# Patient Record
Sex: MALE | Race: OTHER | HISPANIC OR LATINO | URBAN - METROPOLITAN AREA
[De-identification: names, ages, dates, MRNs, and addresses within clinical notes are randomized per-mention and may not be internally consistent; named-entity substitution may affect disease eponyms.]

---

## 2018-03-01 ENCOUNTER — OUTPATIENT (OUTPATIENT)
Dept: OUTPATIENT SERVICES | Facility: HOSPITAL | Age: 38
LOS: 1 days | End: 2018-03-01

## 2018-03-16 DIAGNOSIS — R69 ILLNESS, UNSPECIFIED: ICD-10-CM

## 2018-12-11 ENCOUNTER — EMERGENCY (EMERGENCY)
Facility: HOSPITAL | Age: 38
LOS: 1 days | Discharge: LEFT BEFORE DISCHARGE | End: 2018-12-11
Attending: EMERGENCY MEDICINE | Admitting: INTERNAL MEDICINE
Payer: MEDICAID

## 2018-12-11 VITALS
WEIGHT: 223.99 LBS | DIASTOLIC BLOOD PRESSURE: 87 MMHG | OXYGEN SATURATION: 95 % | HEIGHT: 69 IN | TEMPERATURE: 99 F | HEART RATE: 107 BPM | SYSTOLIC BLOOD PRESSURE: 129 MMHG | RESPIRATION RATE: 18 BRPM

## 2018-12-11 VITALS
DIASTOLIC BLOOD PRESSURE: 77 MMHG | OXYGEN SATURATION: 99 % | HEART RATE: 89 BPM | SYSTOLIC BLOOD PRESSURE: 122 MMHG | RESPIRATION RATE: 18 BRPM

## 2018-12-11 DIAGNOSIS — R69 ILLNESS, UNSPECIFIED: ICD-10-CM

## 2018-12-11 DIAGNOSIS — F11.20 OPIOID DEPENDENCE, UNCOMPLICATED: ICD-10-CM

## 2018-12-11 DIAGNOSIS — F19.10 OTHER PSYCHOACTIVE SUBSTANCE ABUSE, UNCOMPLICATED: ICD-10-CM

## 2018-12-11 DIAGNOSIS — M86.9 OSTEOMYELITIS, UNSPECIFIED: ICD-10-CM

## 2018-12-11 LAB
ALBUMIN SERPL ELPH-MCNC: 4.2 G/DL — SIGNIFICANT CHANGE UP (ref 3.3–5)
ALP SERPL-CCNC: 94 U/L — SIGNIFICANT CHANGE UP (ref 40–120)
ALT FLD-CCNC: 51 U/L — HIGH (ref 10–45)
ANION GAP SERPL CALC-SCNC: 11 MMOL/L — SIGNIFICANT CHANGE UP (ref 5–17)
APTT BLD: 19.7 SEC — LOW (ref 27.5–36.3)
AST SERPL-CCNC: 49 U/L — HIGH (ref 10–40)
BASOPHILS # BLD AUTO: 0 K/UL — SIGNIFICANT CHANGE UP (ref 0–0.2)
BASOPHILS NFR BLD AUTO: 0.8 % — SIGNIFICANT CHANGE UP (ref 0–2)
BILIRUB SERPL-MCNC: 0.4 MG/DL — SIGNIFICANT CHANGE UP (ref 0.2–1.2)
BUN SERPL-MCNC: 17 MG/DL — SIGNIFICANT CHANGE UP (ref 7–23)
CALCIUM SERPL-MCNC: 9.7 MG/DL — SIGNIFICANT CHANGE UP (ref 8.4–10.5)
CHLORIDE SERPL-SCNC: 97 MMOL/L — SIGNIFICANT CHANGE UP (ref 96–108)
CO2 SERPL-SCNC: 26 MMOL/L — SIGNIFICANT CHANGE UP (ref 22–31)
CREAT SERPL-MCNC: 0.69 MG/DL — SIGNIFICANT CHANGE UP (ref 0.5–1.3)
CRP SERPL-MCNC: 4.33 MG/DL — HIGH (ref 0–0.4)
EOSINOPHIL # BLD AUTO: 0.2 K/UL — SIGNIFICANT CHANGE UP (ref 0–0.5)
EOSINOPHIL NFR BLD AUTO: 4.4 % — SIGNIFICANT CHANGE UP (ref 0–6)
ERYTHROCYTE [SEDIMENTATION RATE] IN BLOOD: 44 MM/HR — HIGH (ref 0–15)
GAS PNL BLDV: SIGNIFICANT CHANGE UP
GLUCOSE SERPL-MCNC: 118 MG/DL — HIGH (ref 70–99)
HCT VFR BLD CALC: 40.8 % — SIGNIFICANT CHANGE UP (ref 39–50)
HGB BLD-MCNC: 14 G/DL — SIGNIFICANT CHANGE UP (ref 13–17)
INR BLD: 0.97 RATIO — SIGNIFICANT CHANGE UP (ref 0.88–1.16)
LYMPHOCYTES # BLD AUTO: 1 K/UL — SIGNIFICANT CHANGE UP (ref 1–3.3)
LYMPHOCYTES # BLD AUTO: 20.2 % — SIGNIFICANT CHANGE UP (ref 13–44)
MCHC RBC-ENTMCNC: 29.6 PG — SIGNIFICANT CHANGE UP (ref 27–34)
MCHC RBC-ENTMCNC: 34.4 GM/DL — SIGNIFICANT CHANGE UP (ref 32–36)
MCV RBC AUTO: 86.1 FL — SIGNIFICANT CHANGE UP (ref 80–100)
MONOCYTES # BLD AUTO: 0.4 K/UL — SIGNIFICANT CHANGE UP (ref 0–0.9)
MONOCYTES NFR BLD AUTO: 7.7 % — SIGNIFICANT CHANGE UP (ref 2–14)
NEUTROPHILS # BLD AUTO: 3.4 K/UL — SIGNIFICANT CHANGE UP (ref 1.8–7.4)
NEUTROPHILS NFR BLD AUTO: 66.8 % — SIGNIFICANT CHANGE UP (ref 43–77)
PLATELET # BLD AUTO: 229 K/UL — SIGNIFICANT CHANGE UP (ref 150–400)
POTASSIUM SERPL-MCNC: 5.2 MMOL/L — SIGNIFICANT CHANGE UP (ref 3.5–5.3)
POTASSIUM SERPL-SCNC: 5.2 MMOL/L — SIGNIFICANT CHANGE UP (ref 3.5–5.3)
PROT SERPL-MCNC: 8 G/DL — SIGNIFICANT CHANGE UP (ref 6–8.3)
PROTHROM AB SERPL-ACNC: 11 SEC — SIGNIFICANT CHANGE UP (ref 10–12.9)
RBC # BLD: 4.74 M/UL — SIGNIFICANT CHANGE UP (ref 4.2–5.8)
RBC # FLD: 14.1 % — SIGNIFICANT CHANGE UP (ref 10.3–14.5)
SODIUM SERPL-SCNC: 134 MMOL/L — LOW (ref 135–145)
WBC # BLD: 5.1 K/UL — SIGNIFICANT CHANGE UP (ref 3.8–10.5)
WBC # FLD AUTO: 5.1 K/UL — SIGNIFICANT CHANGE UP (ref 3.8–10.5)

## 2018-12-11 PROCEDURE — 85027 COMPLETE CBC AUTOMATED: CPT

## 2018-12-11 PROCEDURE — 99284 EMERGENCY DEPT VISIT MOD MDM: CPT

## 2018-12-11 PROCEDURE — 99285 EMERGENCY DEPT VISIT HI MDM: CPT

## 2018-12-11 PROCEDURE — 82803 BLOOD GASES ANY COMBINATION: CPT

## 2018-12-11 PROCEDURE — 80053 COMPREHEN METABOLIC PANEL: CPT

## 2018-12-11 PROCEDURE — 85730 THROMBOPLASTIN TIME PARTIAL: CPT

## 2018-12-11 PROCEDURE — 86140 C-REACTIVE PROTEIN: CPT

## 2018-12-11 PROCEDURE — 83605 ASSAY OF LACTIC ACID: CPT

## 2018-12-11 PROCEDURE — 85610 PROTHROMBIN TIME: CPT

## 2018-12-11 PROCEDURE — 82565 ASSAY OF CREATININE: CPT

## 2018-12-11 PROCEDURE — 85652 RBC SED RATE AUTOMATED: CPT

## 2018-12-11 PROCEDURE — 82435 ASSAY OF BLOOD CHLORIDE: CPT

## 2018-12-11 PROCEDURE — 82330 ASSAY OF CALCIUM: CPT

## 2018-12-11 PROCEDURE — 84295 ASSAY OF SERUM SODIUM: CPT

## 2018-12-11 PROCEDURE — 82947 ASSAY GLUCOSE BLOOD QUANT: CPT

## 2018-12-11 PROCEDURE — 84132 ASSAY OF SERUM POTASSIUM: CPT

## 2018-12-11 PROCEDURE — 85014 HEMATOCRIT: CPT

## 2018-12-11 RX ORDER — ONDANSETRON 8 MG/1
4 TABLET, FILM COATED ORAL EVERY 6 HOURS
Qty: 0 | Refills: 0 | Status: DISCONTINUED | OUTPATIENT
Start: 2018-12-11 | End: 2018-12-12

## 2018-12-11 RX ORDER — INFLUENZA VIRUS VACCINE 15; 15; 15; 15 UG/.5ML; UG/.5ML; UG/.5ML; UG/.5ML
0.5 SUSPENSION INTRAMUSCULAR ONCE
Qty: 0 | Refills: 0 | Status: DISCONTINUED | OUTPATIENT
Start: 2018-12-11 | End: 2018-12-12

## 2018-12-11 RX ORDER — PIPERACILLIN AND TAZOBACTAM 4; .5 G/20ML; G/20ML
3.38 INJECTION, POWDER, LYOPHILIZED, FOR SOLUTION INTRAVENOUS EVERY 8 HOURS
Qty: 0 | Refills: 0 | Status: DISCONTINUED | OUTPATIENT
Start: 2018-12-11 | End: 2018-12-12

## 2018-12-11 RX ORDER — VANCOMYCIN HCL 1 G
1000 VIAL (EA) INTRAVENOUS EVERY 12 HOURS
Qty: 0 | Refills: 0 | Status: DISCONTINUED | OUTPATIENT
Start: 2018-12-11 | End: 2018-12-12

## 2018-12-11 RX ORDER — NALOXONE HYDROCHLORIDE 4 MG/.1ML
2 SPRAY NASAL ONCE
Qty: 0 | Refills: 0 | Status: COMPLETED | OUTPATIENT
Start: 2018-12-11 | End: 2018-12-11

## 2018-12-11 RX ORDER — ENOXAPARIN SODIUM 100 MG/ML
40 INJECTION SUBCUTANEOUS EVERY 24 HOURS
Qty: 0 | Refills: 0 | Status: DISCONTINUED | OUTPATIENT
Start: 2018-12-11 | End: 2018-12-12

## 2018-12-11 RX ORDER — KETOROLAC TROMETHAMINE 30 MG/ML
30 SYRINGE (ML) INJECTION ONCE
Qty: 0 | Refills: 0 | Status: DISCONTINUED | OUTPATIENT
Start: 2018-12-11 | End: 2018-12-12

## 2018-12-11 RX ADMIN — NALOXONE HYDROCHLORIDE 2 MILLIGRAM(S): 4 SPRAY NASAL at 18:21

## 2018-12-11 RX ADMIN — Medication 250 MILLIGRAM(S): at 20:22

## 2018-12-11 NOTE — ED ADULT NURSE NOTE - NSIMPLEMENTINTERV_GEN_ALL_ED
Implemented All Universal Safety Interventions:  [place] to call system. Call bell, personal items and telephone within reach. Instruct patient to call for assistance. Room bathroom lighting operational. Non-slip footwear when patient is off stretcher. Physically safe environment: no spills, clutter or unnecessary equipment. Stretcher in lowest position, wheels locked, appropriate side rails in place.
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
## 2018-12-11 NOTE — ED BEHAVIORAL HEALTH ASSESSMENT NOTE - HPI (INCLUDE ILLNESS QUALITY, SEVERITY, DURATION, TIMING, CONTEXT, MODIFYING FACTORS, ASSOCIATED SIGNS AND SYMPTOMS)
Pt is 37 yo  male ,  for past 2 months, lives with wife and 2 stepsons, PMHx current IV drug abuse, osteomyelitis T5T6 with washout presented to the ED after signing out AMA from [facility] while being treated for osteomyelitis of L5S1. Patient left AMA this morning as he was offended RN accused patient of tampering with IV. Patient stated he had CT and MRI with IV contrast while at [facility]. Psychiatric consult was requested to evaluate the pt's capacity to leave AMA. Pt was requesting to leave AMA despite encouragement and education about risks of leaving AMA without receiving proper treatment and risk of spreading of the infection possible death. pt was seen and evaluated at bedside, he is calm ,cooperatives and AAOX3. Pt reports that he is not satisfied with his care here after being told that his pain will be managed by Toradol, pt stated " I am a heroin user and need stronger medication" pt reports that at [facility] he as given strong pain killers as oxycodone 40 mg. Writer spoke with pt regarding pain management in the hospital according to medical team discretion, pt contributed to refuse to stay. Per ER team, pt earlier went to bathroom and injected heroin as IV and had to be given Narcan to resuscitate him. Pt admits of hx of heroin use since age 31, started with oxycodone and sri pills, pt then switched to heroin and been using daily 20 bags for past one year, pt reports that he is prescribed Adderall and Xanax by a psychiatrist for his anxiety, however pt refused to give name of the psychiatrist and details. Pt reports that he currently using Xanax 2 mg TID and Adderall 30 mg BID  for past 2 years. Writer offered the pt rehab referral but her refused. Pt denies nay hx of psychiatric admissions or suicidal attempts, has hx of 19 days in penitentiary for possession of drugs, no hx of violence. Pt denies endogenous symptoms of depression like low energy, excessive feelings of guilt, reports good sleep, good appetite, good concentration, denies feelings of hopelessness or helplessness or worthlessness. Pt reports her mood to be "Ok". Pt denies suicidal ideation, intent or plan. Pt denies any manic symptoms like mood instability, impulsivity, grandiosity, racing thoughts, insomnia or pressured speech. Pt denies auditory or visual hallucinations, denies paranoia, thought insertion or thought broad casting, depersonalization or derealization.   Pt denies obsessions or compulsions, denies symptoms of PTSD. Patient denies symptoms of ZEN, Phobias, Social anxiety. Suicidal and homicidal risk assessment was done. Patient at this time does not have any acute, modifiable, imminent risk for suicide. Patient also denies any violent thoughts. Pt denies any hallucinations; patient can control his impulses and think rationally.     Risks of leaving AMA was explained in details at bedside for the pt by the PA MsMariella ( [id number]) Pt verbalized understanding  was able to repeat what was told , instructed pt to come back to ER if he decided to receive treatment or had any worsening symptoms as pain or fever. Pt agreed. Counseling and education provided to pt regarding risk of substance use and resources offered to pt for tx.
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
## 2018-12-11 NOTE — ED ADULT NURSE REASSESSMENT NOTE - NS ED NURSE REASSESS COMMENT FT1
MD herring state to hold off on IV insertion/ blood work at this time and is waiting to hear back from doctors at [facility] on the care that was provided to patient during admission there.

## 2018-12-11 NOTE — ED PROVIDER NOTE - ATTENDING CONTRIBUTION TO CARE
39 y/o male with the above documented history and HPI who on exam appears well and comfortable but saying he received narcotics prior to his leaving [facility], under the influence. VSs noted, sclerae anicteric, MM's moist, neck supple, lungs CTA, cardiac sounds s/ audible m/r/g, abdomen soft, NT/ND, extremities s/ asymmetry, back s/ erythema, edema, ecchymosis, warmth or reproudcible ttp skin s/ rash or jaundice and neurologically intact. We will attempt to contact [facility] in an effort to establish his status at the time of his leaving the hospital and the plan that was in place. 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

## 2018-12-11 NOTE — PROVIDER CONTACT NOTE (MEDICATION) - ACTION/TREATMENT ORDERED:
PA notified and made aware. Jenifer, ADMIN at bedside. pt has pending psych consult. will continue to monitor.

## 2018-12-11 NOTE — ED ADULT NURSE NOTE - OBJECTIVE STATEMENT
38y Male PMH  diagnosed with osteomyelitis of L4-L5 at [facility] on Friday and states he left against medical advice because "the nurse accused me of messing with the IV." 38y Male PMH IV Drug abuse, vertebral osteomylitis, presents to the ED c/o infection.     diagnosed with osteomyelitis of L4-L5 at [facility] on Friday and states he left against medical advice because "the nurse accused me of messing with the IV."  Pt states that prior to leaving the hospital he was given Oxyxodone 40mg and Xanax 2mg. Pt presents a&oX4, drowsy but arousable to voice. 38y Male PMH IV Drug abuse, vertebral osteomylitis, presents to the ED c/o infection. Pt states that on Friday he began to have back pain, went to [place] and was diagnosed osteomyelitis of L4-L5. Pt was admitted for antibiotics and states he left AMA because "the nurse accused me of messing with the IV."  Pt states that prior to leaving the hospital he was given Oxyxodone 40mg and Xanax 2mg. Pt presents a&oX4, drowsy but arousable to voice. 38y Male PMH IV Drug abuse, vertebral osteomyelitis, presents to the ED c/o infection. Pt states that on Friday he began to have back pain, went to [place] and was diagnosed osteomyelitis of L4-L5. Pt was admitted for antibiotics and states he left AMA because "the nurse accused me of messing with the IV." Pt also reporting numbness to   Pt states that prior to leaving the hospital he was given Oxyxodone 40mg and Xanax 2mg. Pt reports IV use of Heroin, pt states that he last used Heroin on 12/7/18 prior to going to [place], Pt denies using IV drugs prior to arrival to [facility] ED. Pt denies having any drugs on him at this time. Pt presents a&oX4, drowsy but arousable to voice, MAUREEN Vasquez  requesting pt to be placed on 1:1, airway intact, breathing spontaneously and unlabored, lung sounds clear bilaterally, abd soft nondistended nontender to palpation, scaring noted to bilateral upper extremities, pin point pupils noted, gross neuro intact, pt speaking in full sentences and moving all extremities, no facial droop or slurred speech noted, equal strength and sensation bilaterally, 38y Male PMH IV Drug abuse, vertebral osteomyelitis, presents to the ED c/o infection. Pt states that on Friday he began to have back pain, went to [place] and was diagnosed osteomyelitis of L4-L5. Pt was admitted for antibiotics and states he left AMA because "the nurse accused me of messing with the IV." Pt also reporting numbness to L. leg X weeks. Pt states that prior to leaving the hospital he was given Oxyxodone 40mg and Xanax 2mg. Pt reports IV use of Heroin, pt states that he last used Heroin on 12/7/18 prior to going to [place], Pt denies using IV drugs prior to arrival to [facility] ED. Pt denies having any drugs on him at this time. Pt presents a&oX4, drowsy but arousable to voice, MAUREEN Vasquez  requesting pt to be placed on 1:1, airway intact, breathing spontaneously and unlabored, lung sounds clear bilaterally, abd soft nondistended nontender to palpation, scaring noted to bilateral upper extremities, pin point pupils noted, gross neuro intact, pt speaking in full sentences and moving all extremities, no facial droop or slurred speech noted, equal strength and sensation bilaterally, skin warm and dry, cap refill <3 seconds, +peripheral pulses, denies ha, dizziness, SOB, CP, light-headedness, n/v, abd pain, dysuria, hematuria, tingling to extremities, inability to ambulate, loss of bladder/bowel function. MD at bedside for eval. safety maintained.

## 2018-12-11 NOTE — ED PROVIDER NOTE - PHYSICAL EXAMINATION
Patient complaining of chest pain and nose bleed. No obvious distress. Patient appears to be under the influence from unknown substance

## 2018-12-11 NOTE — ED BEHAVIORAL HEALTH ASSESSMENT NOTE - DESCRIPTION
calm and cooperative , no agitations or PRN needed    Heart Rate  Heart Rate Heart Rate (beats/min): 89 /min  Heart Rate Method: cardiac monitor    Noninvasive Blood Pressure  BP Systolic Systolic: 122 mm Hg  BP Diastolic Diastolic (mm Hg): 77 mm Hg  Blood Pressure - Site Site: left upper arm  Blood Pressure - Method Method: electronic    Respiratory/Pulse Oximetry  Respiration Rate (breaths/min) Respiration Rate (breaths/min): 18 /min  SpO2 (%) SpO2 (%): 99 %  O2 delivery Patient On: supplemental O2    Oxygen Therapy (Pediatric/Adult)  Oxygen Therapy Flow (L/min) Oxygen Flow (L/min): 2 L/Min  Oxygen Therapy: Delivery Method Delivery Method: nasal cannula osteomyelitis T5T6 see hPI

## 2018-12-11 NOTE — H&P ADULT - NSHPPHYSICALEXAM_GEN_ALL_CORE
General: WN/WD NAD  PERRLA  Neurology: A&Ox3, nonfocal, GALLAGHER x 4  Respiratory: CTA B/L  CV: RRR, S1S2, no murmurs, rubs or gallops  Abdominal: Soft, NT, ND +BS, Last BM  Extremities: No edema, + peripheral pulses  Skin Normal
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
## 2018-12-11 NOTE — ED ADULT NURSE REASSESSMENT NOTE - NS ED NURSE REASSESS COMMENT FT1
Pt remains a&oX4, resting comfortably in bed in no apparent distress. Pt on 1:1. Calm. Remains on 2L NC. Safety maintained. Awaiting bed assignment.

## 2018-12-11 NOTE — ED BEHAVIORAL HEALTH ASSESSMENT NOTE - RISK ASSESSMENT
Low risk  Risk factors: active substance use, limited coping skills , not in treatment, male gender  Protective factors: no prior psych admissions or suicide attempts, no hx of self injurious behavior. Overall protective factors overweights the risk factors and no indication for acute hospitalization.
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
## 2018-12-11 NOTE — H&P ADULT - NSHPLABSRESULTS_GEN_ALL_CORE
Lab Results:  CBC  CBC Full  -  ( 11 Dec 2018 14:44 )  WBC Count : 5.1 K/uL  Hemoglobin : 14.0 g/dL  Hematocrit : 40.8 %  Platelet Count - Automated : 229 K/uL  Mean Cell Volume : 86.1 fl  Mean Cell Hemoglobin : 29.6 pg  Mean Cell Hemoglobin Concentration : 34.4 gm/dL  Auto Neutrophil # : 3.4 K/uL  Auto Lymphocyte # : 1.0 K/uL  Auto Monocyte # : 0.4 K/uL  Auto Eosinophil # : 0.2 K/uL  Auto Basophil # : 0.0 K/uL  Auto Neutrophil % : 66.8 %  Auto Lymphocyte % : 20.2 %  Auto Monocyte % : 7.7 %  Auto Eosinophil % : 4.4 %  Auto Basophil % : 0.8 %    .		Differential:	[] Automated		[] Manual  Chemistry                        14.0   5.1   )-----------( 229      ( 11 Dec 2018 14:44 )             40.8     12-11    134<L>  |  97  |  17  ----------------------------<  118<H>  5.2   |  26  |  0.69    Ca    9.7      11 Dec 2018 14:44    TPro  8.0  /  Alb  4.2  /  TBili  0.4  /  DBili  x   /  AST  49<H>  /  ALT  51<H>  /  AlkPhos  94  12-11    LIVER FUNCTIONS - ( 11 Dec 2018 14:44 )  Alb: 4.2 g/dL / Pro: 8.0 g/dL / ALK PHOS: 94 U/L / ALT: 51 U/L / AST: 49 U/L / GGT: x           PT/INR - ( 11 Dec 2018 14:44 )   PT: 11.0 sec;   INR: 0.97 ratio         PTT - ( 11 Dec 2018 14:44 )  PTT:19.7 sec          MICROBIOLOGY/CULTURES:      RADIOLOGY RESULTS: reviewed
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
## 2018-12-11 NOTE — ED ADULT NURSE REASSESSMENT NOTE - NS ED NURSE REASSESS COMMENT FT1
Pt being placed on 1:1 after evaluation by MD Blankenship. Pt lethargic at this time but becomes alert to voice stimuli. Pt currently standing in room putting on gown and taking other clothes off. Pt steady on feet. 1:1 at bedside. Safety maintained. will continue to monitor

## 2018-12-11 NOTE — ED BEHAVIORAL HEALTH ASSESSMENT NOTE - SUICIDE PROTECTIVE FACTORS
Pt Malayalam speaking, refused  services and requested son Marcellus Lopez [phone] to interpret.
Fear of death or dying due to pain/suffering/Identifies reasons for living

## 2018-12-11 NOTE — ED BEHAVIORAL HEALTH ASSESSMENT NOTE - SUMMARY
Pt is 39 yo  male ,  for past 2 months, lives with wife and 2 stepsons, PMHx current IV drug abuse, osteomyelitis T5T6 with washout presented to the ED after signing out AMA from [facility] while being treated for osteomyelitis of L5S1. Patient left AMA this morning as he was offended RN accused patient of tampering with IV. Patient stated he had CT and MRI with IV contrast while at [facility]. Psychiatric consult was requested to evaluate the pt's capacity to leave AMA. Pt presents as clam and cooperative, relates well to questions and interviewer. no psychotic cor manic symptoms appreciated or reported. Pt is AAOX3. Pt was able to verbalize the risks of leaving AMA as discussed with him by medical team, advised pt t return to nearest ER if developed symptoms as worsening pain or fever. Pt agreed. Patient at this time does not have any acute, modifiable, imminent risk for suicide. Patient also denies any violent thoughts. Pt denies any hallucinations; patient can control his impulses and think rationally. Pt has capacity to leave AMA
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
## 2018-12-11 NOTE — ED ADULT NURSE REASSESSMENT NOTE - NS ED NURSE REASSESS COMMENT FT1
Pt was on 1:1 and was brought to bathroom. 1:1 states that she allowed patient in bathroom with the door slightly open. 1:1 states that about 10 minutes in he shut the door and locked it. 1:1 states she kept asking patient to open up the door and asked if he was okay but did not ask for assistance for about two minutes. ED Tech Brigitte went to help 1:1 and opened door and states she found him crouching on the floor. 1:1 states she heard the toilet flush multiple times. Pt immediately placed back on stretcher and brought into room. Powdery white substance found in pants pocket. MAUREEN Vasquez and MD Blankenship at bedside for evaluation. Pt lethargic but arousable to voice stimuli. Pt placed on cardiac monitor, CO2 monitoring and placed on 2L NC for SpO2 of 88-89%. Security contacted and at bedside to wand patient and remove belongings from room. Per MD Blankenship's orders patient was given 2mg of Narcan. Charge RN aware of situation. 1:1 remains in place. Pt now more alert and VSS.

## 2018-12-12 ENCOUNTER — INPATIENT (INPATIENT)
Facility: HOSPITAL | Age: 38
LOS: 4 days | Discharge: LEFT BEFORE DISCHARGE | DRG: 540 | End: 2018-12-17
Attending: INTERNAL MEDICINE | Admitting: INTERNAL MEDICINE
Payer: MEDICAID

## 2018-12-12 VITALS
WEIGHT: 229.94 LBS | OXYGEN SATURATION: 98 % | TEMPERATURE: 98 F | HEIGHT: 68 IN | HEART RATE: 72 BPM | RESPIRATION RATE: 16 BRPM | SYSTOLIC BLOOD PRESSURE: 138 MMHG | DIASTOLIC BLOOD PRESSURE: 88 MMHG

## 2018-12-12 DIAGNOSIS — M46.27 OSTEOMYELITIS OF VERTEBRA, LUMBOSACRAL REGION: ICD-10-CM

## 2018-12-12 DIAGNOSIS — F19.10 OTHER PSYCHOACTIVE SUBSTANCE ABUSE, UNCOMPLICATED: ICD-10-CM

## 2018-12-12 DIAGNOSIS — Z98.890 OTHER SPECIFIED POSTPROCEDURAL STATES: Chronic | ICD-10-CM

## 2018-12-12 LAB
ALBUMIN SERPL ELPH-MCNC: 4.2 G/DL — SIGNIFICANT CHANGE UP (ref 3.3–5)
ALP SERPL-CCNC: 90 U/L — SIGNIFICANT CHANGE UP (ref 40–120)
ALT FLD-CCNC: 46 U/L — HIGH (ref 10–45)
AMPHET UR-MCNC: NEGATIVE — SIGNIFICANT CHANGE UP
ANION GAP SERPL CALC-SCNC: 13 MMOL/L — SIGNIFICANT CHANGE UP (ref 5–17)
APTT BLD: 27.4 SEC — LOW (ref 27.5–36.3)
AST SERPL-CCNC: 38 U/L — SIGNIFICANT CHANGE UP (ref 10–40)
BARBITURATES UR SCN-MCNC: NEGATIVE — SIGNIFICANT CHANGE UP
BASOPHILS # BLD AUTO: 0 K/UL — SIGNIFICANT CHANGE UP (ref 0–0.2)
BASOPHILS NFR BLD AUTO: 0.4 % — SIGNIFICANT CHANGE UP (ref 0–2)
BENZODIAZ UR-MCNC: POSITIVE
BILIRUB SERPL-MCNC: 0.5 MG/DL — SIGNIFICANT CHANGE UP (ref 0.2–1.2)
BUN SERPL-MCNC: 17 MG/DL — SIGNIFICANT CHANGE UP (ref 7–23)
CALCIUM SERPL-MCNC: 9.4 MG/DL — SIGNIFICANT CHANGE UP (ref 8.4–10.5)
CHLORIDE SERPL-SCNC: 93 MMOL/L — LOW (ref 96–108)
CO2 SERPL-SCNC: 26 MMOL/L — SIGNIFICANT CHANGE UP (ref 22–31)
COCAINE METAB.OTHER UR-MCNC: NEGATIVE — SIGNIFICANT CHANGE UP
CREAT SERPL-MCNC: 0.69 MG/DL — SIGNIFICANT CHANGE UP (ref 0.5–1.3)
CRP SERPL-MCNC: 2.82 MG/DL — HIGH (ref 0–0.4)
EOSINOPHIL # BLD AUTO: 0.2 K/UL — SIGNIFICANT CHANGE UP (ref 0–0.5)
EOSINOPHIL NFR BLD AUTO: 3.6 % — SIGNIFICANT CHANGE UP (ref 0–6)
ERYTHROCYTE [SEDIMENTATION RATE] IN BLOOD: 40 MM/HR — HIGH (ref 0–15)
GAS PNL BLDV: SIGNIFICANT CHANGE UP
GLUCOSE SERPL-MCNC: 127 MG/DL — HIGH (ref 70–99)
HCT VFR BLD CALC: 40.1 % — SIGNIFICANT CHANGE UP (ref 39–50)
HGB BLD-MCNC: 13.6 G/DL — SIGNIFICANT CHANGE UP (ref 13–17)
INR BLD: 0.97 RATIO — SIGNIFICANT CHANGE UP (ref 0.88–1.16)
LYMPHOCYTES # BLD AUTO: 1 K/UL — SIGNIFICANT CHANGE UP (ref 1–3.3)
LYMPHOCYTES # BLD AUTO: 16.7 % — SIGNIFICANT CHANGE UP (ref 13–44)
MCHC RBC-ENTMCNC: 29.3 PG — SIGNIFICANT CHANGE UP (ref 27–34)
MCHC RBC-ENTMCNC: 34.1 GM/DL — SIGNIFICANT CHANGE UP (ref 32–36)
MCV RBC AUTO: 85.9 FL — SIGNIFICANT CHANGE UP (ref 80–100)
METHADONE UR-MCNC: NEGATIVE — SIGNIFICANT CHANGE UP
MONOCYTES # BLD AUTO: 0.5 K/UL — SIGNIFICANT CHANGE UP (ref 0–0.9)
MONOCYTES NFR BLD AUTO: 7.4 % — SIGNIFICANT CHANGE UP (ref 2–14)
NEUTROPHILS # BLD AUTO: 4.4 K/UL — SIGNIFICANT CHANGE UP (ref 1.8–7.4)
NEUTROPHILS NFR BLD AUTO: 71.9 % — SIGNIFICANT CHANGE UP (ref 43–77)
OPIATES UR-MCNC: POSITIVE
OXYCODONE UR-MCNC: POSITIVE
PCP SPEC-MCNC: SIGNIFICANT CHANGE UP
PCP UR-MCNC: NEGATIVE — SIGNIFICANT CHANGE UP
PLATELET # BLD AUTO: 254 K/UL — SIGNIFICANT CHANGE UP (ref 150–400)
POTASSIUM SERPL-MCNC: 4.1 MMOL/L — SIGNIFICANT CHANGE UP (ref 3.5–5.3)
POTASSIUM SERPL-SCNC: 4.1 MMOL/L — SIGNIFICANT CHANGE UP (ref 3.5–5.3)
PROT SERPL-MCNC: 7.8 G/DL — SIGNIFICANT CHANGE UP (ref 6–8.3)
PROTHROM AB SERPL-ACNC: 11.1 SEC — SIGNIFICANT CHANGE UP (ref 10–12.9)
RBC # BLD: 4.67 M/UL — SIGNIFICANT CHANGE UP (ref 4.2–5.8)
RBC # FLD: 13.9 % — SIGNIFICANT CHANGE UP (ref 10.3–14.5)
SODIUM SERPL-SCNC: 132 MMOL/L — LOW (ref 135–145)
THC UR QL: NEGATIVE — SIGNIFICANT CHANGE UP
WBC # BLD: 6.1 K/UL — SIGNIFICANT CHANGE UP (ref 3.8–10.5)
WBC # FLD AUTO: 6.1 K/UL — SIGNIFICANT CHANGE UP (ref 3.8–10.5)

## 2018-12-12 PROCEDURE — 99285 EMERGENCY DEPT VISIT HI MDM: CPT | Mod: 25

## 2018-12-12 PROCEDURE — 99223 1ST HOSP IP/OBS HIGH 75: CPT

## 2018-12-12 PROCEDURE — 72149 MRI LUMBAR SPINE W/DYE: CPT | Mod: 26

## 2018-12-12 RX ORDER — OXYCODONE HYDROCHLORIDE 5 MG/1
10 TABLET ORAL EVERY 6 HOURS
Qty: 0 | Refills: 0 | Status: DISCONTINUED | OUTPATIENT
Start: 2018-12-12 | End: 2018-12-12

## 2018-12-12 RX ORDER — OXYCODONE HYDROCHLORIDE 5 MG/1
120 TABLET ORAL
Qty: 0 | Refills: 0 | Status: DISCONTINUED | OUTPATIENT
Start: 2018-12-12 | End: 2018-12-12

## 2018-12-12 RX ORDER — VANCOMYCIN HCL 1 G
1500 VIAL (EA) INTRAVENOUS EVERY 12 HOURS
Qty: 0 | Refills: 0 | Status: DISCONTINUED | OUTPATIENT
Start: 2018-12-12 | End: 2018-12-12

## 2018-12-12 RX ORDER — OXYCODONE HYDROCHLORIDE 5 MG/1
15 TABLET ORAL ONCE
Qty: 0 | Refills: 0 | Status: DISCONTINUED | OUTPATIENT
Start: 2018-12-12 | End: 2018-12-12

## 2018-12-12 RX ORDER — INFLUENZA VIRUS VACCINE 15; 15; 15; 15 UG/.5ML; UG/.5ML; UG/.5ML; UG/.5ML
0.5 SUSPENSION INTRAMUSCULAR ONCE
Qty: 0 | Refills: 0 | Status: DISCONTINUED | OUTPATIENT
Start: 2018-12-12 | End: 2018-12-17

## 2018-12-12 RX ORDER — VANCOMYCIN HCL 1 G
1000 VIAL (EA) INTRAVENOUS ONCE
Qty: 0 | Refills: 0 | Status: COMPLETED | OUTPATIENT
Start: 2018-12-12 | End: 2018-12-12

## 2018-12-12 RX ORDER — OXYCODONE HYDROCHLORIDE 5 MG/1
30 TABLET ORAL EVERY 6 HOURS
Qty: 0 | Refills: 0 | Status: DISCONTINUED | OUTPATIENT
Start: 2018-12-12 | End: 2018-12-13

## 2018-12-12 RX ORDER — PIPERACILLIN AND TAZOBACTAM 4; .5 G/20ML; G/20ML
3.38 INJECTION, POWDER, LYOPHILIZED, FOR SOLUTION INTRAVENOUS ONCE
Qty: 0 | Refills: 0 | Status: COMPLETED | OUTPATIENT
Start: 2018-12-12 | End: 2018-12-12

## 2018-12-12 RX ORDER — SODIUM CHLORIDE 9 MG/ML
1000 INJECTION INTRAMUSCULAR; INTRAVENOUS; SUBCUTANEOUS ONCE
Qty: 0 | Refills: 0 | Status: COMPLETED | OUTPATIENT
Start: 2018-12-12 | End: 2018-12-12

## 2018-12-12 RX ORDER — HEPARIN SODIUM 5000 [USP'U]/ML
5000 INJECTION INTRAVENOUS; SUBCUTANEOUS EVERY 8 HOURS
Qty: 0 | Refills: 0 | Status: DISCONTINUED | OUTPATIENT
Start: 2018-12-12 | End: 2018-12-17

## 2018-12-12 RX ORDER — ALPRAZOLAM 0.25 MG
2 TABLET ORAL THREE TIMES A DAY
Qty: 0 | Refills: 0 | Status: DISCONTINUED | OUTPATIENT
Start: 2018-12-12 | End: 2018-12-17

## 2018-12-12 RX ORDER — NICOTINE POLACRILEX 2 MG
1 GUM BUCCAL DAILY
Qty: 0 | Refills: 0 | Status: DISCONTINUED | OUTPATIENT
Start: 2018-12-12 | End: 2018-12-17

## 2018-12-12 RX ORDER — PIPERACILLIN AND TAZOBACTAM 4; .5 G/20ML; G/20ML
3.38 INJECTION, POWDER, LYOPHILIZED, FOR SOLUTION INTRAVENOUS EVERY 8 HOURS
Qty: 0 | Refills: 0 | Status: DISCONTINUED | OUTPATIENT
Start: 2018-12-12 | End: 2018-12-12

## 2018-12-12 RX ADMIN — Medication 1 PATCH: at 09:16

## 2018-12-12 RX ADMIN — Medication 250 MILLIGRAM(S): at 09:11

## 2018-12-12 RX ADMIN — HEPARIN SODIUM 5000 UNIT(S): 5000 INJECTION INTRAVENOUS; SUBCUTANEOUS at 21:26

## 2018-12-12 RX ADMIN — Medication 1 PATCH: at 19:39

## 2018-12-12 RX ADMIN — OXYCODONE HYDROCHLORIDE 15 MILLIGRAM(S): 5 TABLET ORAL at 09:04

## 2018-12-12 RX ADMIN — Medication 1 MILLIGRAM(S): at 15:03

## 2018-12-12 RX ADMIN — OXYCODONE HYDROCHLORIDE 15 MILLIGRAM(S): 5 TABLET ORAL at 08:41

## 2018-12-12 RX ADMIN — Medication 2 MILLIGRAM(S): at 21:26

## 2018-12-12 RX ADMIN — OXYCODONE HYDROCHLORIDE 30 MILLIGRAM(S): 5 TABLET ORAL at 23:47

## 2018-12-12 RX ADMIN — SODIUM CHLORIDE 2000 MILLILITER(S): 9 INJECTION INTRAMUSCULAR; INTRAVENOUS; SUBCUTANEOUS at 09:12

## 2018-12-12 RX ADMIN — PIPERACILLIN AND TAZOBACTAM 200 GRAM(S): 4; .5 INJECTION, POWDER, LYOPHILIZED, FOR SOLUTION INTRAVENOUS at 08:49

## 2018-12-12 NOTE — ED ADULT NURSE REASSESSMENT NOTE - NS ED NURSE REASSESS COMMENT FT1
1436 Pt to MRI and they called pt states he is claustrophobic will contact admitting team for they ordered the MRi and nothing ordered Jose Antonio

## 2018-12-12 NOTE — BEHAVIORAL HEALTH ASSESSMENT NOTE - THOUGHT PROCESS
Pt contacted,adv to cont the augmentin as per immediate care and then start on prednisone too for the lupus flare Linear

## 2018-12-12 NOTE — BEHAVIORAL HEALTH ASSESSMENT NOTE - NSBHCHARTREVIEWLAB_PSY_A_CORE FT
13.6   6.1   )-----------( 254      ( 12 Dec 2018 08:45 )             40.1     12-12    132<L>  |  93<L>  |  17  ----------------------------<  127<H>  4.1   |  26  |  0.69    Ca    9.4      12 Dec 2018 08:45    TPro  7.8  /  Alb  4.2  /  TBili  0.5  /  DBili  x   /  AST  38  /  ALT  46<H>  /  AlkPhos  90  12-12

## 2018-12-12 NOTE — CONSULT NOTE ADULT - SUBJECTIVE AND OBJECTIVE BOX
p ([id number])     HPI: 38 yr old M current IV drug abuse, h/o osteomyelitis T5-T6 with washout back in 2014 by Dr. Williamson presented to the ED yesterday after signing out AMA from [facility] while being treated for osteomyelitis. Patient complains of a month history of left sided lower back pain radiating to the left leg up to the knee both anteriorly and posteriorly. The pain has affected his mobility as he started limping. He sometimes has numbness as well. However, the patient denies saddle anesthesia and urinary and bowel incontinence.     PAST MEDICAL HISTORY   IV drug abuse    PAST SURGICAL HISTORY   H/O Spinal surgery  No significant past surgical history        MEDICATIONS:  Antibiotics:    Neuro:  oxyCODONE    IR 10 milliGRAM(s) Oral every 6 hours PRN    Anticoagulation:  heparin  Injectable 5000 Unit(s) SubCutaneous every 8 hours    Other:      SOCIAL HISTORY:   Occupation:   Marital Status:     FAMILY HISTORY:  No pertinent family history in first degree relatives      PHYSICAL EXAMINATION:   T(C): 36.9 (12-12-18 @ 12:58), Max: 36.9 (12-12-18 @ 12:58)  HR: 83 (12-12-18 @ 12:58) (72 - 89)  BP: 120/74 (12-12-18 @ 12:58) (94/53 - 138/88)  RR: 18 (12-12-18 @ 17:00) (15 - 18)  SpO2: 100% (12-12-18 @ 12:58) (98% - 100%)  Wt(kg): --Height (cm): 172.72 (12-12 @ 06:50)  Weight (kg): 104.3 (12-12 @ 06:50)    General Examination:     Neurologic Examination:           PHYSICAL EXAM:    Constitutional: No Acute Distress     Neurological: AOx3, Following Commands    Motor exam:          Upper extremity                         Delt     Bicep     Tricep    HG                                                 R         5/5        5/5        5/5       5/5                                               L          5/5        5/5        5/5       5/5          Lower extremity                        HF         KF        KE       DF         PF                                                  R        5/5        4/5        5/5       5/5         5/5                                               L         4/5        4/5       5/5       4/5          5/5                                                 Sensation: [X] intact to light touch  [] decreased:     No clonus    Incision: c/d/i    LABS:                        13.6   6.1   )-----------( 254      ( 12 Dec 2018 08:45 )             40.1     12-12    132<L>  |  93<L>  |  17  ----------------------------<  127<H>  4.1   |  26  |  0.69    Ca    9.4      12 Dec 2018 08:45    TPro  7.8  /  Alb  4.2  /  TBili  0.5  /  DBili  x   /  AST  38  /  ALT  46<H>  /  AlkPhos  90  12-12    PT/INR - ( 12 Dec 2018 08:45 )   PT: 11.1 sec;   INR: 0.97 ratio         PTT - ( 12 Dec 2018 08:45 )  PTT:27.4 sec      RADIOLOGY & ADDITIONAL STUDIES:    MRI: L3-4: Minimal right facet edema may represent early facet infection.   L4-L5: There is right perifacet edema and enhancement. Minimal disc bulge and mild bilateral facet arthropathy results in mild left foraminal narrowing. No central canal stenosis.   There is abnormal enhancement in the bilateral paraspinal musculature extending from L1 to L5 consistent with phlegmon. There is a 5 mm rim enhancing collection at the L1 level may represent an abscess. p ([phone])     HPI: 38 yr old M current IV drug abuse, h/o osteomyelitis T5-T6 with washout back in 2014 by Dr. Williamson presented to the ED yesterday after signing out AMA from [facility] while being treated for osteomyelitis. Patient complains of a month history of left sided lower back pain radiating to the left leg up to the knee both anteriorly and posteriorly. The pain has affected his mobility as he started limping. He sometimes has numbness as well. However, the patient denies saddle anesthesia and urinary and bowel incontinence.     PAST MEDICAL HISTORY   IV drug abuse    PAST SURGICAL HISTORY   H/O Spinal surgery  No significant past surgical history        MEDICATIONS:  Antibiotics:    Neuro:  oxyCODONE    IR 10 milliGRAM(s) Oral every 6 hours PRN    Anticoagulation:  heparin  Injectable 5000 Unit(s) SubCutaneous every 8 hours    Other:      SOCIAL HISTORY:   Occupation:   Marital Status:     FAMILY HISTORY:  No pertinent family history in first degree relatives      PHYSICAL EXAMINATION:   T(C): 36.9 (12-12-18 @ 12:58), Max: 36.9 (12-12-18 @ 12:58)  HR: 83 (12-12-18 @ 12:58) (72 - 89)  BP: 120/74 (12-12-18 @ 12:58) (94/53 - 138/88)  RR: 18 (12-12-18 @ 17:00) (15 - 18)  SpO2: 100% (12-12-18 @ 12:58) (98% - 100%)  Wt(kg): --Height (cm): 172.72 (12-12 @ 06:50)  Weight (kg): 104.3 (12-12 @ 06:50)    General Examination:     Neurologic Examination:           PHYSICAL EXAM:    Constitutional: No Acute Distress     Neurological: AOx3, Following Commands    Motor exam:          Upper extremity                         Delt     Bicep     Tricep    HG                                                 R         5/5        5/5        5/5       5/5                                               L          5/5        5/5        5/5       5/5          Lower extremity                        HF         KF        KE       DF         PF                           R (pain limited)           5/5        4/5        5/5       5/5         5/5                         L (pain limited)          4/5        4/5       5/5       4/5          5/5                                                 Sensation: [X] intact to light touch  [] decreased:     No clonus    Incision: c/d/i    LABS:                        13.6   6.1   )-----------( 254      ( 12 Dec 2018 08:45 )             40.1     12-12    132<L>  |  93<L>  |  17  ----------------------------<  127<H>  4.1   |  26  |  0.69    Ca    9.4      12 Dec 2018 08:45    TPro  7.8  /  Alb  4.2  /  TBili  0.5  /  DBili  x   /  AST  38  /  ALT  46<H>  /  AlkPhos  90  12-12    PT/INR - ( 12 Dec 2018 08:45 )   PT: 11.1 sec;   INR: 0.97 ratio         PTT - ( 12 Dec 2018 08:45 )  PTT:27.4 sec      RADIOLOGY & ADDITIONAL STUDIES:    MRI: L3-4: Minimal right facet edema may represent early facet infection.   L4-L5: There is right perifacet edema and enhancement. Minimal disc bulge and mild bilateral facet arthropathy results in mild left foraminal narrowing. No central canal stenosis.   There is abnormal enhancement in the bilateral paraspinal musculature extending from L1 to L5 consistent with phlegmon. There is a 5 mm rim enhancing collection at the L1 level may represent an abscess.

## 2018-12-12 NOTE — ED ADULT NURSE NOTE - OBJECTIVE STATEMENT
Pt was in the ED earlier today and AMA pt was found to be licked in the bathroom using Heroin with his own needles.  Pt comes back with Back pain Pt has osteomyelitis known and wants to be admitted and stay.  Pt belongs taken and pt placed in a gown and socks Pt asked if he had any drugs on him Pt stated no only his clean needles which where packed up by security and locked up.  Pt hard stick and unable to get an IV 3 attempts by @ rns.  IVL placed in the right Jugular will continue to monitor Mpuleorn

## 2018-12-12 NOTE — DISCHARGE NOTE ADULT - CARE PROVIDER_API CALL
Kashmir Abel), [facility]; Internal Medicine  [address] [place] [address]  Phone: [phone]  Fax: [phone]

## 2018-12-12 NOTE — ED PROVIDER NOTE - PHYSICAL EXAMINATION
Constitutional: Well developed, well nourished. NAD. Good general hygiene  Head: Atraumatic.  Eyes: PERRLA. EOMI without discomfort.   ENT: No nasal discharge. Mucous membranes moist.  Neck: Supple. Painless ROM.  Cardiovascular: Regular rhythm. Regular rate. Normal S1 and S2. No murmurs. 2+ pulses in all extremities.   Pulmonary: Normal respiratory rate and effort. Lungs clear to auscultation bilaterally. No wheezing, rales, or rhonchi. Bilateral, equal lung expansion.   Abdominal: Soft. Nondistended. Nontender. No rebound or guarding.   Extremities. Pelvis stable. No lower extremity edema. Symmetric calves.  Skin: old track marks to b/l arms. No skin changes to back  Neuro: AAOx3. No focal neurological deficits. Symmetric intact strength/sensation to b/l legs  Psych: Normal mood. Normal affect.

## 2018-12-12 NOTE — ED ADULT NURSE REASSESSMENT NOTE - NS ED NURSE REASSESS COMMENT FT1
[phone] called NP covering the pt and an order for PO Ativan to be given for MRI will go and medicate pt in MRI dimas

## 2018-12-12 NOTE — BEHAVIORAL HEALTH ASSESSMENT NOTE - NSBHCHARTREVIEWIMAGING_PSY_A_CORE FT
< from: MR Lumbar Spine w/ IV Cont (12.12.18 @ 15:59) >      IMPRESSION:     L4-5 facet septic arthritis.. A 5 mm rim enhancing collection in the left   paraspinal musculature at the L1 level may represent an abscess.    < end of copied text >

## 2018-12-12 NOTE — ED PROVIDER NOTE - PROGRESS NOTE DETAILS
Pt adamant to me he would like to be admitted and be treated for osteo. We took all of his belongings and fully undressed him. D/w Dr Abel for admission.

## 2018-12-12 NOTE — H&P ADULT - HISTORY OF PRESENT ILLNESS
37 y/o male PMHx current IV drug abuse, osteomyelitis T5T6 with washout presented to the ED after signing out AMA from [facility] while being treated for osteomyelitis of L5S1. Patient left AMA as he was offended RN accused patient of tampering with IV. Patient stated he had CT and MRI with IV contrast while at [facility]    As per collateral I was able to obtain from [facility] MRI SHOWS L4-L5 PEDICLE ENHANCEMENT AND EXTENSIVE ENHANCEMENT AROUND THE PARAVERTEBRAL MUSCLES OF L4L5

## 2018-12-12 NOTE — H&P ADULT - PROBLEM SELECTOR PLAN 1
AS PER VERBAL REPORT FROM [facility] enhancement of right pedicles of L4/L5 and facet joints along with extensive enhancement of paravertebral muscles   -Repeat MRI of Lumbar spine with contrast   -Blood cultures now  -echocardiogram   Vanc and Zosyn for now

## 2018-12-12 NOTE — BEHAVIORAL HEALTH ASSESSMENT NOTE - SUMMARY
Pt is 37 yo  male,  for past 2 months, lives with wife and 2 stepsons, part time  but applying for disability, with PPHx significant for heroin dependence, no prior psych admissions or SA, actively abusing 2.5 bundles IV heroin daily, with PMHx significant for osteomyelitis T5T6 with washout, has had 2 recent ER visits and left AMA, now re-admitted to [facility] for management of OM, psychiatry consulted for substance abuse.  Pt currently with very mild signs/sx opioid withdrawal; is prescribed Xanax 2mg tid and Oxycodone 30mg four times a day per ISTOP.  Would reinstate home meds to prevent withdrawal.  Denies SIIP/HIIP, state she is motivated for substance cessation.

## 2018-12-12 NOTE — SBIRT NOTE. - NSSBIRTFULLSCREEN_GEN_A_ED_FT
Screen results reviewed with pt. Pt provided with healthy guidelines. Recommendation made for certified substance abuse treatment program referral. Pt's motivation and readiness to stop use was discussed and goals to make changes discussed. Pt reports he is enrolled in outpatient treatment facility-- [facility]. Pt given [facility] substance abuse treatment facility list for review.

## 2018-12-12 NOTE — ED PROVIDER NOTE - ATTENDING CONTRIBUTION TO CARE
Attending MD Reed: I personally have seen and examined this patient.  Resident note reviewed and agree on plan of care and except where noted.  See below for details.     Seen in G2, unaccompanied    38M with PMH including osteomyelitis of thoracic spine s/p washout in 2014/2015, [facility], found using narcotics at [facility] earlier today and given Naloxone and then AMA'ed presents to the ED now for treatment because scared of losing legs from osteo.  Reports back pain.  Denies numbness/weakness/tingling in extremities.  Denies loss of urinary or bowel continence, urinary retention.  Denies chest pain, shortness of breath, palpitations. Denies abdominal pain, nausea, vomiting, diarrhea, blood in stools. Meds reviewed Oxycodone, Xanax and Adderall. Denies allergies.  Reports tobacco and heroin use.  Reports daily heroin.  Denies EtOH.  On exam, NAD, head NCAT, PERRL, FROM at neck, no tenderness to midline palpation, no stepoffs along length of spine, lungs CTAB with good inspiratory effort, +S1S2, no m/r/g, abdomen soft with +BS, NT, ND, no CVAT, moving all extremities with 5/5 strength bilateral upper and lower extremities, good and equal  strength bilaterally, 5cm round ecchymosis to anterior RLE, multiple healing abrasions/scars to bilateral upper and lower extremities, +track osborn to bilateral upper extremities, sensory grossly intact, +2 DPs and radials; A/P: 38M with osteomyelitis of lumbar spine, will admit.  Discussed will remove his belonging with security secondary to previous behavior.  Amenable, contrite.  Will re-admit, labs, IV abx.

## 2018-12-12 NOTE — BEHAVIORAL HEALTH ASSESSMENT NOTE - MEDICAL RECORD REVIEWED
Addended by: ISMAEL WARREN on: 8/13/2018 09:37 AM     Modules accepted: Orders    
Addended by: JESSICA SERNA on: 8/13/2018 09:55 AM     Modules accepted: Orders    
Yes

## 2018-12-12 NOTE — BEHAVIORAL HEALTH ASSESSMENT NOTE - OTHER PAST PSYCHIATRIC HISTORY (INCLUDE DETAILS REGARDING ONSET, COURSE OF ILLNESS, INPATIENT/OUTPATIENT TREATMENT)
saw a therapist once in the past briefly but states his insurance did not cover it    No h/o SA or psych admissions

## 2018-12-12 NOTE — BEHAVIORAL HEALTH ASSESSMENT NOTE - NSBHCHARTREVIEWINVESTIGATE_PSY_A_CORE FT
< from: 12 Lead ECG (12.09.12 @ 19:48) >    Ventricular Rate 92 BPM    Atrial Rate 92 BPM    P-R Interval 154 ms    QRS Duration 102 ms     ms    QTc 435 ms    P Axis 50 degrees    R Axis 56 degrees    T Axis 19 degrees    Diagnosis Line NORMAL SINUS RHYTHM  NORMAL ECG    < end of copied text >

## 2018-12-12 NOTE — CONSULT NOTE ADULT - ASSESSMENT
38 yr old M current IV drug abuse, h/o osteomyelitis T5-T6 with washout back in 2014 by Dr. Williamson presented to the ED yesterday after signing out AMA from [facility] while being treated for osteomyelitis. Patient complains of a month history of left sided lower back pain radiating to the left leg up to the knee both anteriorly and posteriorly. The pain has affected his mobility as he started limping. He sometimes has numbness as well. However, the patient denies saddle anesthesia and urinary and bowel incontinence. MRI showed L3-5 facet edema may represent early infection as well as bilateral paraspinal enhancement from L1-5 consistent with phlegmon and ring enhancing collection at L1 that may represent an abscess. 40; CRP2.82; WBC 6.1; temp 98.4. Currently not on abx for optimal microbiological diagnosis per ID.     Plan:  - Continue management per primary team  - Management per ID  - Consider IR guided aspiration   - Pain control  - F/U blood cultures
39 y/o male PMHx current IV drug abuse, MSSA osteomyelitis T5T6 with washout and hardware placement in 2012, presented to the ED after signing out AMA from [facility] while being treated for osteomyelitis of L5S1.   Pt found to have facet L4-5 septic arthritis with abscess in paraspinal muscles.     Plan:   Pt with no neurological deficits.   No fever or leucocytosis   Given pt with no neurological deficits and not septic and has an abscess would recommend holding abx for optimal microbiologic diagnosis and will try to see if the abscess is amenable to aspiration.  Will discuss with Neuroradiology in am.  ESR/CRP minimally elevated.   Pt has been intermittently been on Abx at [place].   Please try to obtain records from [place].   Neurosurgery eval.   Possible psych input as pt worried about going into withdrawal.   Pain management  Follow up blood culture.   If pt has fevers or any change in neurological status please start Vancomycin 1gm iv q8h with cefepime 2 gm iv q12h

## 2018-12-12 NOTE — H&P ADULT - ASSESSMENT
38 yr old M w/a hx of IV drug abuse with history osteomyelitis of t5t6 with washout now presents with acute back pain

## 2018-12-12 NOTE — BEHAVIORAL HEALTH ASSESSMENT NOTE - NSBHSUICPROTECTFACT_PSY_A_CORE
Fear of death or dying due to pain/suffering/Responsibility to family and others/Identifies reasons for living/Future oriented/Supportive social network or family

## 2018-12-12 NOTE — CHART NOTE - NSCHARTNOTEFT_GEN_A_CORE
Was called by RN due to patient wanting to sign out AMA. Asked patient why he wanted to leave, reports that he wants to leave against medical advise feels unhappy about the care in the hospital. Pt was noted to be using heroin by ED staff and was placed on constant observation. Psych consulted who deemed that pt has capacity to leave AMA. Patient is AAO x 3. I explained at length to the patient the risks of signing out AMA including but not limited to harm, injury or death. I explained the risks, benefits and alternatives to treatment as well as the attendant risks of refusing treatment at this time. I offered to answer any questions and fully answered any such questions. We believe that the patient fully understands what has been explained and answered. I informed hospitalist Dr. Abel of this, aware. Patient signed form to sign out AMA and accepts responsibility for any and all results of this decision. Pt educated that he has to follow up outpatient and if develops fever, chills, numbness, tingling or worsening pain return to ED for further care.    Ester Henderson NP  [id number]

## 2018-12-12 NOTE — PATIENT PROFILE ADULT - NSSTREETDRUGWI_GEN_A_NUR
insomnia/vomiting/depression/mood swings/nausea/diaphoresis/intense desire for drugs/agitation/difficulty concentrating/disorientation

## 2018-12-12 NOTE — BEHAVIORAL HEALTH ASSESSMENT NOTE - NSBHSOCIALHXDETAILSFT_PSY_A_CORE
in October, lives with wife and her 2 children who have ADHD and autism, employed part time but is applying for disability

## 2018-12-12 NOTE — DISCHARGE NOTE ADULT - CARE PLAN
Principal Discharge DX:	Osteomyelitis  Goal:	pt leaving AMA  Assessment and plan of treatment:	pt leaving AMA

## 2018-12-12 NOTE — DISCHARGE NOTE ADULT - PATIENT PORTAL LINK FT
You can access the [facility] Patient Portal, offered by [facility], by registering with the following website: [email or web address]

## 2018-12-12 NOTE — DISCHARGE NOTE ADULT - HOSPITAL COURSE
39 y/o male PMHx current IV drug abuse, osteomyelitis T5T6 with washout presented to the ED after signing out AMA from [facility] while being treated for osteomyelitis of L5S1. Patient left AMA as he was offended RN accused patient of tampering with IV. Patient stated he had CT and MRI with IV contrast while at [facility]. Pt started on Vancomycin and Zosyn. Pt also placed on 1:1 as pt noted to be using Heroin in ED bathroom. Pt wanted to sign out AMA. Psych consulted who deemed that pt has capacity to leave AMA. Pt now leaving AMA,  PMD Dr. Abel aware.

## 2018-12-12 NOTE — DISCHARGE NOTE ADULT - ADDITIONAL INSTRUCTIONS
pt leaving AMA, instructed pt to follow up outpatient, and pt need to return to ER if develops fever numbness, tingling , worsening pain

## 2018-12-12 NOTE — ED PROVIDER NOTE - OBJECTIVE STATEMENT
37 y/o male PMHx current IV drug abuse, osteomyelitis T5T6 with washout presented to the ED after signing out AMA from [facility] while being treated for osteomyelitis of L5S1. Patient left AMA as he was offended RN accused patient of tampering with IV. Patient stated he had CT and MRI with IV contrast while at [place].    Left AMA here about 6 hours ago. Notably used heroin in the bathroom and required naloxone, left AMA because no one would give him high dose narcotics. REturns today because he is scared that he may lose use of his legs from osteo.

## 2018-12-12 NOTE — H&P ADULT - NSHPLABSRESULTS_GEN_ALL_CORE
LABS:                         13.6   6.1   )-----------( 254      ( 12 Dec 2018 08:45 )             40.1     12-12    132<L>  |  93<L>  |  17  ----------------------------<  127<H>  4.1   |  26  |  0.69    Ca    9.4      12 Dec 2018 08:45    TPro  7.8  /  Alb  4.2  /  TBili  0.5  /  DBili  x   /  AST  38  /  ALT  46<H>  /  AlkPhos  90  12-12    PT/INR - ( 12 Dec 2018 08:45 )   PT: 11.1 sec;   INR: 0.97 ratio         PTT - ( 12 Dec 2018 08:45 )  PTT:27.4 sec            Records reviewed from prior hospitalization.  Labs reviewed remarkable for   EKG personally reviewed   CXR personally reviewed

## 2018-12-12 NOTE — BEHAVIORAL HEALTH ASSESSMENT NOTE - DETAILS
mother with Alzheimers father was addicted to heroin, multiple siblings with substance abuse who have  nausea back pain, generalized pain

## 2018-12-12 NOTE — ED PROVIDER NOTE - CARE PLAN
Principal Discharge DX:	Osteomyelitis of vertebra of lumbosacral region  Secondary Diagnosis:	IV drug abuse

## 2018-12-12 NOTE — ED ADULT NURSE NOTE - CHPI ED NUR SYMPTOMS NEG
no difficulty bearing weight/no tingling/no bladder dysfunction/no bowel dysfunction/no anorexia/no fatigue/no constipation/no motor function loss/no neck tenderness/no numbness

## 2018-12-12 NOTE — BEHAVIORAL HEALTH ASSESSMENT NOTE - HPI (INCLUDE ILLNESS QUALITY, SEVERITY, DURATION, TIMING, CONTEXT, MODIFYING FACTORS, ASSOCIATED SIGNS AND SYMPTOMS)
Pt is 39 yo  male,  for past 2 months, lives with wife and 2 stepsons, part time  but applying for disability, with PPHx significant for heroin dependence, no prior psych admissions or SA, actively abusing 2.5 bundles IV heroin daily, with PMHx significant for osteomyelitis T5T6 with washout, has had 2 recent ER visits and left AMA, now re-admitted to [facility] for management of OM, psychiatry consulted for substance abuse.     Of note, pt left [facility] ER last night after being found using IV heroin in the bathroom; required Narcan.  Currently pt calm, cooperative, resting in bed.  States he has been using heroin use since age 31 after his parents , started with oxycodone and sri pills, pt then switched to heroin and been using daily 20-25 bags IV daily for past one year, pt reports that he is prescribed Adderall and Xanax by OP physician (IM/nephrologist Erickson Orr per ISTOP) for his anxiety. Per ISTOP, pt is regularly prescribed Xanax 2 mg TID and Adderall 30 mg BID, as well as Oxycodone 30mg four times a day by Dr. Orr. States he takes his prescribed medications correctly.  Denies h/o psychiatric admissions or SA  Reports mood has recently been "up and down," slightly depressed over his ongoing substance dependence and employment issues, also issues with his landlord selling their residence.  Denies sustained anhedonia, SIIP/HIIP, AVH, or paranoia.  No manic sx.  Denies access to guns.  Denies abuse of alcohol or other drugs.  Pt currently VSS, no tremor noted on exam.  Pupils slightly enlarged, endorses mild joint pain, nausea and diminished appetite, states he is worried withdrawal is imminent.  Pt states he is motivated to stop using IV drugs.  Has been previously hospitalized for 8 weeks for spinal infection; states he is aware he may require another prolonged admission and is ready to do so if needed.

## 2018-12-12 NOTE — BEHAVIORAL HEALTH ASSESSMENT NOTE - NSBHCONSULTMEDS_PSY_A_CORE FT
1. Recommend restarting Xanax 2mg PO tid standing.  Should also consider restarting home dose Oxycodone to prevent opioid withdrawal.  Will likely need additional PRN doses for breakthrough pain; defer to pain management/primary team.  Would not restart Adderall for now.    2. CINA monitoring with PRN: Clonidine 0.1mg PO q4h PRN opioid withdrawal sx (lacrimation, pilorection, abdominal pain, nausea, vomiting, rhinorrhea, muscle aches); Loperamide PRN GI sx; Seroquel 50mg PO q6h PRN anxiety/agitation/insomnia    3.  for substance abuse referral resources    4. [facility] psych will follow

## 2018-12-12 NOTE — BEHAVIORAL HEALTH ASSESSMENT NOTE - RISK ASSESSMENT
Risk factors: active substance use, limited coping skills , not in treatment, male gender  Protective factors: no prior psych admissions or suicide attempts, no hx of self injurious behavior, fear of death, supportive family, no guns, future-oriented, no current SIIP/HIIP    Pt is at chronic risk 2/2 ongoing substance abuse but is not at acutely elevated risk and does not meet criteria for psychiatric admission

## 2018-12-12 NOTE — BEHAVIORAL HEALTH ASSESSMENT NOTE - NSBHCHARTREVIEWVS_PSY_A_CORE FT
T(C): 36.9 (12-12-18 @ 17:00), Max: 36.9 (12-12-18 @ 12:58)  HR: 77 (12-12-18 @ 17:00) (72 - 89)  BP: 126/66 (12-12-18 @ 17:00) (94/53 - 138/88)  RR: 18 (12-12-18 @ 17:00) (15 - 18)  SpO2: 100% (12-12-18 @ 12:58) (98% - 100%)  Wt(kg): --

## 2018-12-12 NOTE — BEHAVIORAL HEALTH ASSESSMENT NOTE - NSBHCONSULTFOLLOWAFTERCARE_PSY_A_CORE FT
May f/u as outpatient at [facility]- [phone]

## 2018-12-12 NOTE — CONSULT NOTE ADULT - SUBJECTIVE AND OBJECTIVE BOX
Patient is a 38y old  Male who presents with a chief complaint of OM (12 Dec 2018 11:32)      HPI:  39 y/o male PMHx current IV drug abuse, osteomyelitis T5T6 with washout presented to the ED after signing out AMA from [facility] while being treated for osteomyelitis of L5S1. Patient left AMA as he was offended RN accused patient of tampering with IV. Patient stated he had CT and MRI with IV contrast while at [place]    As per collateral I was able to obtain from [place] [facility] SHOWS L4-L5 PEDICLE ENHANCEMENT AND EXTENSIVE ENHANCEMENT AROUND THE PARAVERTEBRAL MUSCLES OF L4L5 (12 Dec 2018 11:32)      PAST MEDICAL & SURGICAL HISTORY:  IV drug abuse  H/O Spinal surgery      REVIEW OF SYSTEMS    General:	Denies any chills. Fevers absent    Skin: No rash  	  Ophthalmologic: Denies any visual complaints, discharge, redness.  	  ENMT: No nasal congestion or throat pain.     Respiratory and Thorax: No cough, sputum or chest pain. Denies shortness of breath.  	  Cardiovascular: No chest pain, palpitations.    Gastrointestinal: No nausea, abdominal pain or diarrhea.    Genitourinary: No dysuria, frequency. No flank pain.    Musculoskeletal: No joint swelling or pain.    Neurological: No confusion. No extremity weakness.    Psychiatric: No hallucinations	    Hematology/Lymphatics: No LN swelling.    Endocrine: No recent weight gain or loss. No abnormal heat/cold intolerance    Allergic/Immunologic:	No hives or rash     Social history:    FAMILY HISTORY:  No pertinent family history in first degree relatives      Allergies    No Known Allergies    Intolerances        Antimicrobials:    piperacillin/tazobactam IVPB. 3.375 Gram(s) IV Intermittent every 8 hours  vancomycin  IVPB 1500 milliGRAM(s) IV Intermittent every 12 hours        Vital Signs Last 24 Hrs  T(C): 36.9 (12 Dec 2018 12:58), Max: 36.9 (12 Dec 2018 12:58)  T(F): 98.4 (12 Dec 2018 12:58), Max: 98.4 (12 Dec 2018 12:58)  HR: 83 (12 Dec 2018 12:58) (72 - 89)  BP: 120/74 (12 Dec 2018 12:58) (94/53 - 138/88)  BP(mean): 74 (12 Dec 2018 12:58) (74 - 74)  RR: 16 (12 Dec 2018 12:58) (15 - 18)  SpO2: 100% (12 Dec 2018 12:58) (98% - 100%)    PHYSICAL EXAM: Patient in no acute distress.    Constitutional: Comfortable. Awake and alert    Eyes: No discharge or conjunctival injection    ENMT: No thrush. No pharyngeal exudate or erythema.    Neck: Supple, No LN.    Respiratory: Good air entry bilaterally.    Cardiovascular: S1 S2 wnl, No murmurs.    Gastrointestinal: Soft BS(+) no tenderness, non distended.    Genitourinary: No CVA tenderness     Extremities: No edema.    Vascular: peripheral pulses felt    Neurological: AAO X 3. No grossly focal deficits.    Skin: No rash     Musculoskeletal: No joint swelling.    Psychiatric: Affect normal.                              13.6   6.1   )-----------( 254      ( 12 Dec 2018 08:45 )             40.1       12-12    132<L>  |  93<L>  |  17  ----------------------------<  127<H>  4.1   |  26  |  0.69    Ca    9.4      12 Dec 2018 08:45    TPro  7.8  /  Alb  4.2  /  TBili  0.5  /  DBili  x   /  AST  38  /  ALT  46<H>  /  AlkPhos  90  12-12            Radiology: Imaging reviewed personally [ x] Patient is a 38y old  Male who presents with a chief complaint of OM (12 Dec 2018 11:32)      HPI:  37 y/o male PMHx current IV drug abuse, osteomyelitis T5T6 with washout and hardware placement in , presented to the ED after signing out AMA from [facility] while being treated for osteomyelitis of L5S1. Patient left AMA as he was offended RN accused patient of tampering with IV. Patient stated he had CT and MRI with IV contrast while at [place]  As per collateral I was able to obtain from [place] [facility] SHOWS L4-L5 PEDICLE ENHANCEMENT AND EXTENSIVE ENHANCEMENT AROUND THE PARAVERTEBRAL MUSCLES OF L4L5 (12 Dec 2018 11:32)  Above reviewed:   Pt presented to ER here and then signed out AMA from here used heroine and came back later as he was worried he can become paralysed. Pt says a week ago he woke up with severe low back pain, no fever or chills. Pt has a hx of MSSA T4-5 spinal OM, s/p washout and hardware placement in , he says his symptoms were similar.  He went to [place] and was on antibiotics intermittently due ti issues with iv access. He denies weakness or numbness in B/L lower extremities, no bowel or urinary incontinence. Pain concentrated in lower back worse on lt side, not much radiation down the legs.   Pt admits to actively using iv heroine, reuses the needles and washes them with water only.       PAST MEDICAL & SURGICAL HISTORY:  IV drug abuse  H/O Spinal surgery      REVIEW OF SYSTEMS    General: Denies any chills. Fevers absent    Skin: No rash  	  Ophthalmologic: Denies any visual complaints, discharge, redness.  	  ENT: No nasal congestion or throat pain.     Respiratory and Thorax: No cough, sputum. Denies shortness of breath.  	  Cardiovascular: No chest pain, palpitations.    Gastrointestinal: + nausea, no abdominal pain or diarrhea.    Genitourinary: No dysuria, frequency. No flank pain.    Musculoskeletal: No joint swelling or pain.    Neurological: No extremity weakness. Rest per HPI.     Psychiatric: No hallucinations	    Endocrine: No recent weight gain or loss. No abnormal heat/cold intolerance    Allergic/Immunologic: No hives or rash       Social history:  Lives alone.      FAMILY HISTORY:  Father , had DM and ESRD on HD  Mother with Alzheimer's,  at 63.       Allergies  No Known Allergies      Antimicrobials:  piperacillin/tazobactam IVPB. 3.375 Gram(s) IV Intermittent every 8 hours  vancomycin  IVPB 1500 milliGRAM(s) IV Intermittent every 12 hours        Vital Signs Last 24 Hrs  T(C): 36.9 (12 Dec 2018 12:58), Max: 36.9 (12 Dec 2018 12:58)  T(F): 98.4 (12 Dec 2018 12:58), Max: 98.4 (12 Dec 2018 12:58)  HR: 83 (12 Dec 2018 12:58) (72 - 89)  BP: 120/74 (12 Dec 2018 12:58) (94/53 - 138/88)  BP(mean): 74 (12 Dec 2018 12:58) (74 - 74)  RR: 16 (12 Dec 2018 12:58) (15 - 18)  SpO2: 100% (12 Dec 2018 12:58) (98% - 100%)      PHYSICAL EXAM: Patient in no acute distress.    Constitutional: Comfortable. Awake and alert    Eyes: No discharge or conjunctival injection    ENT: No thrush. No pharyngeal exudate or erythema.    Neck: Supple, No LN.    Respiratory: Good air entry bilaterally.    Cardiovascular: S1 S2 wnl, No murmurs.    Gastrointestinal: Soft BS(+) no tenderness, non distended.    Genitourinary: No CVA tenderness     Extremities: No edema.    Vascular: peripheral pulses felt    Neurological: AAO X 3. No grossly focal deficits.    Skin: No rash     Musculoskeletal: No joint swelling.    Psychiatric: Affect normal.                              13.6   6.1   )-----------( 254      ( 12 Dec 2018 08:45 )             40.1           132<L>  |  93<L>  |  17  ----------------------------<  127<H>  4.1   |  26  |  0.69    Ca    9.4      12 Dec 2018 08:45    TPro  7.8  /  Alb  4.2  /  TBili  0.5  /  DBili  x   /  AST  38  /  ALT  46<H>  /  AlkPhos  90  12            Radiology: Imaging reviewed personally [ x] Patient is a 38y old  Male who presents with a chief complaint of OM (12 Dec 2018 11:32)      HPI:  39 y/o male PMHx current IV drug abuse, osteomyelitis T5T6 with washout, presented to the ED after signing out AMA from [facility] while being treated for osteomyelitis of L5S1. Patient left AMA as he was offended RN accused patient of tampering with IV. Patient stated he had CT and MRI with IV contrast while at [place]  As per collateral I was able to obtain from [place] [facility] SHOWS L4-L5 PEDICLE ENHANCEMENT AND EXTENSIVE ENHANCEMENT AROUND THE PARAVERTEBRAL MUSCLES OF L4L5 (12 Dec 2018 11:32)  Above reviewed:   Pt presented to ER here and then signed out AMA from here used heroine and came back later as he was worried he can become paralysed. Pt says a week ago he woke up with severe low back pain, no fever or chills. Pt has a hx of MSSA T4-5 spinal OM, s/p washout and hardware placement in , he says his symptoms were similar.  He went to [place] and was on antibiotics intermittently due ti issues with iv access. He denies weakness or numbness in B/L lower extremities, no bowel or urinary incontinence. Pain concentrated in lower back worse on lt side, not much radiation down the legs.   Pt admits to actively using iv heroine, reuses the needles and washes them with water only.       PAST MEDICAL & SURGICAL HISTORY:  IV drug abuse  H/O Spinal surgery      REVIEW OF SYSTEMS    General: Denies any chills. Fevers absent    Skin: No rash  	  Ophthalmologic: Denies any visual complaints, discharge, redness.  	  ENT: No nasal congestion or throat pain.     Respiratory and Thorax: No cough, sputum. Denies shortness of breath.  	  Cardiovascular: No chest pain, palpitations.    Gastrointestinal: + nausea, no abdominal pain or diarrhea.    Genitourinary: No dysuria, frequency. No flank pain.    Musculoskeletal: No joint swelling or pain.    Neurological: No extremity weakness. Rest per HPI.     Psychiatric: No hallucinations	    Endocrine: No recent weight gain or loss. No abnormal heat/cold intolerance    Allergic/Immunologic: No hives or rash       Social history:  Lives alone.      FAMILY HISTORY:  Father , had DM and ESRD on HD  Mother with Alzheimer's,  at 63.       Allergies  No Known Allergies      Antimicrobials:  piperacillin/tazobactam IVPB. 3.375 Gram(s) IV Intermittent every 8 hours  vancomycin  IVPB 1500 milliGRAM(s) IV Intermittent every 12 hours        Vital Signs Last 24 Hrs  T(C): 36.9 (12 Dec 2018 12:58), Max: 36.9 (12 Dec 2018 12:58)  T(F): 98.4 (12 Dec 2018 12:58), Max: 98.4 (12 Dec 2018 12:58)  HR: 83 (12 Dec 2018 12:58) (72 - 89)  BP: 120/74 (12 Dec 2018 12:58) (94/53 - 138/88)  BP(mean): 74 (12 Dec 2018 12:58) (74 - 74)  RR: 16 (12 Dec 2018 12:58) (15 - 18)  SpO2: 100% (12 Dec 2018 12:58) (98% - 100%)      PHYSICAL EXAM: Patient in no acute distress.    Constitutional: Comfortable. Awake and alert    Eyes: No discharge or conjunctival injection    ENT: No thrush. No pharyngeal exudate or erythema.    Neck: Supple,     Respiratory: Good air entry bilaterally.    Cardiovascular: S1 S2 wnl, No murmurs.    Gastrointestinal: Soft BS(+) no tenderness, non distended.    Genitourinary: No CVA tenderness     Extremities: No edema.    Vascular: peripheral pulses felt    Neurological: AAO X 3. No grossly focal deficits. Able to move B/L lower extremities against gravity.     Skin: Multiple track marks b/l upper extremities.     Musculoskeletal: No joint swelling.    Psychiatric: Affect normal.                              13.6   6.1   )-----------( 254      ( 12 Dec 2018 08:45 )             40.1           132<L>  |  93<L>  |  17  ----------------------------<  127<H>  4.1   |  26  |  0.69    Ca    9.4      12 Dec 2018 08:45    TPro  7.8  /  Alb  4.2  /  TBili  0.5  /  DBili  x   /  AST  38  /  ALT  46<H>  /  AlkPhos  90  12      Sedimentation Rate, Erythrocyte (18 @ 08:45)    Sedimentation Rate, Erythrocyte: 40 mm/hr    C-Reactive Protein, Serum (18 @ 12:54)    C-Reactive Protein, Serum: 2.82 mg/dL      Radiology: Imaging reviewed personally [ x]  < from: MR Lumbar Spine w/ IV Cont (12.18 @ 15:59) >  IMPRESSION:     L4-5 facet septic arthritis.. A 5 mm rim enhancing collection in the left   paraspinal musculature at the L1 level may represent an abscess.

## 2018-12-13 LAB
ANION GAP SERPL CALC-SCNC: 12 MMOL/L — SIGNIFICANT CHANGE UP (ref 5–17)
BUN SERPL-MCNC: 12 MG/DL — SIGNIFICANT CHANGE UP (ref 7–23)
CALCIUM SERPL-MCNC: 9.5 MG/DL — SIGNIFICANT CHANGE UP (ref 8.4–10.5)
CHLORIDE SERPL-SCNC: 102 MMOL/L — SIGNIFICANT CHANGE UP (ref 96–108)
CO2 SERPL-SCNC: 23 MMOL/L — SIGNIFICANT CHANGE UP (ref 22–31)
CREAT SERPL-MCNC: 0.65 MG/DL — SIGNIFICANT CHANGE UP (ref 0.5–1.3)
GLUCOSE SERPL-MCNC: 104 MG/DL — HIGH (ref 70–99)
HCT VFR BLD CALC: 41 % — SIGNIFICANT CHANGE UP (ref 39–50)
HGB BLD-MCNC: 13.8 G/DL — SIGNIFICANT CHANGE UP (ref 13–17)
MCHC RBC-ENTMCNC: 28.8 PG — SIGNIFICANT CHANGE UP (ref 27–34)
MCHC RBC-ENTMCNC: 33.7 GM/DL — SIGNIFICANT CHANGE UP (ref 32–36)
MCV RBC AUTO: 85.4 FL — SIGNIFICANT CHANGE UP (ref 80–100)
PLATELET # BLD AUTO: 266 K/UL — SIGNIFICANT CHANGE UP (ref 150–400)
POTASSIUM SERPL-MCNC: 4 MMOL/L — SIGNIFICANT CHANGE UP (ref 3.5–5.3)
POTASSIUM SERPL-SCNC: 4 MMOL/L — SIGNIFICANT CHANGE UP (ref 3.5–5.3)
RBC # BLD: 4.8 M/UL — SIGNIFICANT CHANGE UP (ref 4.2–5.8)
RBC # FLD: 14.5 % — SIGNIFICANT CHANGE UP (ref 10.3–14.5)
SODIUM SERPL-SCNC: 137 MMOL/L — SIGNIFICANT CHANGE UP (ref 135–145)
WBC # BLD: 5.62 K/UL — SIGNIFICANT CHANGE UP (ref 3.8–10.5)
WBC # FLD AUTO: 5.62 K/UL — SIGNIFICANT CHANGE UP (ref 3.8–10.5)

## 2018-12-13 PROCEDURE — 93306 TTE W/DOPPLER COMPLETE: CPT | Mod: 26

## 2018-12-13 PROCEDURE — 99232 SBSQ HOSP IP/OBS MODERATE 35: CPT

## 2018-12-13 RX ORDER — QUETIAPINE FUMARATE 200 MG/1
50 TABLET, FILM COATED ORAL EVERY 6 HOURS
Qty: 0 | Refills: 0 | Status: DISCONTINUED | OUTPATIENT
Start: 2018-12-13 | End: 2018-12-17

## 2018-12-13 RX ORDER — OXYCODONE HYDROCHLORIDE 5 MG/1
30 TABLET ORAL EVERY 4 HOURS
Qty: 0 | Refills: 0 | Status: DISCONTINUED | OUTPATIENT
Start: 2018-12-13 | End: 2018-12-17

## 2018-12-13 RX ADMIN — OXYCODONE HYDROCHLORIDE 30 MILLIGRAM(S): 5 TABLET ORAL at 17:53

## 2018-12-13 RX ADMIN — HEPARIN SODIUM 5000 UNIT(S): 5000 INJECTION INTRAVENOUS; SUBCUTANEOUS at 21:31

## 2018-12-13 RX ADMIN — Medication 1 PATCH: at 11:16

## 2018-12-13 RX ADMIN — Medication 2 MILLIGRAM(S): at 21:31

## 2018-12-13 RX ADMIN — QUETIAPINE FUMARATE 50 MILLIGRAM(S): 200 TABLET, FILM COATED ORAL at 23:52

## 2018-12-13 RX ADMIN — HEPARIN SODIUM 5000 UNIT(S): 5000 INJECTION INTRAVENOUS; SUBCUTANEOUS at 05:34

## 2018-12-13 RX ADMIN — OXYCODONE HYDROCHLORIDE 30 MILLIGRAM(S): 5 TABLET ORAL at 05:34

## 2018-12-13 RX ADMIN — Medication 2 MILLIGRAM(S): at 13:30

## 2018-12-13 RX ADMIN — Medication 1 PATCH: at 08:38

## 2018-12-13 RX ADMIN — OXYCODONE HYDROCHLORIDE 30 MILLIGRAM(S): 5 TABLET ORAL at 22:07

## 2018-12-13 RX ADMIN — HEPARIN SODIUM 5000 UNIT(S): 5000 INJECTION INTRAVENOUS; SUBCUTANEOUS at 13:29

## 2018-12-13 RX ADMIN — OXYCODONE HYDROCHLORIDE 30 MILLIGRAM(S): 5 TABLET ORAL at 11:38

## 2018-12-13 RX ADMIN — Medication 1 PATCH: at 21:29

## 2018-12-13 RX ADMIN — Medication 2 MILLIGRAM(S): at 05:34

## 2018-12-13 RX ADMIN — OXYCODONE HYDROCHLORIDE 30 MILLIGRAM(S): 5 TABLET ORAL at 00:23

## 2018-12-13 RX ADMIN — OXYCODONE HYDROCHLORIDE 30 MILLIGRAM(S): 5 TABLET ORAL at 11:12

## 2018-12-13 RX ADMIN — OXYCODONE HYDROCHLORIDE 30 MILLIGRAM(S): 5 TABLET ORAL at 06:17

## 2018-12-13 RX ADMIN — OXYCODONE HYDROCHLORIDE 30 MILLIGRAM(S): 5 TABLET ORAL at 23:00

## 2018-12-13 NOTE — PROGRESS NOTE ADULT - SUBJECTIVE AND OBJECTIVE BOX
Patient is a 38y old  Male who presents with a chief complaint of OM (13 Dec 2018 11:48)      INTERVAL HPI/OVERNIGHT EVENTS:  T(C): 36.3 (12-13-18 @ 12:21), Max: 36.7 (12-12-18 @ 20:10)  HR: 92 (12-13-18 @ 12:21) (74 - 92)  BP: 130/86 (12-13-18 @ 12:21) (105/68 - 133/66)  RR: 18 (12-13-18 @ 12:21) (17 - 18)  SpO2: 98% (12-13-18 @ 12:21) (97% - 98%)  Wt(kg): --  I&O's Summary    12 Dec 2018 07:01  -  13 Dec 2018 07:00  --------------------------------------------------------  IN: 750 mL / OUT: 3 mL / NET: 747 mL    13 Dec 2018 07:01  -  13 Dec 2018 17:29  --------------------------------------------------------  IN: 600 mL / OUT: 0 mL / NET: 600 mL        LABS:                        13.8   5.62  )-----------( 266      ( 13 Dec 2018 07:29 )             41.0     12-13    137  |  102  |  12  ----------------------------<  104<H>  4.0   |  23  |  0.65    Ca    9.5      13 Dec 2018 06:55    TPro  7.8  /  Alb  4.2  /  TBili  0.5  /  DBili  x   /  AST  38  /  ALT  46<H>  /  AlkPhos  90  12-12    PT/INR - ( 12 Dec 2018 08:45 )   PT: 11.1 sec;   INR: 0.97 ratio         PTT - ( 12 Dec 2018 08:45 )  PTT:27.4 sec    CAPILLARY BLOOD GLUCOSE                MEDICATIONS  (STANDING):  ALPRAZolam 2 milliGRAM(s) Oral three times a day  heparin  Injectable 5000 Unit(s) SubCutaneous every 8 hours  influenza   Vaccine 0.5 milliLiter(s) IntraMuscular once  nicotine - 21 mG/24Hr(s) Patch 1 patch Transdermal daily    MEDICATIONS  (PRN):          PHYSICAL EXAM:  GENERAL: NAD, well-groomed, well-developed  HEAD:  Atraumatic, Normocephalic  CHEST/LUNG: Clear to percussion bilaterally; No rales, rhonchi, wheezing, or rubs  HEART: Regular rate and rhythm; No murmurs, rubs, or gallops  ABDOMEN: Soft, Nontender, Nondistended; Bowel sounds present  EXTREMITIES:  2+ Peripheral Pulses, No clubbing, cyanosis, or edema  LYMPH: No lymphadenopathy noted  SKIN: No rashes or lesions    Care Discussed with Consultants/Other Providers [ ] YES  [ ] NO

## 2018-12-13 NOTE — PROGRESS NOTE ADULT - SUBJECTIVE AND OBJECTIVE BOX
38y old  Male who presents with a chief complaint of OM (12 Dec 2018 18:10)      Interval history:  Afebrile, back pain controlled, denies no other complains.     No Known Allergies    Antimicrobials:      REVIEW OF SYSTEMS:  No chest pain  No cough, no SOB  No N/V/D, no abdominal pain  No dysuria  No rash.     Vital Signs Last 24 Hrs  T(C): 36.4 (12-13-18 @ 04:51), Max: 36.9 (12-12-18 @ 12:58)  T(F): 97.6 (12-13-18 @ 04:51), Max: 98.4 (12-12-18 @ 12:58)  HR: 74 (12-13-18 @ 04:51) (74 - 83)  BP: 105/68 (12-13-18 @ 04:51) (105/68 - 133/66)  BP(mean): 74 (12-12-18 @ 12:58) (74 - 74)  RR: 17 (12-13-18 @ 04:51) (16 - 18)  SpO2: 97% (12-13-18 @ 04:51) (97% - 100%)    PHYSICAL EXAM:  Patient in no acute distress. AAOX3.  No icterus, no oral ulcers.  Cardiovascular: S1S2 normal.  Lungs: Good air entry B/L lung fields.  Gastrointestinal: soft, nontender, nondistended.  Extremities: no edema.  IV sites not inflamed.   Track marks b/l upper extremity                           13.8   5.62  )-----------( 266      ( 13 Dec 2018 07:29 )             41.0   12-13    137  |  102  |  12  ----------------------------<  104<H>  4.0   |  23  |  0.65    Ca    9.5      13 Dec 2018 06:55    TPro  7.8  /  Alb  4.2  /  TBili  0.5  /  DBili  x   /  AST  38  /  ALT  46<H>  /  AlkPhos  90  12-12      LIVER FUNCTIONS - ( 12 Dec 2018 08:45 )  Alb: 4.2 g/dL / Pro: 7.8 g/dL / ALK PHOS: 90 U/L / ALT: 46 U/L / AST: 38 U/L / GGT: x             Culture - Blood (collected 12 Dec 2018 10:55)  Source: .Blood Blood-Peripheral  Preliminary Report (13 Dec 2018 11:11):    No growth to date.

## 2018-12-13 NOTE — PROGRESS NOTE ADULT - ASSESSMENT
39 y/o male PMHx current IV drug abuse, MSSA osteomyelitis T5T6 with washout and hardware placement in 2012, presented to the ED after signing out AMA from [facility] while being treated for osteomyelitis of L5S1.   Pt found to have facet L4-5 septic arthritis with abscess in paraspinal muscles.     Plan:   Pt neurologically stable.   No fever or leucocytosis   Given pt neurologically stable and not septic and has an abscess would recommend holding abx for optimal microbiologic diagnosis  Discussed with Neuroradiology, pt scheduled for aspiration tomorrow at 8 am.   Please order Bacterail, AFB and fungal cultures.   S/p Neurosurgery eval.   Psych on board.   Pain management  Follow up blood culture, NTD.   If pt has fevers or any change in neurological status please start Vancomycin 1gm iv q8h with cefepime 2 gm iv q12h

## 2018-12-13 NOTE — PROGRESS NOTE ADULT - ASSESSMENT
38 yr old M w/a hx of IV drug abuse with history osteomyelitis of t5t6 with washout now presents with acute back pain    Problem/Plan - 1:  ·  Problem: Osteomyelitis of vertebra of lumbosacral region.  Plan: AS PER VERBAL REPORT FROM [facility] enhancement of right pedicles of L4/L5 and facet joints along with extensive enhancement of paravertebral muscles   -Repeat MRI of Lumbar spine with contrast   -Blood cultures now  -echocardiogram   Vanc and Zosyn for now.     Problem/Plan - 2:  ·  Problem: IV drug abuse.  Plan: No benzo's No adderal   oxycodone 10 mg Q6 PRN for pain.

## 2018-12-14 LAB
GRAM STN FLD: SIGNIFICANT CHANGE UP
NIGHT BLUE STAIN TISS: SIGNIFICANT CHANGE UP
SPECIMEN SOURCE: SIGNIFICANT CHANGE UP
SPECIMEN SOURCE: SIGNIFICANT CHANGE UP

## 2018-12-14 PROCEDURE — 99232 SBSQ HOSP IP/OBS MODERATE 35: CPT

## 2018-12-14 PROCEDURE — 77012 CT SCAN FOR NEEDLE BIOPSY: CPT | Mod: 26

## 2018-12-14 PROCEDURE — 10022: CPT

## 2018-12-14 RX ORDER — DEXTROAMPHETAMINE SACCHARATE, AMPHETAMINE ASPARTATE, DEXTROAMPHETAMINE SULFATE AND AMPHETAMINE SULFATE 1.875; 1.875; 1.875; 1.875 MG/1; MG/1; MG/1; MG/1
30 TABLET ORAL THREE TIMES A DAY
Qty: 0 | Refills: 0 | Status: DISCONTINUED | OUTPATIENT
Start: 2018-12-14 | End: 2018-12-14

## 2018-12-14 RX ORDER — HYDROCORTISONE 1 %
1 OINTMENT (GRAM) TOPICAL
Qty: 0 | Refills: 0 | Status: DISCONTINUED | OUTPATIENT
Start: 2018-12-14 | End: 2018-12-17

## 2018-12-14 RX ORDER — CEFEPIME 1 G/1
2000 INJECTION, POWDER, FOR SOLUTION INTRAMUSCULAR; INTRAVENOUS EVERY 12 HOURS
Qty: 0 | Refills: 0 | Status: DISCONTINUED | OUTPATIENT
Start: 2018-12-14 | End: 2018-12-17

## 2018-12-14 RX ORDER — DIPHENHYDRAMINE HCL 50 MG
25 CAPSULE ORAL ONCE
Qty: 0 | Refills: 0 | Status: COMPLETED | OUTPATIENT
Start: 2018-12-14 | End: 2018-12-14

## 2018-12-14 RX ORDER — VANCOMYCIN HCL 1 G
1000 VIAL (EA) INTRAVENOUS EVERY 8 HOURS
Qty: 0 | Refills: 0 | Status: DISCONTINUED | OUTPATIENT
Start: 2018-12-14 | End: 2018-12-17

## 2018-12-14 RX ORDER — DEXTROAMPHETAMINE SACCHARATE, AMPHETAMINE ASPARTATE, DEXTROAMPHETAMINE SULFATE AND AMPHETAMINE SULFATE 1.875; 1.875; 1.875; 1.875 MG/1; MG/1; MG/1; MG/1
1 TABLET ORAL
Qty: 0 | Refills: 0 | COMMUNITY

## 2018-12-14 RX ORDER — DEXTROAMPHETAMINE SACCHARATE, AMPHETAMINE ASPARTATE, DEXTROAMPHETAMINE SULFATE AND AMPHETAMINE SULFATE 1.875; 1.875; 1.875; 1.875 MG/1; MG/1; MG/1; MG/1
30 TABLET ORAL
Qty: 0 | Refills: 0 | Status: DISCONTINUED | OUTPATIENT
Start: 2018-12-14 | End: 2018-12-17

## 2018-12-14 RX ADMIN — OXYCODONE HYDROCHLORIDE 30 MILLIGRAM(S): 5 TABLET ORAL at 02:14

## 2018-12-14 RX ADMIN — Medication 1 PATCH: at 11:43

## 2018-12-14 RX ADMIN — Medication 250 MILLIGRAM(S): at 21:34

## 2018-12-14 RX ADMIN — OXYCODONE HYDROCHLORIDE 30 MILLIGRAM(S): 5 TABLET ORAL at 10:09

## 2018-12-14 RX ADMIN — OXYCODONE HYDROCHLORIDE 30 MILLIGRAM(S): 5 TABLET ORAL at 03:20

## 2018-12-14 RX ADMIN — Medication 2 MILLIGRAM(S): at 21:35

## 2018-12-14 RX ADMIN — OXYCODONE HYDROCHLORIDE 30 MILLIGRAM(S): 5 TABLET ORAL at 23:39

## 2018-12-14 RX ADMIN — HEPARIN SODIUM 5000 UNIT(S): 5000 INJECTION INTRAVENOUS; SUBCUTANEOUS at 21:35

## 2018-12-14 RX ADMIN — DEXTROAMPHETAMINE SACCHARATE, AMPHETAMINE ASPARTATE, DEXTROAMPHETAMINE SULFATE AND AMPHETAMINE SULFATE 30 MILLIGRAM(S): 1.875; 1.875; 1.875; 1.875 TABLET ORAL at 13:08

## 2018-12-14 RX ADMIN — OXYCODONE HYDROCHLORIDE 30 MILLIGRAM(S): 5 TABLET ORAL at 13:13

## 2018-12-14 RX ADMIN — Medication 1 PATCH: at 20:34

## 2018-12-14 RX ADMIN — OXYCODONE HYDROCHLORIDE 30 MILLIGRAM(S): 5 TABLET ORAL at 18:55

## 2018-12-14 RX ADMIN — OXYCODONE HYDROCHLORIDE 30 MILLIGRAM(S): 5 TABLET ORAL at 18:25

## 2018-12-14 RX ADMIN — Medication 1 PATCH: at 11:44

## 2018-12-14 RX ADMIN — Medication 2 MILLIGRAM(S): at 13:12

## 2018-12-14 RX ADMIN — Medication 2 MILLIGRAM(S): at 09:06

## 2018-12-14 RX ADMIN — OXYCODONE HYDROCHLORIDE 30 MILLIGRAM(S): 5 TABLET ORAL at 09:05

## 2018-12-14 RX ADMIN — OXYCODONE HYDROCHLORIDE 30 MILLIGRAM(S): 5 TABLET ORAL at 22:30

## 2018-12-14 RX ADMIN — OXYCODONE HYDROCHLORIDE 30 MILLIGRAM(S): 5 TABLET ORAL at 13:43

## 2018-12-14 RX ADMIN — Medication 250 MILLIGRAM(S): at 13:08

## 2018-12-14 RX ADMIN — CEFEPIME 100 MILLIGRAM(S): 1 INJECTION, POWDER, FOR SOLUTION INTRAMUSCULAR; INTRAVENOUS at 18:25

## 2018-12-14 RX ADMIN — Medication 1 APPLICATION(S): at 22:27

## 2018-12-14 RX ADMIN — DEXTROAMPHETAMINE SACCHARATE, AMPHETAMINE ASPARTATE, DEXTROAMPHETAMINE SULFATE AND AMPHETAMINE SULFATE 30 MILLIGRAM(S): 1.875; 1.875; 1.875; 1.875 TABLET ORAL at 18:20

## 2018-12-14 RX ADMIN — HEPARIN SODIUM 5000 UNIT(S): 5000 INJECTION INTRAVENOUS; SUBCUTANEOUS at 13:08

## 2018-12-14 RX ADMIN — Medication 1 PATCH: at 07:21

## 2018-12-14 RX ADMIN — Medication 25 MILLIGRAM(S): at 18:20

## 2018-12-14 NOTE — PROGRESS NOTE ADULT - SUBJECTIVE AND OBJECTIVE BOX
Clinical Indication: Suspected lumbar osteomyelitis    PREPROCEDURE:    Patient presents for CT fine needle aspiration of lumbar spine osteomyelitis.  Risks and benefits were discussed with patient and/or health care proxy.  Risks include but are not limited to headache, bleeding, infection and nerve damage.    Patient and/or health care proxy understands and consents to procedure.    POSTPROCEDURE:     20 g 10 cm Chiba needle inserted into the spine adjacent to the right l4-5 facet joint. A small amount of bloody fluid was  aspirated and mixed with normal saline and sent to lab.    Patient tolerated the procedure well.

## 2018-12-14 NOTE — PROGRESS NOTE ADULT - ATTENDING COMMENTS
Radha Alvarado  Pager: [phone]. If no response or past 5 pm call [phone].     Please call ID service for questions over weekend at [phone].

## 2018-12-14 NOTE — PHARMACOTHERAPY INTERVENTION NOTE - COMMENTS
recommended adderall dose be adjusted to a maximum of 60mg/day - confirmed w/ outside pharmacy pt takes 30mg bid

## 2018-12-14 NOTE — PROGRESS NOTE ADULT - SUBJECTIVE AND OBJECTIVE BOX
38y old  Male who presents with a chief complaint of OM (14 Dec 2018 11:12)      Interval history:  Afebrile, feels well, back pain controlled, able to walk around.       No Known Allergies      Antimicrobials:  cefepime   IVPB 2000 milliGRAM(s) IV Intermittent every 12 hours  vancomycin  IVPB 1000 milliGRAM(s) IV Intermittent every 8 hours      REVIEW OF SYSTEMS:  No chest pain  No cough, no SOB  No N/V/D, no abdominal pain  No dysuria  No rash.       Vital Signs Last 24 Hrs  T(C): 36.9 (12-14-18 @ 10:36), Max: 36.9 (12-14-18 @ 10:36)  T(F): 98.5 (12-14-18 @ 10:36), Max: 98.5 (12-14-18 @ 10:36)  HR: 84 (12-14-18 @ 10:36) (73 - 88)  BP: 107/72 (12-14-18 @ 10:36) (96/67 - 112/72)  RR: 18 (12-14-18 @ 10:36) (18 - 18)  SpO2: 98% (12-14-18 @ 10:36) (96% - 98%)      PHYSICAL EXAM:  Patient in no acute distress. AAOX3.  No icterus, no oral ulcers.  Cardiovascular: S1S2 normal.  Lungs: Good air entry B/L lung fields.  Gastrointestinal: soft, nontender, nondistended.  Extremities: no edema.  IV sites not inflamed.                           13.8   5.62  )-----------( 266      ( 13 Dec 2018 07:29 )             41.0   12-13    137  |  102  |  12  ----------------------------<  104<H>  4.0   |  23  |  0.65    Ca    9.5      13 Dec 2018 06:55      Culture - Body Fluid with Gram Stain (collected 14 Dec 2018 12:23)  Source: .Body Fluid Interstitial Fluid  Gram Stain (14 Dec 2018 14:31):    polymorphonuclear leukocytes per low power field    No organisms seen per oil power field    by cytocentrifuge    Culture - Blood (collected 12 Dec 2018 10:55)  Source: .Blood Blood-Peripheral  Preliminary Report (13 Dec 2018 11:11):    No growth to date.

## 2018-12-14 NOTE — PROGRESS NOTE ADULT - ASSESSMENT
39 y/o male PMHx current IV drug abuse, MSSA osteomyelitis T5T6 with washout and hardware placement in 2012, presented to the ED after signing out AMA from [facility] while being treated for osteomyelitis of L5S1.   Pt found to have facet L4-5 septic arthritis with abscess in paraspinal muscles.       Plan:   Started Vancomycin 1gm iv q8h with cefepime 2 gm iv q12h  Check trough prior to 4th dose.   Follow up Bacterial, AFB and fungal cultures.   S/p Neurosurgery eval.   Psych on board.   Pain management  Follow up blood culture, NTD.

## 2018-12-14 NOTE — CHART NOTE - NSCHARTNOTEFT_GEN_A_CORE
Pt c/o itching after Vancomycin dose. No rashes noted. Pt denies sob, swelling. Will give benadryl. D/w ID. Recommended to infuse Vancomycin slowly and monitor patient.   D.w attending. Continue to monitor pt.

## 2018-12-15 LAB
ANION GAP SERPL CALC-SCNC: 12 MMOL/L — SIGNIFICANT CHANGE UP (ref 5–17)
BUN SERPL-MCNC: 18 MG/DL — SIGNIFICANT CHANGE UP (ref 7–23)
CALCIUM SERPL-MCNC: 9.3 MG/DL — SIGNIFICANT CHANGE UP (ref 8.4–10.5)
CHLORIDE SERPL-SCNC: 100 MMOL/L — SIGNIFICANT CHANGE UP (ref 96–108)
CO2 SERPL-SCNC: 24 MMOL/L — SIGNIFICANT CHANGE UP (ref 22–31)
CREAT SERPL-MCNC: 0.7 MG/DL — SIGNIFICANT CHANGE UP (ref 0.5–1.3)
GLUCOSE SERPL-MCNC: 106 MG/DL — HIGH (ref 70–99)
HCT VFR BLD CALC: 38.5 % — LOW (ref 39–50)
HGB BLD-MCNC: 12.7 G/DL — LOW (ref 13–17)
MCHC RBC-ENTMCNC: 28.3 PG — SIGNIFICANT CHANGE UP (ref 27–34)
MCHC RBC-ENTMCNC: 33 GM/DL — SIGNIFICANT CHANGE UP (ref 32–36)
MCV RBC AUTO: 85.7 FL — SIGNIFICANT CHANGE UP (ref 80–100)
PLATELET # BLD AUTO: 296 K/UL — SIGNIFICANT CHANGE UP (ref 150–400)
POTASSIUM SERPL-MCNC: 3.4 MMOL/L — LOW (ref 3.5–5.3)
POTASSIUM SERPL-SCNC: 3.4 MMOL/L — LOW (ref 3.5–5.3)
RBC # BLD: 4.49 M/UL — SIGNIFICANT CHANGE UP (ref 4.2–5.8)
RBC # FLD: 14.3 % — SIGNIFICANT CHANGE UP (ref 10.3–14.5)
SODIUM SERPL-SCNC: 136 MMOL/L — SIGNIFICANT CHANGE UP (ref 135–145)
VANCOMYCIN TROUGH SERPL-MCNC: 7.4 UG/ML — LOW (ref 10–20)
WBC # BLD: 7.7 K/UL — SIGNIFICANT CHANGE UP (ref 3.8–10.5)
WBC # FLD AUTO: 7.7 K/UL — SIGNIFICANT CHANGE UP (ref 3.8–10.5)

## 2018-12-15 PROCEDURE — 99232 SBSQ HOSP IP/OBS MODERATE 35: CPT

## 2018-12-15 RX ADMIN — QUETIAPINE FUMARATE 50 MILLIGRAM(S): 200 TABLET, FILM COATED ORAL at 00:04

## 2018-12-15 RX ADMIN — OXYCODONE HYDROCHLORIDE 30 MILLIGRAM(S): 5 TABLET ORAL at 02:34

## 2018-12-15 RX ADMIN — Medication 1 PATCH: at 11:55

## 2018-12-15 RX ADMIN — OXYCODONE HYDROCHLORIDE 30 MILLIGRAM(S): 5 TABLET ORAL at 19:30

## 2018-12-15 RX ADMIN — Medication 2 MILLIGRAM(S): at 06:19

## 2018-12-15 RX ADMIN — OXYCODONE HYDROCHLORIDE 30 MILLIGRAM(S): 5 TABLET ORAL at 19:00

## 2018-12-15 RX ADMIN — Medication 250 MILLIGRAM(S): at 16:43

## 2018-12-15 RX ADMIN — DEXTROAMPHETAMINE SACCHARATE, AMPHETAMINE ASPARTATE, DEXTROAMPHETAMINE SULFATE AND AMPHETAMINE SULFATE 30 MILLIGRAM(S): 1.875; 1.875; 1.875; 1.875 TABLET ORAL at 09:31

## 2018-12-15 RX ADMIN — Medication 2 MILLIGRAM(S): at 21:51

## 2018-12-15 RX ADMIN — Medication 2 MILLIGRAM(S): at 13:04

## 2018-12-15 RX ADMIN — CEFEPIME 100 MILLIGRAM(S): 1 INJECTION, POWDER, FOR SOLUTION INTRAMUSCULAR; INTRAVENOUS at 06:18

## 2018-12-15 RX ADMIN — CEFEPIME 100 MILLIGRAM(S): 1 INJECTION, POWDER, FOR SOLUTION INTRAMUSCULAR; INTRAVENOUS at 19:01

## 2018-12-15 RX ADMIN — Medication 1 PATCH: at 07:17

## 2018-12-15 RX ADMIN — QUETIAPINE FUMARATE 50 MILLIGRAM(S): 200 TABLET, FILM COATED ORAL at 23:20

## 2018-12-15 RX ADMIN — OXYCODONE HYDROCHLORIDE 30 MILLIGRAM(S): 5 TABLET ORAL at 09:30

## 2018-12-15 RX ADMIN — Medication 250 MILLIGRAM(S): at 06:18

## 2018-12-15 RX ADMIN — Medication 1 PATCH: at 19:48

## 2018-12-15 RX ADMIN — DEXTROAMPHETAMINE SACCHARATE, AMPHETAMINE ASPARTATE, DEXTROAMPHETAMINE SULFATE AND AMPHETAMINE SULFATE 30 MILLIGRAM(S): 1.875; 1.875; 1.875; 1.875 TABLET ORAL at 18:32

## 2018-12-15 RX ADMIN — OXYCODONE HYDROCHLORIDE 30 MILLIGRAM(S): 5 TABLET ORAL at 15:25

## 2018-12-15 RX ADMIN — Medication 1 APPLICATION(S): at 06:19

## 2018-12-15 RX ADMIN — OXYCODONE HYDROCHLORIDE 30 MILLIGRAM(S): 5 TABLET ORAL at 10:00

## 2018-12-15 RX ADMIN — Medication 1 APPLICATION(S): at 18:31

## 2018-12-15 RX ADMIN — Medication 1 PATCH: at 11:56

## 2018-12-15 RX ADMIN — OXYCODONE HYDROCHLORIDE 30 MILLIGRAM(S): 5 TABLET ORAL at 23:20

## 2018-12-15 RX ADMIN — HEPARIN SODIUM 5000 UNIT(S): 5000 INJECTION INTRAVENOUS; SUBCUTANEOUS at 06:18

## 2018-12-15 RX ADMIN — HEPARIN SODIUM 5000 UNIT(S): 5000 INJECTION INTRAVENOUS; SUBCUTANEOUS at 13:05

## 2018-12-15 RX ADMIN — OXYCODONE HYDROCHLORIDE 30 MILLIGRAM(S): 5 TABLET ORAL at 14:55

## 2018-12-15 RX ADMIN — HEPARIN SODIUM 5000 UNIT(S): 5000 INJECTION INTRAVENOUS; SUBCUTANEOUS at 21:51

## 2018-12-15 NOTE — PROGRESS NOTE ADULT - ASSESSMENT
38 yr old M w/a hx of IV drug abuse with history osteomyelitis of t5t6 with washout now presents with acute back pain    Problem/Plan - 1:  ·  Problem: Osteomyelitis of vertebra of lumbosacral region.  Plan: AS PER VERBAL REPORT FROM [facility] enhancement of right pedicles of L4/L5 and facet joints along with extensive enhancement of paravertebral muscles   - sp biopsy  - cw antibiotics  - ID fu .     Problem/Plan - 2:  ·  Problem: IV drug abuse.  Plan: psych consult for management

## 2018-12-15 NOTE — PROGRESS NOTE ADULT - SUBJECTIVE AND OBJECTIVE BOX
Follow Up:  osteomyelitis    Interval History/ROS: complains of pain - has limited duration of analgesia    Allergies  No Known Allergies    ANTIMICROBIALS:  cefepime   IVPB 2000 every 12 hours  vancomycin  IVPB 1000 every 8 hours    OTHER MEDS:  MEDICATIONS  (STANDING):  ALPRAZolam 2 three times a day  amphetamine/dextroamphetamine 30 two times a day  cloNIDine 0.1 every 4 hours PRN  heparin  Injectable 5000 every 8 hours  influenza   Vaccine 0.5 once  oxyCODONE    IR 30 every 4 hours PRN  QUEtiapine 50 every 6 hours PRN    Vital Signs Last 24 Hrs  T(C): 36.8 (15 Dec 2018 11:12), Max: 36.9 (14 Dec 2018 20:58)  T(F): 98.3 (15 Dec 2018 11:12), Max: 98.5 (14 Dec 2018 20:58)  HR: 91 (15 Dec 2018 11:12) (74 - 110)  BP: 109/71 (15 Dec 2018 11:12) (91/56 - 130/84)  BP(mean): --  RR: 18 (15 Dec 2018 11:12) (18 - 18)  SpO2: 95% (15 Dec 2018 11:12) (95% - 99%)    PHYSICAL EXAM:  General: WN/WD NAD, Non-toxic  Neurology: A&Ox3, nonfocal  Respiratory: Clear to auscultation bilaterally  CV: RRR, S1S2, no murmurs, rubs or gallops  Abdominal:  non-distended,   Extremities: No edema, + peripheral pulses  Line Sites: Clear right IJ site  Skin: No rash, tattoos                        12.7   7.70  )-----------( 296      ( 15 Dec 2018 09:18 )             38.5   WBC Count: 7.70 (12-15 @ 09:18)  WBC Count: 5.62 (12-13 @ 07:29)  WBC Count: 6.1 (12-12 @ 08:45)  WBC Count: 5.1 (12-11 @ 14:44)      12-15    136  |  100  |  18  ----------------------------<  106<H>  3.4<L>   |  24  |  0.70    Ca    9.3      15 Dec 2018 07:32        MICROBIOLOGY:  .Body Fluid Interstitial Fluid  12-14-18   No growth  --    polymorphonuclear leukocytes per low power field  No organisms seen per oil power field  by cytocentrifuge      .Blood Blood-Peripheral  12-12-18   No growth to date.  --  --    Vancomycin Level, Trough: 7.4 ug/mL (12-15-18 @ 13:30)        RADIOLOGY:  < from: MR Lumbar Spine w/ IV Cont (12.12.18 @ 15:59) >  L4-5 facet septic arthritis.. A 5 mm rim enhancing collection in the left   paraspinal musculature at the L1 level may represent an abscess.    < end of copied text >      Alfonso Bermudez MD; Division of Infectious Disease; Pager: [phone]; nights and weekends: [phone]

## 2018-12-15 NOTE — PROGRESS NOTE ADULT - ASSESSMENT
39 y/o male PMHx current IV drug abuse, MSSA osteomyelitis T5T6 with washout and hardware placement in 2012, presented to the ED after signing out AMA from [facility] while being treated for osteomyelitis of L5S1.   Pt found to have facet L4-5 septic arthritis with abscess in paraspinal muscles.   Low vanco level but was drawn prior to steady state  Cultures negative      Plan:   Continue Vancomycin 1gm iv q8h with cefepime 2 gm iv q12h  Check vanco level  12/17 am  monitor culture results  Check HIV, Hep C serologies

## 2018-12-15 NOTE — PROGRESS NOTE ADULT - SUBJECTIVE AND OBJECTIVE BOX
Patient is a 38y old  Male who presents with a chief complaint of OM (15 Dec 2018 17:05)      INTERVAL HPI/OVERNIGHT EVENTS:  T(C): 36.8 (12-15-18 @ 11:12), Max: 36.9 (12-14-18 @ 20:58)  HR: 91 (12-15-18 @ 11:12) (74 - 110)  BP: 109/71 (12-15-18 @ 11:12) (91/56 - 130/84)  RR: 18 (12-15-18 @ 11:12) (18 - 18)  SpO2: 95% (12-15-18 @ 11:12) (95% - 99%)  Wt(kg): --  I&O's Summary    14 Dec 2018 07:01  -  15 Dec 2018 07:00  --------------------------------------------------------  IN: 2100 mL / OUT: 0 mL / NET: 2100 mL    15 Dec 2018 07:01  -  15 Dec 2018 17:39  --------------------------------------------------------  IN: 830 mL / OUT: 0 mL / NET: 830 mL        LABS:                        12.7   7.70  )-----------( 296      ( 15 Dec 2018 09:18 )             38.5     12-15    136  |  100  |  18  ----------------------------<  106<H>  3.4<L>   |  24  |  0.70    Ca    9.3      15 Dec 2018 07:32          CAPILLARY BLOOD GLUCOSE                MEDICATIONS  (STANDING):  ALPRAZolam 2 milliGRAM(s) Oral three times a day  amphetamine/dextroamphetamine 30 milliGRAM(s) Oral two times a day  cefepime   IVPB 2000 milliGRAM(s) IV Intermittent every 12 hours  heparin  Injectable 5000 Unit(s) SubCutaneous every 8 hours  hydrocortisone 0.5% Cream 1 Application(s) Topical two times a day  influenza   Vaccine 0.5 milliLiter(s) IntraMuscular once  nicotine - 21 mG/24Hr(s) Patch 1 patch Transdermal daily  vancomycin  IVPB 1000 milliGRAM(s) IV Intermittent every 8 hours    MEDICATIONS  (PRN):  cloNIDine 0.1 milliGRAM(s) Oral every 4 hours PRN if signs of withdrawal  oxyCODONE    IR 30 milliGRAM(s) Oral every 4 hours PRN Severe Pain (7 - 10)  QUEtiapine 50 milliGRAM(s) Oral every 6 hours PRN Anxiety, Agitation, or Insomnia          PHYSICAL EXAM:  GENERAL: NAD, well-groomed, well-developed  HEAD:  Atraumatic, Normocephalic  CHEST/LUNG: Clear to percussion bilaterally; No rales, rhonchi, wheezing, or rubs  HEART: Regular rate and rhythm; No murmurs, rubs, or gallops  ABDOMEN: Soft, Nontender, Nondistended; Bowel sounds present  EXTREMITIES:  2+ Peripheral Pulses, No clubbing, cyanosis, or edema  LYMPH: No lymphadenopathy noted  SKIN: No rashes or lesions    Care Discussed with Consultants/Other Providers [ ] YES  [ ] NO

## 2018-12-16 LAB
ANION GAP SERPL CALC-SCNC: 14 MMOL/L — SIGNIFICANT CHANGE UP (ref 5–17)
BUN SERPL-MCNC: 13 MG/DL — SIGNIFICANT CHANGE UP (ref 7–23)
CALCIUM SERPL-MCNC: 9.2 MG/DL — SIGNIFICANT CHANGE UP (ref 8.4–10.5)
CHLORIDE SERPL-SCNC: 98 MMOL/L — SIGNIFICANT CHANGE UP (ref 96–108)
CO2 SERPL-SCNC: 23 MMOL/L — SIGNIFICANT CHANGE UP (ref 22–31)
CREAT SERPL-MCNC: 0.65 MG/DL — SIGNIFICANT CHANGE UP (ref 0.5–1.3)
GLUCOSE SERPL-MCNC: 97 MG/DL — SIGNIFICANT CHANGE UP (ref 70–99)
POTASSIUM SERPL-MCNC: 4 MMOL/L — SIGNIFICANT CHANGE UP (ref 3.5–5.3)
POTASSIUM SERPL-SCNC: 4 MMOL/L — SIGNIFICANT CHANGE UP (ref 3.5–5.3)
SODIUM SERPL-SCNC: 135 MMOL/L — SIGNIFICANT CHANGE UP (ref 135–145)

## 2018-12-16 PROCEDURE — 71045 X-RAY EXAM CHEST 1 VIEW: CPT | Mod: 26

## 2018-12-16 RX ADMIN — OXYCODONE HYDROCHLORIDE 30 MILLIGRAM(S): 5 TABLET ORAL at 21:55

## 2018-12-16 RX ADMIN — OXYCODONE HYDROCHLORIDE 30 MILLIGRAM(S): 5 TABLET ORAL at 16:05

## 2018-12-16 RX ADMIN — HEPARIN SODIUM 5000 UNIT(S): 5000 INJECTION INTRAVENOUS; SUBCUTANEOUS at 06:00

## 2018-12-16 RX ADMIN — CEFEPIME 100 MILLIGRAM(S): 1 INJECTION, POWDER, FOR SOLUTION INTRAMUSCULAR; INTRAVENOUS at 06:00

## 2018-12-16 RX ADMIN — Medication 1 PATCH: at 11:22

## 2018-12-16 RX ADMIN — OXYCODONE HYDROCHLORIDE 30 MILLIGRAM(S): 5 TABLET ORAL at 06:50

## 2018-12-16 RX ADMIN — OXYCODONE HYDROCHLORIDE 30 MILLIGRAM(S): 5 TABLET ORAL at 10:55

## 2018-12-16 RX ADMIN — OXYCODONE HYDROCHLORIDE 30 MILLIGRAM(S): 5 TABLET ORAL at 23:34

## 2018-12-16 RX ADMIN — Medication 2 MILLIGRAM(S): at 14:13

## 2018-12-16 RX ADMIN — HEPARIN SODIUM 5000 UNIT(S): 5000 INJECTION INTRAVENOUS; SUBCUTANEOUS at 21:42

## 2018-12-16 RX ADMIN — Medication 1 PATCH: at 07:24

## 2018-12-16 RX ADMIN — Medication 1 PATCH: at 19:25

## 2018-12-16 RX ADMIN — DEXTROAMPHETAMINE SACCHARATE, AMPHETAMINE ASPARTATE, DEXTROAMPHETAMINE SULFATE AND AMPHETAMINE SULFATE 30 MILLIGRAM(S): 1.875; 1.875; 1.875; 1.875 TABLET ORAL at 11:24

## 2018-12-16 RX ADMIN — DEXTROAMPHETAMINE SACCHARATE, AMPHETAMINE ASPARTATE, DEXTROAMPHETAMINE SULFATE AND AMPHETAMINE SULFATE 30 MILLIGRAM(S): 1.875; 1.875; 1.875; 1.875 TABLET ORAL at 17:20

## 2018-12-16 RX ADMIN — OXYCODONE HYDROCHLORIDE 30 MILLIGRAM(S): 5 TABLET ORAL at 00:10

## 2018-12-16 RX ADMIN — Medication 1 PATCH: at 11:23

## 2018-12-16 RX ADMIN — HEPARIN SODIUM 5000 UNIT(S): 5000 INJECTION INTRAVENOUS; SUBCUTANEOUS at 14:12

## 2018-12-16 RX ADMIN — Medication 1 APPLICATION(S): at 05:54

## 2018-12-16 RX ADMIN — OXYCODONE HYDROCHLORIDE 30 MILLIGRAM(S): 5 TABLET ORAL at 11:08

## 2018-12-16 RX ADMIN — Medication 2 MILLIGRAM(S): at 06:00

## 2018-12-16 RX ADMIN — OXYCODONE HYDROCHLORIDE 30 MILLIGRAM(S): 5 TABLET ORAL at 17:16

## 2018-12-16 RX ADMIN — Medication 2 MILLIGRAM(S): at 21:42

## 2018-12-16 RX ADMIN — Medication 1 APPLICATION(S): at 17:20

## 2018-12-16 RX ADMIN — OXYCODONE HYDROCHLORIDE 30 MILLIGRAM(S): 5 TABLET ORAL at 06:26

## 2018-12-16 RX ADMIN — Medication 250 MILLIGRAM(S): at 21:42

## 2018-12-16 RX ADMIN — Medication 250 MILLIGRAM(S): at 00:10

## 2018-12-16 NOTE — CHART NOTE - NSCHARTNOTEFT_GEN_A_CORE
Alerted by RN that suspicious IV site was used by pt's himself.   Nursing manager suggested to get an urine tox.   need to f/u urine tox result.  cont assess and monitor. Alerted by RN that suspicious IV site was used by pt's himself. Nursing manager suggested to get an urine tox test.   D/C'd R EJ iv. No s/s of active bleeding and cathter tip intact. Cover it with 2x2.   need to f/u urine tox result.  cont assess and monitor.

## 2018-12-16 NOTE — CHART NOTE - NSCHARTNOTEFT_GEN_A_CORE
Pertaining to the chest X-ray from 12/16/2018 at 1:50 PM, the right upper PICC line tip is in the SVC. This represents a change from the final report. Findings were discussed with on-call attending.    An addendum will be issued.    Anthony Chacon MD  PGY-3 Diagnostic Radiology  [phone]

## 2018-12-16 NOTE — PROGRESS NOTE ADULT - SUBJECTIVE AND OBJECTIVE BOX
Subjective: Patient seen and examined. No new events except as noted.   doing better  PICC line placed today     REVIEW OF SYSTEMS:    CONSTITUTIONAL: No weakness, fevers or chills  EYES/ENT: No visual changes;  No vertigo or throat pain   NECK: No pain or stiffness  RESPIRATORY: No cough, wheezing, hemoptysis; No shortness of breath  CARDIOVASCULAR: No chest pain or palpitations  GASTROINTESTINAL: No abdominal or epigastric pain. No nausea, vomiting, or hematemesis; No diarrhea or constipation. No melena or hematochezia.  GENITOURINARY: No dysuria, frequency or hematuria  NEUROLOGICAL: No numbness or weakness  SKIN: No itching, burning, rashes, or lesions   All other review of systems is negative unless indicated above.    MEDICATIONS:  MEDICATIONS  (STANDING):  ALPRAZolam 2 milliGRAM(s) Oral three times a day  amphetamine/dextroamphetamine 30 milliGRAM(s) Oral two times a day  cefepime   IVPB 2000 milliGRAM(s) IV Intermittent every 12 hours  heparin  Injectable 5000 Unit(s) SubCutaneous every 8 hours  hydrocortisone 0.5% Cream 1 Application(s) Topical two times a day  influenza   Vaccine 0.5 milliLiter(s) IntraMuscular once  nicotine - 21 mG/24Hr(s) Patch 1 patch Transdermal daily  vancomycin  IVPB 1000 milliGRAM(s) IV Intermittent every 8 hours      PHYSICAL EXAM:  T(C): 36.8 (12-16-18 @ 09:11), Max: 37.4 (12-16-18 @ 00:27)  HR: 62 (12-16-18 @ 09:11) (62 - 99)  BP: 115/83 (12-16-18 @ 09:11) (115/83 - 145/84)  RR: 18 (12-16-18 @ 09:11) (18 - 20)  SpO2: 96% (12-16-18 @ 09:11) (96% - 99%)  Wt(kg): --  I&O's Summary    15 Dec 2018 07:01  -  16 Dec 2018 07:00  --------------------------------------------------------  IN: 2080 mL / OUT: 0 mL / NET: 2080 mL    16 Dec 2018 07:01  -  16 Dec 2018 15:02  --------------------------------------------------------  IN: 240 mL / OUT: 0 mL / NET: 240 mL          Appearance: Normal	  HEENT:   Normal oral mucosa, PERRL, EOMI	  Lymphatic: No lymphadenopathy , no edema  Cardiovascular: Normal S1 S2, No JVD, No murmurs , Peripheral pulses palpable 2+ bilaterally  Respiratory: Lungs clear to auscultation, normal effort 	  Gastrointestinal:  Soft, Non-tender, + BS	  Skin: No rashes, No ecchymoses, No cyanosis, warm to touch  Musculoskeletal: Normal range of motion, normal strength  Psychiatry:  Mood & affect appropriate  Ext: No edema      All labs, Imaging and EKGs personally reviewed                           12.7   7.70  )-----------( 296      ( 15 Dec 2018 09:18 )             38.5               12-16    135  |  98  |  13  ----------------------------<  97  4.0   |  23  |  0.65    Ca    9.2      16 Dec 2018 07:27

## 2018-12-16 NOTE — PROGRESS NOTE ADULT - ASSESSMENT
38 yr old M w/a hx of IV drug abuse with history osteomyelitis of t5t6 with washout now presents with acute back pain    Problem/Plan - 1:  ·  Problem: Osteomyelitis of vertebra of lumbosacral region.  Plan: AS PER VERBAL REPORT FROM [facility] enhancement of right pedicles of L4/L5 and facet joints along with extensive enhancement of paravertebral muscles   - sp biopsy  - cw antibiotics, vanc trough, adjust dose accordingly to keep trough>15<20  - ID fu .     Problem/Plan - 2:  ·  Problem: IV drug abuse.  Plan: psych consult for management

## 2018-12-17 ENCOUNTER — TRANSCRIPTION ENCOUNTER (OUTPATIENT)
Age: 38
End: 2018-12-17

## 2018-12-17 VITALS
HEART RATE: 92 BPM | TEMPERATURE: 98 F | RESPIRATION RATE: 18 BRPM | OXYGEN SATURATION: 100 % | DIASTOLIC BLOOD PRESSURE: 80 MMHG | SYSTOLIC BLOOD PRESSURE: 125 MMHG

## 2018-12-17 LAB
CULTURE RESULTS: SIGNIFICANT CHANGE UP
SPECIMEN SOURCE: SIGNIFICANT CHANGE UP
VANCOMYCIN TROUGH SERPL-MCNC: 4.8 UG/ML — LOW (ref 10–20)

## 2018-12-17 PROCEDURE — 99232 SBSQ HOSP IP/OBS MODERATE 35: CPT

## 2018-12-17 RX ORDER — ALPRAZOLAM 0.25 MG
1 TABLET ORAL
Qty: 0 | Refills: 0 | COMMUNITY

## 2018-12-17 RX ORDER — QUETIAPINE FUMARATE 200 MG/1
1 TABLET, FILM COATED ORAL
Qty: 0 | Refills: 0 | COMMUNITY
Start: 2018-12-17

## 2018-12-17 RX ORDER — VANCOMYCIN HCL 1 G
1 VIAL (EA) INTRAVENOUS
Qty: 0 | Refills: 0 | COMMUNITY
Start: 2018-12-17

## 2018-12-17 RX ORDER — HYDROCORTISONE 1 %
1 OINTMENT (GRAM) TOPICAL
Qty: 0 | Refills: 0 | COMMUNITY
Start: 2018-12-17

## 2018-12-17 RX ORDER — QUETIAPINE FUMARATE 200 MG/1
1 TABLET, FILM COATED ORAL
Qty: 10 | Refills: 0 | OUTPATIENT
Start: 2018-12-17

## 2018-12-17 RX ORDER — DEXTROAMPHETAMINE SACCHARATE, AMPHETAMINE ASPARTATE, DEXTROAMPHETAMINE SULFATE AND AMPHETAMINE SULFATE 1.875; 1.875; 1.875; 1.875 MG/1; MG/1; MG/1; MG/1
1 TABLET ORAL
Qty: 0 | Refills: 0 | COMMUNITY

## 2018-12-17 RX ORDER — GABAPENTIN 400 MG/1
1 CAPSULE ORAL
Qty: 0 | Refills: 0 | COMMUNITY

## 2018-12-17 RX ORDER — QUETIAPINE FUMARATE 200 MG/1
1 TABLET, FILM COATED ORAL
Qty: 8 | Refills: 0 | OUTPATIENT
Start: 2018-12-17

## 2018-12-17 RX ORDER — NICOTINE POLACRILEX 2 MG
1 GUM BUCCAL
Qty: 0 | Refills: 0 | COMMUNITY
Start: 2018-12-17

## 2018-12-17 RX ORDER — CEFEPIME 1 G/1
2 INJECTION, POWDER, FOR SOLUTION INTRAMUSCULAR; INTRAVENOUS
Qty: 0 | Refills: 0 | COMMUNITY
Start: 2018-12-17

## 2018-12-17 RX ADMIN — QUETIAPINE FUMARATE 50 MILLIGRAM(S): 200 TABLET, FILM COATED ORAL at 03:15

## 2018-12-17 RX ADMIN — OXYCODONE HYDROCHLORIDE 30 MILLIGRAM(S): 5 TABLET ORAL at 03:13

## 2018-12-17 RX ADMIN — HEPARIN SODIUM 5000 UNIT(S): 5000 INJECTION INTRAVENOUS; SUBCUTANEOUS at 05:10

## 2018-12-17 RX ADMIN — Medication 2 MILLIGRAM(S): at 13:15

## 2018-12-17 RX ADMIN — OXYCODONE HYDROCHLORIDE 30 MILLIGRAM(S): 5 TABLET ORAL at 10:04

## 2018-12-17 RX ADMIN — Medication 1 APPLICATION(S): at 05:11

## 2018-12-17 RX ADMIN — OXYCODONE HYDROCHLORIDE 30 MILLIGRAM(S): 5 TABLET ORAL at 14:21

## 2018-12-17 RX ADMIN — Medication 250 MILLIGRAM(S): at 13:04

## 2018-12-17 RX ADMIN — DEXTROAMPHETAMINE SACCHARATE, AMPHETAMINE ASPARTATE, DEXTROAMPHETAMINE SULFATE AND AMPHETAMINE SULFATE 30 MILLIGRAM(S): 1.875; 1.875; 1.875; 1.875 TABLET ORAL at 10:04

## 2018-12-17 RX ADMIN — Medication 1 PATCH: at 13:04

## 2018-12-17 RX ADMIN — Medication 2 MILLIGRAM(S): at 05:10

## 2018-12-17 RX ADMIN — Medication 250 MILLIGRAM(S): at 05:10

## 2018-12-17 RX ADMIN — OXYCODONE HYDROCHLORIDE 30 MILLIGRAM(S): 5 TABLET ORAL at 14:51

## 2018-12-17 RX ADMIN — Medication 1 PATCH: at 11:25

## 2018-12-17 RX ADMIN — OXYCODONE HYDROCHLORIDE 30 MILLIGRAM(S): 5 TABLET ORAL at 04:55

## 2018-12-17 RX ADMIN — HEPARIN SODIUM 5000 UNIT(S): 5000 INJECTION INTRAVENOUS; SUBCUTANEOUS at 13:05

## 2018-12-17 RX ADMIN — CEFEPIME 100 MILLIGRAM(S): 1 INJECTION, POWDER, FOR SOLUTION INTRAMUSCULAR; INTRAVENOUS at 05:10

## 2018-12-17 RX ADMIN — Medication 1 PATCH: at 08:57

## 2018-12-17 RX ADMIN — Medication 1 APPLICATION(S): at 17:12

## 2018-12-17 RX ADMIN — OXYCODONE HYDROCHLORIDE 30 MILLIGRAM(S): 5 TABLET ORAL at 10:34

## 2018-12-17 RX ADMIN — DEXTROAMPHETAMINE SACCHARATE, AMPHETAMINE ASPARTATE, DEXTROAMPHETAMINE SULFATE AND AMPHETAMINE SULFATE 30 MILLIGRAM(S): 1.875; 1.875; 1.875; 1.875 TABLET ORAL at 17:12

## 2018-12-17 RX ADMIN — Medication 1 PATCH: at 18:22

## 2018-12-17 NOTE — DISCHARGE NOTE ADULT - MEDICATION SUMMARY - MEDICATIONS TO TAKE
I will START or STAY ON the medications listed below when I get home from the hospital:    oxyCODONE 30 mg oral tablet  -- 1 tab(s) by mouth every 6 hours  -- Indication: For Pain    QUEtiapine 50 mg oral tablet  -- 1 tab(s) by mouth every 6 hours, As needed, Anxiety, Agitation, or Insomnia  -- Indication: For Anxiety or Agitation    ALPRAZolam 2 mg oral tablet  -- 1 tab(s) by mouth 3 times a day  -- Indication: For Anxiety    cefepime 2 g intravenous injection  -- 2 gram(s) intravenous every 12 hours  -- Indication: For Osteomyelitis of vertebra of lumbosacral region    dextroamphetamine-amphetamine 30 mg oral tablet  -- 1 tab(s) by mouth 2 times a day  -- Indication: For Osteomyelitis of vertebra of lumbosacral region    hydrocortisone 0.5% topical cream  -- 1 application on skin 2 times a day  -- Indication: For Rash    vancomycin 1 g intravenous injection  -- 1 gram(s) intravenous every 8 hours  -- Indication: For Osteomyelitis of vertebra of lumbosacral region    nicotine 21 mg/24 hr transdermal film, extended release  -- 1 patch by transdermal patch once a day  -- Indication: For Smoking cessation

## 2018-12-17 NOTE — DISCHARGE NOTE ADULT - PLAN OF CARE
Infection resolves You will be on IV antibiotic for at least 6 weeks; through 12/25/2018  You will receive the antibiotics through the PICC line that was inserted in your right arm.  Follow up with Infectious disease (ID) in 4 weeks.  Call for appointment You were followed by Social work during hospitalization.  Consider drug Rehab program, you were give a list of programs by .

## 2018-12-17 NOTE — DISCHARGE NOTE ADULT - CARE PROVIDER_API CALL
Erickson Orr), Internal Medicine; Nephrology  [address] [place] [address]  Phone: [phone]  Fax: [phone]

## 2018-12-17 NOTE — DISCHARGE NOTE ADULT - ADDITIONAL INSTRUCTIONS
You have chosen to sign out against medical advice (AMA); Your PICC line had to be removed prior to leaving.  Follow up with your primary healthcare provider as your condition will most likely deteriorate without Antibiotic treatment.

## 2018-12-17 NOTE — PROGRESS NOTE ADULT - ASSESSMENT
37 y/o male PMHx current IV drug abuse, MSSA osteomyelitis T5T6 with washout and hardware placement in 2012, presented to the ED after signing out AMA from [facility] while being treated for osteomyelitis of L5S1.   Pt found to have facet L4-5 septic arthritis with abscess in paraspinal muscles.   Low vanco level, it was drawn later and pt missed 2 prior doses.   Cultures negative to date.       Plan:   Continue Vancomycin 1gm iv q8h with cefepime 2 gm iv q12h  Check level tomorrow morning.   Follow up prelim cx.   HIV, Hep C serologies ordered.   Will need 6 weeks of abx total until 1/25/19.   CBC/CMP/Vanco trough to be followed by physician at rehab.

## 2018-12-17 NOTE — DISCHARGE NOTE ADULT - PATIENT PORTAL LINK FT
You can access the [facility] Patient Portal, offered by [facility], by registering with the following website: [email or web address]

## 2018-12-17 NOTE — DISCHARGE NOTE ADULT - HOSPITAL COURSE
39 y/o male PMHx current IV drug abuse, osteomyelitis T5T6 with washout presented to the ED after signing out AMA from [facility] while being treated for osteomyelitis of L5S1. Patient left AMA as he was offended RN accused patient of tampering with IV. Patient stated he had CT and MRI with IV contrast while at [facility].    Work-up confirmed L4-5 Osteomyelitis; PICC inserted for long term IV Abx. Patient stated he wanted to sign out AMA 2/2 death in his family (wife's aunt ); PICC removed prior to d/c. Medical attdg (Dr Abel) informed.

## 2018-12-17 NOTE — PROGRESS NOTE ADULT - SUBJECTIVE AND OBJECTIVE BOX
38y old  Male who presents with a chief complaint of OM (17 Dec 2018 11:30)      Interval history:  Afebrile, no cough, no SOB, no N/V/D, no abdominal pain, denies dysuria. Complains of some pain but mostly controlled, wants to have his pain meds increased. Per RN pt was suspected to use his iv. Now with PICC.       No Known Allergies      Antimicrobials:  cefepime   IVPB 2000 milliGRAM(s) IV Intermittent every 12 hours  vancomycin  IVPB 1000 milliGRAM(s) IV Intermittent every 8 hours      REVIEW OF SYSTEMS:  No chest pain  No rash.       Vital Signs Last 24 Hrs  T(C): 36.9 (12-17-18 @ 13:48), Max: 36.9 (12-17-18 @ 13:48)  T(F): 98.4 (12-17-18 @ 13:48), Max: 98.4 (12-17-18 @ 13:48)  HR: 92 (12-17-18 @ 13:48) (78 - 98)  RR: 18 (12-17-18 @ 13:48) (17 - 18)  SpO2: 100% (12-17-18 @ 13:48) (98% - 100%)      PHYSICAL EXAM:  Patient in no acute distress. AAOX3. Sitting in chair, comfortable.   No icterus, no oral ulcers.  Cardiovascular: S1S2 normal.  Lungs: Good air entry B/L lung fields.  Gastrointestinal: soft, nontender, nondistended.  Extremities: no edema.  Rt arm PICC  Multiple track marks b/l upper extremities.       12-16    135  |  98  |  13  ----------------------------<  97  4.0   |  23  |  0.65    Ca    9.2      16 Dec 2018 07:27      Culture - Fungal, Body Fluid (12.14.18 @ 12:23)    Specimen Source: .Body Fluid Interstitial Fluid    Culture Results:   Testing in progress      Culture - Body Fluid with Gram Stain (12.14.18 @ 12:23)    Gram Stain:   polymorphonuclear leukocytes per low power field  No organisms seen per oil power field  by cytocentrifuge    Specimen Source: .Body Fluid Interstitial Fluid    Culture Results:   No growth        Radiology:  < from: Xray Chest 1 View- PORTABLE-Urgent (12.16.18 @ 13:56) >  IMPRESSION:     Tip of the right PICC line projects over the mid shaft of the right   humerus. Recommend advancement.    No consolidation, pleural effusion or pneumothorax. No pulmonary edema.    The cardiomediastinal silhouette is normal in size and contour. Upper   thoracic spine fusion.

## 2018-12-17 NOTE — DISCHARGE NOTE ADULT - MEDICATION SUMMARY - MEDICATIONS TO STOP TAKING
I will STOP taking the medications listed below when I get home from the hospital:    Adderall 30 mg oral tablet  -- 1 tab(s) by mouth 3 times a day

## 2018-12-17 NOTE — DISCHARGE NOTE ADULT - CARE PLAN
Principal Discharge DX:	Osteomyelitis of vertebra of lumbosacral region  Goal:	Infection resolves  Assessment and plan of treatment:	You will be on IV antibiotic for at least 6 weeks; through 12/25/2018  You will receive the antibiotics through the PICC line that was inserted in your right arm.  Follow up with Infectious disease (ID) in 4 weeks.  Call for appointment  Secondary Diagnosis:	IV drug abuse  Assessment and plan of treatment:	You were followed by Social work during hospitalization.  Consider drug Rehab program, you were give a list of programs by .

## 2018-12-19 LAB
CULTURE RESULTS: SIGNIFICANT CHANGE UP
SPECIMEN SOURCE: SIGNIFICANT CHANGE UP

## 2018-12-26 LAB
AMPHET UR-MCNC: SIGNIFICANT CHANGE UP
BARBITURATES, URINE.: NEGATIVE — SIGNIFICANT CHANGE UP
BENZODIAZ UR-MCNC: SIGNIFICANT CHANGE UP
COCAINE METAB.OTHER UR-MCNC: NEGATIVE — SIGNIFICANT CHANGE UP
CREATININE, URINE THERAPEUTIC: 45.8 MG/DL — SIGNIFICANT CHANGE UP
METHADONE UR-MCNC: NEGATIVE — SIGNIFICANT CHANGE UP
METHAQUALONE UR QL: NEGATIVE — SIGNIFICANT CHANGE UP
METHAQUALONE UR-MCNC: NEGATIVE — SIGNIFICANT CHANGE UP
OPIATES UR-MCNC: SIGNIFICANT CHANGE UP
PCP UR-MCNC: NEGATIVE — SIGNIFICANT CHANGE UP
PROPOXYPH UR QL: NEGATIVE — SIGNIFICANT CHANGE UP
THC UR QL: NEGATIVE — SIGNIFICANT CHANGE UP

## 2019-01-09 PROCEDURE — 86140 C-REACTIVE PROTEIN: CPT

## 2019-01-09 PROCEDURE — 87040 BLOOD CULTURE FOR BACTERIA: CPT

## 2019-01-09 PROCEDURE — 72149 MRI LUMBAR SPINE W/DYE: CPT

## 2019-01-09 PROCEDURE — 77012 CT SCAN FOR NEEDLE BIOPSY: CPT

## 2019-01-09 PROCEDURE — G0480: CPT

## 2019-01-09 PROCEDURE — 83605 ASSAY OF LACTIC ACID: CPT

## 2019-01-09 PROCEDURE — 82435 ASSAY OF BLOOD CHLORIDE: CPT

## 2019-01-09 PROCEDURE — 80307 DRUG TEST PRSMV CHEM ANLYZR: CPT

## 2019-01-09 PROCEDURE — 82803 BLOOD GASES ANY COMBINATION: CPT

## 2019-01-09 PROCEDURE — 85652 RBC SED RATE AUTOMATED: CPT

## 2019-01-09 PROCEDURE — C1751: CPT

## 2019-01-09 PROCEDURE — 87102 FUNGUS ISOLATION CULTURE: CPT

## 2019-01-09 PROCEDURE — C8929: CPT

## 2019-01-09 PROCEDURE — 87075 CULTR BACTERIA EXCEPT BLOOD: CPT

## 2019-01-09 PROCEDURE — 82947 ASSAY GLUCOSE BLOOD QUANT: CPT

## 2019-01-09 PROCEDURE — 87205 SMEAR GRAM STAIN: CPT

## 2019-01-09 PROCEDURE — 71045 X-RAY EXAM CHEST 1 VIEW: CPT

## 2019-01-09 PROCEDURE — 85027 COMPLETE CBC AUTOMATED: CPT

## 2019-01-09 PROCEDURE — 85014 HEMATOCRIT: CPT

## 2019-01-09 PROCEDURE — 10022: CPT

## 2019-01-09 PROCEDURE — 87015 SPECIMEN INFECT AGNT CONCNTJ: CPT

## 2019-01-09 PROCEDURE — 82330 ASSAY OF CALCIUM: CPT

## 2019-01-09 PROCEDURE — 84132 ASSAY OF SERUM POTASSIUM: CPT

## 2019-01-09 PROCEDURE — 80048 BASIC METABOLIC PNL TOTAL CA: CPT

## 2019-01-09 PROCEDURE — A9585: CPT

## 2019-01-09 PROCEDURE — 84295 ASSAY OF SERUM SODIUM: CPT

## 2019-01-09 PROCEDURE — 87116 MYCOBACTERIA CULTURE: CPT

## 2019-01-09 PROCEDURE — 85730 THROMBOPLASTIN TIME PARTIAL: CPT

## 2019-01-09 PROCEDURE — 80053 COMPREHEN METABOLIC PANEL: CPT

## 2019-01-09 PROCEDURE — 36569 INSJ PICC 5 YR+ W/O IMAGING: CPT

## 2019-01-09 PROCEDURE — 87070 CULTURE OTHR SPECIMN AEROBIC: CPT

## 2019-01-09 PROCEDURE — 87206 SMEAR FLUORESCENT/ACID STAI: CPT

## 2019-01-09 PROCEDURE — 80202 ASSAY OF VANCOMYCIN: CPT

## 2019-01-09 PROCEDURE — 85610 PROTHROMBIN TIME: CPT

## 2019-01-09 PROCEDURE — 99285 EMERGENCY DEPT VISIT HI MDM: CPT

## 2019-01-12 LAB
CULTURE RESULTS: SIGNIFICANT CHANGE UP
SPECIMEN SOURCE: SIGNIFICANT CHANGE UP

## 2019-01-15 ENCOUNTER — EMERGENCY (EMERGENCY)
Facility: HOSPITAL | Age: 39
LOS: 1 days | End: 2019-01-15
Attending: EMERGENCY MEDICINE
Payer: MEDICAID

## 2019-01-15 VITALS
HEIGHT: 68 IN | SYSTOLIC BLOOD PRESSURE: 127 MMHG | RESPIRATION RATE: 18 BRPM | DIASTOLIC BLOOD PRESSURE: 84 MMHG | OXYGEN SATURATION: 97 % | HEART RATE: 94 BPM | WEIGHT: 220.02 LBS | TEMPERATURE: 97 F

## 2019-01-15 DIAGNOSIS — Z98.890 OTHER SPECIFIED POSTPROCEDURAL STATES: Chronic | ICD-10-CM

## 2019-01-15 PROCEDURE — 99284 EMERGENCY DEPT VISIT MOD MDM: CPT | Mod: 25

## 2019-01-16 ENCOUNTER — INPATIENT (INPATIENT)
Facility: HOSPITAL | Age: 39
LOS: 1 days | Discharge: LEFT BEFORE DISCHARGE | DRG: 95 | End: 2019-01-18
Attending: INTERNAL MEDICINE | Admitting: INTERNAL MEDICINE
Payer: MEDICAID

## 2019-01-16 VITALS
TEMPERATURE: 98 F | OXYGEN SATURATION: 97 % | DIASTOLIC BLOOD PRESSURE: 65 MMHG | RESPIRATION RATE: 16 BRPM | SYSTOLIC BLOOD PRESSURE: 112 MMHG | HEART RATE: 75 BPM

## 2019-01-16 VITALS
SYSTOLIC BLOOD PRESSURE: 114 MMHG | WEIGHT: 220.02 LBS | DIASTOLIC BLOOD PRESSURE: 77 MMHG | HEART RATE: 93 BPM | RESPIRATION RATE: 18 BRPM | TEMPERATURE: 99 F | OXYGEN SATURATION: 97 % | HEIGHT: 68 IN

## 2019-01-16 DIAGNOSIS — Z98.890 OTHER SPECIFIED POSTPROCEDURAL STATES: Chronic | ICD-10-CM

## 2019-01-16 DIAGNOSIS — G06.1 INTRASPINAL ABSCESS AND GRANULOMA: ICD-10-CM

## 2019-01-16 DIAGNOSIS — F11.90 OPIOID USE, UNSPECIFIED, UNCOMPLICATED: ICD-10-CM

## 2019-01-16 DIAGNOSIS — Z29.9 ENCOUNTER FOR PROPHYLACTIC MEASURES, UNSPECIFIED: ICD-10-CM

## 2019-01-16 PROBLEM — F19.10 OTHER PSYCHOACTIVE SUBSTANCE ABUSE, UNCOMPLICATED: Chronic | Status: ACTIVE | Noted: 2018-12-11

## 2019-01-16 LAB
ALBUMIN SERPL ELPH-MCNC: 4.9 G/DL — SIGNIFICANT CHANGE UP (ref 3.3–5)
ALP SERPL-CCNC: 95 U/L — SIGNIFICANT CHANGE UP (ref 40–120)
ALT FLD-CCNC: 24 U/L — SIGNIFICANT CHANGE UP (ref 10–45)
ANION GAP SERPL CALC-SCNC: 11 MMOL/L — SIGNIFICANT CHANGE UP (ref 5–17)
APTT BLD: 34.6 SEC — SIGNIFICANT CHANGE UP (ref 27.5–36.3)
AST SERPL-CCNC: 18 U/L — SIGNIFICANT CHANGE UP (ref 10–40)
BASOPHILS # BLD AUTO: 0.1 K/UL — SIGNIFICANT CHANGE UP (ref 0–0.2)
BASOPHILS NFR BLD AUTO: 0.7 % — SIGNIFICANT CHANGE UP (ref 0–2)
BILIRUB SERPL-MCNC: 0.3 MG/DL — SIGNIFICANT CHANGE UP (ref 0.2–1.2)
BUN SERPL-MCNC: 13 MG/DL — SIGNIFICANT CHANGE UP (ref 7–23)
CALCIUM SERPL-MCNC: 9.8 MG/DL — SIGNIFICANT CHANGE UP (ref 8.4–10.5)
CHLORIDE SERPL-SCNC: 100 MMOL/L — SIGNIFICANT CHANGE UP (ref 96–108)
CO2 SERPL-SCNC: 27 MMOL/L — SIGNIFICANT CHANGE UP (ref 22–31)
CREAT SERPL-MCNC: 0.84 MG/DL — SIGNIFICANT CHANGE UP (ref 0.5–1.3)
CRP SERPL-MCNC: 0.17 MG/DL — SIGNIFICANT CHANGE UP (ref 0–0.4)
EOSINOPHIL # BLD AUTO: 0.1 K/UL — SIGNIFICANT CHANGE UP (ref 0–0.5)
EOSINOPHIL NFR BLD AUTO: 1.4 % — SIGNIFICANT CHANGE UP (ref 0–6)
ERYTHROCYTE [SEDIMENTATION RATE] IN BLOOD: 34 MM/HR — HIGH (ref 0–15)
GLUCOSE SERPL-MCNC: 85 MG/DL — SIGNIFICANT CHANGE UP (ref 70–99)
HCT VFR BLD CALC: 41.2 % — SIGNIFICANT CHANGE UP (ref 39–50)
HGB BLD-MCNC: 14.4 G/DL — SIGNIFICANT CHANGE UP (ref 13–17)
INR BLD: 1.05 RATIO — SIGNIFICANT CHANGE UP (ref 0.88–1.16)
LYMPHOCYTES # BLD AUTO: 2.3 K/UL — SIGNIFICANT CHANGE UP (ref 1–3.3)
LYMPHOCYTES # BLD AUTO: 24.7 % — SIGNIFICANT CHANGE UP (ref 13–44)
MCHC RBC-ENTMCNC: 30.2 PG — SIGNIFICANT CHANGE UP (ref 27–34)
MCHC RBC-ENTMCNC: 35 GM/DL — SIGNIFICANT CHANGE UP (ref 32–36)
MCV RBC AUTO: 86.4 FL — SIGNIFICANT CHANGE UP (ref 80–100)
MONOCYTES # BLD AUTO: 0.6 K/UL — SIGNIFICANT CHANGE UP (ref 0–0.9)
MONOCYTES NFR BLD AUTO: 6 % — SIGNIFICANT CHANGE UP (ref 2–14)
NEUTROPHILS # BLD AUTO: 6.3 K/UL — SIGNIFICANT CHANGE UP (ref 1.8–7.4)
NEUTROPHILS NFR BLD AUTO: 67.2 % — SIGNIFICANT CHANGE UP (ref 43–77)
PLATELET # BLD AUTO: 340 K/UL — SIGNIFICANT CHANGE UP (ref 150–400)
POTASSIUM SERPL-MCNC: 4.1 MMOL/L — SIGNIFICANT CHANGE UP (ref 3.5–5.3)
POTASSIUM SERPL-SCNC: 4.1 MMOL/L — SIGNIFICANT CHANGE UP (ref 3.5–5.3)
PROT SERPL-MCNC: 8.5 G/DL — HIGH (ref 6–8.3)
PROTHROM AB SERPL-ACNC: 12 SEC — SIGNIFICANT CHANGE UP (ref 10–12.9)
RBC # BLD: 4.77 M/UL — SIGNIFICANT CHANGE UP (ref 4.2–5.8)
RBC # FLD: 13.2 % — SIGNIFICANT CHANGE UP (ref 10.3–14.5)
SODIUM SERPL-SCNC: 138 MMOL/L — SIGNIFICANT CHANGE UP (ref 135–145)
WBC # BLD: 9.4 K/UL — SIGNIFICANT CHANGE UP (ref 3.8–10.5)
WBC # FLD AUTO: 9.4 K/UL — SIGNIFICANT CHANGE UP (ref 3.8–10.5)

## 2019-01-16 PROCEDURE — 36000 PLACE NEEDLE IN VEIN: CPT

## 2019-01-16 PROCEDURE — 36000 PLACE NEEDLE IN VEIN: CPT | Mod: 26

## 2019-01-16 PROCEDURE — 72149 MRI LUMBAR SPINE W/DYE: CPT

## 2019-01-16 PROCEDURE — 86140 C-REACTIVE PROTEIN: CPT

## 2019-01-16 PROCEDURE — 99284 EMERGENCY DEPT VISIT MOD MDM: CPT | Mod: 25

## 2019-01-16 PROCEDURE — 80053 COMPREHEN METABOLIC PANEL: CPT

## 2019-01-16 PROCEDURE — 85730 THROMBOPLASTIN TIME PARTIAL: CPT

## 2019-01-16 PROCEDURE — A9585: CPT

## 2019-01-16 PROCEDURE — 76937 US GUIDE VASCULAR ACCESS: CPT | Mod: 26

## 2019-01-16 PROCEDURE — 99285 EMERGENCY DEPT VISIT HI MDM: CPT

## 2019-01-16 PROCEDURE — 85027 COMPLETE CBC AUTOMATED: CPT

## 2019-01-16 PROCEDURE — 72147 MRI CHEST SPINE W/DYE: CPT

## 2019-01-16 PROCEDURE — 72147 MRI CHEST SPINE W/DYE: CPT | Mod: 26

## 2019-01-16 PROCEDURE — 85652 RBC SED RATE AUTOMATED: CPT

## 2019-01-16 PROCEDURE — 72149 MRI LUMBAR SPINE W/DYE: CPT | Mod: 26

## 2019-01-16 PROCEDURE — 76937 US GUIDE VASCULAR ACCESS: CPT

## 2019-01-16 PROCEDURE — 85610 PROTHROMBIN TIME: CPT

## 2019-01-16 PROCEDURE — 99223 1ST HOSP IP/OBS HIGH 75: CPT

## 2019-01-16 PROCEDURE — 87040 BLOOD CULTURE FOR BACTERIA: CPT

## 2019-01-16 RX ORDER — HYDROMORPHONE HYDROCHLORIDE 2 MG/ML
1 INJECTION INTRAMUSCULAR; INTRAVENOUS; SUBCUTANEOUS ONCE
Qty: 0 | Refills: 0 | Status: DISCONTINUED | OUTPATIENT
Start: 2019-01-16 | End: 2019-01-16

## 2019-01-16 RX ORDER — MORPHINE SULFATE 50 MG/1
4 CAPSULE, EXTENDED RELEASE ORAL ONCE
Qty: 0 | Refills: 0 | Status: DISCONTINUED | OUTPATIENT
Start: 2019-01-16 | End: 2019-01-16

## 2019-01-16 RX ORDER — ENOXAPARIN SODIUM 100 MG/ML
40 INJECTION SUBCUTANEOUS EVERY 24 HOURS
Qty: 0 | Refills: 0 | Status: DISCONTINUED | OUTPATIENT
Start: 2019-01-16 | End: 2019-01-18

## 2019-01-16 RX ORDER — DEXTROAMPHETAMINE SACCHARATE, AMPHETAMINE ASPARTATE, DEXTROAMPHETAMINE SULFATE AND AMPHETAMINE SULFATE 1.875; 1.875; 1.875; 1.875 MG/1; MG/1; MG/1; MG/1
30 TABLET ORAL
Qty: 0 | Refills: 0 | Status: DISCONTINUED | OUTPATIENT
Start: 2019-01-16 | End: 2019-01-18

## 2019-01-16 RX ORDER — VANCOMYCIN HCL 1 G
1000 VIAL (EA) INTRAVENOUS ONCE
Qty: 0 | Refills: 0 | Status: COMPLETED | OUTPATIENT
Start: 2019-01-16 | End: 2019-01-16

## 2019-01-16 RX ORDER — ALPRAZOLAM 0.25 MG
2 TABLET ORAL ONCE
Qty: 0 | Refills: 0 | Status: DISCONTINUED | OUTPATIENT
Start: 2019-01-16 | End: 2019-01-16

## 2019-01-16 RX ORDER — ALPRAZOLAM 0.25 MG
2 TABLET ORAL THREE TIMES A DAY
Qty: 0 | Refills: 0 | Status: DISCONTINUED | OUTPATIENT
Start: 2019-01-16 | End: 2019-01-18

## 2019-01-16 RX ORDER — HYDROMORPHONE HYDROCHLORIDE 2 MG/ML
2 INJECTION INTRAMUSCULAR; INTRAVENOUS; SUBCUTANEOUS EVERY 4 HOURS
Qty: 0 | Refills: 0 | Status: DISCONTINUED | OUTPATIENT
Start: 2019-01-16 | End: 2019-01-18

## 2019-01-16 RX ORDER — NICOTINE POLACRILEX 2 MG
1 GUM BUCCAL DAILY
Qty: 0 | Refills: 0 | Status: DISCONTINUED | OUTPATIENT
Start: 2019-01-16 | End: 2019-01-18

## 2019-01-16 RX ORDER — VANCOMYCIN HCL 1 G
1000 VIAL (EA) INTRAVENOUS EVERY 8 HOURS
Qty: 0 | Refills: 0 | Status: DISCONTINUED | OUTPATIENT
Start: 2019-01-16 | End: 2019-01-18

## 2019-01-16 RX ORDER — CEFEPIME 1 G/1
2000 INJECTION, POWDER, FOR SOLUTION INTRAMUSCULAR; INTRAVENOUS ONCE
Qty: 0 | Refills: 0 | Status: COMPLETED | OUTPATIENT
Start: 2019-01-16 | End: 2019-01-16

## 2019-01-16 RX ORDER — MORPHINE SULFATE 50 MG/1
15 CAPSULE, EXTENDED RELEASE ORAL
Qty: 0 | Refills: 0 | Status: DISCONTINUED | OUTPATIENT
Start: 2019-01-16 | End: 2019-01-16

## 2019-01-16 RX ORDER — CEFEPIME 1 G/1
2000 INJECTION, POWDER, FOR SOLUTION INTRAMUSCULAR; INTRAVENOUS EVERY 12 HOURS
Qty: 0 | Refills: 0 | Status: DISCONTINUED | OUTPATIENT
Start: 2019-01-16 | End: 2019-01-18

## 2019-01-16 RX ADMIN — HYDROMORPHONE HYDROCHLORIDE 1 MILLIGRAM(S): 2 INJECTION INTRAMUSCULAR; INTRAVENOUS; SUBCUTANEOUS at 19:47

## 2019-01-16 RX ADMIN — Medication 250 MILLIGRAM(S): at 19:11

## 2019-01-16 RX ADMIN — Medication 1 PATCH: at 23:29

## 2019-01-16 RX ADMIN — MORPHINE SULFATE 15 MILLIGRAM(S): 50 CAPSULE, EXTENDED RELEASE ORAL at 14:15

## 2019-01-16 RX ADMIN — HYDROMORPHONE HYDROCHLORIDE 1 MILLIGRAM(S): 2 INJECTION INTRAMUSCULAR; INTRAVENOUS; SUBCUTANEOUS at 23:30

## 2019-01-16 RX ADMIN — CEFEPIME 100 MILLIGRAM(S): 1 INJECTION, POWDER, FOR SOLUTION INTRAMUSCULAR; INTRAVENOUS at 23:10

## 2019-01-16 RX ADMIN — HYDROMORPHONE HYDROCHLORIDE 1 MILLIGRAM(S): 2 INJECTION INTRAMUSCULAR; INTRAVENOUS; SUBCUTANEOUS at 23:42

## 2019-01-16 RX ADMIN — MORPHINE SULFATE 15 MILLIGRAM(S): 50 CAPSULE, EXTENDED RELEASE ORAL at 06:57

## 2019-01-16 RX ADMIN — Medication 2 MILLIGRAM(S): at 21:15

## 2019-01-16 RX ADMIN — HYDROMORPHONE HYDROCHLORIDE 1 MILLIGRAM(S): 2 INJECTION INTRAMUSCULAR; INTRAVENOUS; SUBCUTANEOUS at 23:28

## 2019-01-16 RX ADMIN — Medication 2 MILLIGRAM(S): at 07:42

## 2019-01-16 NOTE — ED ADULT NURSE REASSESSMENT NOTE - NS ED NURSE REASSESS COMMENT FT1
Security called to check patients belongings due to security concerns from past visits. Patient compliant. Security checked belongings and did not find any drugs or paraphernalia. Patient understanding to search and placed on 1:1 for safety. Patient medicated for MRI as ordered with xanax. Changed in to gown, Being transported to MRI.

## 2019-01-16 NOTE — CONSULT NOTE ADULT - ASSESSMENT
38M h/o IVDA, Epidural abscess with osteomyelitis presents with lumbar back pain for several weeks with fever at OSH (left AMA). States that pain is severe, pain meds are not working. Admits to some tingling in his right leg. No chest pain, SOB, chills. No other complaints. MRI shows no evidence of lumbar discitis/osteomyelitis or epidural abscess.     Plan:  - No acute neurosurgical intervention at this time  - Should follow-up with Medicine/ID immediately for antibiotics duration and choice, if needed  - Follow-up with Dr. Williamson 1-2 weeks after discharge 38M h/o IVDA, Epidural abscess with osteomyelitis presents with lumbar back pain for several weeks with fever at OSH (left AMA). States that pain is severe, pain meds are not working. Admits to some tingling in his right leg. No chest pain, SOB, chills. No other complaints. MRI shows no evidence of lumbar discitis/osteomyelitis or epidural abscess.     Plan:  - No acute neurosurgical intervention at this time  - Management per Medicine/ID  - Please call ID attending, per note, patient should be on total of 6 weeks duration of abx.   - Follow-up with Dr. Williamson 1-2 weeks after discharge

## 2019-01-16 NOTE — ED PROVIDER NOTE - OBJECTIVE STATEMENT
38M h/o IVDA, Epidural abscess with osteomyelitis presents with severe lumbar back pain for several weeks with fever at OSH (left AMA). States that pain is severe, pain meds are not working. Admits to some tingling in his right leg. No chest pain, SOB, chills. No other complaints.

## 2019-01-16 NOTE — ED ADULT NURSE REASSESSMENT NOTE - NS ED NURSE REASSESS COMMENT FT1
Care assumed of patient at this time. Patient resting on stretcher, pt updated to plan of care. NAD at this time.

## 2019-01-16 NOTE — ED ADULT NURSE REASSESSMENT NOTE - NS ED NURSE REASSESS COMMENT FT1
Pt. requesting ultrasound guided IV by MD, declines RN to attempt to obtain peripheral IV access. MD Zamora to place US guided IV.

## 2019-01-16 NOTE — CHART NOTE - NSCHARTNOTEFT_GEN_A_CORE
ED SOCIAL WORK NOTE:   Patient is a 38 y.o. , English speaking male presents with c/o back pain. Per chart review, patient's PMH includes: IV drug abuse. Social Work referred by Nurse Payton to f/u with patient discharge planning. LMSW met with patient at bedside to discuss discharge planning, and confirm demographics.  Patient reports that he lives in private home in [place] with his spouse/emergency contact (Saadia Sheets; [phone]). Patient reports being independent with ambulation; no assistive equipment/persons needed. No skilled needs.   Patient reports that he is an active heroin user. Patient reports that he is enrolled as a client with [facility]. Patient reports having completed his initial intake, and will be following up the following day with an appointment. Patient also reports smoking cigarettes. No other substance use identified. No MH issues reported or identified. Patient reports that he is on probation. Patient states that he has violated probation a few times in the last few months due to his substance use.   Patient does not report any HCP or Advanced Directives. No social barriers to discharge.   LMSW called [facility], and spoke with shakir Dial to schedule taxi services to transport patient to his home. LMSW scheduled 3:45 PM taxi with [employer] taxi services.

## 2019-01-16 NOTE — H&P ADULT - HISTORY OF PRESENT ILLNESS
NIGHT HOSPITALIST:  Patient UNKNOWN to me previously, assigned to me at this point via the ER and by Dr. Abel to admit this 37 y/o M--patient admits to still active IV heroin us (3 days ago), apparent osteomyelitis of the T5T6 with patient signing out AMA from [facility] and AMA from [facility] in December 17 2018, with the patient's PICC line discontinued past leaving AMA in December from [facility] (unclear to the interval IV antibiotics since December 2018), with the patient called back to [facility] after rereview by radiology of patient's MRI LS spine from this AM (patient had left AMA from [facility] a few days ago) for low back pain and shooting pain RIGHT leg.  No fever, no chills, no rigors.   No bowel or urinary incontinence.  NO HA, no focal weakness.   No amaurosis symptoms.  NO joint pain, no rash.  NO chest pain/pressure.  No dyspnoea.   No abdominal pain, no red blood per rectum or melena.   NO tearing back pain.   No dysuria, no haematuria.  Denies weight loss or anorexia.   Remaining review of systems not contributory.

## 2019-01-16 NOTE — ED PROVIDER NOTE - PROGRESS NOTE DETAILS
Case discussed with neurosurgery who states patient should be admitted to medicine for IV antibiotics.  Call placed to PMD Dr. Orr who has no preference for admission.  Call placed to Dr. Abel who admitted patient previously and accepts patient for admission.

## 2019-01-16 NOTE — ED PROVIDER NOTE - CARE PROVIDER_API CALL
Jayla Williamson (DO), Neurological Surgery  [address]  [place] [address]  Phone: [phone]  Fax: [phone]

## 2019-01-16 NOTE — ED ADULT NURSE NOTE - OBJECTIVE STATEMENT
39 yo M pmh of IV drug abuse came to ED c/o left sided lower back pain radiating to the RLE 39 yo M pmh of IV drug abuse came to ED c/o left sided lower back pain radiating to the RLE.  Pt states that he was at [facility] for the past two days for the same complaint and left because he wasn't getting the treatment he felt he needed.  Pt is poor historian, states that he had an infection in his spine, is noted to have epidural abscess, and is concerned about becoming septic.  Pt endorses fevers.  Pt states that last IV injection was two days ago prior to admission to [facility].  Denies CP, SOB, chills, n/v/d, lightheadedness, dizziness, changes in urinary or bowel habits.  A&Ox4, gross neuro intact, pt endorses tingling in RLE.  Pt has redness noted on right forearm from previous IV where he was receiving IV vancomycin.  Skin w/d/i.  Scarring noted bilaterally in upper extremities.  VSS.  Safety and comfort maintained. Will continue to monitor.

## 2019-01-16 NOTE — H&P ADULT - PROBLEM SELECTOR PLAN 1
Presumed haematogenous spread in the setting of still active heroin use.  Blood cultures x 3 as above.   IV antibiotics as above.   Echo.   UA to exclude ectopic foci to the kidney.  ID consult in the AM.

## 2019-01-16 NOTE — ED ADULT NURSE NOTE - CHIEF COMPLAINT QUOTE
low back pain [id number], seen in ED few hours ago and discharged called to come back r/t MRI results, never left ED was waiting for cab in waiting room

## 2019-01-16 NOTE — H&P ADULT - PROBLEM SELECTOR PLAN 2
Social work.   Enhanced supervision, AWOL precautions and all visitors to check in with patient's primary RN>    Would consider formal psychiatry evaluation in the AM if patient agrees.

## 2019-01-16 NOTE — ED ADULT NURSE NOTE - CHPI ED NUR SYMPTOMS NEG
no bladder dysfunction/no constipation/no neck tenderness/no difficulty bearing weight/no bowel dysfunction/no anorexia/no fatigue/no motor function loss

## 2019-01-16 NOTE — ED ADULT NURSE REASSESSMENT NOTE - NS ED NURSE REASSESS COMMENT FT1
Patient back from MRI. Requested food - advised not to eat or drink until results of MRI. Patient expresses understanding. Appears comfortable, resting with eyes closed. calm. remains on 1:1 for safety.

## 2019-01-16 NOTE — ED PROVIDER NOTE - PROGRESS NOTE DETAILS
Case discussed with radiologist Dr. Rosa and MRI findings reviewed.  No new findings and improvement since previous MRI. Case discussed with neurosurgery who states patient can be discharged and followup. Sabrina: Patient discharged home, ID doctor came by and sat down with Dr. Rosa (neurorads) who states still some enhancement on MRI. ID recommend IV abx. Patient d/c'd home. radiology to make an addendum because as of now, radiology reports indicates normal MRI. spoke with neurosurgery who is attempting to contact patient. will attempt as well. MAY was able to contact patient wife. Patient wife states she will be calling him and they will return. nsx will retouch base with them to make sure patient is coming back to ER to be admitted.

## 2019-01-16 NOTE — ED PROVIDER NOTE - ATTENDING CONTRIBUTION TO CARE
39 y/o male with the above documented history and HPI who on exam appears well and comfortable. VSs noted, head NC/AT, EOMsI, neck supple, lungs CTA, cardiac sounds s/ audible m/r/g, abdomen soft, NT/ND, extremities s/ asymmetry, skin s/ rash and neurologically intact. Called back due to radiographic finding. Evaluated by NS. We will order ABXs and admit to Medicine.

## 2019-01-16 NOTE — ED PROVIDER NOTE - SHIFT CHANGE DETAILS
Poncho Martines MD [facility] note of transfer at the usual time of sign out: Receiving team will follow up on labs, analgesia, any clinical imaging, reassess and disposition as clinically indicated.  Details of patient and plan conveyed to receiving physician and conveyed back for understanding.  There were no questions at this time about the patient's status, disposition, and plan. Patient's care to be taken over by receiving physician at this time, all decisions regarding the progression of care will be made at their discretion.

## 2019-01-16 NOTE — ED ADULT NURSE NOTE - OBJECTIVE STATEMENT
37 y/o male with PMH IV drug abuse, presenting to ED for +MRI reading. Pt states "I was seen here earlier for lower back pain, they Did an MRI and told me that it was negative. They called me back after I left and said they saw an infection around the spine and told me to come back" upon exam pt A&Ox3 gross neuro intact , no difficulty speaking in complete sentences, +ROM in all extremities, ambulating independently, abdomen soft nontender nondistended, skin intact, states last time he used heroin was "3-4 days ago." Pt denies chest pain, sob, ha, n/v/d, abdominal pain, f/c, urinary symptoms, hematuria

## 2019-01-16 NOTE — H&P ADULT - NSHPLABSRESULTS_GEN_ALL_CORE
WBC 9.4.  Hgb 14.4  Platelets of 340K.  INR 1.0.   CRP 0.17.   Chest radiograph reviewed with no infiltrate or effusion.   K+ 4.1.  Random glucose of 85.  Cr 0.8.  Urine toxicology from the ER from earlier with + amphetamine, + benzo, + opiates.    EKG tracing reviewed with sinus mary ellen at 56 with QTc 401, and QRS complex 106  (normal intervals)    MRI LS spine with L4 L5 with enhancement involving the RIGHT L4 L5 facet joint and facet similar to study from 12/12/18 but presence of septic arthritis is not excluded.

## 2019-01-16 NOTE — CONSULT NOTE ADULT - SUBJECTIVE AND OBJECTIVE BOX
p ([id number])     HPI: 38M h/o IVDA, Epidural abscess with osteomyelitis presents with lumbar back pain for several weeks with fever at OSH (left AMA). States that pain is severe, pain meds are not working. Admits to some tingling in his right leg. No chest pain, SOB, chills. No other complaints. MRI shows no evidence of lumbar discitis/osteomyelitis or epidural abscess.     PAST MEDICAL HISTORY   IV drug abuse    PAST SURGICAL HISTORY   H/O Spinal surgery  No significant past surgical history        MEDICATIONS:  Antibiotics:    Neuro:  morphine  IR 15 milliGRAM(s) Oral every 3 hours PRN    Anticoagulation:    Other:      SOCIAL HISTORY:   Occupation:   Marital Status:     FAMILY HISTORY:  No pertinent family history in first degree relatives      PHYSICAL EXAMINATION:   T(C): 36.8 (01-16-19 @ 07:30), Max: 36.9 (01-16-19 @ 01:07)  HR: 75 (01-16-19 @ 07:30) (69 - 94)  BP: 112/65 (01-16-19 @ 07:30) (109/66 - 127/84)  RR: 16 (01-16-19 @ 07:30) (16 - 18)  SpO2: 97% (01-16-19 @ 07:30) (97% - 98%)  Wt(kg): --Height (cm): 172.72 (01-15 @ 22:30)  Weight (kg): 99.8 (01-15 @ 22:30)    General Examination:     Neurologic Examination:           AOx3, FC, PERRL, EOMI, V1-3 intact, no facial, palate zulay symmetric, tongue midline, shrug 5/5  5/5 throughout, no drift  SILT  No clonus or babinski      LABS:                        14.4   9.4   )-----------( 340      ( 16 Jan 2019 03:53 )             41.2     01-16    138  |  100  |  13  ----------------------------<  85  4.1   |  27  |  0.84    Ca    9.8      16 Jan 2019 03:53    TPro  8.5<H>  /  Alb  4.9  /  TBili  0.3  /  DBili  x   /  AST  18  /  ALT  24  /  AlkPhos  95  01-16    PT/INR - ( 16 Jan 2019 03:53 )   PT: 12.0 sec;   INR: 1.05 ratio         PTT - ( 16 Jan 2019 03:53 )  PTT:34.6 sec      RADIOLOGY & ADDITIONAL STUDIES:

## 2019-01-16 NOTE — ED ADULT TRIAGE NOTE - CHIEF COMPLAINT QUOTE
low back pain [id number], seen in ED few hours ago and discharged called to come back r/t MRI results, never left ED was waiting for cab in waiting room

## 2019-01-16 NOTE — ED PROVIDER NOTE - MEDICAL DECISION MAKING DETAILS
38M p/w back pain. Concern for osteomyelitis vs. epidural abscess. Will get labs, MRI, admit. 38M p/w back pain. Concern for osteomyelitis vs. epidural abscess. Will get labs, MRI, Will follow up on labs, analgesia, reassess and disposition as clinically indicated.

## 2019-01-16 NOTE — ED PROVIDER NOTE - NS ED ROS FT
CONST: +fevers, no chills  EYES: no pain, no vision changes  ENT: no sore throat, no ear pain, no change in hearing  CV: no chest pain, no leg swelling  RESP: no shortness of breath, no cough  ABD: no abdominal pain, no nausea, no vomiting, no diarrhea  : no dysuria, no flank pain, no hematuria  MSK: +back pain, no extremity pain  NEURO: no headache or additional neurologic complaints  HEME: no easy bleeding  SKIN:  no rash

## 2019-01-16 NOTE — ED PROVIDER NOTE - ATTENDING CONTRIBUTION TO CARE
Agree with above, Poncho Martines MD, FACEP  CN 2-12 intact, normal coordination, normal finger to nose, normal heel to shin, no gait instability, 5/5 strength in upper and lower extremities, normal sensory throughout, alert and oriented to person, place, time, and situation.   there are track marks from the hand to arm bilaterally, no signs of erythema, no signs of proximally spreading erythema, no fluctuance  right eye disconjugate   mmm  non-tachycardic, non-tachypneic   non-distended, soft non-tender  no deformity of extremity  5/5 str to bilateral UE and LE, 5/5 sensory to bilateral UE and LE   + tenderness to palpation to lumbar mid spine  neg straight leg test bilaterally  patient with history of discitis and om of spine with abscess and concern for reported worsening pain  will get iv, cbc, cmp, esr, crp, analgesia prn, mri spine with iv contrast and neurosurgery consult  Will follow up on labs, analgesia, reassess and disposition as clinically indicated.

## 2019-01-16 NOTE — H&P ADULT - NSHPPHYSICALEXAM_GEN_ALL_CORE
Physical exam with an obese, young M appears chronically ill with scattered tattoos.  Afebrile.   HR  93  RR 14.   BP  114/77  97% on RA.   HEENT< PERRL< EOMI, patient with ponytail.  Neck supple, chest clear, cor s1 s2 no murmurs noted.   Abdomen soft nontender, no rebound, normal bowel sounds,  Ext with LEFT antecubital and RIGHT antecubital sites with prior IV DU sites, no crepitus noted.  No foot interdigit injection sites noted.  skin tattoos as above.   Neurologic AxOx3.   Speech fluent.  Cognition intact.  UE/LE 5/5.  Cerebellar intact.  Gait observed normal with NO ataxia.

## 2019-01-16 NOTE — ED PROVIDER NOTE - NSFOLLOWUPINSTRUCTIONS_ED_ALL_ED_FT
Please followup with Dr. Williamson as instructed.  Return to ED with any new or worssening signs symptoms or concerns.

## 2019-01-16 NOTE — ED ADULT NURSE NOTE - NSIMPLEMENTINTERV_GEN_ALL_ED
Implemented All Universal Safety Interventions:  [place] to call system. Call bell, personal items and telephone within reach. Instruct patient to call for assistance. Room bathroom lighting operational. Non-slip footwear when patient is off stretcher. Physically safe environment: no spills, clutter or unnecessary equipment. Stretcher in lowest position, wheels locked, appropriate side rails in place.

## 2019-01-16 NOTE — ED PROVIDER NOTE - OBJECTIVE STATEMENT
38M PMH IVDA, osteomyelitis s/p washout presented earlier to ED due to back pain.  Patient had an MRI which was read as normal and he was discharged.  Due to re-read of MRI, which showed enhancement around L4-L5, patient was called to return to ED.  No chest pain, dyspnea, abdominal pain, nausea, vomiting, weakness, saddle area anesthesia, fevers, chills.

## 2019-01-16 NOTE — ED ADULT NURSE NOTE - NSIMPLEMENTINTERV_GEN_ALL_ED
Implemented All Universal Safety Interventions:  [place] to call system. Call bell, personal items and telephone within reach. Instruct patient to call for assistance. Room bathroom lighting operational. Non-slip footwear when patient is off stretcher. Physically safe environment: no spills, clutter or unnecessary equipment. Stretcher in lowest position, wheels locked, appropriate side rails in place.

## 2019-01-16 NOTE — H&P ADULT - ATTENDING COMMENTS
NIGHT HOSPITALIST:   Patient/ spouse aware of course and agree with plan/care as above.   Given patient's history of recidivism, patient's long term prognosis is poor.   Emotional support provided to patient.   Care reviewed with covering NP/PA.  Care assumed by Dr. Abel.    Lane Mclaughlin MD  [phone]

## 2019-01-16 NOTE — H&P ADULT - ASSESSMENT
NIGHT HOSPITALIST:   Presentation of patient following recall by ER for findings of the MRI LS spine as above in this patient with active heroin use>>patient verbalizes that he needs to stop but patient with poor insight into the nature of his drug dependency. NIGHT HOSPITALIST:   Presentation of patient following recall by ER for findings of the MRI LS spine as above in this patient with active heroin use>>patient verbalizes that he needs to stop but patient with poor insight into the nature of his drug dependency.  Presentation consistent with ongoing hematogenous spread verus prior existing osteomyelitis>>will continue with IV antibiotics from last DC from [facility] (IV Cefepime 2 gm Q12H and IV Vancomycin 1 gm Q8H).  Will need a formal evaluation by ID in the AM.   Would consider psychiatry evaluation in the AM if patient agrees.    Will repeat blood cultures (total 3 sets) and echo to exclude endocarditis (versus TRAVIS if nondiagnostic).    [place] I Stop as above.  Patient with minimal relief with Dilaudid 1 mg IVP in the ER--patient with a prior admission to [facility] in 2012 with Dilaudid 4 mg PRN previously.  Social work.

## 2019-01-16 NOTE — ED ADULT NURSE REASSESSMENT NOTE - NS ED NURSE REASSESS COMMENT FT1
Pt is asking for pain medication.  Pt states dissatisfaction for not receiving pain medication.  Pt in NAD, and pending MRI. MD aware, pending medication.  Safety and comfort maintained.  Will continue to monitor.

## 2019-01-16 NOTE — H&P ADULT - NSHPSOURCEINFOTX_GEN_ALL_CORE
Spouse not currently in attendance.  Reviewed medication list from last [facility] DC with patient.  [place] I Stop # [id number]

## 2019-01-17 DIAGNOSIS — F13.20 SEDATIVE, HYPNOTIC OR ANXIOLYTIC DEPENDENCE, UNCOMPLICATED: ICD-10-CM

## 2019-01-17 DIAGNOSIS — F11.99 OPIOID USE, UNSPECIFIED WITH UNSPECIFIED OPIOID-INDUCED DISORDER: ICD-10-CM

## 2019-01-17 LAB
ANION GAP SERPL CALC-SCNC: 15 MMOL/L — SIGNIFICANT CHANGE UP (ref 5–17)
APPEARANCE UR: CLEAR — SIGNIFICANT CHANGE UP
BILIRUB UR-MCNC: NEGATIVE — SIGNIFICANT CHANGE UP
BUN SERPL-MCNC: 12 MG/DL — SIGNIFICANT CHANGE UP (ref 7–23)
CALCIUM SERPL-MCNC: 9.9 MG/DL — SIGNIFICANT CHANGE UP (ref 8.4–10.5)
CHLORIDE SERPL-SCNC: 102 MMOL/L — SIGNIFICANT CHANGE UP (ref 96–108)
CO2 SERPL-SCNC: 16 MMOL/L — LOW (ref 22–31)
COLOR SPEC: YELLOW — SIGNIFICANT CHANGE UP
CREAT SERPL-MCNC: 0.69 MG/DL — SIGNIFICANT CHANGE UP (ref 0.5–1.3)
DIFF PNL FLD: NEGATIVE — SIGNIFICANT CHANGE UP
GLUCOSE SERPL-MCNC: 103 MG/DL — HIGH (ref 70–99)
GLUCOSE UR QL: NEGATIVE MG/DL — SIGNIFICANT CHANGE UP
KETONES UR-MCNC: NEGATIVE — SIGNIFICANT CHANGE UP
LEUKOCYTE ESTERASE UR-ACNC: NEGATIVE — SIGNIFICANT CHANGE UP
NITRITE UR-MCNC: NEGATIVE — SIGNIFICANT CHANGE UP
PH UR: 5.5 — SIGNIFICANT CHANGE UP (ref 5–8)
POTASSIUM SERPL-MCNC: 4.3 MMOL/L — SIGNIFICANT CHANGE UP (ref 3.5–5.3)
POTASSIUM SERPL-SCNC: 4.3 MMOL/L — SIGNIFICANT CHANGE UP (ref 3.5–5.3)
PROT UR-MCNC: NEGATIVE MG/DL — SIGNIFICANT CHANGE UP
SODIUM SERPL-SCNC: 133 MMOL/L — LOW (ref 135–145)
SP GR SPEC: 1.03 — HIGH (ref 1.01–1.02)
UROBILINOGEN FLD QL: NEGATIVE MG/DL — SIGNIFICANT CHANGE UP

## 2019-01-17 PROCEDURE — 93306 TTE W/DOPPLER COMPLETE: CPT | Mod: 26

## 2019-01-17 PROCEDURE — 99222 1ST HOSP IP/OBS MODERATE 55: CPT | Mod: GC

## 2019-01-17 RX ORDER — KETOROLAC TROMETHAMINE 30 MG/ML
30 SYRINGE (ML) INJECTION ONCE
Qty: 0 | Refills: 0 | Status: DISCONTINUED | OUTPATIENT
Start: 2019-01-17 | End: 2019-01-17

## 2019-01-17 RX ORDER — HYDROMORPHONE HYDROCHLORIDE 2 MG/ML
1 INJECTION INTRAMUSCULAR; INTRAVENOUS; SUBCUTANEOUS ONCE
Qty: 0 | Refills: 0 | Status: DISCONTINUED | OUTPATIENT
Start: 2019-01-17 | End: 2019-01-17

## 2019-01-17 RX ORDER — MORPHINE SULFATE 50 MG/1
2 CAPSULE, EXTENDED RELEASE ORAL ONCE
Qty: 0 | Refills: 0 | Status: DISCONTINUED | OUTPATIENT
Start: 2019-01-17 | End: 2019-01-17

## 2019-01-17 RX ORDER — INFLUENZA VIRUS VACCINE 15; 15; 15; 15 UG/.5ML; UG/.5ML; UG/.5ML; UG/.5ML
0.5 SUSPENSION INTRAMUSCULAR ONCE
Qty: 0 | Refills: 0 | Status: DISCONTINUED | OUTPATIENT
Start: 2019-01-17 | End: 2019-01-18

## 2019-01-17 RX ADMIN — MORPHINE SULFATE 2 MILLIGRAM(S): 50 CAPSULE, EXTENDED RELEASE ORAL at 17:27

## 2019-01-17 RX ADMIN — CEFEPIME 100 MILLIGRAM(S): 1 INJECTION, POWDER, FOR SOLUTION INTRAMUSCULAR; INTRAVENOUS at 18:22

## 2019-01-17 RX ADMIN — HYDROMORPHONE HYDROCHLORIDE 2 MILLIGRAM(S): 2 INJECTION INTRAMUSCULAR; INTRAVENOUS; SUBCUTANEOUS at 02:01

## 2019-01-17 RX ADMIN — HYDROMORPHONE HYDROCHLORIDE 2 MILLIGRAM(S): 2 INJECTION INTRAMUSCULAR; INTRAVENOUS; SUBCUTANEOUS at 02:15

## 2019-01-17 RX ADMIN — Medication 2 MILLIGRAM(S): at 05:14

## 2019-01-17 RX ADMIN — HYDROMORPHONE HYDROCHLORIDE 2 MILLIGRAM(S): 2 INJECTION INTRAMUSCULAR; INTRAVENOUS; SUBCUTANEOUS at 05:50

## 2019-01-17 RX ADMIN — Medication 250 MILLIGRAM(S): at 13:17

## 2019-01-17 RX ADMIN — Medication 2 MILLIGRAM(S): at 13:17

## 2019-01-17 RX ADMIN — Medication 250 MILLIGRAM(S): at 22:36

## 2019-01-17 RX ADMIN — HYDROMORPHONE HYDROCHLORIDE 2 MILLIGRAM(S): 2 INJECTION INTRAMUSCULAR; INTRAVENOUS; SUBCUTANEOUS at 09:51

## 2019-01-17 RX ADMIN — Medication 1 PATCH: at 22:42

## 2019-01-17 RX ADMIN — DEXTROAMPHETAMINE SACCHARATE, AMPHETAMINE ASPARTATE, DEXTROAMPHETAMINE SULFATE AND AMPHETAMINE SULFATE 30 MILLIGRAM(S): 1.875; 1.875; 1.875; 1.875 TABLET ORAL at 05:14

## 2019-01-17 RX ADMIN — HYDROMORPHONE HYDROCHLORIDE 2 MILLIGRAM(S): 2 INJECTION INTRAMUSCULAR; INTRAVENOUS; SUBCUTANEOUS at 18:22

## 2019-01-17 RX ADMIN — Medication 1 PATCH: at 22:36

## 2019-01-17 RX ADMIN — HYDROMORPHONE HYDROCHLORIDE 1 MILLIGRAM(S): 2 INJECTION INTRAMUSCULAR; INTRAVENOUS; SUBCUTANEOUS at 21:13

## 2019-01-17 RX ADMIN — HYDROMORPHONE HYDROCHLORIDE 2 MILLIGRAM(S): 2 INJECTION INTRAMUSCULAR; INTRAVENOUS; SUBCUTANEOUS at 06:05

## 2019-01-17 RX ADMIN — ENOXAPARIN SODIUM 40 MILLIGRAM(S): 100 INJECTION SUBCUTANEOUS at 05:14

## 2019-01-17 RX ADMIN — HYDROMORPHONE HYDROCHLORIDE 2 MILLIGRAM(S): 2 INJECTION INTRAMUSCULAR; INTRAVENOUS; SUBCUTANEOUS at 22:35

## 2019-01-17 RX ADMIN — HYDROMORPHONE HYDROCHLORIDE 2 MILLIGRAM(S): 2 INJECTION INTRAMUSCULAR; INTRAVENOUS; SUBCUTANEOUS at 14:23

## 2019-01-17 RX ADMIN — Medication 30 MILLIGRAM(S): at 09:50

## 2019-01-17 RX ADMIN — Medication 1 PATCH: at 06:01

## 2019-01-17 RX ADMIN — Medication 250 MILLIGRAM(S): at 05:11

## 2019-01-17 RX ADMIN — HYDROMORPHONE HYDROCHLORIDE 2 MILLIGRAM(S): 2 INJECTION INTRAMUSCULAR; INTRAVENOUS; SUBCUTANEOUS at 10:16

## 2019-01-17 RX ADMIN — DEXTROAMPHETAMINE SACCHARATE, AMPHETAMINE ASPARTATE, DEXTROAMPHETAMINE SULFATE AND AMPHETAMINE SULFATE 30 MILLIGRAM(S): 1.875; 1.875; 1.875; 1.875 TABLET ORAL at 18:22

## 2019-01-17 RX ADMIN — CEFEPIME 100 MILLIGRAM(S): 1 INJECTION, POWDER, FOR SOLUTION INTRAMUSCULAR; INTRAVENOUS at 06:24

## 2019-01-17 RX ADMIN — HYDROMORPHONE HYDROCHLORIDE 2 MILLIGRAM(S): 2 INJECTION INTRAMUSCULAR; INTRAVENOUS; SUBCUTANEOUS at 13:47

## 2019-01-17 RX ADMIN — Medication 2 MILLIGRAM(S): at 22:35

## 2019-01-17 RX ADMIN — Medication 1 PATCH: at 22:41

## 2019-01-17 RX ADMIN — Medication 30 MILLIGRAM(S): at 08:50

## 2019-01-17 NOTE — BEHAVIORAL HEALTH ASSESSMENT NOTE - NSBHCONSULTSUBSTANCEOPIATES_PSY_A_CORE FT
Defer to primary team for pain medication.  [facility] monitoring with supportive PRNs: clonidine 0.1 mg PO q4h, loperamide/Zofran/ibuporfen PRN

## 2019-01-17 NOTE — BEHAVIORAL HEALTH ASSESSMENT NOTE - NSBHCHARTREVIEWLAB_PSY_A_CORE FT
14.4   9.4   )-----------( 340      ( 16 Jan 2019 03:53 )             41.2     01-17    133<L>  |  102  |  12  ----------------------------<  103<H>  4.3   |  16<L>  |  0.69    Ca    9.9      17 Jan 2019 10:32    TPro  8.5<H>  /  Alb  4.9  /  TBili  0.3  /  DBili  x   /  AST  18  /  ALT  24  /  AlkPhos  95  01-16    Culture - Blood (01.16.19 @ 08:51)    Specimen Source: .Blood Blood-Venous    Culture Results:   No growth to date.

## 2019-01-17 NOTE — BEHAVIORAL HEALTH ASSESSMENT NOTE - NSBHCONSULTMEDANXIETY_PSY_A_CORE FT
Seroquel 25 to 50 mg PO q6h PRN anxiety/agitation/insomnia -Seroquel 25 to 50 mg PO q6h PRN anxiety/agitation/insomnia  -Atarax 50 mg PO q6h PRN anxiety

## 2019-01-17 NOTE — BEHAVIORAL HEALTH ASSESSMENT NOTE - CASE SUMMARY
37 yo  male,  living with wife and 2 stepsons, part time  but applying for disability, with PPHx significant for heroin dependence, no prior psych admissions or SA, actively abusing 1-2 bags heroin by IV intermittently (last use 5d ago), with PMHx significant for osteomyelitis T5T6 with washout, multiple recent ER visits and admissions from which he has left AMA, now re-admitted to [facility] for management of OM, psychiatry consulted for anxiety.  Pt currently denies sx opioid withdrawal, no tremors noted or sx of benzo withdrawal.  Pt is prescribed Xanax 2mg tid and Oxycodone 30mg four times a day per ISTOP by Dr. Orr (PMD).  States he sometimes overuses Xanax up to 4 times per day, will run out of meds at the end of the month, and use gabapentin.  Also sometimes overuses his pain meds.  States he is trying to get off of heroin, has significantly reduced his dose.  Denies SIIP/HIIP, AVH, or paranoia.  Denies significant mood symptoms.  Dx: Heroin depedence, benzo abuse.  Recs: 1. Would reinstate home meds to prevent withdrawal but would not increase doses; pt declines any reductions to home meds.  2. PRN: Seroquel 50mg PO q6h PRN anxiety (check EKG for QTc<500).  Atarax 50mg PO q6h PRN anxiety.  3. [facility] monitoring with PRN: Clonidine 0.1mg PO q4h PRN opioid withdrawal sx (lacrimation, pilorection, abdominal pain, nausea, vomiting, rhinorrhea, muscle aches); Loperamide PRN GI sx; Seroquel 50mg PO q6h PRN anxiety/agitation/insomnia.  4.  for substance abuse referral resources

## 2019-01-17 NOTE — PATIENT PROFILE ADULT - NSSTREETDRUGWI_GEN_A_NUR
agitation/depression/diaphoresis/difficulty concentrating/disorientation/nausea/intense desire for drugs/mood swings/insomnia/vomiting

## 2019-01-17 NOTE — CONSULT NOTE ADULT - ASSESSMENT
37 y/o M IV heroin use, hx MSSA T5-T6 OM 2012 (s/p washout and hardware placement) presents for low back pain, concern of .     full note to follow 37 y/o M IV heroin use, hx MSSA T5-T6 OM 2012 (s/p washout and hardware placement) presents for low back pain, concern of .           suggest:  - continue IV vancomycin and cefepime  - repeat HIV testing  - trend inflammatory markers 37 y/o M IV heroin use, hx MSSA T5-T6 OM 2012 (s/p washout and hardware placement) presents for low back pain, concern of septic arthritis/ discitis/OM.    MRI L-spine- L4-L5 enhancement around facet joint and facet      suggest:  - continue IV vancomycin and cefepime  - repeat HIV testing- discussed w pt and he was agreeable  - trend inflammatory markers  - follow up BCx  - pain control per primary team    d/w Medicine NP

## 2019-01-17 NOTE — BEHAVIORAL HEALTH ASSESSMENT NOTE - SUMMARY
38M w/ hx opioid use disorder, IVDU, anxiety, no inpatient psych hospitalizations, no hx substance treatment, no SA, domiciled with wife and her 2 sons, currently on probation for drug possession; PMH spinal osteomyelitis; admitted for treatment of spinal abscess likely 2/2 IVDU. Psychiatry consulted for patient requests for more Xanax.    Patient with IVDU, with continued use despite knowledge of significant physiological damage as a result of use. Withdrawal appears to be well-controlled with medications. Patient c/o anxiety, which at this time is conditional to pt's inability to take BZD as frequently desired. Pt's hx of taking more Xanax than prescribed is consistent with substance use disorder. Pt's chronic anxiety also seems to be triggered by specific stressors, and is likely not a generalized anxiety disorder.

## 2019-01-17 NOTE — BEHAVIORAL HEALTH ASSESSMENT NOTE - NSBHCONSULTOBSREASON_PSY_A_CORE FT
Pt does not require enhanced supervision for agitation/SI/HI.    Consider visitor restriction or enhanced supervision for history of drug use while in hospital. Pt does not require enhanced supervision for agitation/SI/HI.  Consider visitor restriction or enhanced supervision for history of drug use while in hospital.

## 2019-01-17 NOTE — BEHAVIORAL HEALTH ASSESSMENT NOTE - NSBHCHARTREVIEWINVESTIGATE_PSY_A_CORE FT
< from: 12 Lead ECG (01.16.19 @ 21:07) >    Ventricular Rate 56 BPM    Atrial Rate 56 BPM    P-R Interval 178 ms    QRS Duration 106 ms    Q-T Interval 416 ms    QTC Calculation(Bezet) 401 ms    P Axis 52 degrees    R Axis 39 degrees    T Axis 24 degrees    Diagnosis Line SINUS BRADYCARDIA  OTHERWISE NORMAL ECG    Confirmed by ATTENDING, ED ([id number]),  NUBIA CRAWFORD ([id number]) on 1/17/2019 7:12:44 AM    < end of copied text >

## 2019-01-17 NOTE — BEHAVIORAL HEALTH ASSESSMENT NOTE - RISK ASSESSMENT
Pt with risk factors of substance use, global insomnia, anxiety, acute medical illness.  Protective factors of supportive family, future-oriented thinking.   Pt at elevated chronic risk for self-harm and suicide. Pt with risk factors of substance use, global insomnia, anxiety, acute medical illness.  Protective factors of supportive family, future-oriented thinking.   Pt at elevated chronic risk but does not meet criteria for psychiatric hospitalization.

## 2019-01-17 NOTE — CONSULT NOTE ADULT - SUBJECTIVE AND OBJECTIVE BOX
HPI: 37 y/o M IV heroin use, hx MSSA T5-T6 OM 2012 (s/p washout and hardware placement) presents for low back pain. Pt recently admitted  to  after signing out AMA from [facility]; at that time found to have L4-L5 septic arthritis w 5 mm collection in L paraspinal muscle. This collection was drained by IR and sent for cx but returned negative. Plan at that time was to c/w vanco 1 g IV q8h and cefepime 2g IV q12 h for 6 weeks through 19. Pt states a few days PTA went to [facility] for LBP and left bc they only gave him abx; came to ED 1/15/19 and was discharged after MRI L spine was read as normal; however addendum showed L4-5 facet enhancement and pt called back to ED. Overnight restarted on previous dosages of vanco and cefepime. Pt states this past week had a fever 101F and started having tingling in R leg but no difficulty walking/urinating/having BMs.    Denies cough, rhinorrhea, sore throat, cp, abd pain, n/v, diarrhea, dysuria.       prior hospital charts reviewed [ x ]  primary team notes reviewed [ x ]  other consultant notes reviewed [  x]    PAST MEDICAL & SURGICAL HISTORY:  Osteomyelitis of lumbar spine  IV drug abuse  H/O Spinal surgery      Allergies  No Known Allergies        ANTIMICROBIALS (past 90 days)  MEDICATIONS  (STANDING):  cefepime   IVPB   100 mL/Hr IV Intermittent (19 @ 23:10)    cefepime   IVPB   100 mL/Hr IV Intermittent (19 @ 06:24)    vancomycin  IVPB   250 mL/Hr IV Intermittent (19 @ 19:11)    vancomycin  IVPB   250 mL/Hr IV Intermittent (19 @ 13:17)   250 mL/Hr IV Intermittent (19 @ 05:11)        ANTIMICROBIALS:    cefepime   IVPB 2000 every 12 hours  vancomycin  IVPB 1000 every 8 hours      OTHER MEDS: MEDICATIONS  (STANDING):  ALPRAZolam 2 three times a day  amphetamine/dextroamphetamine 30 two times a day  enoxaparin Injectable 40 every 24 hours  HYDROmorphone  Injectable 2 every 4 hours PRN  influenza   Vaccine 0.5 once      SOCIAL HISTORY:   smokes cigarettes, drinks etoh, uses IV heroin    FAMILY HISTORY:  No pertinent family history in first degree relatives      REVIEW OF SYSTEMS  [ x ] All other systems negative except as noted below:	    Constitutional:  [x ] fever [ ] chills  [ ] weight loss  [ ] weakness  Skin:  [ ] rash [ ] phlebitis	  Eyes: [ ] icterus [ ] pain  [ ] discharge	  ENMT: [ ] sore throat  [ ] thrush [ ] ulcers [ ] exudates  Respiratory: [ ] dyspnea [ ] hemoptysis [ ] cough [ ] sputum	  Cardiovascular:  [ ] chest pain [ ] palpitations [ ] edema	  Gastrointestinal:  [ ] nausea [ ] vomiting [ ] diarrhea [ ] constipation [ ] pain	  Genitourinary:  [ ] dysuria [ ] frequency [ ] hematuria [ ] discharge [ ] flank pain  [ ] incontinence  Musculoskeletal:  [ ] myalgias [ ] arthralgias [ ] arthritis  [x ] back pain  Neurological:  [ ] headache [ ] seizures  [ ] confusion/altered mental status  Psychiatric:  [ ] anxiety [ ] depression	  Hematology/Lymphatics:  [ ] lymphadenopathy  Endocrine:  [ ] adrenal [ ] thyroid  Allergic/Immunologic:	 [ ] transplant [ ] seasonal    Vital Signs Last 24 Hrs  T(F): 98.3 (19 @ 11:44), Max: 98.7 (19 @ 15:49)    Vital Signs Last 24 Hrs  HR: 71 (19 @ 11:44) (62 - 93)  BP: 105/67 (19 @ 11:44) (101/59 - 121/69)  RR: 18 (19 @ 11:44)  SpO2: 97% (19 @ 11:44) (97% - 100%)  Wt(kg): --    PHYSICAL EXAM:  General: non-toxic  HEAD/EYES: anicteric, PERRL  ENT:  supple  Cardiovascular:   S1, S2, rrr  Respiratory:  clear bilaterally  GI:  soft, non-tender, normal bowel sounds  :  no CVA tenderness   Musculoskeletal:  no synovitis  Neurologic:  grossly non-focal  Skin:  no rash  Lymph: no lymphadenopathy  Psychiatric:  appropriate affect                                14.4   9.4   )-----------( 340      ( 2019 03:53 )             41.2         133<L>  |  102  |  12  ----------------------------<  103<H>  4.3   |  16<L>  |  0.69    Ca    9.9      2019 10:32    TPro  8.5<H>  /  Alb  4.9  /  TBili  0.3  /  DBili  x   /  AST  18  /  ALT  24  /  AlkPhos  95  01-16      Urinalysis Basic - ( 2019 08:04 )    Color: Yellow / Appearance: Clear / S.028 / pH: x  Gluc: x / Ketone: Negative  / Bili: Negative / Urobili: Negative mg/dL   Blood: x / Protein: Negative mg/dL / Nitrite: Negative   Leuk Esterase: Negative / RBC: x / WBC x   Sq Epi: x / Non Sq Epi: x / Bacteria: x        MICROBIOLOGY:    Culture - Blood (collected 2019 08:51)  Source: .Blood Blood-Venous  Preliminary Report (2019 09:01):    No growth to date.    Culture - Blood (collected 2019 08:51)  Source: .Blood Blood-Peripheral  Preliminary Report (2019 09:01):    No growth to date.      RADIOLOGY:  imaging below personally reviewed    Thoracic spine MRI:    Redemonstration of T4 laminectomy and posterior fusion of T1-T7 via   multilevel pedicle screws and paired spinal rods.    No evidence for discitis/osteomyelitis or epidural abscess. No abnormal   enhancement in the thoracic region.    No spinal canal stenosis or neural foraminal narrowing.    Lumbar spine MRI:    No evidence for discitis/osteomyelitis or epidural abscess. No abnormal   enhancement in the lumbar region.    No spinal canal stenosis or neural foraminal narrowing    Additionally, at L4-L5, there is enhancement involving the right L4-L5   and facet joint and facet which is similar to study from 2018. The   presence of septic arthritis is not excluded. HPI: 39 y/o M IV heroin use, hx MSSA T5-T6 OM 2012 (s/p washout and hardware placement) presents for low back pain. Pt recently admitted  to  after signing out AMA from [facility]; at that time found to have L4-L5 septic arthritis w 5 mm collection in L paraspinal muscle. This collection was drained by IR and sent for cx but returned negative. Plan at that time was to c/w vanco 1 g IV q8h and cefepime 2g IV q12 h for 6 weeks through 19. Pt states a few days PTA went to [facility] for LBP and left bc they only gave him abx; came to ED 1/15/19 and was discharged after MRI L spine was read as normal; however addendum showed L4-5 facet enhancement and pt called back to ED. Overnight restarted on previous dosages of vanco and cefepime. Pt states this past week had a fever 101F and started having tingling in R leg but no difficulty walking/urinating/having BMs.    Denies cough, rhinorrhea, sore throat, cp, abd pain, n/v, diarrhea, dysuria.     prior hospital charts reviewed [ x ]  primary team notes reviewed [ x ]  other consultant notes reviewed [  x]    PAST MEDICAL & SURGICAL HISTORY:  Osteomyelitis of lumbar spine  IV drug abuse  H/O Spinal surgery    Allergies  No Known Allergies    ANTIMICROBIALS:    cefepime   IVPB 2000 every 12 hours  vancomycin  IVPB 1000 every 8 hours    OTHER MEDS: MEDICATIONS  (STANDING):  ALPRAZolam 2 three times a day  amphetamine/dextroamphetamine 30 two times a day  enoxaparin Injectable 40 every 24 hours  HYDROmorphone  Injectable 2 every 4 hours PRN  influenza   Vaccine 0.5 once    SOCIAL HISTORY:   smokes cigarettes, drinks etoh, uses IV heroin    FAMILY HISTORY:  No pertinent family history in first degree relatives    REVIEW OF SYSTEMS  [ x ] All other systems negative except as noted below:	    Constitutional:  [x ] fever [ ] chills  [ ] weight loss  [ ] weakness  Skin:  [ ] rash [ ] phlebitis	  Eyes: [ ] icterus [ ] pain  [ ] discharge	  ENMT: [ ] sore throat  [ ] thrush [ ] ulcers [ ] exudates  Respiratory: [ ] dyspnea [ ] hemoptysis [ ] cough [ ] sputum	  Cardiovascular:  [ ] chest pain [ ] palpitations [ ] edema	  Gastrointestinal:  [ ] nausea [ ] vomiting [ ] diarrhea [ ] constipation [ ] pain	  Genitourinary:  [ ] dysuria [ ] frequency [ ] hematuria [ ] discharge [ ] flank pain  [ ] incontinence  Musculoskeletal:  [ ] myalgias [ ] arthralgias [ ] arthritis  [x ] back pain  Neurological:  [ ] headache [ ] seizures  [ ] confusion/altered mental status  Psychiatric:  [ ] anxiety [ ] depression	  Hematology/Lymphatics:  [ ] lymphadenopathy  Endocrine:  [ ] adrenal [ ] thyroid  Allergic/Immunologic:	 [ ] transplant [ ] seasonal    Vital Signs Last 24 Hrs  T(F): 98.3 (19 @ 11:44), Max: 98.7 (19 @ 15:49)    Vital Signs Last 24 Hrs  HR: 71 (19 @ 11:44) (62 - 93)  BP: 105/67 (19 @ 11:44) (101/59 - 121/69)  RR: 18 (19 @ 11:44)  SpO2: 97% (19 @ 11:44) (97% - 100%)  Wt(kg): --    PHYSICAL EXAM:  General: non-toxic  HEAD/EYES: anicteric  ENT:  supple  Cardiovascular:   S1, S2, rrr  Respiratory:  clear bilaterally  GI:  soft, non-tender, normal bowel sounds  :  no mosley  Musculoskeletal:  no synovitis  Neurologic:  grossly non-focal  Skin:  no rash but evidence of recent injections; no cords; multiple tattoos  Psychiatric:  appropriate affect    C-Reactive Protein, Serum: 0.17 (19)  Sedimentation Rate, Erythrocyte: 34 (19)  C-Reactive Protein, Serum: 2.82 (18)  Sedimentation Rate, Erythrocyte: 40 (18)  C-Reactive Protein, Serum: 4.33 (18)  Sedimentation Rate, Erythrocyte: 44 (18)                        14.4   9.4   )-----------( 340      ( 2019 03:53 )             41.2     133<L>  |  102  |  12  ----------------------------<  103<H>  4.3   |  16<L>  |  0.69    Ca    9.9      2019 10:32    TPro  8.5<H>  /  Alb  4.9  /  TBili  0.3  /  DBili  x   /  AST  18  /  ALT  24  /  AlkPhos  95  01-16    Urinalysis Basic - ( 2019 08:04 )  Color: Yellow / Appearance: Clear / S.028 / pH: x  Gluc: x / Ketone: Negative  / Bili: Negative / Urobili: Negative mg/dL   Blood: x / Protein: Negative mg/dL / Nitrite: Negative   Leuk Esterase: Negative / RBC: x / WBC x   Sq Epi: x / Non Sq Epi: x / Bacteria: x    MICROBIOLOGY:  Culture - Blood (collected 2019 08:51)  Source: .Blood Blood-Venous  Preliminary Report (2019 09:01):    No growth to date.    Culture - Blood (collected 2019 08:51)  Source: .Blood Blood-Peripheral  Preliminary Report (2019 09:01):    No growth to date.    RADIOLOGY:  imaging below personally reviewed    Thoracic spine MRI:    Redemonstration of T4 laminectomy and posterior fusion of T1-T7 via   multilevel pedicle screws and paired spinal rods.    No evidence for discitis/osteomyelitis or epidural abscess. No abnormal   enhancement in the thoracic region.    No spinal canal stenosis or neural foraminal narrowing.    Lumbar spine MRI:    No evidence for discitis/osteomyelitis or epidural abscess. No abnormal   enhancement in the lumbar region.    No spinal canal stenosis or neural foraminal narrowing    Additionally, at L4-L5, there is enhancement involving the right L4-L5   and facet joint and facet which is similar to study from 2018. The   presence of septic arthritis is not excluded.

## 2019-01-17 NOTE — BEHAVIORAL HEALTH ASSESSMENT NOTE - HPI (INCLUDE ILLNESS QUALITY, SEVERITY, DURATION, TIMING, CONTEXT, MODIFYING FACTORS, ASSOCIATED SIGNS AND SYMPTOMS)
38M w/ hx opioid use disorder, IVDU, anxiety, no inpatient psych hospitalizations, no hx substance treatment, no SA, domiciled with wife and her 2 sons, currently on probation for possession; PMH spinal osteomyelitis; admitted for treatment of spinal abscess likely 2/ IVDU; psychiatry consulted for anxiety-induced requests for more Xanax.    Pt reports taking Xanax 2 mg on an average of four times a day for his anxiety. (Per [facility], pt last filled Xanax 2 mg q8h x 90 tabs on 2019; is also prescribed Adderall 30 mg BID by same provider). Anxiety at home is triggered by reminders of his  dad and stresses of being a parent. Since being in the hospital, pt states pain is major trigger for anxiety but pain is being well-controlled by medications. Describes anxiety as a sensation of restlessness causing him to bother nurses for more medication. Reports recent history of being trialed on Klonopin for anxiety without good effect. Denies hx of panic attacks, SOB, n/v, fear of dying.     Pt last used heroin 4-5 days ago. Currently experiencing bilateral hand cramping and abd discomfort ("gas"). Denies tremors, diaphoresis, diarrhea, rhinorrhea. Reports onset of opioid use in  after death of father. (Per chart, pt started with oxycodone and sri pills; switched to IV heroin up to 20-25 bags daily). Has been hospitalized for detox at [facility] in  but no substance treatment. Reports cocaine use about two months ago. Denies use of other drugs including LSD, marijuana, PCP. Reports planning on attending outpatient substance treatment after discharge from hospital.    Reports recent depressed mood for the past 2 months due to pain. Has chronic insomnia, sleeping only about an hour at a time. Denies anhedonia, SI. Denies HI, AVH. Denies hx of perla.    Of note, patient with recent history of IV heroin use in the bathroom of ED, required Narcan. 38M w/ hx opioid use disorder, IVDU, anxiety, no inpatient psych hospitalizations, no hx substance treatment, no SA, domiciled with wife and her 2 sons, currently on probation for possession; PMH spinal osteomyelitis; admitted for treatment of spinal abscess likely 2/2 IVDU; psychiatry consulted for anxiety-induced requests for more Xanax.    Pt reports taking Xanax 2 mg on an average of four times a day for his anxiety. (Per [facility], pt last filled Xanax 2 mg q8h x 90 tabs on 2019; is also prescribed Adderall 30 mg BID by same provider). Anxiety at home is triggered by reminders of his  dad and stresses of being a parent. Since being in the hospital, pt states pain is major trigger for anxiety but pain is being well-controlled by medications. Describes anxiety as a sensation of restlessness causing him to bother nurses for more medication. Reports recent history of being trialed on Klonopin for anxiety without good effect. Denies hx of panic attacks, SOB, n/v, fear of dying. When he runs out of Xanax at end of month, pt was advised by Xanax prescriber to take gabapentin 600 mg TID to prevent seizures.    Pt last used heroin 4-5 days ago. Currently experiencing bilateral hand cramping and abd discomfort ("gas"). Denies tremors, diaphoresis, diarrhea, rhinorrhea. Reports onset of opioid use in  after death of father. (Per chart, pt started with oxycodone and sri pills; switched to IV heroin up to 20-25 bags daily). Has been hospitalized for detox at [facility] in  but no substance treatment. Reports cocaine use about two months ago. Denies use of other drugs including LSD, marijuana, PCP. Reports planning on attending outpatient substance treatment after discharge from hospital.    Reports recent depressed mood for the past 2 months due to pain. Has chronic insomnia, sleeping only about an hour at a time. Denies anhedonia, SI. Denies HI, AVH. Denies hx of perla.    Of note, patient during ED visit in Dec 2018 used IV heroin in the bathroom, required Narcan.

## 2019-01-17 NOTE — BEHAVIORAL HEALTH ASSESSMENT NOTE - NSBHCHARTREVIEWVS_PSY_A_CORE FT
Vital Signs Last 24 Hrs  T(C): 36.8 (17 Jan 2019 11:44), Max: 37.1 (16 Jan 2019 15:49)  T(F): 98.3 (17 Jan 2019 11:44), Max: 98.7 (16 Jan 2019 15:49)  HR: 71 (17 Jan 2019 11:44) (62 - 93)  BP: 105/67 (17 Jan 2019 11:44) (101/59 - 121/69)  BP(mean): --  RR: 18 (17 Jan 2019 11:44) (18 - 18)  SpO2: 97% (17 Jan 2019 11:44) (97% - 100%)

## 2019-01-17 NOTE — BEHAVIORAL HEALTH ASSESSMENT NOTE - DETAILS
Mother  from Alzheimer's Per chart: father with heroin use; siblings with substance abuse "gas" "hand cramping"

## 2019-01-17 NOTE — BEHAVIORAL HEALTH ASSESSMENT NOTE - OTHER PAST PSYCHIATRIC HISTORY (INCLUDE DETAILS REGARDING ONSET, COURSE OF ILLNESS, INPATIENT/OUTPATIENT TREATMENT)
Per chart, pt saw a therapist once but was unable to continue tx due to insurance; no hx inpatient psych; no SA

## 2019-01-18 VITALS
TEMPERATURE: 98 F | OXYGEN SATURATION: 96 % | HEART RATE: 74 BPM | SYSTOLIC BLOOD PRESSURE: 111 MMHG | DIASTOLIC BLOOD PRESSURE: 70 MMHG | RESPIRATION RATE: 18 BRPM

## 2019-01-18 LAB — VANCOMYCIN TROUGH SERPL-MCNC: 31 UG/ML — CRITICAL HIGH (ref 10–20)

## 2019-01-18 PROCEDURE — 99285 EMERGENCY DEPT VISIT HI MDM: CPT

## 2019-01-18 PROCEDURE — C8929: CPT

## 2019-01-18 PROCEDURE — 80202 ASSAY OF VANCOMYCIN: CPT

## 2019-01-18 PROCEDURE — 99232 SBSQ HOSP IP/OBS MODERATE 35: CPT

## 2019-01-18 PROCEDURE — 93005 ELECTROCARDIOGRAM TRACING: CPT

## 2019-01-18 PROCEDURE — 85027 COMPLETE CBC AUTOMATED: CPT

## 2019-01-18 PROCEDURE — 87040 BLOOD CULTURE FOR BACTERIA: CPT

## 2019-01-18 PROCEDURE — 81003 URINALYSIS AUTO W/O SCOPE: CPT

## 2019-01-18 PROCEDURE — 80048 BASIC METABOLIC PNL TOTAL CA: CPT

## 2019-01-18 RX ORDER — HYDROXYZINE HCL 10 MG
50 TABLET ORAL
Qty: 0 | Refills: 0 | Status: DISCONTINUED | OUTPATIENT
Start: 2019-01-18 | End: 2019-01-18

## 2019-01-18 RX ORDER — OXYCODONE HYDROCHLORIDE 5 MG/1
1 TABLET ORAL
Qty: 0 | Refills: 0 | COMMUNITY

## 2019-01-18 RX ORDER — GABAPENTIN 400 MG/1
600 CAPSULE ORAL THREE TIMES A DAY
Qty: 0 | Refills: 0 | Status: DISCONTINUED | OUTPATIENT
Start: 2019-01-18 | End: 2019-01-18

## 2019-01-18 RX ORDER — NICOTINE POLACRILEX 2 MG
1 GUM BUCCAL
Qty: 0 | Refills: 0 | COMMUNITY
Start: 2019-01-18

## 2019-01-18 RX ORDER — QUETIAPINE FUMARATE 200 MG/1
25 TABLET, FILM COATED ORAL
Qty: 0 | Refills: 0 | Status: DISCONTINUED | OUTPATIENT
Start: 2019-01-18 | End: 2019-01-18

## 2019-01-18 RX ADMIN — HYDROMORPHONE HYDROCHLORIDE 2 MILLIGRAM(S): 2 INJECTION INTRAMUSCULAR; INTRAVENOUS; SUBCUTANEOUS at 15:22

## 2019-01-18 RX ADMIN — HYDROMORPHONE HYDROCHLORIDE 2 MILLIGRAM(S): 2 INJECTION INTRAMUSCULAR; INTRAVENOUS; SUBCUTANEOUS at 14:59

## 2019-01-18 RX ADMIN — Medication 2 MILLIGRAM(S): at 05:25

## 2019-01-18 RX ADMIN — GABAPENTIN 600 MILLIGRAM(S): 400 CAPSULE ORAL at 14:01

## 2019-01-18 RX ADMIN — HYDROMORPHONE HYDROCHLORIDE 2 MILLIGRAM(S): 2 INJECTION INTRAMUSCULAR; INTRAVENOUS; SUBCUTANEOUS at 10:57

## 2019-01-18 RX ADMIN — ENOXAPARIN SODIUM 40 MILLIGRAM(S): 100 INJECTION SUBCUTANEOUS at 05:25

## 2019-01-18 RX ADMIN — HYDROMORPHONE HYDROCHLORIDE 2 MILLIGRAM(S): 2 INJECTION INTRAMUSCULAR; INTRAVENOUS; SUBCUTANEOUS at 02:41

## 2019-01-18 RX ADMIN — DEXTROAMPHETAMINE SACCHARATE, AMPHETAMINE ASPARTATE, DEXTROAMPHETAMINE SULFATE AND AMPHETAMINE SULFATE 30 MILLIGRAM(S): 1.875; 1.875; 1.875; 1.875 TABLET ORAL at 06:09

## 2019-01-18 RX ADMIN — CEFEPIME 100 MILLIGRAM(S): 1 INJECTION, POWDER, FOR SOLUTION INTRAMUSCULAR; INTRAVENOUS at 05:17

## 2019-01-18 RX ADMIN — Medication 2 MILLIGRAM(S): at 13:34

## 2019-01-18 RX ADMIN — Medication 250 MILLIGRAM(S): at 05:25

## 2019-01-18 RX ADMIN — HYDROMORPHONE HYDROCHLORIDE 2 MILLIGRAM(S): 2 INJECTION INTRAMUSCULAR; INTRAVENOUS; SUBCUTANEOUS at 06:51

## 2019-01-18 NOTE — DISCHARGE NOTE ADULT - MEDICATION SUMMARY - MEDICATIONS TO TAKE
I will START or STAY ON the medications listed below when I get home from the hospital:    oxyCODONE 30 mg oral tablet  -- 1 tab(s) by mouth every 6 hours, As Needed  -- Indication: For pain    Neurontin 600 mg oral tablet  -- 1 tab(s) by mouth 3 times a day  -- Indication: For pain/anxiety    ALPRAZolam 2 mg oral tablet  -- 1 tab(s) by mouth 3 times a day  -- Indication: For Anxiety    dextroamphetamine-amphetamine 30 mg oral tablet  -- 1 tab(s) by mouth 2 times a day  -- Indication: For Anxiety    nicotine 21 mg/24 hr transdermal film, extended release  -- 1 patch by transdermal patch once a day  -- Indication: For smoking cessation

## 2019-01-18 NOTE — DISCHARGE NOTE ADULT - CARE PLAN
Principal Discharge DX:	Osteomyelitis of lumbar spine  Goal:	Resolution  Assessment and plan of treatment:	MRI spine shows L4-L5 enhancement. ID consulted. Started on Vanc/Zosyn. Patient is signing out AMA.  Despite explaining the risk of worsening infection with out antibiotic  Secondary Diagnosis:	Anxiolytic dependence  Assessment and plan of treatment:	Continue current medications  Secondary Diagnosis:	IV drug abuse  Assessment and plan of treatment:	Highly  recommend to stop the use.  Secondary Diagnosis:	Opioid use disorder  Assessment and plan of treatment:	Cut down the pain medications. Recommend for substance abuse rehabilitation Principal Discharge DX:	Osteomyelitis of lumbar spine  Goal:	Resolution  Assessment and plan of treatment:	MRI spine shows L4-L5 enhancement. ID consulted. Started on Vanc/Zosyn. Patient is signing out AMA.  Explained to the patient that if infection is untreated it can worsen and can cause paralysis. He  Secondary Diagnosis:	Anxiolytic dependence  Assessment and plan of treatment:	Continue current medications  Secondary Diagnosis:	IV drug abuse  Assessment and plan of treatment:	Highly  recommend to stop the use.  Secondary Diagnosis:	Opioid use disorder  Assessment and plan of treatment:	Cut down the pain medications. Recommend for substance abuse rehabilitation

## 2019-01-18 NOTE — PROGRESS NOTE ADULT - SUBJECTIVE AND OBJECTIVE BOX
Follow Up:  septic arthritis/discitis l spine    Inverval History/ROS: Pt c/o of pain in L-spine. States will leave AMA if pain not controlled; NP aware.    Allergies  No Known Allergies        ANTIMICROBIALS:  cefepime   IVPB 2000 every 12 hours  vancomycin  IVPB 1000 every 8 hours      OTHER MEDS:  ALPRAZolam 2 milliGRAM(s) Oral three times a day  amphetamine/dextroamphetamine 30 milliGRAM(s) Oral two times a day  enoxaparin Injectable 40 milliGRAM(s) SubCutaneous every 24 hours  gabapentin 600 milliGRAM(s) Oral three times a day  HYDROmorphone  Injectable 2 milliGRAM(s) IV Push every 4 hours PRN  hydrOXYzine hydrochloride 50 milliGRAM(s) Oral four times a day PRN  influenza   Vaccine 0.5 milliLiter(s) IntraMuscular once  nicotine - 21 mG/24Hr(s) Patch 1 patch Transdermal daily  QUEtiapine 25 milliGRAM(s) Oral four times a day PRN      Vital Signs Last 24 Hrs  T(C): 36.9 (2019 09:22), Max: 36.9 (2019 09:22)  T(F): 98.4 (2019 09:22), Max: 98.4 (2019 09:22)  HR: 74 (2019 09:22) (63 - 75)  BP: 111/70 (2019 09:22) (111/70 - 120/80)  BP(mean): --  RR: 18 (2019 09:22) (18 - 18)  SpO2: 96% (2019 09:22) (96% - 100%)    PHYSICAL EXAM:  General: [x] non-toxic  HEAD/EYES: [ ] PERRL [x ] white sclera [ ] icterus  ENT:  [ ] normal [ x] supple [ ] thrush [ ] pharyngeal exudate  Cardiovascular:   [ ] murmur [x ] normal [ ] PPM/AICD  Respiratory:  [x ] clear to ausculation bilaterally  GI:  [x ] soft, non-tender, normal bowel sounds  :  [ ] mosley [ ] no CVA tenderness   Musculoskeletal:  [x ] no synovitis  Neurologic:  [ x] non-focal exam   Skin:  [x ] no rash  Lymph: [ ] no lymphadenopathy  Psychiatric:  [x ] appropriate affect [ ] alert & oriented  Lines:  [ ] no phlebitis [ ] central line                  133<L>  |  102  |  12  ----------------------------<  103<H>  4.3   |  16<L>  |  0.69    Ca    9.9      2019 10:32        Urinalysis Basic - ( 2019 08:04 )    Color: Yellow / Appearance: Clear / S.028 / pH: x  Gluc: x / Ketone: Negative  / Bili: Negative / Urobili: Negative mg/dL   Blood: x / Protein: Negative mg/dL / Nitrite: Negative   Leuk Esterase: Negative / RBC: x / WBC x   Sq Epi: x / Non Sq Epi: x / Bacteria: x        MICROBIOLOGY:  Vancomycin Level, Trough: 31.0 ug/mL (19 @ 08:46)    .Blood Blood-Venous  19   No growth to date.  --  --      .Blood Blood-Peripheral  19   No growth to date.  --  --      RADIOLOGY:  Thoracic spine MRI:    Redemonstration of T4 laminectomy and posterior fusion of T1-T7 via   multilevel pedicle screws and paired spinal rods.    No evidence for discitis/osteomyelitis or epidural abscess. No abnormal   enhancement in the thoracic region.    No spinal canal stenosis or neural foraminal narrowing.    Lumbar spine MRI:    No evidence for discitis/osteomyelitis or epidural abscess. No abnormal   enhancement in the lumbar region.    No spinal canal stenosis or neural foraminal narrowing    Additionally, at L4-L5, there is enhancement involving the right L4-L5   and facet joint and facet which is similar to study from 2018. The   presence of septic arthritis is not excluded.
went to the er twice to see the pt, he wasn't not on the bed , likely in the rest room

## 2019-01-18 NOTE — DISCHARGE NOTE ADULT - MEDICATION SUMMARY - MEDICATIONS TO STOP TAKING
I will STOP taking the medications listed below when I get home from the hospital:    vancomycin 1 g intravenous injection  -- 1 gram(s) intravenous every 8 hours    cefepime 2 g intravenous injection  -- 2 gram(s) intravenous every 12 hours    hydrocortisone 0.5% topical cream  -- 1 application on skin 2 times a day    QUEtiapine 50 mg oral tablet  -- 1 tab(s) by mouth every 6 hours, As needed, Anxiety, Agitation, or Insomnia

## 2019-01-18 NOTE — DISCHARGE NOTE ADULT - PATIENT PORTAL LINK FT
You can access the [facility] Portal, offered by [facility], by registering with the following website: [email or web address]

## 2019-01-18 NOTE — DISCHARGE NOTE ADULT - HOSPITAL COURSE
39 y/o M--patient admits to still active IV heroin us (3 days ago), apparent osteomyelitis of the T5T6 with patient signing out AMA from [facility] and AMA from [place] in December 17 2018, with the patient's PICC line discontinued past leaving AMA in December from [place] (unclear to the interval IV antibiotics since December 2018), with the patient called back to [facility] [place] after rereview by radiology of patient's MRI LS spine from this AM (patient had left AMA from [place] a few days ago) for low back pain and shooting pain RIGHT leg.  No fever, no chills, no rigors.   No bowel or urinary incontinence.  NO HA, no focal weakness.   No amaurosis symptoms.  NO joint pain, no rash.  NO chest pain/pressure.  No dyspnoea.   No abdominal pain, no red blood per rectum or melena.   NO tearing back pain.   No dysuria, no haematuria.  Denies weight loss or anorexia.   Admitted with enhancement around L4-L5 on MRI. Started on IV zosyn and Vanco. ID was consulted. Patient was constantly asking to increase IV pain medication. He was not in agony or in distress due to pain. Explained the health risk of being dependent on opioids. Seen by Dr Abel today. Did not agree with increasing opoid, especially when he has a h/o IVDA. Patient wanted to sign out AMA. While the staff were in the process of getting the paper work ready, he eloped around 4 pm.

## 2019-01-18 NOTE — PROGRESS NOTE ADULT - ASSESSMENT
37 y/o M IV heroin use, hx MSSA T5-T6 OM 2012 (s/p washout and hardware placement) presents for low back pain, concern of septic arthritis/ discitis/OM.    MRI L-spine- L4-L5 enhancement around facet joint and facet      suggest:  - vanco level 31 but drawn a couple hrs after receiving vanco--> not a real trough, would check trough PRIOR to next dose  - continue IV vancomycin and cefepime  - repeat HIV testing- discussed w pt and he was agreeable  - trend inflammatory markers  - follow up BCx- neg to date  - pain control per primary team  - pt considering leaving AMA unless pain controlled; pt understands this is not recommended and the best treatment would be to stay in the hospital to receive IV abx- understands if infection is untreated, it can worsen and cause paralysis

## 2019-01-18 NOTE — PROVIDER CONTACT NOTE (OTHER) - ASSESSMENT
pt vital signs stable; during shift states pain is always a 8 or 9 out of 10 yet no grimacing, guarding, or acute distress noted.

## 2019-01-18 NOTE — DISCHARGE NOTE ADULT - PLAN OF CARE
Resolution MRI spine shows L4-L5 enhancement. ID consulted. Started on Vanc/Zosyn. Patient is signing out AMA.  Despite explaining the risk of worsening infection with out antibiotic Continue current medications Highly  recommend to stop the use. Cut down the pain medications. Recommend for substance abuse rehabilitation MRI spine shows L4-L5 enhancement. ID consulted. Started on Vanc/Zosyn. Patient is signing out AMA.  Explained to the patient that if infection is untreated it can worsen and can cause paralysis. He

## 2019-01-21 ENCOUNTER — INPATIENT (INPATIENT)
Facility: HOSPITAL | Age: 39
LOS: 3 days | Discharge: HOME | DRG: 540 | End: 2019-01-25
Attending: INTERNAL MEDICINE | Admitting: INTERNAL MEDICINE
Payer: MEDICAID

## 2019-01-21 VITALS
TEMPERATURE: 98 F | SYSTOLIC BLOOD PRESSURE: 131 MMHG | HEIGHT: 68 IN | DIASTOLIC BLOOD PRESSURE: 91 MMHG | HEART RATE: 71 BPM | RESPIRATION RATE: 18 BRPM | OXYGEN SATURATION: 100 % | WEIGHT: 220.02 LBS

## 2019-01-21 DIAGNOSIS — M54.9 DORSALGIA, UNSPECIFIED: ICD-10-CM

## 2019-01-21 DIAGNOSIS — M46.26 OSTEOMYELITIS OF VERTEBRA, LUMBAR REGION: ICD-10-CM

## 2019-01-21 DIAGNOSIS — F11.10 OPIOID ABUSE, UNCOMPLICATED: ICD-10-CM

## 2019-01-21 DIAGNOSIS — Z98.890 OTHER SPECIFIED POSTPROCEDURAL STATES: Chronic | ICD-10-CM

## 2019-01-21 DIAGNOSIS — F41.9 ANXIETY DISORDER, UNSPECIFIED: ICD-10-CM

## 2019-01-21 LAB
ALBUMIN SERPL ELPH-MCNC: 4.5 G/DL — SIGNIFICANT CHANGE UP (ref 3.3–5)
ALP SERPL-CCNC: 93 U/L — SIGNIFICANT CHANGE UP (ref 40–120)
ALT FLD-CCNC: 15 U/L — SIGNIFICANT CHANGE UP (ref 10–45)
ANION GAP SERPL CALC-SCNC: 12 MMOL/L — SIGNIFICANT CHANGE UP (ref 5–17)
APAP SERPL-MCNC: <15 UG/ML — SIGNIFICANT CHANGE UP (ref 10–30)
APPEARANCE UR: CLEAR — SIGNIFICANT CHANGE UP
APTT BLD: 30 SEC — SIGNIFICANT CHANGE UP (ref 27.5–36.3)
AST SERPL-CCNC: 15 U/L — SIGNIFICANT CHANGE UP (ref 10–40)
BASOPHILS # BLD AUTO: 0 K/UL — SIGNIFICANT CHANGE UP (ref 0–0.2)
BASOPHILS NFR BLD AUTO: 0.3 % — SIGNIFICANT CHANGE UP (ref 0–2)
BILIRUB SERPL-MCNC: 0.4 MG/DL — SIGNIFICANT CHANGE UP (ref 0.2–1.2)
BILIRUB UR-MCNC: NEGATIVE — SIGNIFICANT CHANGE UP
BUN SERPL-MCNC: 8 MG/DL — SIGNIFICANT CHANGE UP (ref 7–23)
CALCIUM SERPL-MCNC: 9.5 MG/DL — SIGNIFICANT CHANGE UP (ref 8.4–10.5)
CHLORIDE SERPL-SCNC: 101 MMOL/L — SIGNIFICANT CHANGE UP (ref 96–108)
CO2 SERPL-SCNC: 25 MMOL/L — SIGNIFICANT CHANGE UP (ref 22–31)
COLOR SPEC: SIGNIFICANT CHANGE UP
CREAT SERPL-MCNC: 0.72 MG/DL — SIGNIFICANT CHANGE UP (ref 0.5–1.3)
CULTURE RESULTS: SIGNIFICANT CHANGE UP
DIFF PNL FLD: NEGATIVE — SIGNIFICANT CHANGE UP
EOSINOPHIL # BLD AUTO: 0.1 K/UL — SIGNIFICANT CHANGE UP (ref 0–0.5)
EOSINOPHIL NFR BLD AUTO: 0.9 % — SIGNIFICANT CHANGE UP (ref 0–6)
ETHANOL SERPL-MCNC: SIGNIFICANT CHANGE UP MG/DL (ref 0–10)
GLUCOSE SERPL-MCNC: 99 MG/DL — SIGNIFICANT CHANGE UP (ref 70–99)
GLUCOSE UR QL: NEGATIVE — SIGNIFICANT CHANGE UP
HCT VFR BLD CALC: 42.3 % — SIGNIFICANT CHANGE UP (ref 39–50)
HGB BLD-MCNC: 14.8 G/DL — SIGNIFICANT CHANGE UP (ref 13–17)
INR BLD: 1.07 RATIO — SIGNIFICANT CHANGE UP (ref 0.88–1.16)
KETONES UR-MCNC: NEGATIVE — SIGNIFICANT CHANGE UP
LEUKOCYTE ESTERASE UR-ACNC: NEGATIVE — SIGNIFICANT CHANGE UP
LYMPHOCYTES # BLD AUTO: 2.1 K/UL — SIGNIFICANT CHANGE UP (ref 1–3.3)
LYMPHOCYTES # BLD AUTO: 23.3 % — SIGNIFICANT CHANGE UP (ref 13–44)
MCHC RBC-ENTMCNC: 30.2 PG — SIGNIFICANT CHANGE UP (ref 27–34)
MCHC RBC-ENTMCNC: 35 GM/DL — SIGNIFICANT CHANGE UP (ref 32–36)
MCV RBC AUTO: 86.2 FL — SIGNIFICANT CHANGE UP (ref 80–100)
MONOCYTES # BLD AUTO: 0.4 K/UL — SIGNIFICANT CHANGE UP (ref 0–0.9)
MONOCYTES NFR BLD AUTO: 4.3 % — SIGNIFICANT CHANGE UP (ref 2–14)
NEUTROPHILS # BLD AUTO: 6.4 K/UL — SIGNIFICANT CHANGE UP (ref 1.8–7.4)
NEUTROPHILS NFR BLD AUTO: 71.2 % — SIGNIFICANT CHANGE UP (ref 43–77)
NITRITE UR-MCNC: NEGATIVE — SIGNIFICANT CHANGE UP
PH UR: 7 — SIGNIFICANT CHANGE UP (ref 5–8)
PLATELET # BLD AUTO: 342 K/UL — SIGNIFICANT CHANGE UP (ref 150–400)
POTASSIUM SERPL-MCNC: 3.7 MMOL/L — SIGNIFICANT CHANGE UP (ref 3.5–5.3)
POTASSIUM SERPL-SCNC: 3.7 MMOL/L — SIGNIFICANT CHANGE UP (ref 3.5–5.3)
PROT SERPL-MCNC: 8.1 G/DL — SIGNIFICANT CHANGE UP (ref 6–8.3)
PROT UR-MCNC: SIGNIFICANT CHANGE UP
PROTHROM AB SERPL-ACNC: 12.3 SEC — SIGNIFICANT CHANGE UP (ref 10–12.9)
RBC # BLD: 4.9 M/UL — SIGNIFICANT CHANGE UP (ref 4.2–5.8)
RBC # FLD: 12.8 % — SIGNIFICANT CHANGE UP (ref 10.3–14.5)
SALICYLATES SERPL-MCNC: <2 MG/DL — LOW (ref 15–30)
SODIUM SERPL-SCNC: 138 MMOL/L — SIGNIFICANT CHANGE UP (ref 135–145)
SP GR SPEC: 1.01 — SIGNIFICANT CHANGE UP (ref 1.01–1.02)
SPECIMEN SOURCE: SIGNIFICANT CHANGE UP
UROBILINOGEN FLD QL: NEGATIVE — SIGNIFICANT CHANGE UP
WBC # BLD: 9 K/UL — SIGNIFICANT CHANGE UP (ref 3.8–10.5)
WBC # FLD AUTO: 9 K/UL — SIGNIFICANT CHANGE UP (ref 3.8–10.5)

## 2019-01-21 PROCEDURE — 99285 EMERGENCY DEPT VISIT HI MDM: CPT | Mod: 25

## 2019-01-21 PROCEDURE — 99223 1ST HOSP IP/OBS HIGH 75: CPT

## 2019-01-21 PROCEDURE — 99222 1ST HOSP IP/OBS MODERATE 55: CPT | Mod: GC

## 2019-01-21 RX ORDER — VANCOMYCIN HCL 1 G
1000 VIAL (EA) INTRAVENOUS ONCE
Qty: 0 | Refills: 0 | Status: COMPLETED | OUTPATIENT
Start: 2019-01-21 | End: 2019-01-21

## 2019-01-21 RX ORDER — VANCOMYCIN HCL 1 G
1000 VIAL (EA) INTRAVENOUS EVERY 12 HOURS
Qty: 0 | Refills: 0 | Status: DISCONTINUED | OUTPATIENT
Start: 2019-01-21 | End: 2019-01-23

## 2019-01-21 RX ORDER — ACETAMINOPHEN 500 MG
650 TABLET ORAL EVERY 6 HOURS
Qty: 0 | Refills: 0 | Status: DISCONTINUED | OUTPATIENT
Start: 2019-01-21 | End: 2019-01-25

## 2019-01-21 RX ORDER — CEFEPIME 1 G/1
INJECTION, POWDER, FOR SOLUTION INTRAMUSCULAR; INTRAVENOUS
Qty: 0 | Refills: 0 | Status: DISCONTINUED | OUTPATIENT
Start: 2019-01-21 | End: 2019-01-23

## 2019-01-21 RX ORDER — LOPERAMIDE HCL 2 MG
2 TABLET ORAL EVERY 6 HOURS
Qty: 0 | Refills: 0 | Status: DISCONTINUED | OUTPATIENT
Start: 2019-01-21 | End: 2019-01-25

## 2019-01-21 RX ORDER — KETOROLAC TROMETHAMINE 30 MG/ML
15 SYRINGE (ML) INJECTION ONCE
Qty: 0 | Refills: 0 | Status: DISCONTINUED | OUTPATIENT
Start: 2019-01-21 | End: 2019-01-21

## 2019-01-21 RX ORDER — GABAPENTIN 400 MG/1
600 CAPSULE ORAL THREE TIMES A DAY
Qty: 0 | Refills: 0 | Status: DISCONTINUED | OUTPATIENT
Start: 2019-01-21 | End: 2019-01-25

## 2019-01-21 RX ORDER — INFLUENZA VIRUS VACCINE 15; 15; 15; 15 UG/.5ML; UG/.5ML; UG/.5ML; UG/.5ML
0.5 SUSPENSION INTRAMUSCULAR ONCE
Qty: 0 | Refills: 0 | Status: DISCONTINUED | OUTPATIENT
Start: 2019-01-21 | End: 2019-01-25

## 2019-01-21 RX ORDER — CEFEPIME 1 G/1
1000 INJECTION, POWDER, FOR SOLUTION INTRAMUSCULAR; INTRAVENOUS ONCE
Qty: 0 | Refills: 0 | Status: COMPLETED | OUTPATIENT
Start: 2019-01-21 | End: 2019-01-21

## 2019-01-21 RX ORDER — CEFEPIME 1 G/1
1000 INJECTION, POWDER, FOR SOLUTION INTRAMUSCULAR; INTRAVENOUS EVERY 8 HOURS
Qty: 0 | Refills: 0 | Status: DISCONTINUED | OUTPATIENT
Start: 2019-01-21 | End: 2019-01-23

## 2019-01-21 RX ORDER — ENOXAPARIN SODIUM 100 MG/ML
40 INJECTION SUBCUTANEOUS EVERY 24 HOURS
Qty: 0 | Refills: 0 | Status: DISCONTINUED | OUTPATIENT
Start: 2019-01-21 | End: 2019-01-25

## 2019-01-21 RX ORDER — OXYCODONE HYDROCHLORIDE 5 MG/1
30 TABLET ORAL EVERY 4 HOURS
Qty: 0 | Refills: 0 | Status: DISCONTINUED | OUTPATIENT
Start: 2019-01-21 | End: 2019-01-23

## 2019-01-21 RX ORDER — DEXTROAMPHETAMINE SACCHARATE, AMPHETAMINE ASPARTATE, DEXTROAMPHETAMINE SULFATE AND AMPHETAMINE SULFATE 1.875; 1.875; 1.875; 1.875 MG/1; MG/1; MG/1; MG/1
30 TABLET ORAL ONCE
Qty: 0 | Refills: 0 | Status: DISCONTINUED | OUTPATIENT
Start: 2019-01-21 | End: 2019-01-21

## 2019-01-21 RX ORDER — ALPRAZOLAM 0.25 MG
2 TABLET ORAL THREE TIMES A DAY
Qty: 0 | Refills: 0 | Status: DISCONTINUED | OUTPATIENT
Start: 2019-01-21 | End: 2019-01-25

## 2019-01-21 RX ORDER — NICOTINE POLACRILEX 2 MG
1 GUM BUCCAL DAILY
Qty: 0 | Refills: 0 | Status: DISCONTINUED | OUTPATIENT
Start: 2019-01-21 | End: 2019-01-25

## 2019-01-21 RX ORDER — DEXTROAMPHETAMINE SACCHARATE, AMPHETAMINE ASPARTATE, DEXTROAMPHETAMINE SULFATE AND AMPHETAMINE SULFATE 1.875; 1.875; 1.875; 1.875 MG/1; MG/1; MG/1; MG/1
30 TABLET ORAL
Qty: 0 | Refills: 0 | Status: DISCONTINUED | OUTPATIENT
Start: 2019-01-21 | End: 2019-01-25

## 2019-01-21 RX ADMIN — CEFEPIME 100 MILLIGRAM(S): 1 INJECTION, POWDER, FOR SOLUTION INTRAMUSCULAR; INTRAVENOUS at 18:37

## 2019-01-21 RX ADMIN — GABAPENTIN 600 MILLIGRAM(S): 400 CAPSULE ORAL at 22:43

## 2019-01-21 RX ADMIN — OXYCODONE HYDROCHLORIDE 30 MILLIGRAM(S): 5 TABLET ORAL at 15:00

## 2019-01-21 RX ADMIN — OXYCODONE HYDROCHLORIDE 30 MILLIGRAM(S): 5 TABLET ORAL at 10:06

## 2019-01-21 RX ADMIN — Medication 250 MILLIGRAM(S): at 02:40

## 2019-01-21 RX ADMIN — Medication 0.1 MILLIGRAM(S): at 20:46

## 2019-01-21 RX ADMIN — OXYCODONE HYDROCHLORIDE 30 MILLIGRAM(S): 5 TABLET ORAL at 14:28

## 2019-01-21 RX ADMIN — DEXTROAMPHETAMINE SACCHARATE, AMPHETAMINE ASPARTATE, DEXTROAMPHETAMINE SULFATE AND AMPHETAMINE SULFATE 30 MILLIGRAM(S): 1.875; 1.875; 1.875; 1.875 TABLET ORAL at 18:39

## 2019-01-21 RX ADMIN — ENOXAPARIN SODIUM 40 MILLIGRAM(S): 100 INJECTION SUBCUTANEOUS at 14:29

## 2019-01-21 RX ADMIN — Medication 2 MILLIGRAM(S): at 11:09

## 2019-01-21 RX ADMIN — OXYCODONE HYDROCHLORIDE 30 MILLIGRAM(S): 5 TABLET ORAL at 18:40

## 2019-01-21 RX ADMIN — Medication 250 MILLIGRAM(S): at 14:34

## 2019-01-21 RX ADMIN — Medication 15 MILLIGRAM(S): at 07:56

## 2019-01-21 RX ADMIN — OXYCODONE HYDROCHLORIDE 30 MILLIGRAM(S): 5 TABLET ORAL at 09:36

## 2019-01-21 RX ADMIN — Medication 2 MILLIGRAM(S): at 19:49

## 2019-01-21 RX ADMIN — GABAPENTIN 600 MILLIGRAM(S): 400 CAPSULE ORAL at 14:28

## 2019-01-21 RX ADMIN — OXYCODONE HYDROCHLORIDE 30 MILLIGRAM(S): 5 TABLET ORAL at 22:44

## 2019-01-21 RX ADMIN — DEXTROAMPHETAMINE SACCHARATE, AMPHETAMINE ASPARTATE, DEXTROAMPHETAMINE SULFATE AND AMPHETAMINE SULFATE 30 MILLIGRAM(S): 1.875; 1.875; 1.875; 1.875 TABLET ORAL at 14:06

## 2019-01-21 RX ADMIN — OXYCODONE HYDROCHLORIDE 30 MILLIGRAM(S): 5 TABLET ORAL at 19:40

## 2019-01-21 RX ADMIN — OXYCODONE HYDROCHLORIDE 30 MILLIGRAM(S): 5 TABLET ORAL at 23:44

## 2019-01-21 RX ADMIN — CEFEPIME 100 MILLIGRAM(S): 1 INJECTION, POWDER, FOR SOLUTION INTRAMUSCULAR; INTRAVENOUS at 09:01

## 2019-01-21 RX ADMIN — CEFEPIME 100 MILLIGRAM(S): 1 INJECTION, POWDER, FOR SOLUTION INTRAMUSCULAR; INTRAVENOUS at 01:59

## 2019-01-21 RX ADMIN — Medication 1 PATCH: at 20:21

## 2019-01-21 RX ADMIN — Medication 1 PATCH: at 14:06

## 2019-01-21 NOTE — CONSULT NOTE ADULT - SUBJECTIVE AND OBJECTIVE BOX
HPI:  Mr. Sheets is a 38 year old man with a PMHx of T5/T6 spinal osteomyelitis (diagnosed initially in  s/p washout, with recurrence in 2018 s/p needle aspiration with no growth), daily heroin use, general anxiety disorder, who presents to the ED for management of his osteomyelitis and persistent back pain. Mr. Sheets was hospitalized here from  to  for the same, but signed out AMA because he felt that his pain was not being adequately controlled. He states that he was frustrated that he was not receiving IV dilaudid, so he left. In hindsight, he admits that his pain was actually tolerable during that hospitalization, and he now wants to continue treatment for his osteomyelitis. Since he has left the hospital, he has had persistent lower, midline back pain, described as sharp, with some radiation down his leg, with tingling but no weakness. He has been able to ambulate without much trouble. At its worst, it is an 8/10, and it's relieved partially by oxycodone and heroin. He last used heroin on the evening prior to admission, and endorses using 2 bags per day. He last injected heroin over 2 weeks ago. At present, he is starting to experience some withdrawal symptoms, and notes lacrimation and rhinorrhea with some abdominal cramping. He is again asking for IV dilaudid for pain.    There is no family history of predisposition to infections or immunocompromised state. There is a family history of drug use (father, siblings). (2019 08:39)      PAST MEDICAL & SURGICAL HISTORY:  Osteomyelitis of lumbar spine  IV drug abuse  H/O Spinal surgery      Allergies  No Known Allergies        ANTIMICROBIALS:  cefepime   IVPB    cefepime   IVPB 1000 every 8 hours  vancomycin  IVPB 1000 every 12 hours      OTHER MEDS: MEDICATIONS  (STANDING):  acetaminophen   Tablet .. 650 every 6 hours PRN  ALPRAZolam 2 three times a day PRN  amphetamine/dextroamphetamine 30 two times a day  cloNIDine 0.1 every 4 hours PRN  enoxaparin Injectable 40 every 24 hours  gabapentin 600 three times a day  influenza   Vaccine 0.5 once  loperamide 2 every 6 hours PRN  oxyCODONE    IR 30 every 4 hours PRN      SOCIAL HISTORY:  [ ] etoh [ ] tobacco [ ] former smoker [x ] IVDU    FAMILY HISTORY:  No pertinent family history in first degree relatives      REVIEW OF SYSTEMS  [  ] ROS unobtainable because:    [x  ] All other systems negative except as noted below:	    Constitutional:  [ ] fever [ ] chills  [ ] weight loss  [ ] weakness  Skin:  [ ] rash [ ] phlebitis	  Eyes: [ ] icterus [ ] pain  [ ] discharge	  ENMT: [ ] sore throat  [ ] thrush [ ] ulcers [ ] exudates  Respiratory: [ ] dyspnea [ ] hemoptysis [ ] cough [ ] sputum	  Cardiovascular:  [ ] chest pain [ ] palpitations [ ] edema	  Gastrointestinal:  [ ] nausea [ ] vomiting [ ] diarrhea [ ] constipation [ ] pain	  Genitourinary:  [ ] dysuria [ ] frequency [ ] hematuria [ ] discharge [ ] flank pain  [ ] incontinence  Musculoskeletal:  [ ] myalgias [ ] arthralgias [ ] arthritis  [ ] back pain  Neurological:  [ ] headache [ ] seizures  [ ] confusion/altered mental status  Psychiatric:  [ ] anxiety [ ] depression	  Hematology/Lymphatics:  [ ] lymphadenopathy  Endocrine:  [ ] adrenal [ ] thyroid  Allergic/Immunologic:	 [ ] transplant [ ] seasonal    Vital Signs Last 24 Hrs  T(F): 98.1 (19 @ 08:26), Max: 98.7 (19 @ 15:49)    Vital Signs Last 24 Hrs  HR: 71 (19 @ 08:26) (54 - 71)  BP: 107/59 (19 @ 08:26) (107/59 - 131/91)  RR: 18 (19 @ 08:26)  SpO2: 96% (19 @ 08:26) (96% - 100%)  Wt(kg): --    PHYSICAL EXAM:  General: non-toxic  HEAD/EYES: anicteric, PERRL  ENT:  supple  Cardiovascular:   S1, S2  Respiratory:  clear bilaterally  GI:  soft, non-tender, normal bowel sounds  :  no CVA tenderness   Musculoskeletal:  no synovitis +TTP lumbar spine  Neurologic:  grossly non-focal  Skin:  no rash  Lymph: no lymphadenopathy  Psychiatric:  appropriate affect  Vascular:  no phlebitis                                14.8   9.0   )-----------( 342      ( 2019 02:10 )             42.3           138  |  101  |  8   ----------------------------<  99  3.7   |  25  |  0.72    Ca    9.5      2019 02:10    TPro  8.1  /  Alb  4.5  /  TBili  0.4  /  DBili  x   /  AST  15  /  ALT  15  /  AlkPhos  93        Urinalysis Basic - ( 2019 01:19 )    Color: Light Yellow / Appearance: Clear / S.014 / pH: x  Gluc: x / Ketone: Negative  / Bili: Negative / Urobili: Negative   Blood: x / Protein: Trace / Nitrite: Negative   Leuk Esterase: Negative / RBC: x / WBC x   Sq Epi: x / Non Sq Epi: x / Bacteria: x        MICROBIOLOGY:  .Blood Blood-Venous  19   No growth to date.  --  --      .Blood Blood-Venous  19   No growth to date.  --  --      .Blood Blood-Peripheral  19   No growth at 5 days.  --  --              v            RADIOLOGY: < from: MR Lumbar Spine w/ IV Cont (19 @ 09:24) >  EXAM:  MR SPINE LUMBAR IC                          EXAM:  MR SPINE THORACIC IC                            *** ADDENDUM 2019  ***    Additionally, at L4-L5, there is enhancement involving the right L4-L5   and facet joint and facet which is similar to study from 2018. The   presence of septic arthritis is not excluded.    < end of copied text > HPI:  Mr. Sheets is a 38 year old man with a PMHx of T5/T6 spinal osteomyelitis (diagnosed initially in  s/p washout, with recurrence in 2018 s/p needle aspiration with no growth), daily heroin use, general anxiety disorder, who presents to the ED for management of his osteomyelitis and persistent back pain. Mr. Sheets was hospitalized here from  to  for the same, but signed out AMA because he felt that his pain was not being adequately controlled. He states that he was frustrated that he was not receiving IV dilaudid, so he left. In hindsight, he admits that his pain was actually tolerable during that hospitalization, and he now wants to continue treatment for his osteomyelitis. Since he has left the hospital, he has had persistent lower, midline back pain, described as sharp, with some radiation down his leg, with tingling but no weakness. He has been able to ambulate without much trouble. At its worst, it is an 8/10, and it's relieved partially by oxycodone and heroin. He last used heroin on the evening prior to admission, and endorses using 2 bags per day. He last injected heroin over 2 weeks ago. At present, he is starting to experience some withdrawal symptoms, and notes lacrimation and rhinorrhea with some abdominal cramping. He is again asking for IV dilaudid for pain.    There is no family history of predisposition to infections or immunocompromised state. There is a family history of drug use (father, siblings). (2019 08:39)  He states that his pain got worse within 24 hours of leaving the hospital. He is determined to stay as long as requested for IV abx  pain worse with prolonged standing, lying on stomach, occasionally radiating down right anterior thigh.    PAST MEDICAL & SURGICAL HISTORY:  Osteomyelitis of lumbar spine  IV drug abuse  H/O Spinal surgery      Allergies  No Known Allergies    ANTIMICROBIALS:  cefepime   IVPB    cefepime   IVPB 1000 every 8 hours  vancomycin  IVPB 1000 every 12 hours      OTHER MEDS: MEDICATIONS  (STANDING):  acetaminophen   Tablet .. 650 every 6 hours PRN  ALPRAZolam 2 three times a day PRN  amphetamine/dextroamphetamine 30 two times a day  cloNIDine 0.1 every 4 hours PRN  enoxaparin Injectable 40 every 24 hours  gabapentin 600 three times a day  influenza   Vaccine 0.5 once  loperamide 2 every 6 hours PRN  oxyCODONE    IR 30 every 4 hours PRN      SOCIAL HISTORY:  [ ] etoh [ ] tobacco [ ] former smoker [x ] IVDU    FAMILY HISTORY:  No pertinent family history in first degree relatives      REVIEW OF SYSTEMS  [  ] ROS unobtainable because:    [x  ] All other systems negative except as noted below:	    Constitutional:  [ ] fever [ ] chills  [ ] weight loss  [ ] weakness  Skin:  [ ] rash [ ] phlebitis	  Eyes: [ ] icterus [ ] pain  [ ] discharge	  ENMT: [ ] sore throat  [ ] thrush [ ] ulcers [ ] exudates  Respiratory: [ ] dyspnea [ ] hemoptysis [ ] cough [ ] sputum	  Cardiovascular:  [ ] chest pain [ ] palpitations [ ] edema	  Gastrointestinal:  [ ] nausea [ ] vomiting [ ] diarrhea [ ] constipation [ ] pain	  Genitourinary:  [ ] dysuria [ ] frequency [ ] hematuria [ ] discharge [ ] flank pain  [ ] incontinence  Musculoskeletal:  [ ] myalgias [ ] arthralgias [ ] arthritis  [ ] back pain  Neurological:  [ ] headache [ ] seizures  [ ] confusion/altered mental status  Psychiatric:  [ ] anxiety [ ] depression	  Hematology/Lymphatics:  [ ] lymphadenopathy  Endocrine:  [ ] adrenal [ ] thyroid  Allergic/Immunologic:	 [ ] transplant [ ] seasonal    Vital Signs Last 24 Hrs  T(F): 98.1 (19 @ 08:26), Max: 98.7 (19 @ 15:49)    Vital Signs Last 24 Hrs  HR: 71 (19 @ 08:26) (54 - 71)  BP: 107/59 (19 @ 08:26) (107/59 - 131/91)  RR: 18 (19 @ 08:26)  SpO2: 96% (19 @ 08:26) (96% - 100%)  Wt(kg): --    PHYSICAL EXAM:  General: non-toxic  HEAD/EYES: anicteric, PERRL  ENT:  supple  Cardiovascular:   S1, S2  Respiratory:  clear bilaterally  GI:  soft, non-tender, normal bowel sounds  :  no CVA tenderness   Musculoskeletal:  no synovitis +TTP lumbar spine  Neurologic:  grossly non-focal  Skin:  no rash  Lymph: no lymphadenopathy  Psychiatric:  appropriate affect  Vascular:  no phlebitis                        14.8   9.0   )-----------( 342      ( 2019 02:10 )             42.3     Sedimentation Rate, Erythrocyte (19 @ 03:53)    Sedimentation Rate, Erythrocyte: 34 mm/hr    C-Reactive Protein, Serum (19 @ 08:34)    C-Reactive Protein, Serum: 0.17 mg/dL          138  |  101  |  8   ----------------------------<  99  3.7   |  25  |  0.72    Ca    9.5      2019 02:10    TPro  8.1  /  Alb  4.5  /  TBili  0.4  /  DBili  x   /  AST  15  /  ALT  15  /  AlkPhos  93        Urinalysis Basic - ( 2019 01:19 )    Color: Light Yellow / Appearance: Clear / S.014 / pH: x  Gluc: x / Ketone: Negative  / Bili: Negative / Urobili: Negative   Blood: x / Protein: Trace / Nitrite: Negative   Leuk Esterase: Negative / RBC: x / WBC x   Sq Epi: x / Non Sq Epi: x / Bacteria: x        MICROBIOLOGY:  .Blood Blood-Venous  19   No growth to date.  --  --      .Blood Blood-Venous  19   No growth to date.  --  --      .Blood Blood-Peripheral  19   No growth at 5 days.  --  --      RADIOLOGY: < from: MR Lumbar Spine w/ IV Cont (19 @ 09:24) >  EXAM:  MR SPINE LUMBAR IC                          EXAM:  MR SPINE THORACIC IC                            *** ADDENDUM 2019  ***    Additionally, at L4-L5, there is enhancement involving the right L4-L5   and facet joint and facet which is similar to study from 2018. The   presence of septic arthritis is not excluded.    < end of copied text >  < from: MR Lumbar Spine w/ IV Cont (19 @ 09:24) >  Thoracic spine MRI:    Redemonstration of T4 laminectomy and posterior fusion of T1-T7 via   multilevel pedicle screws andpaired spinal rods.    No evidence for discitis/osteomyelitis or epidural abscess. No abnormal   enhancement in the thoracic region.    No spinal canal stenosis or neural foraminal narrowing.    Lumbar spine MRI:    No evidence for discitis/osteomyelitis or epidural abscess. No abnormal   enhancement in the lumbar region.    No spinal canal stenosis or neural foraminal narrowing    < end of copied text >

## 2019-01-21 NOTE — ED PROVIDER NOTE - PMH
IV drug abuse    Osteomyelitis of lumbar spine
Normal rate, regular rhythm, normal S1, S2 heart sounds heard.

## 2019-01-21 NOTE — ED PROVIDER NOTE - MEDICAL DECISION MAKING DETAILS
Patient c known spinal abscesses L2-5 2/2 IVDA, recent AMA from hospital.  Has not been on IV abx x 3 days since AMA.  Has some worsening lower back pain but no new neuro symptoms.  Pain appears well controlled.  Not febrile.  Will readmit to medicine, draw labs/cx, restart cefepime and vanco as per ID recs.  --BMM

## 2019-01-21 NOTE — H&P ADULT - RS GEN PE MLT RESP DETAILS PC
no rales/breath sounds equal/no rhonchi/good air movement/airway patent/respirations non-labored/no wheezes

## 2019-01-21 NOTE — ED ADULT NURSE NOTE - CHPI ED NUR SYMPTOMS NEG
no weakness/no tingling/no decreased eating/drinking/no dizziness/no fever/no pain/no nausea/no vomiting/no chills

## 2019-01-21 NOTE — ED PROVIDER NOTE - PROGRESS NOTE DETAILS
Called Dr. SOMMER Abel re pt's presentation to ED.  He will take patient back onto his service.  Labs/cultures drawn.  No acute worsening of symptoms or trauma; will defer imaging at this time.  Cefepime and Vanco Rx'd.  --BMM

## 2019-01-21 NOTE — ED ADULT NURSE REASSESSMENT NOTE - NS ED NURSE REASSESS COMMENT FT1
0730 Received awake and alert. Breathing easy and non labored. Complained of generalized body pain. Toradol 15 mg IVP given. Will monitor effect. For transfer to Holding Area.

## 2019-01-21 NOTE — H&P ADULT - PROBLEM SELECTOR PLAN 3
Starting to manifest signs of withdrawal.  1. Start clonidine 0.1 mg PO q4h PRN for persistent symptoms.  2. Anticipate loose BMs as well - loperamide PRN ordered.

## 2019-01-21 NOTE — ED ADULT NURSE NOTE - OBJECTIVE STATEMENT
37 y/o male presented to the ED c/o worsening back pain due to Dx abscess, pt states he was receiving care prior at [facility] and [facility] for re occurring Abscess. pt states he thinks he is getting these spinal abscess due to Heroin use. pt last known use was yesterday stated he had a bag, doesn't feel like he detoxing currently. pt was receiving Abx treatment IV on Friday and needed to leave AMA due to a "family emergency". states he returned due to pain and needing the IV abx treatment. pt states back "shooting pain". abscess is L4-L5, pt had prior abscess in thoracic area but had sx and healed.  pt is A&Ox3, on room air with no signs of distress. able to ambulate without difficulty. pt denies any fever, chills, N/V/D. no chest  pain or SOB. pt is cooperative with care and overall good moral. awaiting MD dispo.

## 2019-01-21 NOTE — CONSULT NOTE ADULT - ATTENDING COMMENTS
multiple treatment interuptions of antibiotic therapy which was to be completed 1/25 - the general standard is 6 weeks of continuous antibiotic therapy    His empiric antbiotics as in-patient at [facility]:  Zosyn/Vanco 12/11 -->12/12/18  Cefepime/Vanco 12/14 -->12/17/18; 1/16 -->1/18; 1/21 -->    He has had multiple hospitalizations at [facility] as well    Suggest  Repeat ESR/CRP now and weekly and Blood cultures  Check HIV and Hep C serologies  minimum 14 days of Cefepime/Vanco anticipated

## 2019-01-21 NOTE — H&P ADULT - NEUROLOGICAL DETAILS
alert and oriented x 3/cranial nerves intact/normal strength/sensation intact/deep reflexes intact/responds to pain/responds to verbal commands

## 2019-01-21 NOTE — PATIENT PROFILE ADULT - NSSTREETDRUGWI_GEN_A_NUR
disorientation/nausea/agitation/intense desire for drugs/depression/difficulty concentrating/insomnia/vomiting/diaphoresis/mood swings

## 2019-01-21 NOTE — H&P ADULT - HISTORY OF PRESENT ILLNESS
Mr. Sheets is a 38 year old man with a PMHx of T5/T6 spinal osteomyelitis (diagnosed initially in 2014 s/p washout, with recurrence in December 2018 s/p needle aspiration with no growth), daily heroin use, general anxiety disorder, who presents to the ED for management of his osteomyelitis and persistent back pain. Mr. Sheets was hospitalized here from Jan 16 to Jan 18 for the same, but signed out AMA because he felt that his pain was not being adequately controlled. He states that he was frustrated that he was not receiving IV dilaudid, so he left. In hindsight, he admits that his pain was actually tolerable during that hospitalization, and he now wants to continue treatment for his osteomyelitis. Since he has left the hospital, he has had persistent lower, midline back pain, described as sharp, with some radiation down his leg, with tingling but no weakness. He has been able to ambulate without much trouble. At its worst, it is an 8/10, and it's relieved partially by oxycodone and heroin. He last used heroin on the evening prior to admission, and endorses using 2 bags per day. He last injected heroin over 2 weeks ago. At present, he is starting to experience some withdrawal symptoms, and notes lacrimation and rhinorrhea with some abdominal cramping. He is again asking for IV dilaudid for pain.    There is no family history of predisposition to infections or immunocompromised state. There is a family history of drug use (father, siblings).

## 2019-01-21 NOTE — CONSULT NOTE ADULT - ASSESSMENT
38 year old man with a PMHx of T5/T6 spinal osteomyelitis (diagnosed initially in 2014 s/p washout, with recurrence in December 2018 s/p needle aspiration with no growth), daily heroin use, general anxiety disorder, who presents to the ED for management of his osteomyelitis and persistent back pain. Mr. Sheets was hospitalized here from Jan 16 to Jan 18 for the same, but signed out AMA because he felt that his pain was not being adequately controlled.   Denies fever change in symptoms other than pain  No leukocytosis  c/w vancomycin 1 g iv q8h, cefepime 2 g iv [id number] to be until 1/25  f/u blood cx  Check ESR CRP

## 2019-01-21 NOTE — H&P ADULT - PROBLEM SELECTOR PLAN 2
1. Continue home regimen of oxycodone; will increase frequency to 30 mg PO q4h PRN severe pain.  2. There is no plan to administer IV narcotics for chronic pain management - this was discussed at length with patient.  3. Ambulation as tolerated.  4. Continue neurontin.

## 2019-01-21 NOTE — H&P ADULT - NSHPLABSRESULTS_GEN_ALL_CORE
MRI T-L Spine from 1/16/18 personally reviewed: L4-L5 facet joint enhancement.    Labs reviewed as below.

## 2019-01-21 NOTE — H&P ADULT - GASTROINTESTINAL DETAILS
no rebound tenderness/no guarding/soft/no distention/bowel sounds normal/no rigidity/no masses palpable/nontender

## 2019-01-21 NOTE — H&P ADULT - NSHPPHYSICALEXAM_GEN_ALL_CORE
Vital Signs Last 24 Hrs  T(C): 36.7 (21 Jan 2019 08:26), Max: 36.9 (21 Jan 2019 00:46)  T(F): 98.1 (21 Jan 2019 08:26), Max: 98.5 (21 Jan 2019 06:06)  HR: 71 (21 Jan 2019 08:26) (54 - 71)  BP: 107/59 (21 Jan 2019 08:26) (107/59 - 131/91)  BP(mean): --  RR: 18 (21 Jan 2019 08:26) (16 - 18)  SpO2: 96% (21 Jan 2019 08:26) (96% - 100%)

## 2019-01-21 NOTE — ED PROVIDER NOTE - MUSCULOSKELETAL, MLM
Spine appears normal; has very mild lower thoracic/upper lumbar TTP without stepoff, range of motion is not limited, no muscle or joint tenderness

## 2019-01-21 NOTE — H&P ADULT - ASSESSMENT
Mr. Sheets is a 38 year old man with a PMHx of T5/T6 spinal osteomyelitis (diagnosed initially in 2014 s/p washout, with recurrence in December 2018 s/p needle aspiration with no growth), daily heroin use, general anxiety disorder, who presents to the ED for management of his osteomyelitis and persistent back pain. Currently not septic appearing, and pain appears fairly controlled.

## 2019-01-21 NOTE — H&P ADULT - NSHPSOCIALHISTORY_GEN_ALL_CORE
Lives with wife and two sons.  Endorses daily heroin use.  Current every day smoker.  Denies EtOH use.  Unemployed.

## 2019-01-21 NOTE — ED PROVIDER NOTE - CONSTITUTIONAL, MLM
normal... Well appearing, well nourished, awake, alert, oriented to person, place, time/situation and in no apparent distress. Well appearing, well nourished, awake, alert, oriented to person, place, time/situation and in no apparent distress.  Somnolent, arouses easily to voice.

## 2019-01-21 NOTE — H&P ADULT - PROBLEM SELECTOR PLAN 4
Vaginal Delivery
1. Resume home dose of xanax, 2 mg PO q8h PRN - dose confirmed via ISTOP report [id number]  2. Continue adderall, dose confirmed as well.    DVT PPx - Lovenox

## 2019-01-21 NOTE — H&P ADULT - PROBLEM SELECTOR PLAN 1
1. Admit to medicine.  2. Previous culture data reviewed - no growth from blood or spinal aspirate in December 2018.  3. No focal neurologic findings to suggest spinal cord or radicular involvement.  4. Plan to restart IV vancomycin and cefepime, as previously recommended by ID. ID follow-up requested. Monitor vancomycin levels for toxicity.  5. F/U repeat BCx.  6. Check ESR/CRP.  7. I believe that there is a chronic component to the patient's OM and back pain, which may not be fully relieved by antimicrobial treatment. Furthermore, given hx of substance use and frequently signing out AMA, long-term IV antibiotic use with PICC at may not be practical or safe. Compliance is an issue. Question whether oral therapies could be considered? Will defer to ID.

## 2019-01-21 NOTE — ED PROVIDER NOTE - OBJECTIVE STATEMENT
38y male with PMH of IVDA, osteomyelitis, septic arthritis of L4-L5 presenting for IV antibiotics L4-L5 osteomyelitis after leaving AMA.  He is having back pain, with intermittent right thigh numbness and right leg weakness.  He has left [facility] AM multiple times during his treatment for septic arthritis and osteomyelitis.  His last heroin use was the morning of 1/21/19.  He denies withdrawal symptoms at this time.  Denies fevers, chills, n/v/d, chest pain, loss of sensation and weakness other than right leg. 38y male with PMH of IVDA, osteomyelitis, septic arthritis of L4-L5 presenting for IV antibiotics L4-L5 osteomyelitis after leaving AMA.  He is having back pain, with intermittent right thigh numbness and right leg weakness.  He has left [facility] multiple times during his treatment for septic arthritis and osteomyelitis.  His last heroin use was the morning of 1/21/19.  He denies withdrawal symptoms at this time.  Denies fevers, chills, n/v/d, chest pain, loss of sensation and weakness.  No bowel/bladder changes.  States took gabapentin 600 mg 4 hrs ago.

## 2019-01-22 LAB
ANION GAP SERPL CALC-SCNC: 13 MMOL/L — SIGNIFICANT CHANGE UP (ref 5–17)
BUN SERPL-MCNC: 11 MG/DL — SIGNIFICANT CHANGE UP (ref 7–23)
CALCIUM SERPL-MCNC: 10 MG/DL — SIGNIFICANT CHANGE UP (ref 8.4–10.5)
CHLORIDE SERPL-SCNC: 101 MMOL/L — SIGNIFICANT CHANGE UP (ref 96–108)
CO2 SERPL-SCNC: 27 MMOL/L — SIGNIFICANT CHANGE UP (ref 22–31)
CREAT SERPL-MCNC: 0.75 MG/DL — SIGNIFICANT CHANGE UP (ref 0.5–1.3)
CRP SERPL-MCNC: <0.1 MG/DL — SIGNIFICANT CHANGE UP (ref 0–0.4)
CULTURE RESULTS: NO GROWTH — SIGNIFICANT CHANGE UP
CULTURE RESULTS: SIGNIFICANT CHANGE UP
ERYTHROCYTE [SEDIMENTATION RATE] IN BLOOD: 16 MM/HR — HIGH (ref 0–15)
GLUCOSE SERPL-MCNC: 112 MG/DL — HIGH (ref 70–99)
HCT VFR BLD CALC: 45.3 % — SIGNIFICANT CHANGE UP (ref 39–50)
HCV AB S/CO SERPL IA: 0.21 S/CO — SIGNIFICANT CHANGE UP
HCV AB SERPL-IMP: SIGNIFICANT CHANGE UP
HGB BLD-MCNC: 15.4 G/DL — SIGNIFICANT CHANGE UP (ref 13–17)
HIV 1+2 AB+HIV1 P24 AG SERPL QL IA: SIGNIFICANT CHANGE UP
MCHC RBC-ENTMCNC: 29.2 PG — SIGNIFICANT CHANGE UP (ref 27–34)
MCHC RBC-ENTMCNC: 34 GM/DL — SIGNIFICANT CHANGE UP (ref 32–36)
MCV RBC AUTO: 85.9 FL — SIGNIFICANT CHANGE UP (ref 80–100)
PLATELET # BLD AUTO: 329 K/UL — SIGNIFICANT CHANGE UP (ref 150–400)
POTASSIUM SERPL-MCNC: 3.8 MMOL/L — SIGNIFICANT CHANGE UP (ref 3.5–5.3)
POTASSIUM SERPL-SCNC: 3.8 MMOL/L — SIGNIFICANT CHANGE UP (ref 3.5–5.3)
RBC # BLD: 5.27 M/UL — SIGNIFICANT CHANGE UP (ref 4.2–5.8)
RBC # FLD: 12.9 % — SIGNIFICANT CHANGE UP (ref 10.3–14.5)
SODIUM SERPL-SCNC: 141 MMOL/L — SIGNIFICANT CHANGE UP (ref 135–145)
SPECIMEN SOURCE: SIGNIFICANT CHANGE UP
SPECIMEN SOURCE: SIGNIFICANT CHANGE UP
WBC # BLD: 7.2 K/UL — SIGNIFICANT CHANGE UP (ref 3.8–10.5)
WBC # FLD AUTO: 7.2 K/UL — SIGNIFICANT CHANGE UP (ref 3.8–10.5)

## 2019-01-22 RX ORDER — LANOLIN ALCOHOL/MO/W.PET/CERES
3 CREAM (GRAM) TOPICAL ONCE
Qty: 0 | Refills: 0 | Status: COMPLETED | OUTPATIENT
Start: 2019-01-22 | End: 2019-01-24

## 2019-01-22 RX ADMIN — Medication 1 PATCH: at 12:46

## 2019-01-22 RX ADMIN — OXYCODONE HYDROCHLORIDE 30 MILLIGRAM(S): 5 TABLET ORAL at 12:45

## 2019-01-22 RX ADMIN — Medication 1 PATCH: at 08:00

## 2019-01-22 RX ADMIN — OXYCODONE HYDROCHLORIDE 30 MILLIGRAM(S): 5 TABLET ORAL at 17:50

## 2019-01-22 RX ADMIN — GABAPENTIN 600 MILLIGRAM(S): 400 CAPSULE ORAL at 14:55

## 2019-01-22 RX ADMIN — Medication 250 MILLIGRAM(S): at 02:18

## 2019-01-22 RX ADMIN — OXYCODONE HYDROCHLORIDE 30 MILLIGRAM(S): 5 TABLET ORAL at 20:46

## 2019-01-22 RX ADMIN — Medication 1 PATCH: at 18:43

## 2019-01-22 RX ADMIN — OXYCODONE HYDROCHLORIDE 30 MILLIGRAM(S): 5 TABLET ORAL at 04:02

## 2019-01-22 RX ADMIN — Medication 0.1 MILLIGRAM(S): at 22:47

## 2019-01-22 RX ADMIN — Medication 2 MILLIGRAM(S): at 14:55

## 2019-01-22 RX ADMIN — CEFEPIME 100 MILLIGRAM(S): 1 INJECTION, POWDER, FOR SOLUTION INTRAMUSCULAR; INTRAVENOUS at 01:44

## 2019-01-22 RX ADMIN — Medication 2 MILLIGRAM(S): at 05:45

## 2019-01-22 RX ADMIN — OXYCODONE HYDROCHLORIDE 30 MILLIGRAM(S): 5 TABLET ORAL at 07:45

## 2019-01-22 RX ADMIN — Medication 0.1 MILLIGRAM(S): at 18:36

## 2019-01-22 RX ADMIN — OXYCODONE HYDROCHLORIDE 30 MILLIGRAM(S): 5 TABLET ORAL at 03:02

## 2019-01-22 RX ADMIN — Medication 250 MILLIGRAM(S): at 14:56

## 2019-01-22 RX ADMIN — OXYCODONE HYDROCHLORIDE 30 MILLIGRAM(S): 5 TABLET ORAL at 13:45

## 2019-01-22 RX ADMIN — ENOXAPARIN SODIUM 40 MILLIGRAM(S): 100 INJECTION SUBCUTANEOUS at 14:56

## 2019-01-22 RX ADMIN — OXYCODONE HYDROCHLORIDE 30 MILLIGRAM(S): 5 TABLET ORAL at 08:45

## 2019-01-22 RX ADMIN — GABAPENTIN 600 MILLIGRAM(S): 400 CAPSULE ORAL at 05:46

## 2019-01-22 RX ADMIN — CEFEPIME 100 MILLIGRAM(S): 1 INJECTION, POWDER, FOR SOLUTION INTRAMUSCULAR; INTRAVENOUS at 09:42

## 2019-01-22 RX ADMIN — Medication 1 PATCH: at 14:29

## 2019-01-22 RX ADMIN — OXYCODONE HYDROCHLORIDE 30 MILLIGRAM(S): 5 TABLET ORAL at 16:53

## 2019-01-22 RX ADMIN — DEXTROAMPHETAMINE SACCHARATE, AMPHETAMINE ASPARTATE, DEXTROAMPHETAMINE SULFATE AND AMPHETAMINE SULFATE 30 MILLIGRAM(S): 1.875; 1.875; 1.875; 1.875 TABLET ORAL at 17:39

## 2019-01-22 RX ADMIN — Medication 0.1 MILLIGRAM(S): at 09:39

## 2019-01-22 RX ADMIN — CEFEPIME 100 MILLIGRAM(S): 1 INJECTION, POWDER, FOR SOLUTION INTRAMUSCULAR; INTRAVENOUS at 17:39

## 2019-01-22 RX ADMIN — GABAPENTIN 600 MILLIGRAM(S): 400 CAPSULE ORAL at 22:47

## 2019-01-22 RX ADMIN — OXYCODONE HYDROCHLORIDE 30 MILLIGRAM(S): 5 TABLET ORAL at 21:46

## 2019-01-22 RX ADMIN — Medication 2 MILLIGRAM(S): at 22:46

## 2019-01-22 RX ADMIN — DEXTROAMPHETAMINE SACCHARATE, AMPHETAMINE ASPARTATE, DEXTROAMPHETAMINE SULFATE AND AMPHETAMINE SULFATE 30 MILLIGRAM(S): 1.875; 1.875; 1.875; 1.875 TABLET ORAL at 05:46

## 2019-01-22 NOTE — PROGRESS NOTE ADULT - ASSESSMENT
Problem/Plan - 1:  ·  Problem: Osteomyelitis of lumbar spine.  Plan: fu cultures  ID fu  management as per ID     Problem/Plan - 2:  ·  Problem: Chronic back pain.  Plan: 1. Continue home regimen of oxycodone; will increase frequency to 30 mg PO q4h PRN severe pain.  2. There is no plan to administer IV narcotics for chronic pain management - this was discussed at length with patient.  3. Ambulation as tolerated.  4. Continue neurontin.     Problem/Plan - 3:  ·  Problem: Heroin abuse.  Plan: Starting to manifest signs of withdrawal.  1. Start clonidine 0.1 mg PO q4h PRN for persistent symptoms.  2. Anticipate loose BMs as well - loperamide PRN ordered.     Problem/Plan - 4:  ·  Problem: Anxiety.  Plan: 1. Resume home dose of xanax, 2 mg PO q8h PRN - dose confirmed via ISTOP report [id number]  2. Continue adderall, dose confirmed as well.

## 2019-01-23 LAB
ANION GAP SERPL CALC-SCNC: 13 MMOL/L — SIGNIFICANT CHANGE UP (ref 5–17)
BUN SERPL-MCNC: 13 MG/DL — SIGNIFICANT CHANGE UP (ref 7–23)
CALCIUM SERPL-MCNC: 10.3 MG/DL — SIGNIFICANT CHANGE UP (ref 8.4–10.5)
CHLORIDE SERPL-SCNC: 100 MMOL/L — SIGNIFICANT CHANGE UP (ref 96–108)
CO2 SERPL-SCNC: 23 MMOL/L — SIGNIFICANT CHANGE UP (ref 22–31)
CREAT SERPL-MCNC: 0.84 MG/DL — SIGNIFICANT CHANGE UP (ref 0.5–1.3)
GLUCOSE SERPL-MCNC: 110 MG/DL — HIGH (ref 70–99)
HCT VFR BLD CALC: 43.6 % — SIGNIFICANT CHANGE UP (ref 39–50)
HGB BLD-MCNC: 15 G/DL — SIGNIFICANT CHANGE UP (ref 13–17)
MCHC RBC-ENTMCNC: 29.8 PG — SIGNIFICANT CHANGE UP (ref 27–34)
MCHC RBC-ENTMCNC: 34.5 GM/DL — SIGNIFICANT CHANGE UP (ref 32–36)
MCV RBC AUTO: 86.4 FL — SIGNIFICANT CHANGE UP (ref 80–100)
PLATELET # BLD AUTO: 287 K/UL — SIGNIFICANT CHANGE UP (ref 150–400)
POTASSIUM SERPL-MCNC: 4.1 MMOL/L — SIGNIFICANT CHANGE UP (ref 3.5–5.3)
POTASSIUM SERPL-SCNC: 4.1 MMOL/L — SIGNIFICANT CHANGE UP (ref 3.5–5.3)
RBC # BLD: 5.05 M/UL — SIGNIFICANT CHANGE UP (ref 4.2–5.8)
RBC # FLD: 13.4 % — SIGNIFICANT CHANGE UP (ref 10.3–14.5)
SODIUM SERPL-SCNC: 136 MMOL/L — SIGNIFICANT CHANGE UP (ref 135–145)
VANCOMYCIN TROUGH SERPL-MCNC: 6.7 UG/ML — LOW (ref 10–20)
WBC # BLD: 7 K/UL — SIGNIFICANT CHANGE UP (ref 3.8–10.5)
WBC # FLD AUTO: 7 K/UL — SIGNIFICANT CHANGE UP (ref 3.8–10.5)

## 2019-01-23 PROCEDURE — 99232 SBSQ HOSP IP/OBS MODERATE 35: CPT

## 2019-01-23 RX ORDER — VANCOMYCIN HCL 1 G
1000 VIAL (EA) INTRAVENOUS EVERY 8 HOURS
Qty: 0 | Refills: 0 | Status: DISCONTINUED | OUTPATIENT
Start: 2019-01-23 | End: 2019-01-25

## 2019-01-23 RX ORDER — OXYCODONE HYDROCHLORIDE 5 MG/1
40 TABLET ORAL EVERY 4 HOURS
Qty: 0 | Refills: 0 | Status: DISCONTINUED | OUTPATIENT
Start: 2019-01-23 | End: 2019-01-25

## 2019-01-23 RX ORDER — CEFEPIME 1 G/1
2000 INJECTION, POWDER, FOR SOLUTION INTRAMUSCULAR; INTRAVENOUS EVERY 12 HOURS
Qty: 0 | Refills: 0 | Status: DISCONTINUED | OUTPATIENT
Start: 2019-01-23 | End: 2019-01-25

## 2019-01-23 RX ADMIN — OXYCODONE HYDROCHLORIDE 40 MILLIGRAM(S): 5 TABLET ORAL at 18:40

## 2019-01-23 RX ADMIN — Medication 1 PATCH: at 11:28

## 2019-01-23 RX ADMIN — Medication 0.1 MILLIGRAM(S): at 11:28

## 2019-01-23 RX ADMIN — Medication 250 MILLIGRAM(S): at 03:10

## 2019-01-23 RX ADMIN — OXYCODONE HYDROCHLORIDE 30 MILLIGRAM(S): 5 TABLET ORAL at 10:30

## 2019-01-23 RX ADMIN — GABAPENTIN 600 MILLIGRAM(S): 400 CAPSULE ORAL at 05:32

## 2019-01-23 RX ADMIN — Medication 250 MILLIGRAM(S): at 21:43

## 2019-01-23 RX ADMIN — GABAPENTIN 600 MILLIGRAM(S): 400 CAPSULE ORAL at 13:30

## 2019-01-23 RX ADMIN — OXYCODONE HYDROCHLORIDE 30 MILLIGRAM(S): 5 TABLET ORAL at 09:34

## 2019-01-23 RX ADMIN — Medication 1 PATCH: at 08:01

## 2019-01-23 RX ADMIN — Medication 1 PATCH: at 18:41

## 2019-01-23 RX ADMIN — OXYCODONE HYDROCHLORIDE 30 MILLIGRAM(S): 5 TABLET ORAL at 01:13

## 2019-01-23 RX ADMIN — Medication 2 MILLIGRAM(S): at 08:00

## 2019-01-23 RX ADMIN — Medication 2 MILLIGRAM(S): at 16:01

## 2019-01-23 RX ADMIN — OXYCODONE HYDROCHLORIDE 30 MILLIGRAM(S): 5 TABLET ORAL at 06:33

## 2019-01-23 RX ADMIN — OXYCODONE HYDROCHLORIDE 30 MILLIGRAM(S): 5 TABLET ORAL at 02:13

## 2019-01-23 RX ADMIN — Medication 250 MILLIGRAM(S): at 14:44

## 2019-01-23 RX ADMIN — OXYCODONE HYDROCHLORIDE 40 MILLIGRAM(S): 5 TABLET ORAL at 22:15

## 2019-01-23 RX ADMIN — Medication 1 PATCH: at 11:50

## 2019-01-23 RX ADMIN — ENOXAPARIN SODIUM 40 MILLIGRAM(S): 100 INJECTION SUBCUTANEOUS at 13:30

## 2019-01-23 RX ADMIN — CEFEPIME 100 MILLIGRAM(S): 1 INJECTION, POWDER, FOR SOLUTION INTRAMUSCULAR; INTRAVENOUS at 17:40

## 2019-01-23 RX ADMIN — OXYCODONE HYDROCHLORIDE 40 MILLIGRAM(S): 5 TABLET ORAL at 17:40

## 2019-01-23 RX ADMIN — GABAPENTIN 600 MILLIGRAM(S): 400 CAPSULE ORAL at 20:47

## 2019-01-23 RX ADMIN — Medication 0.1 MILLIGRAM(S): at 20:47

## 2019-01-23 RX ADMIN — OXYCODONE HYDROCHLORIDE 30 MILLIGRAM(S): 5 TABLET ORAL at 13:31

## 2019-01-23 RX ADMIN — OXYCODONE HYDROCHLORIDE 30 MILLIGRAM(S): 5 TABLET ORAL at 14:30

## 2019-01-23 RX ADMIN — DEXTROAMPHETAMINE SACCHARATE, AMPHETAMINE ASPARTATE, DEXTROAMPHETAMINE SULFATE AND AMPHETAMINE SULFATE 30 MILLIGRAM(S): 1.875; 1.875; 1.875; 1.875 TABLET ORAL at 17:39

## 2019-01-23 RX ADMIN — OXYCODONE HYDROCHLORIDE 40 MILLIGRAM(S): 5 TABLET ORAL at 21:44

## 2019-01-23 RX ADMIN — OXYCODONE HYDROCHLORIDE 30 MILLIGRAM(S): 5 TABLET ORAL at 05:33

## 2019-01-23 RX ADMIN — DEXTROAMPHETAMINE SACCHARATE, AMPHETAMINE ASPARTATE, DEXTROAMPHETAMINE SULFATE AND AMPHETAMINE SULFATE 30 MILLIGRAM(S): 1.875; 1.875; 1.875; 1.875 TABLET ORAL at 05:32

## 2019-01-23 RX ADMIN — CEFEPIME 100 MILLIGRAM(S): 1 INJECTION, POWDER, FOR SOLUTION INTRAMUSCULAR; INTRAVENOUS at 01:07

## 2019-01-23 RX ADMIN — CEFEPIME 100 MILLIGRAM(S): 1 INJECTION, POWDER, FOR SOLUTION INTRAMUSCULAR; INTRAVENOUS at 09:35

## 2019-01-23 NOTE — PROGRESS NOTE ADULT - ASSESSMENT
38 year old man with a PMHx of T5/T6 spinal osteomyelitis (diagnosed initially in 2014 s/p washout, with recurrence in December 2018 s/p needle aspiration with no growth), daily heroin use, general anxiety disorder, who presents to the ED for management of his osteomyelitis and persistent back pain. Mr. Sheets was hospitalized here from Jan 16 to Jan 18 for the same, but signed out AMA because he felt that his pain was not being adequately controlled.   Denies fever change in symptoms other than pain  No leukocytosis  Follow up ESR CRP have completely normalized     MRI: stable right L4-L5  and facet joint and facet enhancement which is similar to study from 12/12/201    multiple treatment interuptions of antibiotic therapy which was to be completed 1/25 - but it is unclear if patient has any residual infection with normalized ESR/CRP     His empiric antbiotics as in-patient at [facility]:  Zosyn/Vanco 12/11 -->12/12/18  Cefepime/Vanco 12/14 -->12/17/18; 1/16 -->1/18; 1/21 -->  He has had multiple hospitalizations at [facility] as well and estimates he may have had 1-2 weeks of antibiotics    Suggest  check ProCalcitonin level  c/w vancomycin 1 g iv q8h, cefepime 2 g iv q12h until 1/25    discussed difficulty of determining duration of antibiotics with patient and with NP

## 2019-01-23 NOTE — PROVIDER CONTACT NOTE (OTHER) - ASSESSMENT
pt seen to be acting different, unable recognize nurse for a second, very sleepy, eyes appear red, dilated, bags checked megan, friend saw pt this even, possibly gave pt drugs, pt VS [facility] on check

## 2019-01-23 NOTE — PROVIDER CONTACT NOTE (OTHER) - SITUATION
pt seen to be acting different, unable recognize nurse for a second, very sleepy, eyes appear red, dilated

## 2019-01-24 ENCOUNTER — TRANSCRIPTION ENCOUNTER (OUTPATIENT)
Age: 39
End: 2019-01-24

## 2019-01-24 LAB
PROCALCITONIN SERPL-MCNC: 0.06 NG/ML — SIGNIFICANT CHANGE UP (ref 0.02–0.1)
VANCOMYCIN TROUGH SERPL-MCNC: 8.6 UG/ML — LOW (ref 10–20)

## 2019-01-24 PROCEDURE — 99232 SBSQ HOSP IP/OBS MODERATE 35: CPT

## 2019-01-24 RX ORDER — POLYETHYLENE GLYCOL 3350 17 G/17G
17 POWDER, FOR SOLUTION ORAL DAILY
Qty: 0 | Refills: 0 | Status: DISCONTINUED | OUTPATIENT
Start: 2019-01-24 | End: 2019-01-25

## 2019-01-24 RX ORDER — DOCUSATE SODIUM 100 MG
100 CAPSULE ORAL THREE TIMES A DAY
Qty: 0 | Refills: 0 | Status: DISCONTINUED | OUTPATIENT
Start: 2019-01-24 | End: 2019-01-25

## 2019-01-24 RX ORDER — SENNA PLUS 8.6 MG/1
2 TABLET ORAL AT BEDTIME
Qty: 0 | Refills: 0 | Status: DISCONTINUED | OUTPATIENT
Start: 2019-01-24 | End: 2019-01-25

## 2019-01-24 RX ORDER — LANOLIN ALCOHOL/MO/W.PET/CERES
3 CREAM (GRAM) TOPICAL ONCE
Qty: 0 | Refills: 0 | Status: COMPLETED | OUTPATIENT
Start: 2019-01-24 | End: 2019-01-24

## 2019-01-24 RX ADMIN — Medication 2 MILLIGRAM(S): at 00:02

## 2019-01-24 RX ADMIN — OXYCODONE HYDROCHLORIDE 40 MILLIGRAM(S): 5 TABLET ORAL at 02:54

## 2019-01-24 RX ADMIN — Medication 1 PATCH: at 11:52

## 2019-01-24 RX ADMIN — CEFEPIME 100 MILLIGRAM(S): 1 INJECTION, POWDER, FOR SOLUTION INTRAMUSCULAR; INTRAVENOUS at 05:18

## 2019-01-24 RX ADMIN — Medication 2 MILLIGRAM(S): at 18:38

## 2019-01-24 RX ADMIN — Medication 100 MILLIGRAM(S): at 14:12

## 2019-01-24 RX ADMIN — ENOXAPARIN SODIUM 40 MILLIGRAM(S): 100 INJECTION SUBCUTANEOUS at 14:12

## 2019-01-24 RX ADMIN — OXYCODONE HYDROCHLORIDE 40 MILLIGRAM(S): 5 TABLET ORAL at 22:14

## 2019-01-24 RX ADMIN — Medication 1 PATCH: at 12:38

## 2019-01-24 RX ADMIN — Medication 1 PATCH: at 06:10

## 2019-01-24 RX ADMIN — OXYCODONE HYDROCHLORIDE 40 MILLIGRAM(S): 5 TABLET ORAL at 02:24

## 2019-01-24 RX ADMIN — Medication 250 MILLIGRAM(S): at 14:12

## 2019-01-24 RX ADMIN — Medication 3 MILLIGRAM(S): at 22:52

## 2019-01-24 RX ADMIN — OXYCODONE HYDROCHLORIDE 40 MILLIGRAM(S): 5 TABLET ORAL at 15:29

## 2019-01-24 RX ADMIN — POLYETHYLENE GLYCOL 3350 17 GRAM(S): 17 POWDER, FOR SOLUTION ORAL at 12:37

## 2019-01-24 RX ADMIN — OXYCODONE HYDROCHLORIDE 40 MILLIGRAM(S): 5 TABLET ORAL at 09:47

## 2019-01-24 RX ADMIN — OXYCODONE HYDROCHLORIDE 40 MILLIGRAM(S): 5 TABLET ORAL at 21:14

## 2019-01-24 RX ADMIN — Medication 1 PATCH: at 18:32

## 2019-01-24 RX ADMIN — GABAPENTIN 600 MILLIGRAM(S): 400 CAPSULE ORAL at 05:17

## 2019-01-24 RX ADMIN — GABAPENTIN 600 MILLIGRAM(S): 400 CAPSULE ORAL at 21:16

## 2019-01-24 RX ADMIN — DEXTROAMPHETAMINE SACCHARATE, AMPHETAMINE ASPARTATE, DEXTROAMPHETAMINE SULFATE AND AMPHETAMINE SULFATE 30 MILLIGRAM(S): 1.875; 1.875; 1.875; 1.875 TABLET ORAL at 18:00

## 2019-01-24 RX ADMIN — Medication 100 MILLIGRAM(S): at 21:13

## 2019-01-24 RX ADMIN — Medication 250 MILLIGRAM(S): at 21:17

## 2019-01-24 RX ADMIN — DEXTROAMPHETAMINE SACCHARATE, AMPHETAMINE ASPARTATE, DEXTROAMPHETAMINE SULFATE AND AMPHETAMINE SULFATE 30 MILLIGRAM(S): 1.875; 1.875; 1.875; 1.875 TABLET ORAL at 05:18

## 2019-01-24 RX ADMIN — Medication 250 MILLIGRAM(S): at 06:07

## 2019-01-24 RX ADMIN — OXYCODONE HYDROCHLORIDE 40 MILLIGRAM(S): 5 TABLET ORAL at 16:27

## 2019-01-24 RX ADMIN — Medication 2 MILLIGRAM(S): at 12:37

## 2019-01-24 RX ADMIN — CEFEPIME 100 MILLIGRAM(S): 1 INJECTION, POWDER, FOR SOLUTION INTRAMUSCULAR; INTRAVENOUS at 18:00

## 2019-01-24 RX ADMIN — OXYCODONE HYDROCHLORIDE 40 MILLIGRAM(S): 5 TABLET ORAL at 10:45

## 2019-01-24 RX ADMIN — SENNA PLUS 2 TABLET(S): 8.6 TABLET ORAL at 21:13

## 2019-01-24 RX ADMIN — Medication 3 MILLIGRAM(S): at 00:50

## 2019-01-24 RX ADMIN — GABAPENTIN 600 MILLIGRAM(S): 400 CAPSULE ORAL at 15:00

## 2019-01-24 NOTE — DISCHARGE NOTE ADULT - PATIENT PORTAL LINK FT
You can access the [facility] Patient Portal, offered by [facility], by registering with the following website: [email or web address]

## 2019-01-24 NOTE — DISCHARGE NOTE ADULT - CARE PLAN
Principal Discharge DX:	Osteomyelitis of lumbar spine  Secondary Diagnosis:	Chronic back pain  Secondary Diagnosis:	Heroin abuse  Secondary Diagnosis:	Anxiety Principal Discharge DX:	Osteomyelitis of lumbar spine  Assessment and plan of treatment:	History of T5/T6 spinal osteomyelitis in 2014 s/p washout, with recurrence in December 2018 s/p needle aspiration showed no growth.  MRI 1/16/2019-There are also mild inflammatory changes in adjacent facet joints on right side suggestive of inflammatory - not infectious- arthritis  The negative cultures and negative biomarkers do not indicate any active infectious process  You were treated with IV antibiotics ( Vancomycin and Cefepime )  Please follow up with your primary care physician  Secondary Diagnosis:	Chronic back pain  Assessment and plan of treatment:	Continue home dose pain medications as directed  Secondary Diagnosis:	Heroin abuse  Assessment and plan of treatment:	Substance abuse service information given by   Secondary Diagnosis:	Anxiety  Assessment and plan of treatment:	Continue Xanax as needed Principal Discharge DX:	Osteomyelitis of lumbar spine  Goal:	Resolution  Assessment and plan of treatment:	History of T5/T6 spinal osteomyelitis in 2014 s/p washout, with recurrence in December 2018 s/p needle aspiration showed no growth.  MRI 1/16/2019-There are also mild inflammatory changes in adjacent facet joints on right side suggestive of inflammatory - not infectious- arthritis  The negative cultures and negative biomarkers do not indicate any active infectious process  You were treated with IV antibiotics ( Vancomycin and Cefepime ) X 4 more days  Please follow up with your primary care physician  Secondary Diagnosis:	Chronic back pain  Assessment and plan of treatment:	Continue home dose pain medications as directed  Secondary Diagnosis:	Heroin abuse  Assessment and plan of treatment:	Substance abuse service information given by   Secondary Diagnosis:	Anxiety  Assessment and plan of treatment:	Continue Xanax as needed

## 2019-01-24 NOTE — DISCHARGE NOTE ADULT - PLAN OF CARE
History of T5/T6 spinal osteomyelitis in 2014 s/p washout, with recurrence in December 2018 s/p needle aspiration showed no growth.  MRI 1/16/2019-There are also mild inflammatory changes in adjacent facet joints on right side suggestive of inflammatory - not infectious- arthritis  The negative cultures and negative biomarkers do not indicate any active infectious process  You were treated with IV antibiotics ( Vancomycin and Cefepime )  Please follow up with your primary care physician Continue home dose pain medications as directed Substance abuse service information given by  Continue Xanax as needed Resolution History of T5/T6 spinal osteomyelitis in 2014 s/p washout, with recurrence in December 2018 s/p needle aspiration showed no growth.  MRI 1/16/2019-There are also mild inflammatory changes in adjacent facet joints on right side suggestive of inflammatory - not infectious- arthritis  The negative cultures and negative biomarkers do not indicate any active infectious process  You were treated with IV antibiotics ( Vancomycin and Cefepime ) X 4 more days  Please follow up with your primary care physician

## 2019-01-24 NOTE — DISCHARGE NOTE ADULT - MEDICATION SUMMARY - MEDICATIONS TO STOP TAKING
Pt  Selam Black RCV'D LORAZEPAM FROM Dr Zuly Diaz  Pt  IS ASKING IF SHE CAN HAVE THAT AGAIN  SHE SAID SHE WOULD NEED FOUR TABLETS SO SHE CAN TAKE ONE TABLET ONE HOUR PRIOR TO LEAVING THEN ANOTHER BEFORE GETTING ON THE PLANE AND THEN THE SAME FOR HER TRIP HOME  SHE IS LEAVING ON April 28TH        Pt  CALLED AND LEFT MESSAGE ON NURSE LINE STATING YOU GAVE HER A SCRIPT FOR ATARAX TO BE TAKEN BEFORE FLYING TO [place]  SHE SAID SHE TOOK ONE NOW TO SEE HOW IT WOULD WORK AND SHE SAID IT DID ABSOLUTELY NOTHING FOR HER AND SHE IS DEMANDING SOMETHING STRONGER  SHE SAID SHE NEEDS TO GET ON THAT PLANE SO SHE HAS TO HAVE SOMETHING STRONGER   PLEASE ADVISE I will STOP taking the medications listed below when I get home from the hospital:  None

## 2019-01-24 NOTE — PROGRESS NOTE ADULT - ASSESSMENT
38 year old man with a PMHx of T5/T6 spinal osteomyelitis (diagnosed initially in 2014 s/p washout, with recurrence in December 2018 s/p needle aspiration with no growth), daily heroin use, general anxiety disorder,     MRI reviewed with Neuroradiology  MRI unchanged from 12/12/18  There are also mild inflammatory changes in adjacent facet joints on right side suggestive of inflammatory - not infectious- arthritis  The negative cultures and negative biomarkers do not indicate any active infectious process    results discussed with patient, primary attending and NP    suggest  Discontinue antibiotics in am and discharge

## 2019-01-24 NOTE — DISCHARGE NOTE ADULT - ADDITIONAL INSTRUCTIONS
Follow up with your primary care physician in one week  Make appointments to follow up with your out patient physicians.  Bring all discharge paperwork including discharge medication list to your follow up appointments.

## 2019-01-24 NOTE — DISCHARGE NOTE ADULT - MEDICATION SUMMARY - MEDICATIONS TO TAKE
I will START or STAY ON the medications listed below when I get home from the hospital:    oxyCODONE 30 mg oral tablet  -- 1 tab(s) by mouth every 6 hours, As Needed  -- Indication: For Chronic back pain    Neurontin 600 mg oral tablet  -- 1 tab(s) by mouth 3 times a day  -- Indication: For Chronic back pain    ALPRAZolam 2 mg oral tablet  -- 1 tab(s) by mouth 3 times a day  -- Indication: For Anxiety     dextroamphetamine-amphetamine 30 mg oral tablet  -- 1 tab(s) by mouth 2 times a day  -- Indication: For ADHD

## 2019-01-24 NOTE — DISCHARGE NOTE ADULT - CARE PROVIDER_API CALL
Erickson Orr), Internal Medicine; Nephrology  [address] [place] [address]  Phone: [phone]  Fax: [phone]

## 2019-01-24 NOTE — DISCHARGE NOTE ADULT - HOSPITAL COURSE
Mr. Sheets is a 38 year old man with a PMHx of T5/T6 spinal osteomyelitis (diagnosed initially in 2014 s/p washout, with recurrence in December 2018 s/p needle aspiration with no growth), daily heroin use, general anxiety disorder, admitted for management of his osteomyelitis and persistent back pain.  Previous culture data reviewed - no growth from blood or spinal aspirate in December 2018, no focal neurologic findings to suggest spinal cord or radicular involvement, IV Vancomycin and Cefepime started and evaluated by ID. CRP and procalcitonin WNL, ESR 16. Hx of substance use and frequently signing out AMA, not finishing full course of antibiotics. Last day of antibiotic ( Cefepime and Vancomycin ) 1/25  Pt started to manifest signs of withdrawal such as diarrhea, runny nose, started Clonidine 0.1mg po q 4 hours as needed and Loperamide as needed abuse.     ·  Problem: Anxiety.  Plan: 1. Resume home dose of xanax, 2 mg PO q8h PRN - dose confirmed via ISTOP report [id number]  2. Continue adderall, dose confirmed as well.        Problem/Plan - 1:  ·  Problem: Osteomyelitis of lumbar spine.  Plan: fu cultures  ID fu  management as per ID      Problem/Plan - 2:  ·  Problem: Chronic back pain.  Plan: 1. Continue home regimen of oxycodone; will increase frequency to 30 mg PO q4h PRN severe pain.  2. There is no plan to administer IV narcotics for chronic pain management - this was discussed at length with patient.  3. Ambulation as tolerated.  4. Continue neurontin.      Problem/Plan - 3:  ·  Problem: Heroin abuse.  Plan: Starting to manifest signs of withdrawal.  1. Start clonidine 0.1 mg PO q4h PRN for persistent symptoms.  2. Anticipate loose BMs as well - loperamide PRN ordered.      Problem/Plan - 4:  ·  Problem: Anxiety.  Plan: 1. Resume home dose of xanax, 2 mg PO q8h PRN  2. Continue adderall, dose confirmed as well. Mr. Sheets is a 38 year old man with a PMHx of T5/T6 spinal osteomyelitis (diagnosed initially in 2014 s/p washout, with recurrence in December 2018 s/p needle aspiration with no growth), daily heroin use, general anxiety disorder, admitted for management of his osteomyelitis and persistent back pain.  Previous culture data reviewed - no growth from blood or spinal aspirate in December 2018, no focal neurologic findings to suggest spinal cord or radicular involvement, IV Vancomycin and Cefepime started and evaluated by ID. CRP and procalcitonin WNL, ESR 16. Hx of substance use and frequently signing out AMA, not finishing full course of antibiotics. Last day of antibiotic ( Cefepime and Vancomycin ) 1/25  Pt started to manifest signs of withdrawal such as diarrhea, runny nose, started Clonidine 0.1mg po q 4 hours as needed and Loperamide as needed. MRI 1/16/2019-There are also mild inflammatory changes in adjacent facet joints on right side suggestive of inflammatory - not infectious- arthritis and The negative cultures and negative biomarkers do not indicate any active infectious process. Last dose of antibiotic 1/25 am  [facility] eval for IV drug abuse, monitored on one to one, substance abuse service information provided by [facility] Mr. Sheets is a 38 year old man with a PMHx of T5/T6 spinal osteomyelitis (diagnosed initially in 2014 s/p washout, with recurrence in December 2018 s/p needle aspiration with no growth), daily heroin use, general anxiety disorder, admitted for management of his osteomyelitis and persistent back pain.  Previous culture data reviewed - no growth from blood or spinal aspirate in December 2018, no focal neurologic findings to suggest spinal cord or radicular involvement, IV Vancomycin and Cefepime started and evaluated by ID. CRP and procalcitonin WNL, ESR 16. Hx of substance use and frequently signing out AMA, not finishing full course of antibiotics. Last day of antibiotic ( Cefepime and Vancomycin ) 1/25  Pt started to manifest signs of withdrawal such as diarrhea, runny nose, started Clonidine 0.1mg po q 4 hours as needed and Loperamide as needed. MRI 1/16/2019-There are also mild inflammatory changes in adjacent facet joints on right side suggestive of inflammatory - not infectious- arthritis and The negative cultures and negative biomarkers do not indicate any active infectious process. Last dose of antibiotic 1/25 am  [facility] eval for IV drug abuse, monitored on one to one, substance abuse service information provided by [facility]  Cleared for discharge by ID and attending   Med rec and DCP discussed with Dr. Abel

## 2019-01-25 VITALS
DIASTOLIC BLOOD PRESSURE: 64 MMHG | OXYGEN SATURATION: 100 % | SYSTOLIC BLOOD PRESSURE: 121 MMHG | RESPIRATION RATE: 18 BRPM | TEMPERATURE: 98 F | HEART RATE: 73 BPM

## 2019-01-25 LAB
ANION GAP SERPL CALC-SCNC: 11 MMOL/L — SIGNIFICANT CHANGE UP (ref 5–17)
BUN SERPL-MCNC: 10 MG/DL — SIGNIFICANT CHANGE UP (ref 7–23)
CALCIUM SERPL-MCNC: 9.6 MG/DL — SIGNIFICANT CHANGE UP (ref 8.4–10.5)
CHLORIDE SERPL-SCNC: 101 MMOL/L — SIGNIFICANT CHANGE UP (ref 96–108)
CO2 SERPL-SCNC: 25 MMOL/L — SIGNIFICANT CHANGE UP (ref 22–31)
CREAT SERPL-MCNC: 0.71 MG/DL — SIGNIFICANT CHANGE UP (ref 0.5–1.3)
GLUCOSE SERPL-MCNC: 94 MG/DL — SIGNIFICANT CHANGE UP (ref 70–99)
POTASSIUM SERPL-MCNC: 3.8 MMOL/L — SIGNIFICANT CHANGE UP (ref 3.5–5.3)
POTASSIUM SERPL-SCNC: 3.8 MMOL/L — SIGNIFICANT CHANGE UP (ref 3.5–5.3)
SODIUM SERPL-SCNC: 137 MMOL/L — SIGNIFICANT CHANGE UP (ref 135–145)

## 2019-01-25 RX ADMIN — OXYCODONE HYDROCHLORIDE 40 MILLIGRAM(S): 5 TABLET ORAL at 02:13

## 2019-01-25 RX ADMIN — Medication 250 MILLIGRAM(S): at 06:30

## 2019-01-25 RX ADMIN — GABAPENTIN 600 MILLIGRAM(S): 400 CAPSULE ORAL at 05:24

## 2019-01-25 RX ADMIN — OXYCODONE HYDROCHLORIDE 40 MILLIGRAM(S): 5 TABLET ORAL at 03:13

## 2019-01-25 RX ADMIN — DEXTROAMPHETAMINE SACCHARATE, AMPHETAMINE ASPARTATE, DEXTROAMPHETAMINE SULFATE AND AMPHETAMINE SULFATE 30 MILLIGRAM(S): 1.875; 1.875; 1.875; 1.875 TABLET ORAL at 05:24

## 2019-01-25 RX ADMIN — CEFEPIME 100 MILLIGRAM(S): 1 INJECTION, POWDER, FOR SOLUTION INTRAMUSCULAR; INTRAVENOUS at 05:25

## 2019-01-25 RX ADMIN — Medication 2 MILLIGRAM(S): at 10:28

## 2019-01-25 RX ADMIN — OXYCODONE HYDROCHLORIDE 40 MILLIGRAM(S): 5 TABLET ORAL at 09:44

## 2019-01-25 RX ADMIN — Medication 1 PATCH: at 06:32

## 2019-01-25 RX ADMIN — OXYCODONE HYDROCHLORIDE 40 MILLIGRAM(S): 5 TABLET ORAL at 06:36

## 2019-01-25 RX ADMIN — Medication 2 MILLIGRAM(S): at 05:25

## 2019-01-26 LAB
CULTURE RESULTS: SIGNIFICANT CHANGE UP
CULTURE RESULTS: SIGNIFICANT CHANGE UP
SPECIMEN SOURCE: SIGNIFICANT CHANGE UP
SPECIMEN SOURCE: SIGNIFICANT CHANGE UP

## 2019-02-02 LAB
CULTURE RESULTS: SIGNIFICANT CHANGE UP
SPECIMEN SOURCE: SIGNIFICANT CHANGE UP

## 2019-07-10 PROCEDURE — 80048 BASIC METABOLIC PNL TOTAL CA: CPT

## 2019-07-10 PROCEDURE — 84145 PROCALCITONIN (PCT): CPT

## 2019-07-10 PROCEDURE — 87086 URINE CULTURE/COLONY COUNT: CPT

## 2019-07-10 PROCEDURE — 99285 EMERGENCY DEPT VISIT HI MDM: CPT

## 2019-07-10 PROCEDURE — 87389 HIV-1 AG W/HIV-1&-2 AB AG IA: CPT

## 2019-07-10 PROCEDURE — 81003 URINALYSIS AUTO W/O SCOPE: CPT

## 2019-07-10 PROCEDURE — 85027 COMPLETE CBC AUTOMATED: CPT

## 2019-07-10 PROCEDURE — 86803 HEPATITIS C AB TEST: CPT

## 2019-07-10 PROCEDURE — 86140 C-REACTIVE PROTEIN: CPT

## 2019-07-10 PROCEDURE — 85730 THROMBOPLASTIN TIME PARTIAL: CPT

## 2019-07-10 PROCEDURE — 85610 PROTHROMBIN TIME: CPT

## 2019-07-10 PROCEDURE — 80053 COMPREHEN METABOLIC PANEL: CPT

## 2019-07-10 PROCEDURE — 85652 RBC SED RATE AUTOMATED: CPT

## 2019-07-10 PROCEDURE — 80202 ASSAY OF VANCOMYCIN: CPT

## 2019-07-10 PROCEDURE — 87040 BLOOD CULTURE FOR BACTERIA: CPT

## 2019-07-10 PROCEDURE — 80307 DRUG TEST PRSMV CHEM ANLYZR: CPT

## 2019-07-10 PROCEDURE — G0378: CPT

## 2019-07-23 NOTE — H&P ADULT - NSHPSOCIALHISTORY_GEN_ALL_CORE
Clinical Summary

                             Created on: 2019



Sarah Rosen

External Reference #: [id number]

: 1973

Sex: Female



Demographics







                          Address                   [address] [place]  [address]

 

                          Home Phone                [phone]

 

                          Preferred Language        Unknown

 

                          Marital Status            

 

                          Hindu Affiliation     NON

 

                          Race                      Unknown

 

                          Ethnic Group              Unknown





Author







                          Author                    [facility]

 

                          Organization              [facility]

 

                          Address                   Unknown

 

                          Phone                     Unavailable







Support







                Name            Relationship    Address         Phone

 

                    Corazon Rodas      [employer]                [address] [place]  [address]                      [phone]







Care Team Providers







                    Care Team Member Name    Role                Phone

 

                    Fco Monge MD    PCP                 [phone]







 

                                        



Fco Monge MD                      



                     2019          Hospital            Radiology  



                                         Encounter   

 

                                        



Fco Monge MD                     Abnormal mammogram



                     2019          Hospital            Radiology  



                                         Encounter   

 

                                                     



                           2019                Travel   

 

                                        



Lucille Bertrand                       Abnormal Diagnostic F/u (confirmed additional views)



                     2019          Telephone           Radiology  

 

                                        



Meek Menendez MD                    Lump or mass in breast (Primary Dx)



                     2019          Orders Only         Family Practice  

 

                                        



Brown Rodriguez III, MD                Preventative health care



                     2019          Ancillary           Radiology  



                                         Procedure   

 

                                        



Rose Ballard                        MDD (major depressive disorder), recurrent episode, moderate (Primary

 Dx); 

History of posttraumatic stress disorder (PTSD); 

History of alcohol abuse; 

Current drinker of alcohol



                     2019          Office Visit        Psychology  

 

                                                     



                           2019                Travel   

 

                                        



Meek Menendez MD                    Migraine without status migrainosus, not intractable, unspecified

 migraine type



                     2019          Refill              Family Practice  

 

                                        



Rissa Meléndez MD                



                     10/25/2018          Ancillary           Radiology  



                                         Procedure   

 

                                        



Meek Menendez 

 

                                        



Brown Rodriguez III, MD



[address] [place] [address]



[id number]



[place] [address] [phone] (Fax)                      std concern 



                     2019          Office Visit        Family Practice  

 

                                        



Lucrecia Nolan, OD



[address] [place] [address]                       eye exam



                     04/15/2019          Office Visit        Ophthalmology  

 

                                        



Deep Acosta, ResidentMD



Department of Psychiatry - 



[address] [place] [address] [phone]                             



                     2019          Office Visit        Psychiatry  

 

                                                    6mos follow up right breast ultrasound per Dr. Crenshaw - TV



                     2019          Appointment         Radiology  









   



                 Health Maintenance     Due Date        Last Done       Comments

 

   



                           Cervical Cancer Scrn (3     1996  



                                         Yrs)   

 

   



                     Breast Cancer Scrn     2020 



                                         Sofía Bernabe M.D., eal,ml/:2019 15:23:31 



                                         Imaging Technologist: Edenilson Freeman, [facility] 



                                         Imaging Center 



                                         letter sent: Followup Recommended Mammogram BI-RADS: 0 



                                         Indeterminate Ultrasound BI-RADS: 3 Probably benign  10062 



                                         R92.8 R92.8 









 



                           Narrative                 Performed At

 

 



                           #[id number] - 2019 [facility]. 



                                         #[id number] - MAMMO BREAST ULTRASOUND UNILATERAL, LTD 



                                         ULTRASOUND OF RIGHT BREAST: 2019 



                                         Comparison is made to exam dated:2019 [facility]. 



#[id number] - 2019 [facility].  

#[id number] - MAMMO BREAST ULTRASOUND UNILATERAL, LTD

ULTRASOUND OF RIGHT BREAST: 2019

Comparison is made to exam dated:  2019 [facility].  

Sofía patterson,ml/:2019 15:23:31  



Imaging Technologist: [facility]

letter sent: Followup Recommended   Mammogram BI-RADS: 0 

Indeterminate Ultrasound BI-RADS: 3 Probably benign    33117 

                                         Sofía patterson,ml/:2019 15:23:31 



                                         Imaging Technologist: Edenilson Freeman [facility] 



                                         letter sent: Followup Recommended Mammogram BI-RADS: 0 



                                         Indeterminate Ultrasound BI-RADS: 3 Probably benign  26292 



                                         R92.8 R92.8 









 



                           Narrative                 Performed At

 

 



                           #[id number] - 2019 [facility]. 



                                         #[id number] - MAMMO BREAST ULTRASOUND UNILATERAL, LTD 



                                         ULTRASOUND OF RIGHT BREAST: 2019 



                                         Comparison is made to exam dated:2019 [facility]. 



#[id number] - 2019 [facility].  

#[id number] - MAMMO BREAST ULTRASOUND UNILATERAL, LTD

ULTRASOUND OF RIGHT BREAST: 2019

Comparison is made to exam dated:  2019 [facility].  

Sofía patterson,ml/:2019 15:23:31  



Imaging Technologist: Edenilson Freeman, [facility] 

Imaging Center

letter sent: Followup Recommended   Mammogram BI-RADS: 0 

Indeterminate Ultrasound BI-RADS: 3 Probably benign    [address] 

                                         Juan Jose Gillis M.D. 



                                         ,rl/:2019 09:56:08 



                                         Imaging Technologist: Ms. Valorie Vincent RT(R)(M), [facility] 



                                         letter sent: Additional Imaging Needed 



                                         Mammogram BI-RADS: 0 Indeterminate  z12.31 









 



                           Narrative                 Performed At

 

 



                           #[id number] - 





#[id number] - 



Juan Jose Gillis M.D.

,rl/:2019 09:56:08  



Imaging Technologist: Ms. Valorie Vincent RT(R)(M), [facility]

letter sent: Additional Imaging Needed  

Mammogram BI-RADS: 0 Indeterminate    z12.31









   



                 Performing Organization     Address         City/[place]     [place]     [place]     [place]     [place]     Phone Number

 

   



                                         [facility]   

 

   



                                          [place]     [place]     Phone Number

 

   



                                         [facility]   

 

   



                                          [facility]



                                         Ludlow Hospital      xxxxxx     10/1/2018-     [phone]     [address] 



                 INDIGENT        FAMILY          2019       [id number] 



                           PLANNING                  [place] 



                           INDIG[place]                  [id number] 

 

                                         



            [employer] PL[employer]     [employer] PLAN     xxxxxx     10/1/2018-     [phone]     [address] [address]                   10/3/2019           [place] [address] 









     



            Guarantor Name     Account     Relation to     Date of     Phone      Billing Address



                     Type                Patient             Birth  

 

     



            SilasSarah Crane     Personal/F     Self       1973     [phone]     [address]               ([place])              [place] [address] Still smokes but refuses to quit.  No ethanol.  --spouse not present and could not be reached.  Heroin use as above.

## 2020-03-05 NOTE — ED ADULT NURSE NOTE - CHIEF COMPLAINT
The patient is a 38y Male complaining of abdominal pain. Minocycline Counseling: Patient advised regarding possible photosensitivity and discoloration of the teeth, skin, lips, tongue and gums.  Patient instructed to avoid sunlight, if possible.  When exposed to sunlight, patients should wear protective clothing, sunglasses, and sunscreen.  The patient was instructed to call the office immediately if the following severe adverse effects occur:  hearing changes, easy bruising/bleeding, severe headache, or vision changes.  The patient verbalized understanding of the proper use and possible adverse effects of minocycline.  All of the patient's questions and concerns were addressed.

## 2020-05-11 NOTE — H&P ADULT - PSYCHIATRIC DETAILS
Care After Your Endoscopy  Dr. Heriberto Epstein  [phone]      Activity  • For the next 24 hours: Do not stay alone, drive a car, use electrical/power tools or appliances, drink alcohol, or sign any legal papers.  • Do not do any strenuous activity for 24 hours (for example: bicycling, jogging, and exercising).     Diet  · You may resume a regular diet.    Special Instructions  • You had one 3 mm benign-appearing polyp/biopsies taken.  • Some procedures, such as biopsies or removal of polyps, may cause minimal bleeding for 7-14 days.   • If bleeding becomes excessive, if you have black tarry stools, or you are worried call Dr. Epstein.  • If polyps/biopsies were removed, do not take any aspirin, arthritis medication, ibuprofen, (Nuprin, Advil, Motrin, Aleve) for 10 (ten) days due to possible bleeding.  • You may take Tylenol (acetaminophen).   • Recommend the use of an over-the-counter probiotic for 30 days after a colonoscopy to restore colon hafsa.  (Ex: Florajen)    Call the doctor if you have:  • Fever over 101 degrees (oral).  • Persistent nausea/vomiting (over 12 hours).  • Abnormal pain (sharp, severe abdominal pain).  • Increased pain (bloating, pressure).      If you had polyps or biopsies taken, Dr. Epstein’s office will contact you once the results have been reviewed by Dr Epstein.         
anxious

## 2020-10-22 NOTE — BEHAVIORAL HEALTH ASSESSMENT NOTE - PAST PSYCHOTROPIC MEDICATION
Paxil, Zoloft- states they were ineffective for anxiety Hemigard Postcare Instructions: The HEMIGARD strips are to remain completely dry for at least 5-7 days.

## 2021-03-03 ENCOUNTER — EMERGENCY (EMERGENCY)
Facility: HOSPITAL | Age: 41
LOS: 1 days | Discharge: LEFT BEFORE DISCHARGE | End: 2021-03-03
Attending: EMERGENCY MEDICINE | Admitting: EMERGENCY MEDICINE
Payer: MEDICAID

## 2021-03-03 ENCOUNTER — INPATIENT (INPATIENT)
Facility: HOSPITAL | Age: 41
LOS: 3 days | Discharge: HOME | End: 2021-03-07
Attending: STUDENT IN AN ORGANIZED HEALTH CARE EDUCATION/TRAINING PROGRAM | Admitting: STUDENT IN AN ORGANIZED HEALTH CARE EDUCATION/TRAINING PROGRAM
Payer: MEDICAID

## 2021-03-03 VITALS
RESPIRATION RATE: 19 BRPM | OXYGEN SATURATION: 100 % | DIASTOLIC BLOOD PRESSURE: 59 MMHG | HEART RATE: 76 BPM | TEMPERATURE: 98 F | SYSTOLIC BLOOD PRESSURE: 115 MMHG

## 2021-03-03 VITALS
SYSTOLIC BLOOD PRESSURE: 114 MMHG | DIASTOLIC BLOOD PRESSURE: 66 MMHG | RESPIRATION RATE: 18 BRPM | HEIGHT: 68 IN | TEMPERATURE: 99 F | OXYGEN SATURATION: 97 % | HEART RATE: 94 BPM

## 2021-03-03 VITALS
HEIGHT: 68 IN | DIASTOLIC BLOOD PRESSURE: 73 MMHG | RESPIRATION RATE: 16 BRPM | OXYGEN SATURATION: 100 % | HEART RATE: 57 BPM | SYSTOLIC BLOOD PRESSURE: 124 MMHG | TEMPERATURE: 98 F

## 2021-03-03 DIAGNOSIS — Z29.9 ENCOUNTER FOR PROPHYLACTIC MEASURES, UNSPECIFIED: ICD-10-CM

## 2021-03-03 DIAGNOSIS — F41.9 ANXIETY DISORDER, UNSPECIFIED: ICD-10-CM

## 2021-03-03 DIAGNOSIS — R50.9 FEVER, UNSPECIFIED: ICD-10-CM

## 2021-03-03 DIAGNOSIS — R74.01 ELEVATION OF LEVELS OF LIVER TRANSAMINASE LEVELS: ICD-10-CM

## 2021-03-03 DIAGNOSIS — F11.20 OPIOID DEPENDENCE, UNCOMPLICATED: ICD-10-CM

## 2021-03-03 DIAGNOSIS — Z98.890 OTHER SPECIFIED POSTPROCEDURAL STATES: Chronic | ICD-10-CM

## 2021-03-03 DIAGNOSIS — F13.10 SEDATIVE, HYPNOTIC OR ANXIOLYTIC ABUSE, UNCOMPLICATED: ICD-10-CM

## 2021-03-03 DIAGNOSIS — R56.9 UNSPECIFIED CONVULSIONS: ICD-10-CM

## 2021-03-03 DIAGNOSIS — M54.9 DORSALGIA, UNSPECIFIED: ICD-10-CM

## 2021-03-03 LAB
ALBUMIN SERPL ELPH-MCNC: 4.1 G/DL — SIGNIFICANT CHANGE UP (ref 3.3–5)
ALBUMIN SERPL ELPH-MCNC: 4.3 G/DL — SIGNIFICANT CHANGE UP (ref 3.3–5)
ALP SERPL-CCNC: 158 U/L — HIGH (ref 40–120)
ALP SERPL-CCNC: 160 U/L — HIGH (ref 40–120)
ALT FLD-CCNC: 45 U/L — HIGH (ref 4–41)
ALT FLD-CCNC: 48 U/L — HIGH (ref 4–41)
AMMONIA BLD-MCNC: 46 UMOL/L — SIGNIFICANT CHANGE UP (ref 11–55)
ANION GAP SERPL CALC-SCNC: 10 MMOL/L — SIGNIFICANT CHANGE UP (ref 7–14)
ANION GAP SERPL CALC-SCNC: 11 MMOL/L — SIGNIFICANT CHANGE UP (ref 7–14)
APPEARANCE UR: CLEAR — SIGNIFICANT CHANGE UP
APTT BLD: 31.4 SEC — SIGNIFICANT CHANGE UP (ref 27–36.3)
AST SERPL-CCNC: 45 U/L — HIGH (ref 4–40)
AST SERPL-CCNC: 45 U/L — HIGH (ref 4–40)
B PERT DNA SPEC QL NAA+PROBE: SIGNIFICANT CHANGE UP
BASE EXCESS BLDV CALC-SCNC: 0.7 MMOL/L — SIGNIFICANT CHANGE UP (ref -3–2)
BASOPHILS # BLD AUTO: 0.02 K/UL — SIGNIFICANT CHANGE UP (ref 0–0.2)
BASOPHILS NFR BLD AUTO: 0.3 % — SIGNIFICANT CHANGE UP (ref 0–2)
BILIRUB SERPL-MCNC: 0.3 MG/DL — SIGNIFICANT CHANGE UP (ref 0.2–1.2)
BILIRUB SERPL-MCNC: 0.5 MG/DL — SIGNIFICANT CHANGE UP (ref 0.2–1.2)
BILIRUB UR-MCNC: NEGATIVE — SIGNIFICANT CHANGE UP
BLOOD GAS VENOUS - CREATININE: 0.7 MG/DL — SIGNIFICANT CHANGE UP (ref 0.5–1.3)
BLOOD GAS VENOUS COMPREHENSIVE RESULT: SIGNIFICANT CHANGE UP
BUN SERPL-MCNC: 12 MG/DL — SIGNIFICANT CHANGE UP (ref 7–23)
BUN SERPL-MCNC: 8 MG/DL — SIGNIFICANT CHANGE UP (ref 7–23)
C PNEUM DNA SPEC QL NAA+PROBE: SIGNIFICANT CHANGE UP
CALCIUM SERPL-MCNC: 9.4 MG/DL — SIGNIFICANT CHANGE UP (ref 8.4–10.5)
CALCIUM SERPL-MCNC: 9.4 MG/DL — SIGNIFICANT CHANGE UP (ref 8.4–10.5)
CHLORIDE BLDV-SCNC: 107 MMOL/L — SIGNIFICANT CHANGE UP (ref 96–108)
CHLORIDE SERPL-SCNC: 101 MMOL/L — SIGNIFICANT CHANGE UP (ref 98–107)
CHLORIDE SERPL-SCNC: 99 MMOL/L — SIGNIFICANT CHANGE UP (ref 98–107)
CK SERPL-CCNC: 369 U/L — HIGH (ref 30–200)
CO2 SERPL-SCNC: 24 MMOL/L — SIGNIFICANT CHANGE UP (ref 22–31)
CO2 SERPL-SCNC: 24 MMOL/L — SIGNIFICANT CHANGE UP (ref 22–31)
COLOR SPEC: YELLOW — SIGNIFICANT CHANGE UP
CREAT SERPL-MCNC: 0.62 MG/DL — SIGNIFICANT CHANGE UP (ref 0.5–1.3)
CREAT SERPL-MCNC: 0.65 MG/DL — SIGNIFICANT CHANGE UP (ref 0.5–1.3)
CRP SERPL-MCNC: 4.8 MG/L — SIGNIFICANT CHANGE UP
DIFF PNL FLD: NEGATIVE — SIGNIFICANT CHANGE UP
EOSINOPHIL # BLD AUTO: 0.12 K/UL — SIGNIFICANT CHANGE UP (ref 0–0.5)
EOSINOPHIL NFR BLD AUTO: 1.6 % — SIGNIFICANT CHANGE UP (ref 0–6)
ERYTHROCYTE [SEDIMENTATION RATE] IN BLOOD: 33 MM/HR — HIGH (ref 1–15)
FLUAV SUBTYP SPEC NAA+PROBE: SIGNIFICANT CHANGE UP
FLUBV RNA SPEC QL NAA+PROBE: SIGNIFICANT CHANGE UP
GAS PNL BLDV: 137 MMOL/L — SIGNIFICANT CHANGE UP (ref 136–146)
GLUCOSE BLDV-MCNC: 74 MG/DL — SIGNIFICANT CHANGE UP (ref 70–99)
GLUCOSE SERPL-MCNC: 124 MG/DL — HIGH (ref 70–99)
GLUCOSE SERPL-MCNC: 73 MG/DL — SIGNIFICANT CHANGE UP (ref 70–99)
GLUCOSE UR QL: NEGATIVE — SIGNIFICANT CHANGE UP
HADV DNA SPEC QL NAA+PROBE: SIGNIFICANT CHANGE UP
HCO3 BLDV-SCNC: 24 MMOL/L — SIGNIFICANT CHANGE UP (ref 20–27)
HCOV 229E RNA SPEC QL NAA+PROBE: SIGNIFICANT CHANGE UP
HCOV HKU1 RNA SPEC QL NAA+PROBE: SIGNIFICANT CHANGE UP
HCOV NL63 RNA SPEC QL NAA+PROBE: SIGNIFICANT CHANGE UP
HCOV OC43 RNA SPEC QL NAA+PROBE: SIGNIFICANT CHANGE UP
HCT VFR BLD CALC: 42.9 % — SIGNIFICANT CHANGE UP (ref 39–50)
HCT VFR BLDA CALC: 40.9 % — SIGNIFICANT CHANGE UP (ref 39–51)
HGB BLD CALC-MCNC: 13.3 G/DL — SIGNIFICANT CHANGE UP (ref 13–17)
HGB BLD-MCNC: 14.3 G/DL — SIGNIFICANT CHANGE UP (ref 13–17)
HIV 1+2 AB+HIV1 P24 AG SERPL QL IA: SIGNIFICANT CHANGE UP
HMPV RNA SPEC QL NAA+PROBE: SIGNIFICANT CHANGE UP
HPIV1 RNA SPEC QL NAA+PROBE: SIGNIFICANT CHANGE UP
HPIV2 RNA SPEC QL NAA+PROBE: SIGNIFICANT CHANGE UP
HPIV3 RNA SPEC QL NAA+PROBE: SIGNIFICANT CHANGE UP
HPIV4 RNA SPEC QL NAA+PROBE: SIGNIFICANT CHANGE UP
IANC: 5.92 K/UL — SIGNIFICANT CHANGE UP (ref 1.5–8.5)
IMM GRANULOCYTES NFR BLD AUTO: 0.5 % — SIGNIFICANT CHANGE UP (ref 0–1.5)
INR BLD: 1.03 RATIO — SIGNIFICANT CHANGE UP (ref 0.88–1.16)
KETONES UR-MCNC: NEGATIVE — SIGNIFICANT CHANGE UP
LACTATE BLDV-MCNC: 1.3 MMOL/L — SIGNIFICANT CHANGE UP (ref 0.5–2)
LACTATE BLDV-MCNC: 2.3 MMOL/L — HIGH (ref 0.5–2)
LEUKOCYTE ESTERASE UR-ACNC: NEGATIVE — SIGNIFICANT CHANGE UP
LYMPHOCYTES # BLD AUTO: 1.14 K/UL — SIGNIFICANT CHANGE UP (ref 1–3.3)
LYMPHOCYTES # BLD AUTO: 14.7 % — SIGNIFICANT CHANGE UP (ref 13–44)
MAGNESIUM SERPL-MCNC: 2.3 MG/DL — SIGNIFICANT CHANGE UP (ref 1.6–2.6)
MCHC RBC-ENTMCNC: 29.8 PG — SIGNIFICANT CHANGE UP (ref 27–34)
MCHC RBC-ENTMCNC: 33.3 GM/DL — SIGNIFICANT CHANGE UP (ref 32–36)
MCV RBC AUTO: 89.4 FL — SIGNIFICANT CHANGE UP (ref 80–100)
MONOCYTES # BLD AUTO: 0.49 K/UL — SIGNIFICANT CHANGE UP (ref 0–0.9)
MONOCYTES NFR BLD AUTO: 6.3 % — SIGNIFICANT CHANGE UP (ref 2–14)
NEUTROPHILS # BLD AUTO: 5.92 K/UL — SIGNIFICANT CHANGE UP (ref 1.8–7.4)
NEUTROPHILS NFR BLD AUTO: 76.6 % — SIGNIFICANT CHANGE UP (ref 43–77)
NITRITE UR-MCNC: NEGATIVE — SIGNIFICANT CHANGE UP
NRBC # BLD: 0 /100 WBCS — SIGNIFICANT CHANGE UP
NRBC # FLD: 0 K/UL — SIGNIFICANT CHANGE UP
PCO2 BLDV: 49 MMHG — SIGNIFICANT CHANGE UP (ref 41–51)
PCP SPEC-MCNC: SIGNIFICANT CHANGE UP
PH BLDV: 7.34 — SIGNIFICANT CHANGE UP (ref 7.32–7.43)
PH UR: 6 — SIGNIFICANT CHANGE UP (ref 5–8)
PHOSPHATE SERPL-MCNC: 3.3 MG/DL — SIGNIFICANT CHANGE UP (ref 2.5–4.5)
PLATELET # BLD AUTO: 275 K/UL — SIGNIFICANT CHANGE UP (ref 150–400)
PO2 BLDV: 39 MMHG — SIGNIFICANT CHANGE UP (ref 35–40)
POTASSIUM BLDV-SCNC: 3.9 MMOL/L — SIGNIFICANT CHANGE UP (ref 3.4–4.5)
POTASSIUM SERPL-MCNC: 3.7 MMOL/L — SIGNIFICANT CHANGE UP (ref 3.5–5.3)
POTASSIUM SERPL-MCNC: 4.2 MMOL/L — SIGNIFICANT CHANGE UP (ref 3.5–5.3)
POTASSIUM SERPL-SCNC: 3.7 MMOL/L — SIGNIFICANT CHANGE UP (ref 3.5–5.3)
POTASSIUM SERPL-SCNC: 4.2 MMOL/L — SIGNIFICANT CHANGE UP (ref 3.5–5.3)
PROCALCITONIN SERPL-MCNC: 0.06 NG/ML — SIGNIFICANT CHANGE UP (ref 0.02–0.1)
PROT SERPL-MCNC: 7.9 G/DL — SIGNIFICANT CHANGE UP (ref 6–8.3)
PROT SERPL-MCNC: 8 G/DL — SIGNIFICANT CHANGE UP (ref 6–8.3)
PROT UR-MCNC: ABNORMAL
PROTHROM AB SERPL-ACNC: 11.8 SEC — SIGNIFICANT CHANGE UP (ref 10.6–13.6)
RAPID RVP RESULT: SIGNIFICANT CHANGE UP
RBC # BLD: 4.8 M/UL — SIGNIFICANT CHANGE UP (ref 4.2–5.8)
RBC # FLD: 13.3 % — SIGNIFICANT CHANGE UP (ref 10.3–14.5)
RSV RNA SPEC QL NAA+PROBE: SIGNIFICANT CHANGE UP
RV+EV RNA SPEC QL NAA+PROBE: SIGNIFICANT CHANGE UP
SAO2 % BLDV: 69.5 % — SIGNIFICANT CHANGE UP (ref 60–85)
SARS-COV-2 RNA SPEC QL NAA+PROBE: SIGNIFICANT CHANGE UP
SODIUM SERPL-SCNC: 133 MMOL/L — LOW (ref 135–145)
SODIUM SERPL-SCNC: 136 MMOL/L — SIGNIFICANT CHANGE UP (ref 135–145)
SP GR SPEC: 1.03 — HIGH (ref 1.01–1.02)
UROBILINOGEN FLD QL: ABNORMAL
WBC # BLD: 7.73 K/UL — SIGNIFICANT CHANGE UP (ref 3.8–10.5)
WBC # FLD AUTO: 7.73 K/UL — SIGNIFICANT CHANGE UP (ref 3.8–10.5)

## 2021-03-03 PROCEDURE — 72129 CT CHEST SPINE W/DYE: CPT | Mod: 26

## 2021-03-03 PROCEDURE — 99223 1ST HOSP IP/OBS HIGH 75: CPT | Mod: GC

## 2021-03-03 PROCEDURE — 71046 X-RAY EXAM CHEST 2 VIEWS: CPT | Mod: 26

## 2021-03-03 PROCEDURE — 70450 CT HEAD/BRAIN W/O DYE: CPT | Mod: 26

## 2021-03-03 PROCEDURE — 93010 ELECTROCARDIOGRAM REPORT: CPT

## 2021-03-03 PROCEDURE — 99285 EMERGENCY DEPT VISIT HI MDM: CPT

## 2021-03-03 PROCEDURE — 99285 EMERGENCY DEPT VISIT HI MDM: CPT | Mod: 25

## 2021-03-03 RX ORDER — GABAPENTIN 400 MG/1
600 CAPSULE ORAL THREE TIMES A DAY
Refills: 0 | Status: DISCONTINUED | OUTPATIENT
Start: 2021-03-03 | End: 2021-03-07

## 2021-03-03 RX ORDER — GABAPENTIN 400 MG/1
300 CAPSULE ORAL ONCE
Refills: 0 | Status: COMPLETED | OUTPATIENT
Start: 2021-03-03 | End: 2021-03-03

## 2021-03-03 RX ORDER — NICOTINE POLACRILEX 2 MG
1 GUM BUCCAL DAILY
Refills: 0 | Status: DISCONTINUED | OUTPATIENT
Start: 2021-03-03 | End: 2021-03-07

## 2021-03-03 RX ORDER — PIPERACILLIN AND TAZOBACTAM 4; .5 G/20ML; G/20ML
3.38 INJECTION, POWDER, LYOPHILIZED, FOR SOLUTION INTRAVENOUS ONCE
Refills: 0 | Status: COMPLETED | OUTPATIENT
Start: 2021-03-03 | End: 2021-03-03

## 2021-03-03 RX ORDER — ALPRAZOLAM 0.25 MG
1 TABLET ORAL
Qty: 0 | Refills: 0 | DISCHARGE

## 2021-03-03 RX ORDER — OXYCODONE HYDROCHLORIDE 5 MG/1
30 TABLET ORAL EVERY 6 HOURS
Refills: 0 | Status: DISCONTINUED | OUTPATIENT
Start: 2021-03-03 | End: 2021-03-07

## 2021-03-03 RX ORDER — OXYCODONE HYDROCHLORIDE 5 MG/1
30 TABLET ORAL ONCE
Refills: 0 | Status: DISCONTINUED | OUTPATIENT
Start: 2021-03-03 | End: 2021-03-03

## 2021-03-03 RX ORDER — SODIUM CHLORIDE 9 MG/ML
2000 INJECTION INTRAMUSCULAR; INTRAVENOUS; SUBCUTANEOUS ONCE
Refills: 0 | Status: COMPLETED | OUTPATIENT
Start: 2021-03-03 | End: 2021-03-03

## 2021-03-03 RX ORDER — DIPHENHYDRAMINE HCL 50 MG
25 CAPSULE ORAL ONCE
Refills: 0 | Status: COMPLETED | OUTPATIENT
Start: 2021-03-03 | End: 2021-03-03

## 2021-03-03 RX ORDER — VANCOMYCIN HCL 1 G
1000 VIAL (EA) INTRAVENOUS ONCE
Refills: 0 | Status: COMPLETED | OUTPATIENT
Start: 2021-03-03 | End: 2021-03-03

## 2021-03-03 RX ORDER — DEXTROAMPHETAMINE SACCHARATE, AMPHETAMINE ASPARTATE, DEXTROAMPHETAMINE SULFATE AND AMPHETAMINE SULFATE 1.875; 1.875; 1.875; 1.875 MG/1; MG/1; MG/1; MG/1
30 TABLET ORAL
Refills: 0 | Status: DISCONTINUED | OUTPATIENT
Start: 2021-03-03 | End: 2021-03-07

## 2021-03-03 RX ORDER — ACETAMINOPHEN 500 MG
650 TABLET ORAL ONCE
Refills: 0 | Status: COMPLETED | OUTPATIENT
Start: 2021-03-03 | End: 2021-03-03

## 2021-03-03 RX ADMIN — GABAPENTIN 300 MILLIGRAM(S): 400 CAPSULE ORAL at 12:15

## 2021-03-03 RX ADMIN — PIPERACILLIN AND TAZOBACTAM 3.38 GRAM(S): 4; .5 INJECTION, POWDER, LYOPHILIZED, FOR SOLUTION INTRAVENOUS at 13:15

## 2021-03-03 RX ADMIN — PIPERACILLIN AND TAZOBACTAM 200 GRAM(S): 4; .5 INJECTION, POWDER, LYOPHILIZED, FOR SOLUTION INTRAVENOUS at 12:15

## 2021-03-03 RX ADMIN — OXYCODONE HYDROCHLORIDE 30 MILLIGRAM(S): 5 TABLET ORAL at 21:14

## 2021-03-03 RX ADMIN — Medication 650 MILLIGRAM(S): at 12:15

## 2021-03-03 RX ADMIN — GABAPENTIN 600 MILLIGRAM(S): 400 CAPSULE ORAL at 21:14

## 2021-03-03 RX ADMIN — Medication 650 MILLIGRAM(S): at 10:04

## 2021-03-03 RX ADMIN — SODIUM CHLORIDE 2000 MILLILITER(S): 9 INJECTION INTRAMUSCULAR; INTRAVENOUS; SUBCUTANEOUS at 10:04

## 2021-03-03 RX ADMIN — OXYCODONE HYDROCHLORIDE 30 MILLIGRAM(S): 5 TABLET ORAL at 12:15

## 2021-03-03 RX ADMIN — Medication 25 MILLIGRAM(S): at 13:47

## 2021-03-03 RX ADMIN — Medication 250 MILLIGRAM(S): at 13:20

## 2021-03-03 RX ADMIN — Medication 1000 MILLIGRAM(S): at 14:20

## 2021-03-03 RX ADMIN — SODIUM CHLORIDE 2000 MILLILITER(S): 9 INJECTION INTRAMUSCULAR; INTRAVENOUS; SUBCUTANEOUS at 13:15

## 2021-03-03 NOTE — ED PROVIDER NOTE - ATTENDING CONTRIBUTION TO CARE
40 yo m past medical history ivda with t5/t6 osteomyelitis in 2014 and recurrence in 2018 presented after seizure today and subsequent fever, was to be admitted for further work up but AMA'd and now returns to [facility] care. EMR reviewed. exam as above. plan: order labs requested by neuro, admit.

## 2021-03-03 NOTE — ED ADULT TRIAGE NOTE - CHIEF COMPLAINT QUOTE
Patient brought to ER by EMS from an ambulette. Pt was on his way home from his Methadone program and he had a Tonic Clonic seizure on the van. Pt also had a seizure with EMS.  . Pt has a history of seizure but is not on medication and has not had one in a long time.

## 2021-03-03 NOTE — H&P ADULT - NSICDXPASTMEDICALHX_GEN_ALL_CORE_FT
PAST MEDICAL HISTORY:  IV drug abuse     Methadone maintenance therapy patient     Osteomyelitis of lumbar spine     Seizure

## 2021-03-03 NOTE — ED PROVIDER NOTE - PMH
IV drug abuse    Methadone maintenance therapy patient    Osteomyelitis of lumbar spine    Seizure

## 2021-03-03 NOTE — CONSULT NOTE ADULT - ASSESSMENT
Blas Sheets is a 41 year old RH male with a past medical history of T5/T6 osteomyelitis (s/p washout in 2014, recurrence in 2018, and treatment in 2019), IVDU heroin use (last IV use 8 months prior, last inhalation use 4 days prior), benzodiazepine use disorder (use of Xanax), general anxiety disorder who presents after seizure episodes.    Impression: seizure episodes likely provoked in the setting of recent benzodiazepine withdrawal and exacerbating factor of fevers with a possible infection of unclear source    Recs:  [] routine EEG  [] would monitor off any anti-epileptics  [] please obtain Mg, P, CPK, ammonia, and obtain baseline prolactin level  [] if patient has a convulsion, please document accurately the length of the episode, and specifically what the patient was doing paying attention to eye opening vs closure, gaze deviation, shaking of extremities, tongue bite, urinary incontinence, any derangement of vital signs  [] If patient has a generalized tonic clonic episode for >3 minutes or significant derangement of vital signs may administer Ativan 2mg IV  [] continue to workup and treat any possible infections  [] advised patient to not take any further benzodiazepines  [] may benefit from further drug rehabilitation counseling    Case to be discussed and patient see with neurology attending, Dr. Goldman.

## 2021-03-03 NOTE — H&P ADULT - PROBLEM SELECTOR PLAN 3
Likely 2/2 seizure. ESR 33 with history of osteo. Unlikely recurrence. RVP negative  -Trend fever  -Trend ESR. If rising or not decreasing, then consider MRI for further evaluation for osteo though CT thoracic spine negative  -Hold on abx. F/u cxs Likely 2/2 seizure. ESR 33 with history of osteo. Unlikely recurrence. RVP negative  -Trend fever  -Trend ESR.  -MRI thoracic and lumbar w/ w/o contrast  -If febrile or ESR not decreasing and no osteo on MR, then consider TTE though abstinent from IVDU for 8 months per patient  -Hold on abx. F/u cxs  -Neg rvp Likely 2/2 seizure. ESR 33 with history of osteo. Unlikely recurrence. RVP negative  -Trend fever curve  -Trend ESR  -MRI thoracic and lumbar w/ w/o contrast  -If febrile or ESR not decreasing and no osteo on MR, then consider TTE to r/o endocarditis, though abstinent from IVDU for 8 months per patient  -Hold on abx. F/u cxs.  -Neg rvp Likely 2/2 seizure. ESR 33 with history of OM. Unlikely recurrence. RVP negative.  -Trend fever curve  -Trend ESR  -MRI thoracic and lumbar w/ w/o contrast ordered given persistent back pain, questionable IVDU history (pt denies IVDU for past 8 months)  -If febrile or ESR not decreasing and no OM on MR, then consider TTE to r/o endocarditis, though abstinent from IVDU for 8 months per patient  -Hold on abx for now. F/u cultures.  -Neg rvp

## 2021-03-03 NOTE — H&P ADULT - PROBLEM SELECTOR PLAN 6
Oxycodone 30 mg q6h PRN for moderate to severe pain  Gabapentin 600 mg TID Pt with persistent back pain but without any alarm S/S currently.  - C/w oxycodone 30 mg q6h PRN for moderate to severe pain  - C/w methadone once dose confirmed  - C/w gabapentin 600 mg TID

## 2021-03-03 NOTE — H&P ADULT - PROBLEM SELECTOR PLAN 5
Methadone 115 mg daily  F/u with clinic regarding exact amount Confirm methadone 115 mg daily with [facility] in AM. Unclear if clinic called by ED.  F/u with clinic regarding exact amount - Confirm methadone 115 mg daily with [facility] in AM. Unclear if clinic called by ED.  F/u with clinic regarding exact amount.

## 2021-03-03 NOTE — H&P ADULT - PROBLEM SELECTOR PLAN 1
Likely 2/2 benzodiazepine withdrawal  -Apprec neuro: routine eeg,   -Seizure precautions  -Treatment of anxiety predisposing to benzodiazepine use  -F/u AM baseline prolactin (spec received)  -Holding on AEDs at this time Likely 2/2 benzodiazepine withdrawal  -Apprec neuro: routine eeg  -Seizure precautions  -Treatment of anxiety predisposing to benzodiazepine use  -F/u prolactin (spec received)  -Holding on AEDs at this time  -Continuous pulse ox Likely 2/2 benzodiazepine withdrawal  -D/w neuro, will hold on taper and place on symptom triggered CIWA for now given clinical stability and no clinical signs of withdrawal at this time  -Apprec neuro: routine eeg  -Seizure precautions  -Treatment of anxiety predisposing to benzodiazepine use  -F/u prolactin (spec received)  -Holding on AEDs at this time  -Continuous pulse ox Likely 2/2 benzodiazepine withdrawal  -D/w neuro, will hold on benzo taper and place on symptom-triggered CIWA for now given clinical stability and no clinical signs of withdrawal at this time  -Apprec neuro: routine eeg  -Seizure precautions  -Treatment of anxiety predisposing to benzodiazepine use  -F/u prolactin (spec received)  -Holding on AEDs at this time  -Continuous pulse ox Likely 2/2 benzodiazepine withdrawal.  -D/w neuro, will hold on benzodiazepine taper and place on symptom-triggered CIWA for now given clinical stability and no clinical signs of withdrawal at this time  -Appreciate neuro consult with recs  -F/u spot EEG  -Seizure precautions  -Treatment of anxiety predisposing to benzodiazepine use   -F/u prolactin level  -Holding on AEDs at this time  -Continuous pulse ox for now, can discontinue to pt remains seizure free over next 24 hrs

## 2021-03-03 NOTE — ED ADULT NURSE NOTE - NSIMPLEMENTINTERV_GEN_ALL_ED
Implemented All Universal Safety Interventions:  [place] to call system. Call bell, personal items and telephone within reach. Instruct patient to call for assistance. Room bathroom lighting operational. Non-slip footwear when patient is off stretcher. Physically safe environment: no spills, clutter or unnecessary equipment. Stretcher in lowest position, wheels locked, appropriate side rails in place.

## 2021-03-03 NOTE — CHART NOTE - NSCHARTNOTEFT_GEN_A_CORE
Rx Written	Rx Dispensed	Drug	Quantity	Days Supply	Prescriber Name	Payment Method	Dispenser  02/16/2021	02/23/2021	dextroamp-amphetamin 30 mg tab	60	30	Erickson Orr MD	Medicaid	[facility] #[id number]  02/16/2021	02/16/2021	oxycodone hcl 30 mg tablet	120	30	YuErickson bhandari MD	[facility] #[id number]  01/19/2021	01/27/2021	dextroamp-amphetamin 30 mg tab	60	30	YuErickson bhandari MD	Medicaid	[facility] #[id number]  01/19/2021	01/19/2021	oxycodone hcl 30 mg tablet	120	30	YuErickson bhandari MD	[facility] #[id number]  12/21/2020	12/21/2020	oxycodone hcl 30 mg tablet	120	30	YuErcikson bhandari MD	[facility] #[id number]  12/21/2020	12/21/2020	dextroamp-amphetamin 30 mg tab	60	30	YuErickson bhandari MD	Medicaid	[facility] #[id number]  11/18/2020	11/18/2020	dextroamp-amphetamin 30 mg tab	60	30	YuErickson bhandari MD	Medicaid	[facility] #[id number]  11/18/2020	11/18/2020	oxycodone hcl 30 mg tablet	120	30	YuErickson bhandari MD	[facility] #[id number]  10/13/2020	10/21/2020	dextroamp-amphetamin 30 mg tab	60	30	YuErickson bhandari MD	Medicaid	[facility] #[id number]  10/13/2020	10/13/2020	oxycodone hcl 30 mg tablet	120	30	YuErickson bhandari MD	[facility] #[id number]  09/16/2020	09/16/2020	dextroamp-amphetamin 30 mg tab	60	30	YuErickson bhandari MD	Medicaid	[facility] #[id number]  09/16/2020	09/16/2020	oxycodone hcl 30 mg tablet	120	30	YuErickson bhandari MD	[facility] #[id number]  07/23/2020	07/30/2020	alprazolam 2 mg tablet	90	30	YuErickson bhandari MD	Medicaid	[facility] #[id number]  07/23/2020	07/29/2020	dextroamp-amphetamin 30 mg tab	60	30	YuErickson bhandari MD	Medicaid	[facility] #[id number]  07/23/2020	07/23/2020	oxycodone hcl 30 mg tablet	120	30	YuErickson bhandari MD	[facility] #[id number]  06/24/2020	06/30/2020	alprazolam 2 mg tablet	90	30	YuErickson bhandari MD	Medicaid	[facility] #[id number]  06/24/2020	06/28/2020	dextroamp-amphetamin 30 mg tab	60	30	YuErickson bhandari MD	Medicaid	[facility] #[id number]  06/24/2020	06/25/2020	oxycodone hcl 30 mg tablet	120	30	YuErickson bhandari MD	[facility] #[id number]  05/20/2020	06/01/2020	oxycodone hcl 30 mg tablet	120	30	YuErickson bhandari MD	[facility] #[id number]  05/27/2020	05/31/2020	alprazolam 2 mg tablet	90	30	YuErickson bhandari MD	Medicaid	[facility] #[id number]  05/27/2020	05/28/2020	dextroamp-amphetamin 30 mg tab	60	30	YuErickson bhandari MD	[facility] #[id number]  04/28/2020	05/16/2020	oxycodone hcl 30 mg tablet	50	12	YuErickson bhandari MD	[facility] #[id number]  04/28/2020	05/02/2020	alprazolam 2 mg tablet	90	30	YuErickson bhandair MD	Medicaid	[facility] #[id number]  04/28/2020	05/01/2020	dextroamp-amphetamin 30 mg tab	60	30	YurEickson bhandari MD	Medicaid	[facility] #[id number]  04/09/2020	04/18/2020	oxycodone hcl 30 mg tablet	120	30	YuErickson bhandari MD	[facility] #[id number]  04/01/2020	04/05/2020	oxycodone hcl 30 mg tablet	60	15	YuErickson bhandari MD	Medicaid	[facility] #[id number]  04/01/2020	04/05/2020	alprazolam 2 mg tablet	90	30	YuErickson bhandari MD	Medicaid	[facility] #[id number]  04/01/2020	04/02/2020	dextroamp-amphetamin 30 mg tab	60	30	YuErickson bhandari MD	Medicaid	[facility] #[id number]  03/09/2020	03/09/2020	oxycodone hcl 30 mg tablet	120	30	YuErickson bhandari MD	Medicaid	[facility] #[id number]  03/09/2020	03/09/2020	alprazolam 2 mg tablet	90	30	YuErickson bhandari MD	Medicaid	[facility] #[id number]  02/11/2020	03/06/2020	dextroamp-amphetamin 30 mg tab	60	30	Erickson Orr MD	Medicaid	[facility] #[id number]

## 2021-03-03 NOTE — H&P ADULT - NSHPPHYSICALEXAM_GEN_ALL_CORE
PHYSICAL EXAM:    Vital Signs Last 24 Hrs  T(C): 36.4 (03 Mar 2021 17:02), Max: 38.1 (03 Mar 2021 09:34)  T(F): 97.6 (03 Mar 2021 17:02), Max: 100.5 (03 Mar 2021 09:34)  HR: 70 (03 Mar 2021 19:47) (57 - 94)  BP: 108/75 (03 Mar 2021 19:47) (108/75 - 124/73)  BP(mean): --  RR: 18 (03 Mar 2021 19:47) (16 - 19)  SpO2: 99% (03 Mar 2021 19:47) (97% - 100%)    General: No acute distress. no oversedation  HEENT: NCAT.  PERRL.  EOMI.  No scleral icterus or injection.  Moist MM.  No oropharyngeal exudates.    Neck: Supple.  Full ROM.  No JVD.  No thyromegaly. No lymphadenopathy. No cervical tenderness.  Heart: RRR.  Normal S1 and S2.  No murmurs, rubs, or gallops.   Lungs: CTAB. No wheezes, crackles, or rhonchi.    Abdomen: BS+, soft, NT/ND.  No organomegaly.  Skin: Warm and dry.  No rashes.  Extremities: No edema, clubbing, or cyanosis.  2+ peripheral pulses b/l.  Musculoskeletal: No deformities.  No spinal or paraspinal tenderness.  Neuro: A&Ox3.  CN II-XII intact.  5/5 strength in UE and LE b/l.  Tactile sensation intact in UE and LE b/l.  Cerebellar function intact   Psych: No psychomotor agitation, normal affect, normal mood PHYSICAL EXAM:    Vital Signs Last 24 Hrs  T(C): 36.4 (03 Mar 2021 17:02), Max: 38.1 (03 Mar 2021 09:34)  T(F): 97.6 (03 Mar 2021 17:02), Max: 100.5 (03 Mar 2021 09:34)  HR: 70 (03 Mar 2021 19:47) (57 - 94)  BP: 108/75 (03 Mar 2021 19:47) (108/75 - 124/73)  BP(mean): --  RR: 18 (03 Mar 2021 19:47) (16 - 19)  SpO2: 99% (03 Mar 2021 19:47) (97% - 100%)    General: Awake and alert. NAD.  Head: NCAT.    Eyes: PERRL.  EOMI.  No scleral icterus or injection.    Mouth: No tongue fasciculations.  Moist MM.  No oropharyngeal exudates.    Neck: Supple.  Full ROM.  No JVD.  No thyromegaly.  No lymphadenopathy.  No cervical tenderness.  Heart: RRR.  Normal S1 and S2.  No murmurs, rubs, or gallops.  No LE edema b/l.   Lungs: Nonlabored breathing with good inspiratory effort.  CTAB.  No wheezes, crackles, or rhonchi.    Abdomen: BS+, soft, NT/ND.  No hepatomegaly.  Skin: Warm and dry.  No rashes.  Extremities: No UE tremors b/l with outstretched arms.  No edema, clubbing, or cyanosis.  2+ peripheral pulses b/l.  Musculoskeletal: No joint deformities.  No spinal or paraspinal tenderness.  Neuro: A&Ox3.  CN II-XII intact.  5/5 strength in UE and LE b/l.  Tactile sensation intact in UE and LE b/l.  Cerebellar function intact.   Psych: No psychomotor agitation, normal affect, normal mood

## 2021-03-03 NOTE — H&P ADULT - NSHPSOCIALHISTORY_GEN_ALL_CORE
1 PPD smoking  Social alcohol  Other substance use as in HPI  Lives with wife, daughter, and two step sons 1 PPD smoking  Social alcohol use  Heroin use as in HPI, no IVDU in past 8 months  Lives with wife, daughter, and two step sons

## 2021-03-03 NOTE — H&P ADULT - HISTORY OF PRESENT ILLNESS
41 year old man with a PMHx of T5/T6 spinal osteomyelitis (diagnosed initially in 2014 s/p washout, with recurrence in December 2018 s/p needle aspiration with no growth, readmission in 2019 for IV abx), heroin use (states has been snorted, last 1 week ago, has not used IV drugs in 6 mo), general anxiety disorder, here s/p seizure this morning.    His last seizure was a year ago and the first one distant. He estimates he has had 4 in total and that they have always been in the setting of decreasing his benzodiazepine use. Three months ago he wanted to discontinue his alprazolam 2 mg TID and his PCP Dr. Healy agreed. He tolerated discontinuation but due to uncontrolled anxiety obtained alprazolam and for this reason obtained benzodiazepines without a prescription and has been intermittently taking PO and inhaled alprazolam three times a day for 1-2 weeks at a time with 1-2 weeks of abstinence in between. He has been attending his methadone clinic (115 mg qd) in addition to taking oxycodone 30 mg q6h PRN for back pain. He has not injected heroine for 8 months, citing his 7 month old daughter as a protective factor, and has been inhaling heroine last 4 days ago. His back pain is unchanged.     This present admission, he has been abstinent of alprazolam for 4 days. He was in his usual state of health until he was in his ambulette back to his apartment when he had a seizure. He recalls dropping his coffee and then losing consciousness and hitting his R temple and then waking up in the ambulette. EMS was called and he had another GTC x1 minute en route to [facility]. He denies any fevers, chest pain, or shortness of breath. No agitation, worsening anxiety, tremors. Recently fractured R wrist, used split.    At [facility], he was foudn to be febrile to 100.5. he received vancomycin 1 g, zosyn 3.375, gabapentin, oxycodone 30 mg, and 2 L NS. He left the ED to sign a lease and then returned. 41 year old man with a PMHx of T5/T6 spinal osteomyelitis (diagnosed initially in 2014 s/p washout, with recurrence in December 2018 s/p needle aspiration with no growth, readmission in 2019 for IV abx), heroin use (states has been snorted, last 1 week ago, has not used IV drugs in 8 mo), general anxiety disorder, here s/p seizure this morning.    His last seizure was a year ago and the first one distant. He estimates he has had 4 in total and that they have always been in the setting of decreasing his benzodiazepine use. Three months ago he wanted to discontinue his alprazolam 2 mg TID and his PCP Dr. Healy agreed. He tolerated discontinuation but due to uncontrolled anxiety obtained alprazolam and for this reason obtained benzodiazepines without a prescription and has been intermittently taking PO and inhaled alprazolam three times a day for 1-2 weeks at a time with 1-2 weeks of abstinence in between. He has been attending his methadone clinic (115 mg qd) in addition to taking oxycodone 30 mg q6h PRN for back pain. He has not injected heroine for 8 months, citing his 7 month old daughter as a protective factor, and has been inhaling heroine last 4 days ago. His back pain is unchanged.     This present admission, he has been abstinent of alprazolam for 4 days. He was in his usual state of health until he was in his ambulette back to his apartment when he had a seizure. He recalls dropping his coffee and then losing consciousness and hitting his R temple and then waking up in the ambulette. EMS was called and he had another GTC x1 minute en route to [facility]. He denies any fevers, chest pain, or shortness of breath. No agitation, worsening anxiety, tremors. Recently fractured R wrist, used split.    At [facility], he was foudn to be febrile to 100.5. he received vancomycin 1 g, zosyn 3.375, gabapentin, oxycodone 30 mg, and 2 L NS. He left the ED to sign a lease and then returned.

## 2021-03-03 NOTE — H&P ADULT - PROBLEM SELECTOR PLAN 7
Trend CMP  Likely due to muscle break down iso seizure as CK elevated as well Mild, with AST and ALT in 40s.  - Trend CMP  - Likely due to muscle break down iso seizure as CK mildly elevated as well  - Encourage PO hydration

## 2021-03-03 NOTE — ED PROVIDER NOTE - OBJECTIVE STATEMENT
41 year old man with a PMHx of T5/T6 spinal osteomyelitis (diagnosed initially in 2014 s/p washout, with recurrence in December 2018 s/p needle aspiration with no growth, readmission in 2019 for IV abx), heroin use (states has been snorted, last 1 week ago, has not used IV drugs in 6 mo), general anxiety disorder, here s/p seizure this morning 41 year old man with a PMHx of T5/T6 spinal osteomyelitis (diagnosed initially in 2014 s/p washout, with recurrence in December 2018 s/p needle aspiration with no growth, readmission in 2019 for IV abx), heroin use (states has been snorted, last 1 week ago, has not used IV drugs in 6 mo), general anxiety disorder, here s/p seizure this morning  Attending - Agree with above.  I evaluated patient myself. 40 y/o M BIBA after seizure x 2 episodes.  In ambulette for return home from [facility] when witnessed GTC seizure x approx 1 minute.  EMS called.  Second seizure x 30 seconds witnessed by EMS providers.  ePCR reviewed.  No report of post-ictal period.  Patient reports history of seizure, but none recently, on Depakote in past but currently no antiepileptic medications.  Receives methadone 115mg QD at [facility].  Also takes oxycodone 30mg Q6hrs for chronic back pain s/p T1-T8 fusion and history osteo.  Patient reports feeling well now.  No headache, blurry vision, neck pain.  Chronic back pain.  Hx right wrist fx s/p splint and scheduled to start OT. 41 year old man with a PMHx of T5/T6 spinal osteomyelitis (diagnosed initially in 2014 s/p washout, with recurrence in December 2018 s/p needle aspiration with no growth, readmission in 2019 for IV abx), heroin use (states has been snorted, last 1 week ago, has not used IV drugs in 6 mo), general anxiety disorder, here s/p seizure this morning. States he felt "off" this morning, then had a witnessed seizure by the ambulette drivers, taking him home from the methadone clinic- EMS was called. Had a second seizure with EMS en route that self resolved. No tongue biting. No incontinence. Patient does not recall events. States he has a hx of seizures before, was on Dilantin several years ago, however stopped takiing it because he felt his seizures were due to drug use, rather then a primary seizure disorder. Never saw a neurologist. Denies recent fevers, chills, N/V, anorexia, headaches, dysuria, cough, numbness/tingling in arms/ legs. +chronic back pain.     Attending - Agree with above.  I evaluated patient myself. 40 y/o M BIBA after seizure x 2 episodes.  In ambulette for return home from [facility] when witnessed GTC seizure x approx 1 minute.  EMS called.  Second seizure x 30 seconds witnessed by EMS providers.  ePCR reviewed.  No report of post-ictal period.  Patient reports history of seizure, but none recently, on Depakote in past but currently no antiepileptic medications.  Receives methadone 115mg QD at [facility].  Also takes oxycodone 30mg Q6hrs for chronic back pain s/p T1-T8 fusion and history osteo.  Patient reports feeling well now.  No headache, blurry vision, neck pain.  Chronic back pain.  Hx right wrist fx s/p splint and scheduled to start OT.

## 2021-03-03 NOTE — ED ADULT NURSE REASSESSMENT NOTE - NS ED NURSE REASSESS COMMENT FT1
Pt. experiencing rash and itchiness on bilateral arms after zosyn medication given. Pt. denies respiratory issues, in no apparent distress. Resident Kya made aware, will medicate as ordered

## 2021-03-03 NOTE — H&P ADULT - PROBLEM SELECTOR PLAN 4
-No tremors, sweating, anxiety presently.  -Hold on benzodiazepines  -SW consult -No tremors, sweating, anxiety presently  -Hold any standing benzodiazepines for now  -SW consult

## 2021-03-03 NOTE — ED PROVIDER NOTE - NSFOLLOWUPINSTRUCTIONS_ED_ALL_ED_FT
You have been evaluated in the ER for Sepsis, a life threatening condition that does require IV antibiotics. We believe the source to be your osteomyelitis. We are concerned that your seizures will continue if we do not treat your underlying infection. Follow up with your primary care doctor. Return to the ER as soon as possible for further treatment and admission.

## 2021-03-03 NOTE — H&P ADULT - NSHPLABSRESULTS_GEN_ALL_CORE
LABS:                          14.3   7.73  )-----------( 275      ( 03 Mar 2021 10:06 )             42.9       -    136  |  101  |  8   ----------------------------<  73  4.2   |  24  |  0.65    Ca    9.4      03 Mar 2021 19:22  Phos  3.3     03-  Mg     2.3     -    TPro  8.0  /  Alb  4.3  /  TBili  0.5  /  DBili  x   /  AST  45<H>  /  ALT  48<H>  /  AlkPhos  158<H>  -       LIVER FUNCTIONS - ( 03 Mar 2021 19:22 )  Alb: 4.3 g/dL / Pro: 8.0 g/dL / ALK PHOS: 158 U/L / ALT: 48 U/L / AST: 45 U/L / GGT: x                    Urinalysis Basic - ( 03 Mar 2021 10:05 )    Color: Yellow / Appearance: Clear / S.031 / pH: x  Gluc: x / Ketone: Negative  / Bili: Negative / Urobili: 6 mg/dL   Blood: x / Protein: Trace / Nitrite: Negative   Leuk Esterase: Negative / RBC: 1 /HPF / WBC 2 /HPF   Sq Epi: x / Non Sq Epi: 2 /HPF / Bacteria: Negative        PT/INR - ( 03 Mar 2021 10:06 )   PT: 11.8 sec;   INR: 1.03 ratio         PTT - ( 03 Mar 2021 10:06 )  PTT:31.4 sec    Lactate Trend      CARDIAC MARKERS ( 03 Mar 2021 19:22 )  x     / x     / 369 U/L / x     / x            CAPILLARY BLOOD GLUCOSE        EKG - NSR, normal axis, no QTC prolongation          RADIOLOGY & ADDITIONAL TESTS: Reviewed LABS:                          14.3   7.73  )-----------( 275      ( 03 Mar 2021 10:06 )             42.9       -    136  |  101  |  8   ----------------------------<  73  4.2   |  24  |  0.65    Ca    9.4      03 Mar 2021 19:22  Phos  3.3     -  Mg     2.3     -    TPro  8.0  /  Alb  4.3  /  TBili  0.5  /  DBili  x   /  AST  45<H>  /  ALT  48<H>  /  AlkPhos  158<H>  -       LIVER FUNCTIONS - ( 03 Mar 2021 19:22 )  Alb: 4.3 g/dL / Pro: 8.0 g/dL / ALK PHOS: 158 U/L / ALT: 48 U/L / AST: 45 U/L / GGT: x           UA personally reviewed.  Urinalysis Basic - ( 03 Mar 2021 10:05 )    Color: Yellow / Appearance: Clear / S.031 / pH: x  Gluc: x / Ketone: Negative  / Bili: Negative / Urobili: 6 mg/dL   Blood: x / Protein: Trace / Nitrite: Negative   Leuk Esterase: Negative / RBC: 1 /HPF / WBC 2 /HPF   Sq Epi: x / Non Sq Epi: 2 /HPF / Bacteria: Negative    PT/INR - ( 03 Mar 2021 10:06 )   PT: 11.8 sec;   INR: 1.03 ratio       PTT - ( 03 Mar 2021 10:06 )  PTT:31.4 sec    Lactate Trend      CARDIAC MARKERS ( 03 Mar 2021 19:22 )  x     / x     / 369 U/L / x     / x        EKG - NSR, normal axis, no QTC prolongation    Imaging personally reviewed.  CTH:  Findings:  The lateral ventricles have a normal configuration.    There is no evidence of acute hemorrhage, mass or mass-effect in the posterior fossa or in the supratentorial region.    Evaluation of the osseous structures with the appropriate window appears unremarkable.    The visualized paranasal sinuses mastoid and middle ear regions appear clear.    Impression:  Unremarkable noncontrast head CT.    CT Thoracic Spine:  FINDINGS:  Posterior instrumentation and fusion spanning T1-T7 with associated laminotomies/laminectomies. No transpedicular screws through the T4 level. No hardware complication appreciated. Severe left T4-T5 neuroforaminal stenosis again noted.    Redemonstrated mild vertebral body height loss at T4, T7, T8, and T9. No new bony destructive changes are identified. No evidence for perivertebral edema or collection.    Included lungs are clear. Fatty infiltration of the liver. Simple left renal cyst.    IMPRESSION:    No CT findings to suggest osteomyelitis. Please note MRI with contrast would provide more sensitive evaluation and should be considered if clinical suspicion persists

## 2021-03-03 NOTE — H&P ADULT - ASSESSMENT
41 year old man with a PMHx of T5/T6 spinal osteomyelitis (diagnosed initially in 2014 s/p washout, with recurrence in December 2018 s/p needle aspiration with no growth, readmission in 2019 for IV abx), heroin use (states has been snorted, last 1 week ago, has not used IV drugs in 6 mo), general anxiety disorder, here s/p seizure this morning. 41 year old man with a PMHx of T5/T6 spinal osteomyelitis (diagnosed initially in 2014 s/p washout, with recurrence in December 2018 s/p needle aspiration with no growth, readmission in 2019 for IV abx, T1-T7 spinal fusion), heroin use (states has been snorted, last 1 week ago, has not used IV drugs in 6 mo), general anxiety disorder presenting with seizure most likely secondary to benzodiazepine withdrawal. 41 year old man with a PMHx of T5/T6 spinal osteomyelitis (diagnosed initially in 2014 s/p washout, with recurrence in December 2018 s/p needle aspiration with no growth, readmission in 2019 for IV abx, T1-T7 spinal fusion), heroin use (states has been snorted, last 1 week ago, has not used IV drugs in 8 mo), general anxiety disorder presenting with seizure most likely secondary to benzodiazepine withdrawal.

## 2021-03-03 NOTE — ED PROVIDER NOTE - OBJECTIVE STATEMENT
37 yo M, with a PMHx of T5/T6 spinal osteomyelitis (diagnosed initially in 2014 s/p washout, with recurrence in December 2018 s/p needle aspiration with no growth), IVDU heroin use (last IV use 8 months prior, last inhalation use 4 days prior), benzodiazepine use disorder, general anxiety disorder, was BIBEMS early this morning for seizure, AMA to sign a lease and presents back to ER for admission for seizure. Pt states he dropped his coffee today and while try to clean, his eye rolled backward and woke up while in the ambulance; and had another witnessed tonic clonic seizure by EMS. Pt developed fever while in ED, treated with IV Zosyn & Vanco. Pt admits body aches. Denies any dizziness, visual disturbances, chest pain, sob, abdominal pain, n/v/d, weakness, numbness, dysuria, cough, or any other complaints. 37 yo M, with a PMHx of T5/T6 spinal osteomyelitis (diagnosed initially in 2014 s/p washout, with recurrence in December 2018 s/p needle aspiration with no growth), IVDU heroin use (last IV use 8 months prior, last inhalation use 4 days prior), benzodiazepine use disorder, general anxiety disorder, was BIBEMS early this morning for seizure, AMA to sign a lease and presents back to ER for admission for seizure. Pt states he dropped his coffee today and while try to clean, his eye rolled backward and woke up while in the ambulance; and had another witnessed tonic clonic seizure by EMS. Pt developed fever while in ED, treated with IV Zosyn & Vanco. Pt admits body aches. Admits he took Methadone 115mg prior to the seizure. Denies any dizziness, visual disturbances, chest pain, sob, abdominal pain, n/v/d, weakness, numbness, dysuria, cough, or any other complaints.

## 2021-03-03 NOTE — ED ADULT TRIAGE NOTE - CHIEF COMPLAINT QUOTE
Pt seen to day in ED for seizure x 2 gran mal  today and fever, returning for admission.  Pt left AMA to sign lease for apt.  Denies headache, sob, chills, or seizure activity

## 2021-03-03 NOTE — ED PROVIDER NOTE - PATIENT PORTAL LINK FT
You can access the FollowMyHealth Patient Portal offered by [facility] by registering at the following website: [email or web address]. By joining [facility]’s FollowMyHealth portal, you will also be able to view your health information using other applications (apps) compatible with our system.

## 2021-03-03 NOTE — H&P ADULT - PROBLEM SELECTOR PLAN 2
-Psych consult in AM for control of ZEN without benzodiazepines so as to improve chances of future misuse -Psych consult in AM for control of ZEN without benzodiazepines so as to improve chances of future misuse  -S/w consult -Psych consult to be called in AM for medication management to control anxiety without benzodiazepine use so as to improve chances of future misuse  -S/W consult

## 2021-03-03 NOTE — ED PROVIDER NOTE - CLINICAL SUMMARY MEDICAL DECISION MAKING FREE TEXT BOX
41 year old man with a PMHx of T5/T6 spinal osteomyelitis (diagnosed initially in 2014 s/p washout, with recurrence in December 2018 s/p needle aspiration with no growth, readmission in 2019 for IV abx), heroin use (states has been snorted, last 1 week ago, has not used IV drugs in 6 mo), general anxiety disorder, here s/p seizure this morning. Patient is febrile. Concern for underlying infection. Low suspicion for pna or uti, concern for recurrent osteomyelitis, no suspicion for meningitis as no nucchal rigidity. No confusion to suggest encephalitis. Spoke w/ neuro rad. state CT thoracic can help us understand if osteomyelitis is recurrent, will perform now instead of MRI in order to evaluate more expeditiously. CT head given no prior brain imaging. COVID PCR. Sepsis work up. Reassess.

## 2021-03-03 NOTE — ED PROVIDER NOTE - NS CPE EDP [facility] THORACIC LOC
no midline spine tenderness, mild b/l paraspinal tenderness, no erythema, no edema, no obvious deformity b/l.

## 2021-03-03 NOTE — ED ADULT NURSE NOTE - OBJECTIVE STATEMENT
Pt. is A&Ox3 brought to ER by EMS from methadone clinic. Pt. experienced 2 seizures in ambulette en route to hospital. Pt. states +LOC, denies trauma to head, is not on seizure medications. Pt. states he recently started methadone clinic 3 months ago, last used heroin 4 months ago. Pt. states he smoked a pack of cigarettes daily, history of seizures, and osteomyelitis of spine. Pt. respirations even and unlabored, abdomen nontender and nondistended, skin intact. 22G IV obtained on R wrist, blood obtained, flushing without resistance, dressing dry and intact. Pt. placed on CM, NSR at this time, 100% room air. Safety precautions obtained.             Patient brought to ER by EMS from an ambulette. Pt was on his way home from his Methadone program and he had a Tonic Clonic seizure on the van. Pt also had a seizure with EMS.  . Pt has a history of seizure but is not on medication and has not had one in a long time.

## 2021-03-03 NOTE — H&P ADULT - NSHPOUTPATIENTPROVIDERS_GEN_ALL_CORE
Dr. Healy - [facility]  [place] [facility] [place]

## 2021-03-03 NOTE — ED PROVIDER NOTE - PHYSICAL EXAMINATION
ATTENDING PHYSICAL EXAM  GEN - NAD; well appearing; answers all questions appropriately  HEAD - NC/AT; EYES/NOSE - PERRL, EOMI, mucous membranes moist, no discharge; THROAT: Oral cavity and pharynx normal. No inflammation, swelling, exudate, or lesions  NECK: Neck supple, non-tender without lymphadenopathy, no masses, no JVD  PULMONARY - CTA b/l, symmetric breath sounds, no w/r/r  CARDIAC -s1s2, RRR, no M,R,G  ABDOMEN - +NABS, ND, NT, soft, no guarding, no rebound, no masses   BACK - no CVA tenderness, No vertebral or paravertebral tenderness  EXTREMITIES - symmetric pulses, 2+ dp, capillary refill < 2 seconds, no clubbing, no cyanosis, no edema  SKIN - no rash or bruising   NEUROLOGIC - alert, CN 2-12 intact, reflexes nl, sensation nl, coordination nl, finger to nose nl, romberg negative, motor 5/5 LUE/RLE/LLE/EHL/Plantar flexion, right  4/5 due to right wrist trauma, no pronator drift, gait nl

## 2021-03-03 NOTE — CONSULT NOTE ADULT - SUBJECTIVE AND OBJECTIVE BOX
HPI:  Blas Sheets is a 41 year old RH male with a past medical history of T5/T6 osteomyelitis (s/p washout in 2014, recurrence in 2018, and treatment in 2019), IVDU heroin use (last IV use 8 months prior, last inhalation use 4 days prior), benzodiazepine use disorder (use of Xanax), general anxiety disorder who presents after seizure episodes today. At baseline the patient ambulates without assistive devices and is oriented to all modalities. The patient stated that he has had previous generalized tonic clonic seizures in the past and all had been in the setting of Xanax withdrawal. His last seizure was 1 year ago and he noted that he had stopped using Xanax about 2-3 days prior to the seizure. Additionally, he had been on phenyotin and had last taken the medication about 2-3 years ago and self-stopped the medication. He had since not been restarted on any AEDs. He does not see a neurologist regularly and was prescribed phenytoin by an ED or primary provider of which he is not sure. He notes that he does not recall ever having an EEG done. On this admission, the patient stated that he was walking and had noted that he dropped his coffee, felt his eyes roll back, felt his body get tense and then the next thing he recalls is being in an ambulance. He had a witnessed convulsion in the ambulance while on route to the hospital. He stated that his previous convulsion presented in the same manner. He denies any auras prior to the episode specifically denying auditory or visual stimuli such as ringing bells or flashing lights, rising sensations, olfactory or gustatory auras, shaking of his extremities, feelings of numbness, tingling, lele vu, page vu. He noted that he did have some confusion after he woke up. Denied tongue bite or urinary incontinence. He denied any recent head trauma or falls prior to this episode. He currently only endorse some back pain and R wrist pain. Denied headaches, dizziness, lightheadedness, numbness, tingling, dysphagia, dysarthria, visual or auditory changes, weakness, gait imbalance.  Denied fevers, chills, nausea, vomiting.   Stated that he does drink alcohol occasionally and his last drink was 5 days prior when he had 2 alcoholic beverages. Additionally, patient is currently on a methadone maintenance program     REVIEW OF SYSTEMS    A 10-system ROS was performed and is negative except for those items noted above and/or in the HPI.    PAST MEDICAL & SURGICAL HISTORY:  Methadone maintenance therapy patient    Seizure    Osteomyelitis of lumbar spine    IV drug abuse    H/O Spinal surgery      FAMILY HISTORY:  No pertinent family history in first degree relatives      SOCIAL HISTORY:   T/E/D: smokes 1ppd, occasional EToH use, use of inhalation heroin and last IVDU 8 months prior, use of Xanax (not prescribed)  Occupation: currently working on his [employer]    MEDICATIONS (HOME):  Home Medications:  ALPRAZolam 2 mg oral tablet: 1 tab(s) orally 3 times a day (2019 08:57)  dextroamphetamine-amphetamine 30 mg oral tablet: 1 tab(s) orally 2 times a day (2019 08:57)  Neurontin 600 mg oral tablet: 1 tab(s) orally 3 times a day (2019 08:57)  oxyCODONE 30 mg oral tablet: 1 tab(s) orally every 6 hours, As Needed (2019 08:57)    MEDICATIONS  (STANDING):  vancomycin  IVPB 1000 milliGRAM(s) IV Intermittent once    MEDICATIONS  (PRN):    ALLERGIES/INTOLERANCES:  Allergies  No Known Allergies    Intolerances    VITALS & EXAMINATION:  Vital Signs Last 24 Hrs  T(C): 36.9 (03 Mar 2021 11:37), Max: 38.1 (03 Mar 2021 09:34)  T(F): 98.4 (03 Mar 2021 11:37), Max: 100.5 (03 Mar 2021 09:34)  HR: 76 (03 Mar 2021 11:37) (76 - 94)  BP: 115/59 (03 Mar 2021 11:37) (114/66 - 115/59)  BP(mean): --  RR: 19 (03 Mar 2021 11:37) (18 - 19)  SpO2: 100% (03 Mar 2021 11:37) (97% - 100%)    General:  Constitutional: Appears stated age, in no apparent distress including pain  Head: Normocephalic & atraumatic.  Extremities: noted track marks in antecubital fossa    Neurological (>12):  MS: Awake, alert, oriented to person, place, situation, time. Normal affect. Follows all commands.    Language: Speech is clear, fluent with good repetition & comprehension.     CNs: PERRLA (R = 3mm, L = 3mm). VF intact in all 4 quadrants. EOMI no nystagmus, no diplopia. V1-3 intact to LT, well developed masseter muscles b/l. No facial asymmetry b/l, full eye closure strength b/l. Symmetric palate elevation in midline. Shoulder shrug intact b/l. Tongue midline, normal movements, no atrophy.    Motor: Normal muscle bulk & tone. No noticeable tremor or seizure. No pronator drift.              Deltoid	Biceps	Triceps	Wrist	   R	5	5	5	4-	4	  L	5	5	5	5	5	    	H-Flex	H-Ext	K-Flex	K-Ext	D-Flex	P-Flex  R	5	5	5	5	5	5		   L	5	5	5	5	5	5	     Sensation: Intact to LT b/l throughout.     Reflexes:              Biceps(C5)       BR(C6)     Triceps(C7)               Patellar(L4)    Achilles(S1)    Plantar Resp  R	1	          1	             1		        1		    1		Down   L	1	          1	             1		        1		    1		Down     Coordination:  No dysmetria to FTN    Gait: Deferred due to current current antibiotic infusions and large amount of tubing.     LABORATORY:  CBC                       14.3   7.73  )-----------( 275      ( 03 Mar 2021 10:06 )             42.9     Chem 03-    133<L>  |  99  |  12  ----------------------------<  124<H>  3.7   |  24  |  0.62    Ca    9.4      03 Mar 2021 10:06    TPro  7.9  /  Alb  4.1  /  TBili  0.3  /  DBili  x   /  AST  45<H>  /  ALT  45<H>  /  AlkPhos  160<H>  03-03    LFTs LIVER FUNCTIONS - ( 03 Mar 2021 10:06 )  Alb: 4.1 g/dL / Pro: 7.9 g/dL / ALK PHOS: 160 U/L / ALT: 45 U/L / AST: 45 U/L / GGT: x           Coagulopathy PT/INR - ( 03 Mar 2021 10:06 )   PT: 11.8 sec;   INR: 1.03 ratio         PTT - ( 03 Mar 2021 10:06 )  PTT:31.4 sec  Lipid Panel   A1c   Cardiac enzymes     U/A Urinalysis Basic - ( 03 Mar 2021 10:05 )    Color: Yellow / Appearance: Clear / S.031 / pH: x  Gluc: x / Ketone: Negative  / Bili: Negative / Urobili: 6 mg/dL   Blood: x / Protein: Trace / Nitrite: Negative   Leuk Esterase: Negative / RBC: 1 /HPF / WBC 2 /HPF   Sq Epi: x / Non Sq Epi: 2 /HPF / Bacteria: Negative      STUDIES & IMAGING:    Radiology (XR, CT, MR, U/S, TTE/TRAVIS):    < from: CT Head No Cont (21 @ 11:00) >  Impression:  Unremarkable noncontrast head CT.    < end of copied text >

## 2021-03-03 NOTE — ED PROVIDER NOTE - CLINICAL SUMMARY MEDICAL DECISION MAKING FREE TEXT BOX
37 yo M, with a PMHx of T5/T6 spinal osteomyelitis (diagnosed initially in 2014 s/p washout, with recurrence in December 2018 s/p needle aspiration with no growth), IVDU heroin use (last IV use 8 months prior, last inhalation use 4 days prior), benzodiazepine use disorder, general anxiety disorder, was BIBEMS early this morning for seizure, AMA to sign a lease and presents back to ER for admission for seizure. Pt was febrile, treated with IV Zosyn & Vanco. CT thoracic spine w/o osteomyelitis, CT head neg acute finding, RVP & COVID neg, CXR lungs clear, Ua neg, no source of infection. Pt was seen by neuro, recommended routine EEG, monitor off anti-epileptics, additional labs (Mg, P, CPK, ammonia, prolactin level. Plan: labs request by neuro and admit.

## 2021-03-03 NOTE — H&P ADULT - NSHPREVIEWOFSYSTEMS_GEN_ALL_CORE
REVIEW OF SYSTEMS:    CONSTITUTIONAL: No weakness. +fevers or chills  EYES/ENT: No visual changes;  No vertigo or throat pain   NECK: No pain or stiffness  RESPIRATORY: No cough, wheezing, hemoptysis; No shortness of breath  CARDIOVASCULAR: No chest pain or palpitations  GASTROINTESTINAL: No abdominal pain; nausea, vomiting;  diarrhea or constipation. No hematemesis, melena or hematochezia.  GENITOURINARY: No dysuria, frequency or hematuria  NEUROLOGICAL: Numbness over surgical site in upper back, over right wrist.  SKIN: No itching, burning, rashes, or lesions   MUSKULOSKELETAL: +Upper back pain  All other review of systems is negative unless indicated above. CONSTITUTIONAL: No weakness. +fevers or chills  EYES/ENT: No visual changes;  No vertigo or throat pain   NECK: No pain or stiffness  RESPIRATORY: No cough, wheezing, hemoptysis; No shortness of breath  CARDIOVASCULAR: No chest pain or palpitations  GASTROINTESTINAL: No abdominal pain; nausea, vomiting;  diarrhea or constipation. No hematemesis, melena or hematochezia.  GENITOURINARY: No dysuria, frequency or hematuria  NEUROLOGICAL: Numbness over surgical site in upper back, over right wrist.  SKIN: No itching, burning, rashes, or lesions   MUSKULOSKELETAL: +Upper back pain  All other review of systems is negative unless indicated above.    Constitutional: +Fevers and chills. No generalized weakness or weight loss.  Eyes: No visual changes, double vision, or eye pain  Ears, Nose, Mouth, Throat: No runny nose, sinus pain, ear pain, tinnitus, sore throat, dysphagia, or odynophagia  Cardiovascular: No chest pain, palpitations, or LE edema  Respiratory: No cough, wheezing, hemoptysis, or shortness of breath  Gastrointestinal: No abdominal pain, nausea/vomiting, diarrhea/constipation, hematemesis, melena, or BRBPR  Genitourinary: No dysuria, frequency, urgency, or hematuria  Musculoskeletal: +Chronic upper back pain. No joint pain, joint swelling, or decreased ROM.  Skin: No pruritus or rashes  Neurologic: Numbness over surgical site in upper back, over right wrist. No seizures, headache, paresthesias, or limb weakness.  Psychiatric: No depression, anxiety, or agitation  Endocrine: No heat/cold intolerance, mood swings, sweats, polydipsia, or polyuria  Hematologic/lymphatic: No purpura, petechia, or prolonged or excessive bleeding after dental extraction / injury    Positives and pertinent negatives noted and all other systems negative.

## 2021-03-04 LAB
ALBUMIN SERPL ELPH-MCNC: 4.1 G/DL — SIGNIFICANT CHANGE UP (ref 3.3–5)
ALP SERPL-CCNC: 162 U/L — HIGH (ref 40–120)
ALT FLD-CCNC: 49 U/L — HIGH (ref 4–41)
ANION GAP SERPL CALC-SCNC: 13 MMOL/L — SIGNIFICANT CHANGE UP (ref 7–14)
AST SERPL-CCNC: 46 U/L — HIGH (ref 4–40)
BASOPHILS # BLD AUTO: 0.02 K/UL — SIGNIFICANT CHANGE UP (ref 0–0.2)
BASOPHILS NFR BLD AUTO: 0.3 % — SIGNIFICANT CHANGE UP (ref 0–2)
BILIRUB SERPL-MCNC: 0.5 MG/DL — SIGNIFICANT CHANGE UP (ref 0.2–1.2)
BUN SERPL-MCNC: 10 MG/DL — SIGNIFICANT CHANGE UP (ref 7–23)
CALCIUM SERPL-MCNC: 9.3 MG/DL — SIGNIFICANT CHANGE UP (ref 8.4–10.5)
CHLORIDE SERPL-SCNC: 101 MMOL/L — SIGNIFICANT CHANGE UP (ref 98–107)
CO2 SERPL-SCNC: 20 MMOL/L — LOW (ref 22–31)
CREAT SERPL-MCNC: 0.66 MG/DL — SIGNIFICANT CHANGE UP (ref 0.5–1.3)
CULTURE RESULTS: NO GROWTH — SIGNIFICANT CHANGE UP
EOSINOPHIL # BLD AUTO: 0.17 K/UL — SIGNIFICANT CHANGE UP (ref 0–0.5)
EOSINOPHIL NFR BLD AUTO: 2.6 % — SIGNIFICANT CHANGE UP (ref 0–6)
GLUCOSE SERPL-MCNC: 93 MG/DL — SIGNIFICANT CHANGE UP (ref 70–99)
HCT VFR BLD CALC: 44.3 % — SIGNIFICANT CHANGE UP (ref 39–50)
HGB BLD-MCNC: 14.5 G/DL — SIGNIFICANT CHANGE UP (ref 13–17)
IANC: 4.33 K/UL — SIGNIFICANT CHANGE UP (ref 1.5–8.5)
IMM GRANULOCYTES NFR BLD AUTO: 0.3 % — SIGNIFICANT CHANGE UP (ref 0–1.5)
LYMPHOCYTES # BLD AUTO: 1.52 K/UL — SIGNIFICANT CHANGE UP (ref 1–3.3)
LYMPHOCYTES # BLD AUTO: 23.6 % — SIGNIFICANT CHANGE UP (ref 13–44)
MAGNESIUM SERPL-MCNC: 2.4 MG/DL — SIGNIFICANT CHANGE UP (ref 1.6–2.6)
MCHC RBC-ENTMCNC: 29.7 PG — SIGNIFICANT CHANGE UP (ref 27–34)
MCHC RBC-ENTMCNC: 32.7 GM/DL — SIGNIFICANT CHANGE UP (ref 32–36)
MCV RBC AUTO: 90.6 FL — SIGNIFICANT CHANGE UP (ref 80–100)
MONOCYTES # BLD AUTO: 0.38 K/UL — SIGNIFICANT CHANGE UP (ref 0–0.9)
MONOCYTES NFR BLD AUTO: 5.9 % — SIGNIFICANT CHANGE UP (ref 2–14)
NEUTROPHILS # BLD AUTO: 4.33 K/UL — SIGNIFICANT CHANGE UP (ref 1.8–7.4)
NEUTROPHILS NFR BLD AUTO: 67.3 % — SIGNIFICANT CHANGE UP (ref 43–77)
NRBC # BLD: 0 /100 WBCS — SIGNIFICANT CHANGE UP
NRBC # FLD: 0 K/UL — SIGNIFICANT CHANGE UP
PHOSPHATE SERPL-MCNC: 3.7 MG/DL — SIGNIFICANT CHANGE UP (ref 2.5–4.5)
PLATELET # BLD AUTO: 133 K/UL — LOW (ref 150–400)
POTASSIUM SERPL-MCNC: 4.3 MMOL/L — SIGNIFICANT CHANGE UP (ref 3.5–5.3)
POTASSIUM SERPL-SCNC: 4.3 MMOL/L — SIGNIFICANT CHANGE UP (ref 3.5–5.3)
PROT SERPL-MCNC: 8 G/DL — SIGNIFICANT CHANGE UP (ref 6–8.3)
RBC # BLD: 4.89 M/UL — SIGNIFICANT CHANGE UP (ref 4.2–5.8)
RBC # FLD: 13.3 % — SIGNIFICANT CHANGE UP (ref 10.3–14.5)
SARS-COV-2 RNA SPEC QL NAA+PROBE: SIGNIFICANT CHANGE UP
SODIUM SERPL-SCNC: 134 MMOL/L — LOW (ref 135–145)
SPECIMEN SOURCE: SIGNIFICANT CHANGE UP
WBC # BLD: 6.44 K/UL — SIGNIFICANT CHANGE UP (ref 3.8–10.5)
WBC # FLD AUTO: 6.44 K/UL — SIGNIFICANT CHANGE UP (ref 3.8–10.5)

## 2021-03-04 PROCEDURE — 99233 SBSQ HOSP IP/OBS HIGH 50: CPT

## 2021-03-04 RX ORDER — INFLUENZA VIRUS VACCINE 15; 15; 15; 15 UG/.5ML; UG/.5ML; UG/.5ML; UG/.5ML
0.5 SUSPENSION INTRAMUSCULAR ONCE
Refills: 0 | Status: COMPLETED | OUTPATIENT
Start: 2021-03-04 | End: 2021-03-04

## 2021-03-04 RX ORDER — METHADONE HYDROCHLORIDE 40 MG/1
115 TABLET ORAL DAILY
Refills: 0 | Status: DISCONTINUED | OUTPATIENT
Start: 2021-03-04 | End: 2021-03-07

## 2021-03-04 RX ADMIN — DEXTROAMPHETAMINE SACCHARATE, AMPHETAMINE ASPARTATE, DEXTROAMPHETAMINE SULFATE AND AMPHETAMINE SULFATE 30 MILLIGRAM(S): 1.875; 1.875; 1.875; 1.875 TABLET ORAL at 05:35

## 2021-03-04 RX ADMIN — OXYCODONE HYDROCHLORIDE 30 MILLIGRAM(S): 5 TABLET ORAL at 22:07

## 2021-03-04 RX ADMIN — OXYCODONE HYDROCHLORIDE 30 MILLIGRAM(S): 5 TABLET ORAL at 15:00

## 2021-03-04 RX ADMIN — OXYCODONE HYDROCHLORIDE 30 MILLIGRAM(S): 5 TABLET ORAL at 13:52

## 2021-03-04 RX ADMIN — Medication 1 PATCH: at 17:30

## 2021-03-04 RX ADMIN — OXYCODONE HYDROCHLORIDE 30 MILLIGRAM(S): 5 TABLET ORAL at 07:42

## 2021-03-04 RX ADMIN — GABAPENTIN 600 MILLIGRAM(S): 400 CAPSULE ORAL at 22:07

## 2021-03-04 RX ADMIN — GABAPENTIN 600 MILLIGRAM(S): 400 CAPSULE ORAL at 05:35

## 2021-03-04 RX ADMIN — DEXTROAMPHETAMINE SACCHARATE, AMPHETAMINE ASPARTATE, DEXTROAMPHETAMINE SULFATE AND AMPHETAMINE SULFATE 30 MILLIGRAM(S): 1.875; 1.875; 1.875; 1.875 TABLET ORAL at 17:30

## 2021-03-04 RX ADMIN — OXYCODONE HYDROCHLORIDE 30 MILLIGRAM(S): 5 TABLET ORAL at 23:00

## 2021-03-04 RX ADMIN — METHADONE HYDROCHLORIDE 115 MILLIGRAM(S): 40 TABLET ORAL at 09:23

## 2021-03-04 RX ADMIN — Medication 1 PATCH: at 20:30

## 2021-03-04 NOTE — PROGRESS NOTE ADULT - ASSESSMENT
41 year old man with a PMHx of T5/T6 spinal osteomyelitis (diagnosed initially in 2014 s/p washout, with recurrence in December 2018 s/p needle aspiration with no growth, readmission in 2019 for IV abx, T1-T7 spinal fusion), heroin use (states has been snorted, last 1 week ago, has not used IV drugs in 8 mo), general anxiety disorder presenting with seizure most likely secondary to benzodiazepine withdrawal.

## 2021-03-04 NOTE — PROGRESS NOTE ADULT - PROBLEM SELECTOR PLAN 2
-Psych consult in AM for control of ZEN without benzodiazepines so as to improve chances of future misuse  -S/w consult -S/w consult

## 2021-03-04 NOTE — CHART NOTE - NSCHARTNOTEFT_GEN_A_CORE
Pt attends [facility] ([phone]). Dose 115mg confirmed with RN Sissy Rose. Dose ordered.     Mary Wilcox MD PGY5 EMIM  P: [phone]

## 2021-03-04 NOTE — PROGRESS NOTE ADULT - PROBLEM SELECTOR PLAN 3
Likely 2/2 seizure. ESR 33 with history of osteo. Unlikely recurrence. RVP negative  -Trend fever  -Trend ESR.  -MRI thoracic and lumbar w/ w/o contrast  -If febrile or ESR not decreasing and no osteo on MR, then consider TTE though abstinent from IVDU for 8 months per patient  -Hold on abx. F/u cxs  -Neg rvp Likely 2/2 seizure. ESR 33 with history of osteo. Unlikely recurrence as pain at its baseline. RVP negative  -Trend fever  -Trend ESR  -If febrile or ESR not decreasing and no osteo on MR, then consider MRI spine, TTE though abstinent from IVDU for 8 months per patient  -Hold on abx. F/u cxs  -Neg rvp

## 2021-03-04 NOTE — PROGRESS NOTE ADULT - PROBLEM SELECTOR PLAN 1
Likely 2/2 benzodiazepine withdrawal  -D/w neuro, will hold on taper and place on symptom triggered CIWA for now given clinical stability and no clinical signs of withdrawal at this time  -Apprec neuro: routine eeg  -Seizure precautions  -Treatment of anxiety predisposing to benzodiazepine use  -F/u prolactin (spec received)  -Holding on AEDs at this time  -Continuous pulse ox Likely 2/2 benzodiazepine withdrawal  -D/w neuro, will hold on taper and place on symptom triggered CIWA for now given clinical stability and no clinical signs of withdrawal at this time  -EEG ordered  -Seizure precautions  -Treatment of anxiety predisposing to benzodiazepine use  -F/u prolactin (spec received)  -Holding on AEDs at this time  -Continuous pulse ox

## 2021-03-05 ENCOUNTER — TRANSCRIPTION ENCOUNTER (OUTPATIENT)
Age: 41
End: 2021-03-05

## 2021-03-05 LAB
ALBUMIN SERPL ELPH-MCNC: 4.3 G/DL — SIGNIFICANT CHANGE UP (ref 3.3–5)
ALP SERPL-CCNC: 170 U/L — HIGH (ref 40–120)
ALT FLD-CCNC: 50 U/L — HIGH (ref 4–41)
ANION GAP SERPL CALC-SCNC: 11 MMOL/L — SIGNIFICANT CHANGE UP (ref 7–14)
AST SERPL-CCNC: 34 U/L — SIGNIFICANT CHANGE UP (ref 4–40)
BILIRUB SERPL-MCNC: 0.5 MG/DL — SIGNIFICANT CHANGE UP (ref 0.2–1.2)
BUN SERPL-MCNC: 11 MG/DL — SIGNIFICANT CHANGE UP (ref 7–23)
CALCIUM SERPL-MCNC: 9.8 MG/DL — SIGNIFICANT CHANGE UP (ref 8.4–10.5)
CHLORIDE SERPL-SCNC: 99 MMOL/L — SIGNIFICANT CHANGE UP (ref 98–107)
CO2 SERPL-SCNC: 25 MMOL/L — SIGNIFICANT CHANGE UP (ref 22–31)
CREAT SERPL-MCNC: 0.59 MG/DL — SIGNIFICANT CHANGE UP (ref 0.5–1.3)
GLUCOSE SERPL-MCNC: 98 MG/DL — SIGNIFICANT CHANGE UP (ref 70–99)
HCT VFR BLD CALC: 46.8 % — SIGNIFICANT CHANGE UP (ref 39–50)
HGB BLD-MCNC: 15 G/DL — SIGNIFICANT CHANGE UP (ref 13–17)
MAGNESIUM SERPL-MCNC: 2.3 MG/DL — SIGNIFICANT CHANGE UP (ref 1.6–2.6)
MCHC RBC-ENTMCNC: 29.1 PG — SIGNIFICANT CHANGE UP (ref 27–34)
MCHC RBC-ENTMCNC: 32.1 GM/DL — SIGNIFICANT CHANGE UP (ref 32–36)
MCV RBC AUTO: 90.7 FL — SIGNIFICANT CHANGE UP (ref 80–100)
NRBC # BLD: 0 /100 WBCS — SIGNIFICANT CHANGE UP
NRBC # FLD: 0 K/UL — SIGNIFICANT CHANGE UP
PHOSPHATE SERPL-MCNC: 4 MG/DL — SIGNIFICANT CHANGE UP (ref 2.5–4.5)
PLATELET # BLD AUTO: 153 K/UL — SIGNIFICANT CHANGE UP (ref 150–400)
POTASSIUM SERPL-MCNC: 4.3 MMOL/L — SIGNIFICANT CHANGE UP (ref 3.5–5.3)
POTASSIUM SERPL-SCNC: 4.3 MMOL/L — SIGNIFICANT CHANGE UP (ref 3.5–5.3)
PROT SERPL-MCNC: 8.4 G/DL — HIGH (ref 6–8.3)
RBC # BLD: 5.16 M/UL — SIGNIFICANT CHANGE UP (ref 4.2–5.8)
RBC # FLD: 13.2 % — SIGNIFICANT CHANGE UP (ref 10.3–14.5)
SODIUM SERPL-SCNC: 135 MMOL/L — SIGNIFICANT CHANGE UP (ref 135–145)
WBC # BLD: 6.76 K/UL — SIGNIFICANT CHANGE UP (ref 3.8–10.5)
WBC # FLD AUTO: 6.76 K/UL — SIGNIFICANT CHANGE UP (ref 3.8–10.5)

## 2021-03-05 PROCEDURE — 99233 SBSQ HOSP IP/OBS HIGH 50: CPT | Mod: GC

## 2021-03-05 PROCEDURE — 95720 EEG PHY/QHP EA INCR W/VEEG: CPT

## 2021-03-05 PROCEDURE — 95718 EEG PHYS/QHP 2-12 HR W/VEEG: CPT

## 2021-03-05 PROCEDURE — 99233 SBSQ HOSP IP/OBS HIGH 50: CPT

## 2021-03-05 PROCEDURE — 99223 1ST HOSP IP/OBS HIGH 75: CPT

## 2021-03-05 RX ORDER — DIVALPROEX SODIUM 500 MG/1
500 TABLET, DELAYED RELEASE ORAL
Refills: 0 | Status: DISCONTINUED | OUTPATIENT
Start: 2021-03-05 | End: 2021-03-06

## 2021-03-05 RX ADMIN — Medication 1 PATCH: at 19:14

## 2021-03-05 RX ADMIN — GABAPENTIN 600 MILLIGRAM(S): 400 CAPSULE ORAL at 06:29

## 2021-03-05 RX ADMIN — OXYCODONE HYDROCHLORIDE 30 MILLIGRAM(S): 5 TABLET ORAL at 06:47

## 2021-03-05 RX ADMIN — Medication 1 PATCH: at 06:43

## 2021-03-05 RX ADMIN — METHADONE HYDROCHLORIDE 115 MILLIGRAM(S): 40 TABLET ORAL at 11:25

## 2021-03-05 RX ADMIN — GABAPENTIN 600 MILLIGRAM(S): 400 CAPSULE ORAL at 21:08

## 2021-03-05 RX ADMIN — Medication 1 PATCH: at 11:25

## 2021-03-05 RX ADMIN — DEXTROAMPHETAMINE SACCHARATE, AMPHETAMINE ASPARTATE, DEXTROAMPHETAMINE SULFATE AND AMPHETAMINE SULFATE 30 MILLIGRAM(S): 1.875; 1.875; 1.875; 1.875 TABLET ORAL at 19:14

## 2021-03-05 RX ADMIN — OXYCODONE HYDROCHLORIDE 30 MILLIGRAM(S): 5 TABLET ORAL at 14:35

## 2021-03-05 RX ADMIN — DEXTROAMPHETAMINE SACCHARATE, AMPHETAMINE ASPARTATE, DEXTROAMPHETAMINE SULFATE AND AMPHETAMINE SULFATE 30 MILLIGRAM(S): 1.875; 1.875; 1.875; 1.875 TABLET ORAL at 06:28

## 2021-03-05 RX ADMIN — DIVALPROEX SODIUM 500 MILLIGRAM(S): 500 TABLET, DELAYED RELEASE ORAL at 19:13

## 2021-03-05 RX ADMIN — GABAPENTIN 600 MILLIGRAM(S): 400 CAPSULE ORAL at 14:04

## 2021-03-05 RX ADMIN — OXYCODONE HYDROCHLORIDE 30 MILLIGRAM(S): 5 TABLET ORAL at 14:05

## 2021-03-05 RX ADMIN — OXYCODONE HYDROCHLORIDE 30 MILLIGRAM(S): 5 TABLET ORAL at 22:05

## 2021-03-05 RX ADMIN — OXYCODONE HYDROCHLORIDE 30 MILLIGRAM(S): 5 TABLET ORAL at 07:40

## 2021-03-05 RX ADMIN — OXYCODONE HYDROCHLORIDE 30 MILLIGRAM(S): 5 TABLET ORAL at 21:07
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
## 2021-03-05 NOTE — PROGRESS NOTE ADULT - ASSESSMENT
Blas Sheets is a 41 year old RH male with a past medical history of T5/T6 osteomyelitis (s/p washout in 2014, recurrence in 2018, and treatment in 2019), IVDU heroin use (last IV use 8 months prior, last inhalation use 4 days prior), benzodiazepine use disorder (use of Xanax), general anxiety disorder who presents after seizure episodes.    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    Recs:  [] continue EEG for 1 additional night  [] please start Depakote 500mg bid  [] obtain valproic acid level before second dose tomorrow, obtain albumin level  [] if patient has a convulsion, please document accurately the length of the episode, and specifically what the patient was doing paying attention to eye opening vs closure, gaze deviation, shaking of extremities, tongue bite, urinary incontinence, any derangement of vital signs  [] If patient has a generalized tonic clonic episode for >3 minutes or significant derangement of vital signs may administer Ativan 2mg IV  [] advised patient to not take any further benzodiazepines  [] may benefit from further drug rehabilitation counseling  [] will need MRI with epilepsy protocol that can be done as outpatient  [] upon discharge will need follow up with epilepsy provider at [address] [place] [address]. ([phone])    Case discussed with neurology attending, Dr. Goldman.    Blas Sheets is a 41 year old RH male with a past medical history of T5/T6 osteomyelitis (s/p washout in 2014, recurrence in 2018, and treatment in 2019), IVDU heroin use (last IV use 8 months prior, last inhalation use 4 days prior), benzodiazepine use disorder (use of Xanax), general anxiety disorder who presents after seizure episodes.    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    Recs:  [] continue EEG for 1 additional night  [] please start Depakote 500mg bid  [] obtain valproic acid level before third dose tomorrow, obtain albumin level  [] if patient has a convulsion, please document accurately the length of the episode, and specifically what the patient was doing paying attention to eye opening vs closure, gaze deviation, shaking of extremities, tongue bite, urinary incontinence, any derangement of vital signs  [] If patient has a generalized tonic clonic episode for >3 minutes or significant derangement of vital signs may administer Ativan 2mg IV  [] advised patient to not take any further benzodiazepines  [] may benefit from further drug rehabilitation counseling  [] will need MRI with epilepsy protocol that can be done as outpatient  [] advise patient to not drive, operate heavy machinery, avoid heights, pools, bathtubs, locked doors  [] upon discharge will need follow up with epilepsy provider at [address] [place] [address]. ([phone])    Case discussed with neurology attending, Dr. Goldman.    Blas Sheets is a 41 year old RH male with a past medical history of T5/T6 osteomyelitis (s/p washout in 2014, recurrence in 2018, and treatment in 2019), IVDU heroin use (last IV use 8 months prior, last inhalation use 4 days prior), benzodiazepine use disorder (use of Xanax), general anxiety disorder who presents after seizure episodes.    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    Recs:    [] continue EEG for 1 additional night  [] please start Depakote 500mg bid  [] obtain valproic acid level before third dose tomorrow, obtain albumin level  [] if patient has a convulsion, please document accurately the length of the episode, and specifically what the patient was doing paying attention to eye opening vs closure, gaze deviation, shaking of extremities, tongue bite, urinary incontinence, any derangement of vital signs  [] If patient has a generalized tonic clonic episode for >3 minutes or significant derangement of vital signs may administer Ativan 2mg IV  [] advised patient to not take any further benzodiazepines  [] may benefit from further drug rehabilitation counseling  [] will need MRI with epilepsy protocol that can be done as outpatient  [] advise patient to not drive, operate heavy machinery, avoid heights, pools, bathtubs, locked doors  [] upon discharge will need follow up with epilepsy provider at [address] [place] [address]. ([phone])    Case discussed and seen with neurology attending, Dr. Goldman.

## 2021-03-05 NOTE — BH CONSULTATION LIAISON ASSESSMENT NOTE - NSBHCHARTREVIEWVS_PSY_A_CORE FT
Vital Signs Last 24 Hrs  T(C): 36.7 (05 Mar 2021 06:16), Max: 36.9 (04 Mar 2021 21:38)  T(F): 98 (05 Mar 2021 06:16), Max: 98.5 (04 Mar 2021 21:38)  HR: 71 (05 Mar 2021 06:16) (60 - 71)  BP: 95/64 (05 Mar 2021 06:16) (95/64 - 115/57)  BP(mean): --  RR: 16 (05 Mar 2021 06:16) (16 - 18)  SpO2: 98% (05 Mar 2021 06:16) (98% - 99%)

## 2021-03-05 NOTE — BH CONSULTATION LIAISON ASSESSMENT NOTE - SUMMARY
41 year old man with a PMHx of T5/T6 spinal osteomyelitis (diagnosed initially in 2014 s/p washout, with recurrence in December 2018 s/p needle aspiration with no growth, readmission in 2019 for IV abx), heroin use (states has been snorted, last 1 week ago, has not used IV drugs in 8 mo), general anxiety disorder, here s/p seizure. Seen by Neuro, s/p EEG, started on Depakote 500mg BID. Psychiatry consulted some days after admission for polysubstance use hx.   Pt has longstanding polysubstance issues with benzodiazepines and opioids as mentioned above He seems to have low insight into how severe his problem is despite having recent difficulties stopping and his recent seizures. When asked what he thinks he needs for anxiety/benzo replacement he feels Depakote may be enough, as well as exploring resources with his chem dep provider/methadone program. Provided education on SSRIs for anxiety but he was reluctant to try these.

## 2021-03-05 NOTE — DISCHARGE NOTE PROVIDER - NSFOLLOWUPCLINICS_GEN_ALL_ED_FT
Neurology [facility]  Neurology Epilepsy  [address] [place] [id number]  Phone: [phone]  Fax: [phone]  Follow Up Time: 1 week     [facility]rology [facility]  Neurology Autoimmune Encephalitis  [address] [place] [address]  Phone: [phone]  Fax: [phone]  Follow Up Time: 1 week

## 2021-03-05 NOTE — DISCHARGE NOTE PROVIDER - NSDCCPTREATMENT_GEN_ALL_CORE_FT
PRINCIPAL PROCEDURE  Procedure: EEG, awake and asleep  Findings and Treatment: Clinical Impression:  Previously seen Generalized spike and slow wave complexes not visualized in final 4 hours of recording. Previously seen findings are typically seen in patients with a primary generalized epilepsy syndrome.  Clinical correlation (inquiry re: presence of myoclonus, age of onset of seizures) recommended.

## 2021-03-05 NOTE — BH CONSULTATION LIAISON ASSESSMENT NOTE - MSE UNSTRUCTURED FT
On exam, pt is awake/alert, oriented x 3, without overt distress or focal neuro deficits. Denies SI, hopelessness, anxiety/panic, psychosis, perla, or confusion.

## 2021-03-05 NOTE — EEG REPORT - NS EEG TEXT BOX
MONSERRAT ROLON MRN-[id number])M  Admitting MD: Dr. Fernando Serna    Study Date: 03-5-21 08:00-19:00 3-5-21  x11hrs  --------------------------------------------------------------------------------------------------  History:  CC/ HPI Patient is a 41y old  Male who presents with a chief complaint of Seizure (05 Mar 2021 07:14)    amphetamine/dextroamphetamine 30 milliGRAM(s) Oral two times a day  gabapentin 600 milliGRAM(s) Oral three times a day  influenza   Vaccine 0.5 milliLiter(s) IntraMuscular once  methadone    Tablet 115 milliGRAM(s) Oral daily  nicotine - 21 mG/24Hr(s) Patch 1 patch Transdermal daily    --------------------------------------------------------------------------------------------------  Study Interpretation:    [[[Abbreviation Key:  PDR=alpha rhythm/posterior dominant rhythm. A-P=anterior posterior gradient.  Amplitude: ‘very low’:<20; ‘low’:20-50; ‘medium’:; ‘high’:>200uV.  Persistence for periodic/rhythmic patterns (% of epoch) ‘rare’:<1%; ‘occasional’:1-10%; ‘frequent’:10-50%; ‘abundant’:50-90%; ‘continuous’:>90%.  Persistence for sporadic discharges: ‘rare’:<1/hr; ‘occasional’:1/min-1/hr; ‘frequent’:>1/min; ‘abundant’:>1/10 sec.  GRDA=generalized rhythmic delta activity, LRDA=lateralized rhythmic delta activity, TIRDA=temporal intermittent rhythmic delta activity, FIRDA=frontal intermittent rhythmic activity. LPD=PLED=lateralized periodic discharges, GPD=generalized periodic discharges, BiPDs=BiPLEDs=bilateral independent periodic epileptiform discharges, SIRPID=stimulus induced rhythmic, periodic, or ictal appearing discharges.  Modifiers: +F=with fast component, +S=with spike component, +R=with rhythmic component.  S-B=burst suppression pattern.  Max=maximal. N1-drowsy, N2-stage II sleep, N3-slow wave sleep.  HV=hyperventilation, PS=photic stimulation]]]    FINDINGS:  The background was continuous, spontaneously variable and reactive.  During wakefulness, the posteriorly dominant rhythm consisted of symmetric, well modulated 9-9.5 Hz activity, with an amplitude to 40 uV, that attenuated to eye opening.  Low amplitude central beta was noted in wakefulness.  Frequent Lambda noted    Background Slowing:  Generalized slowing: none was present.  Focal slowing: none was present.    Sleep Background:  Drowsiness was characterized by fragmentation, attenuation, and slowing of the background activity.      Epileptiform Activity:   none    Events:  No clinical events were recorded.  No seizures were recorded.    Activation Procedures:   -Hyperventilation was not performed.    -Photic stimulation was not performed.    Artifacts:  Intermittent myogenic and external motion artifacts were noted.    ECG:  The heart rate on single channel ECG at baseline was predominantly near BPM = 60-70  -----------------------------------------------------------------------------------------------------    EEG Classification / Summary:  normal EEG study, awake / drowsy     -----------------------------------------------------------------------------------------------------    Clinical Impression:  Previously seen Generalized spike and slow wave complexes not visualized in final hours of recording. Previously seen findings are typically seen in patients with a primary generalized epilepsy syndrome.  Clinical correlation (inquiry re: presence of myoclonus, age of onset of seizures) recommended.    Yovany Winter MD  EEG/Epilepsy Attending

## 2021-03-05 NOTE — PROGRESS NOTE ADULT - PROBLEM SELECTOR PLAN 1
Likely 2/2 benzodiazepine withdrawal  -D/w neuro, will hold on taper and place on symptom triggered CIWA for now given clinical stability and no clinical signs of withdrawal at this time  -EEG ordered  -Seizure precautions  -Treatment of anxiety predisposing to benzodiazepine use  -F/u prolactin (spec received)  -Holding on AEDs at this time  -Continuous pulse ox Likely 2/2 benzodiazepine withdrawal  -D/w neuro, will hold on taper and place on symptom triggered CIWA for now given clinical stability and no clinical signs of withdrawal at this time  -EEG w/ epileptiform discharges, d/w neuro will f/u recs  -Seizure precautions  -Treatment of anxiety predisposing to benzodiazepine use  -F/u prolactin (spec received)  -Holding on AEDs at this time  -Continuous pulse ox

## 2021-03-05 NOTE — DISCHARGE NOTE PROVIDER - HOSPITAL COURSE
41 year old man with a PMHx of T5/T6 spinal osteomyelitis (diagnosed initially in 2014 s/p washout, with recurrence in December 2018 s/p needle aspiration with no growth, readmission in 2019 for IV abx), heroin use (states has been snorted, last 1 week ago, has not used IV drugs in 8 mo), general anxiety disorder, here s/p seizure this morning.    His last seizure was a year ago and the first one distant. He estimates he has had 4 in total and that they have always been in the setting of decreasing his benzodiazepine use. Three months ago he wanted to discontinue his alprazolam 2 mg TID and his PCP Dr. Healy agreed. He tolerated discontinuation but due to uncontrolled anxiety obtained alprazolam and for this reason obtained benzodiazepines without a prescription and has been intermittently taking PO and inhaled alprazolam three times a day for 1-2 weeks at a time with 1-2 weeks of abstinence in between. He has been attending his methadone clinic (115 mg qd) in addition to taking oxycodone 30 mg q6h PRN for back pain. He has not injected heroine for 8 months, citing his 7 month old daughter as a protective factor, and has been inhaling heroine last 4 days ago. His back pain is unchanged.     This present admission, he has been abstinent of alprazolam for 4 days. He was in his usual state of health until he was in his ambulette back to his apartment when he had a seizure. He recalls dropping his coffee and then losing consciousness and hitting his R temple and then waking up in the ambulette. EMS was called and he had another GTC x1 minute en route to [facility]. He denies any fevers, chest pain, or shortness of breath. No agitation, worsening anxiety, tremors. Recently fractured R wrist, used split.    At [facility], he was foudn to be febrile to 100.5. he received vancomycin 1 g, zosyn 3.375, gabapentin, oxycodone 30 mg, and 2 L NS. He left the ED to sign a lease and then returned.    He was admitted to the general medical floor for monitoring. The patient's methadone dose was confirmed and restarted. An EEG was performed which showed EEG Classification / Summary:  Abnormal EEG study, awake / drowsy / asleep    -Rare polyphasic epileptiform discharges were present in isolation or briefly repetitive near 5hz.      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  Clinical correlation (inquiry re: presence of myoclonus, age of onset of seizures) recommended.       41 year old man with a PMHx of T5/T6 spinal osteomyelitis (diagnosed initially in 2014 s/p washout, with recurrence in December 2018 s/p needle aspiration with no growth, readmission in 2019 for IV abx), heroin use (states has been snorted, last 1 week ago, has not used IV drugs in 8 mo), general anxiety disorder, here s/p seizure this morning.    His last seizure was a year ago and the first one distant. He estimates he has had 4 in total and that they have always been in the setting of decreasing his benzodiazepine use. Three months ago he wanted to discontinue his alprazolam 2 mg TID and his PCP Dr. Healy agreed. He tolerated discontinuation but due to uncontrolled anxiety obtained alprazolam and for this reason obtained benzodiazepines without a prescription and has been intermittently taking PO and inhaled alprazolam three times a day for 1-2 weeks at a time with 1-2 weeks of abstinence in between. He has been attending his methadone clinic (115 mg qd) in addition to taking oxycodone 30 mg q6h PRN for back pain. He has not injected heroine for 8 months, citing his 7 month old daughter as a protective factor, and has been inhaling heroine last 4 days ago. His back pain is unchanged.     This present admission, he has been abstinent of alprazolam for 4 days. He was in his usual state of health until he was in his ambulette back to his apartment when he had a seizure. He recalls dropping his coffee and then losing consciousness and hitting his R temple and then waking up in the ambulette. EMS was called and he had another GTC x1 minute en route to [facility]. He denies any fevers, chest pain, or shortness of breath. No agitation, worsening anxiety, tremors. Recently fractured R wrist, used split.    At [facility], he was found to be febrile to 100.5. he received vancomycin 1 g, zosyn 3.375, gabapentin, oxycodone 30 mg, and 2 L NS. He left the ED to sign a lease and then returned.    He was admitted to the general medical floor for monitoring. The patient's methadone dose was confirmed and restarted. An EEG was performed which showed rare polyphasic epileptiform discharges. Neurology evaluated the patient and recommended depakot 500mg two times per day.            41 year old man with a PMHx of T5/T6 spinal osteomyelitis (diagnosed initially in 2014 s/p washout, with recurrence in December 2018 s/p needle aspiration with no growth, readmission in 2019 for IV abx), heroin use (states has been snorted, last 1 week ago, has not used IV drugs in 8 mo), general anxiety disorder, here s/p seizure this morning.    His last seizure was a year ago and the first one distant. He estimates he has had 4 in total and that they have always been in the setting of decreasing his benzodiazepine use. Three months ago he wanted to discontinue his alprazolam 2 mg TID and his PCP Dr. Healy agreed. He tolerated discontinuation but due to uncontrolled anxiety obtained alprazolam and for this reason obtained benzodiazepines without a prescription and has been intermittently taking PO and inhaled alprazolam three times a day for 1-2 weeks at a time with 1-2 weeks of abstinence in between. He has been attending his methadone clinic (115 mg qd) in addition to taking oxycodone 30 mg q6h PRN for back pain. He has not injected heroine for 8 months, citing his 7 month old daughter as a protective factor, and has been inhaling heroine last 4 days ago. His back pain is unchanged.     This present admission, he has been abstinent of alprazolam for 4 days. He was in his usual state of health until he was in his ambulette back to his apartment when he had a seizure. He recalls dropping his coffee and then losing consciousness and hitting his R temple and then waking up in the ambulette. EMS was called and he had another GTC x1 minute en route to [facility]. He denies any fevers, chest pain, or shortness of breath. No agitation, worsening anxiety, tremors. Recently fractured R wrist, used split.    At [facility], he was found to be febrile to 100.5. he received vancomycin 1 g, zosyn 3.375, gabapentin, oxycodone 30 mg, and 2 L NS. He left the ED to sign a lease and then returned.    He was admitted to the general medical floor for monitoring. The patient's methadone dose was confirmed and restarted. An EEG was performed which showed rare polyphasic epileptiform discharges. Neurology evaluated the patient and recommended depakot 500mg two times per day. The patient was discharged home in stable condition with instructions to follow up with neurology within 2 weeks.           41 year old man with a PMHx of T5/T6 spinal osteomyelitis (diagnosed initially in 2014 s/p washout, with recurrence in December 2018 s/p needle aspiration with no growth, readmission in 2019 for IV abx), heroin use (states has been snorted, last 1 week ago, has not used IV drugs in 8 mo), general anxiety disorder, here s/p seizure this morning.    His last seizure was a year ago and the first one distant. He estimates he has had 4 in total and that they have always been in the setting of decreasing his benzodiazepine use. Three months ago he wanted to discontinue his alprazolam 2 mg TID and his PCP Dr. Healy agreed. He tolerated discontinuation but due to uncontrolled anxiety obtained alprazolam and for this reason obtained benzodiazepines without a prescription and has been intermittently taking PO and inhaled alprazolam three times a day for 1-2 weeks at a time with 1-2 weeks of abstinence in between. He has been attending his methadone clinic (115 mg qd) in addition to taking oxycodone 30 mg q6h PRN for back pain. He has not injected heroine for 8 months, citing his 7 month old daughter as a protective factor, and has been inhaling heroine last 4 days ago. His back pain is unchanged.     This present admission, he has been abstinent of alprazolam for 4 days. He was in his usual state of health until he was in his ambulette back to his apartment when he had a seizure. He recalls dropping his coffee and then losing consciousness and hitting his R temple and then waking up in the ambulette. EMS was called and he had another GTC x1 minute en route to [facility]. He denies any fevers, chest pain, or shortness of breath. No agitation, worsening anxiety, tremors. Recently fractured R wrist, used split.    At [facility], he was found to be febrile to 100.5. he received vancomycin 1 g, zosyn 3.375, gabapentin, oxycodone 30 mg, and 2 L NS. He left the ED to sign a lease and then returned.    He was admitted to the general medical floor for monitoring. The patient's methadone dose was confirmed and restarted. An EEG was performed which showed rare polyphasic epileptiform discharges. Neurology evaluated the patient and recommended depakot 500mg AM and 750 mg PM. The patient was discharged home in stable condition with instructions to follow up with neurology within 1 week with seizure precautions.           41 year old man with a PMHx of T5/T6 spinal osteomyelitis (diagnosed initially in 2014 s/p washout, with recurrence in December 2018 s/p needle aspiration with no growth, readmission in 2019 for IV abx), heroin use (states has been snorted, last 1 week ago, has not used IV drugs in 8 mo), general anxiety disorder, here s/p seizure this morning.    His last seizure was a year ago and the first one distant. He estimates he has had 4 in total and that they have always been in the setting of decreasing his benzodiazepine use. Three months ago he wanted to discontinue his alprazolam 2 mg TID and his PCP Dr. Healy agreed. He tolerated discontinuation but due to uncontrolled anxiety obtained alprazolam and for this reason obtained benzodiazepines without a prescription and has been intermittently taking PO and inhaled alprazolam three times a day for 1-2 weeks at a time with 1-2 weeks of abstinence in between. He has been attending his methadone clinic (115 mg qd) in addition to taking oxycodone 30 mg q6h PRN for back pain. He has not injected heroine for 8 months, citing his 7 month old daughter as a protective factor, and has been inhaling heroine last 4 days ago. His back pain is unchanged.     This present admission, he has been abstinent of alprazolam for 4 days. He was in his usual state of health until he was in his ambulette back to his apartment when he had a seizure. He recalls dropping his coffee and then losing consciousness and hitting his R temple and then waking up in the ambulette. EMS was called and he had another GTC x1 minute en route to [facility]. He denies any fevers, chest pain, or shortness of breath. No agitation, worsening anxiety, tremors. Recently fractured R wrist, used split.    At [facility], he was found to be febrile to 100.5. he received vancomycin 1 g, zosyn 3.375, gabapentin, oxycodone 30 mg, and 2 L NS. He left the ED to sign a lease and then returned.    He was admitted to the general medical floor for monitoring. The patient's methadone dose was confirmed and restarted. An EEG was performed which showed rare polyphasic epileptiform discharges. Neurology evaluated the patient and recommended depakot 500mg AM and 750 mg PM. The patient was discharged home in stable condition with instructions to follow up with neurology within 1 week with seizure precautions. Activities restrictions as outlined in instructions were also discussed w/ patient. All questions/concerns addressed.

## 2021-03-05 NOTE — DISCHARGE NOTE PROVIDER - NSDCMRMEDTOKEN_GEN_ALL_CORE_FT
dextroamphetamine-amphetamine 30 mg oral tablet: 1 tab(s) orally 2 times a day  Neurontin 600 mg oral tablet: 1 tab(s) orally 3 times a day  oxyCODONE 30 mg oral tablet: 1 tab(s) orally every 6 hours, As Needed   dextroamphetamine-amphetamine 30 mg oral tablet: 1 tab(s) orally 2 times a day  methadone 5 mg oral tablet: 23 tab(s) orally once a day  Neurontin 600 mg oral tablet: 1 tab(s) orally 3 times a day  oxyCODONE 30 mg oral tablet: 1 tab(s) orally every 6 hours, As Needed   Depakote 250 mg oral delayed release tablet: 1 tab(s) orally once a day (at bedtime)   Depakote 500 mg oral delayed release tablet: 1 tab(s) orally 2 times a day   dextroamphetamine-amphetamine 30 mg oral tablet: 1 tab(s) orally 2 times a day  methadone 5 mg oral tablet: 23 tab(s) orally once a day  Neurontin 600 mg oral tablet: 1 tab(s) orally 3 times a day  oxyCODONE 30 mg oral tablet: 1 tab(s) orally every 6 hours, As Needed

## 2021-03-05 NOTE — DISCHARGE NOTE PROVIDER - CARE PROVIDER_API CALL
Erickson Orr (MD)  Internal Medicine; Nephrology  [address], [address]  [place] [address]  Phone: [phone]  Fax: [phone]  Follow Up Time:

## 2021-03-05 NOTE — EEG REPORT - NS EEG TEXT BOX
MONSERRAT ROLON MRN-[id number] (1980)M  Admitting MD: Dr. Fernando Serna    Study Date: 03-5-21 08:00-11:50 3-5-21  x4 hrs  --------------------------------------------------------------------------------------------------  History:  CC/ HPI Patient is a 41y old  Male who presents with a chief complaint of Seizure (05 Mar 2021 07:14)    amphetamine/dextroamphetamine 30 milliGRAM(s) Oral two times a day  gabapentin 600 milliGRAM(s) Oral three times a day  influenza   Vaccine 0.5 milliLiter(s) IntraMuscular once  methadone    Tablet 115 milliGRAM(s) Oral daily  nicotine - 21 mG/24Hr(s) Patch 1 patch Transdermal daily    --------------------------------------------------------------------------------------------------  Study Interpretation:    [[[Abbreviation Key:  PDR=alpha rhythm/posterior dominant rhythm. A-P=anterior posterior gradient.  Amplitude: ‘very low’:<20; ‘low’:20-50; ‘medium’:; ‘high’:>200uV.  Persistence for periodic/rhythmic patterns (% of epoch) ‘rare’:<1%; ‘occasional’:1-10%; ‘frequent’:10-50%; ‘abundant’:50-90%; ‘continuous’:>90%.  Persistence for sporadic discharges: ‘rare’:<1/hr; ‘occasional’:1/min-1/hr; ‘frequent’:>1/min; ‘abundant’:>1/10 sec.  GRDA=generalized rhythmic delta activity, LRDA=lateralized rhythmic delta activity, TIRDA=temporal intermittent rhythmic delta activity, FIRDA=frontal intermittent rhythmic activity. LPD=PLED=lateralized periodic discharges, GPD=generalized periodic discharges, BiPDs=BiPLEDs=bilateral independent periodic epileptiform discharges, SIRPID=stimulus induced rhythmic, periodic, or ictal appearing discharges.  Modifiers: +F=with fast component, +S=with spike component, +R=with rhythmic component.  S-B=burst suppression pattern.  Max=maximal. N1-drowsy, N2-stage II sleep, N3-slow wave sleep.  HV=hyperventilation, PS=photic stimulation]]]    FINDINGS:  The background was continuous, spontaneously variable and reactive.  During wakefulness, the posteriorly dominant rhythm consisted of symmetric, well modulated 9-9.5 Hz activity, with an amplitude to 40 uV, that attenuated to eye opening.  Low amplitude central beta was noted in wakefulness.  Frequent Lambda noted    Background Slowing:  Generalized slowing: none was present.  Focal slowing: none was present.    Sleep Background:  Drowsiness was characterized by fragmentation, attenuation, and slowing of the background activity.      Epileptiform Activity:   none    Events:  No clinical events were recorded.  No seizures were recorded.    Activation Procedures:   -Hyperventilation was not performed.    -Photic stimulation was not performed.    Artifacts:  Intermittent myogenic and external motion artifacts were noted.    ECG:  The heart rate on single channel ECG at baseline was predominantly near BPM = 60-70  -----------------------------------------------------------------------------------------------------    EEG Classification / Summary:  normal EEG study, awake / drowsy     -----------------------------------------------------------------------------------------------------    Clinical Impression:  Previously seen Generalized spike and slow wave complexes not visualized in final 4 hours of recording. Previously seen findings are typically seen in patients with a primary generalized epilepsy syndrome.  Clinical correlation (inquiry re: presence of myoclonus, age of onset of seizures) recommended.    -------------------------------------------------------------------------------------------------------  [facility] EEG Reading Room Ph#: [phone]  Epilepsy Answering Service after 5PM and before 8:30AM: Ph#: [phone]    Santiago Messer MD PGY-5  Epilepsy Fellow    This Preliminary report is based on fellow review. Final report pending attending review.    Reading Room: [phone]  On Call Service After Hours: [phone] MONSERRAT ROLON [id number] (1980)M  Admitting MD: Dr. Fernando Serna    Study Date: 03-5-21 08:00-11:50 3-5-21  x4 hrs  --------------------------------------------------------------------------------------------------  History:  CC/ HPI Patient is a 41y old  Male who presents with a chief complaint of Seizure (05 Mar 2021 07:14)    amphetamine/dextroamphetamine 30 milliGRAM(s) Oral two times a day  gabapentin 600 milliGRAM(s) Oral three times a day  influenza   Vaccine 0.5 milliLiter(s) IntraMuscular once  methadone    Tablet 115 milliGRAM(s) Oral daily  nicotine - 21 mG/24Hr(s) Patch 1 patch Transdermal daily    --------------------------------------------------------------------------------------------------  Study Interpretation:    [[[Abbreviation Key:  PDR=alpha rhythm/posterior dominant rhythm. A-P=anterior posterior gradient.  Amplitude: ‘very low’:<20; ‘low’:20-50; ‘medium’:; ‘high’:>200uV.  Persistence for periodic/rhythmic patterns (% of epoch) ‘rare’:<1%; ‘occasional’:1-10%; ‘frequent’:10-50%; ‘abundant’:50-90%; ‘continuous’:>90%.  Persistence for sporadic discharges: ‘rare’:<1/hr; ‘occasional’:1/min-1/hr; ‘frequent’:>1/min; ‘abundant’:>1/10 sec.  GRDA=generalized rhythmic delta activity, LRDA=lateralized rhythmic delta activity, TIRDA=temporal intermittent rhythmic delta activity, FIRDA=frontal intermittent rhythmic activity. LPD=PLED=lateralized periodic discharges, GPD=generalized periodic discharges, BiPDs=BiPLEDs=bilateral independent periodic epileptiform discharges, SIRPID=stimulus induced rhythmic, periodic, or ictal appearing discharges.  Modifiers: +F=with fast component, +S=with spike component, +R=with rhythmic component.  S-B=burst suppression pattern.  Max=maximal. N1-drowsy, N2-stage II sleep, N3-slow wave sleep.  HV=hyperventilation, PS=photic stimulation]]]    FINDINGS:  The background was continuous, spontaneously variable and reactive.  During wakefulness, the posteriorly dominant rhythm consisted of symmetric, well modulated 9-9.5 Hz activity, with an amplitude to 40 uV, that attenuated to eye opening.  Low amplitude central beta was noted in wakefulness.  Frequent Lambda noted    Background Slowing:  Generalized slowing: none was present.  Focal slowing: none was present.    Sleep Background:  Drowsiness was characterized by fragmentation, attenuation, and slowing of the background activity.      Epileptiform Activity:   none    Events:  No clinical events were recorded.  No seizures were recorded.    Activation Procedures:   -Hyperventilation was not performed.    -Photic stimulation was not performed.    Artifacts:  Intermittent myogenic and external motion artifacts were noted.    ECG:  The heart rate on single channel ECG at baseline was predominantly near BPM = 60-70  -----------------------------------------------------------------------------------------------------    EEG Classification / Summary:  Normal EEG study, awake / drowsy     -----------------------------------------------------------------------------------------------------    Clinical Impression:  Previously seen generalized spike and slow wave complexes not visualized in final 4 hours of recording.     -------------------------------------------------------------------------------------------------------  [facility] EEG Reading Room Ph#: [phone]  Epilepsy Answering Service after 5PM and before 8:30AM: Ph#: [phone]    Santiago Messer MD PGY-5  Epilepsy Fellow    Reading Room: [phone]  On Call Service After Hours: [phone]

## 2021-03-05 NOTE — PROGRESS NOTE ADULT - PROBLEM SELECTOR PLAN 3
Likely 2/2 seizure. ESR 33 with history of osteo. Unlikely recurrence as pain at its baseline. RVP negative  -Trend fever  -Trend ESR  -If febrile, consider MRI spine, TTE though abstinent from IVDU for 8 months per patient  -Hold on abx. F/u cxs  -Neg rvp

## 2021-03-05 NOTE — EEG REPORT - NS EEG TEXT BOX
MONSERRAT ROLON [id number] 41y (1980)M  Admitting MD: Dr. Fernando Serna    Study Date: 03-4-21 19:07-08:00 3-5-21  x12:51hrs  --------------------------------------------------------------------------------------------------  History:  CC/ HPI Patient is a 41y old  Male who presents with a chief complaint of Seizure (05 Mar 2021 07:14)    amphetamine/dextroamphetamine 30 milliGRAM(s) Oral two times a day  gabapentin 600 milliGRAM(s) Oral three times a day  influenza   Vaccine 0.5 milliLiter(s) IntraMuscular once  methadone    Tablet 115 milliGRAM(s) Oral daily  nicotine - 21 mG/24Hr(s) Patch 1 patch Transdermal daily    --------------------------------------------------------------------------------------------------  Study Interpretation:    [[[Abbreviation Key:  PDR=alpha rhythm/posterior dominant rhythm. A-P=anterior posterior gradient.  Amplitude: ‘very low’:<20; ‘low’:20-50; ‘medium’:; ‘high’:>200uV.  Persistence for periodic/rhythmic patterns (% of epoch) ‘rare’:<1%; ‘occasional’:1-10%; ‘frequent’:10-50%; ‘abundant’:50-90%; ‘continuous’:>90%.  Persistence for sporadic discharges: ‘rare’:<1/hr; ‘occasional’:1/min-1/hr; ‘frequent’:>1/min; ‘abundant’:>1/10 sec.  GRDA=generalized rhythmic delta activity, LRDA=lateralized rhythmic delta activity, TIRDA=temporal intermittent rhythmic delta activity, FIRDA=frontal intermittent rhythmic activity. LPD=PLED=lateralized periodic discharges, GPD=generalized periodic discharges, BiPDs=BiPLEDs=bilateral independent periodic epileptiform discharges, SIRPID=stimulus induced rhythmic, periodic, or ictal appearing discharges.  Modifiers: +F=with fast component, +S=with spike component, +R=with rhythmic component.  S-B=burst suppression pattern.  Max=maximal. N1-drowsy, N2-stage II sleep, N3-slow wave sleep.  HV=hyperventilation, PS=photic stimulation]]]    FINDINGS:  The background was continuous, spontaneously variable and reactive.  During wakefulness, the posteriorly dominant rhythm consisted of symmetric, well modulated 9-9.5 Hz activity, with an amplitude to 40 uV, that attenuated to eye opening.  Low amplitude central beta was noted in wakefulness.  Frequent Lambda noted    Background Slowing:  Generalized slowing: none was present.  Focal slowing: none was present.    Sleep Background:  Drowsiness was characterized by fragmentation, attenuation, and slowing of the background activity.    Sleep was characterized by the presence of vertex waves, symmetric spindles, and K-complexes.    Epileptiform Activity:   Rare polyphasic epileptiform discharges were present in isolation or briefly repetitive near 5hz.      Events:  No clinical events were recorded.  No seizures were recorded.    Activation Procedures:   -Hyperventilation was not performed.    -Photic stimulation was not performed.    Artifacts:  Intermittent myogenic and external motion artifacts were noted.    ECG:  The heart rate on single channel ECG at baseline was predominantly near BPM = 60-70  -----------------------------------------------------------------------------------------------------    EEG Classification / Summary:  Abnormal EEG study, awake / drowsy / asleep    -Rare polyphasic epileptiform discharges were present in isolation or briefly repetitive near 5hz.      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  Clinical correlation (inquiry re: presence of myoclonus, age of onset of seizures) recommended.      -------------------------------------------------------------------------------------------------------  [facility] EEG Reading Room Ph#: [phone]  Epilepsy Answering Service after 5PM and before 8:30AM: Ph#: [phone]    Chester Tovar M.D.   of Neurology, [facility] Epilepsy [place]

## 2021-03-05 NOTE — DISCHARGE NOTE PROVIDER - NSDCCPCAREPLAN_GEN_ALL_CORE_FT
PRINCIPAL DISCHARGE DIAGNOSIS  Diagnosis: Seizure  Assessment and Plan of Treatment: You were admitted due to a seizure. No further seizures were observed.  You had an EEG which showed an area of the brain that is susceptible to seizures. The neurologists recommended _____.       PRINCIPAL DISCHARGE DIAGNOSIS  Diagnosis: Seizure  Assessment and Plan of Treatment: You were admitted due to a seizure. No further seizures were observed.  You had an EEG which showed an area of the brain that is susceptible to seizures. The neurologists recommended following up with them within 2 weeks. Take depakote 500mg two times per day. Return to the ER if you have headaches, nausea, vomiting, additional seizures, or if any other concerning symptoms arise. You should not drive, operate heavy machinery, avoid heights, pools, bathtubs until a neurologist instructs you otherwise.       PRINCIPAL DISCHARGE DIAGNOSIS  Diagnosis: Seizure  Assessment and Plan of Treatment: You were admitted due to a seizure. No further seizures were observed.  You had an EEG which showed an area of the brain that is susceptible to seizures. The neurologists recommended following up with them within 2 weeks. Take depakote 500mg in the morning and 750 mg at bedtime. Return to the ER if you have headaches, nausea, vomiting, additional seizures, or if any other concerning symptoms arise. You should not drive, operate heavy machinery, avoid heights, pools, bathtubs until a neurologist instructs you otherwise. Please follow up with an epileptologist in one week, at [address] [place] [address], number is in discharge summary       PRINCIPAL DISCHARGE DIAGNOSIS  Diagnosis: Seizure  Assessment and Plan of Treatment: You were admitted due to a seizure. No further seizures were observed.  You had an EEG which showed an area of the brain that is susceptible to seizures. The neurologists recommended following up with them within 2 weeks. Take depakote 500mg in the morning and 750 mg at bedtime. Return to the ER if you have headaches, nausea, vomiting, additional seizures, or if any other concerning symptoms arise. You should not drive, operate heavy machinery, avoid heights, pools, bathtubs until a neurologist instructs you otherwise. Please follow up with an epileptologist in one week, at [address] [place] [address], number is in discharge summary.  Monitor for any signs of bleeding, abdominal pain, and rash and  call neurology for further instructions if these issues arise

## 2021-03-05 NOTE — BH CONSULTATION LIAISON ASSESSMENT NOTE - NSSUICPROTFACT_PSY_ALL_CORE
none
Responsibility to children, family, or others/Identifies reasons for living/Supportive social network of family or friends/Zoroastrianism beliefs

## 2021-03-05 NOTE — BH CONSULTATION LIAISON ASSESSMENT NOTE - NSBHCONSULTRECOMMENDOTHER_PSY_A_CORE FT
Pt is reluctant to engage in any psychopharmacological measures or program referrals. Would try to reduce harm overall by having patient engage in methadone clinic and stay on Depakote for impulsivity/anxiety control (not ideal but what he is open to).     There is no psychiatric contraindication to discharge when medically cleared  Psychiatry signing off as there is low acuity. Please call if questions arise.

## 2021-03-05 NOTE — DISCHARGE NOTE PROVIDER - NSDCFUADDAPPT_GEN_ALL_CORE_FT
Epilepsy clinic: [facility]. [phone])  please call and schedule an appointment within one week !     Please follow up with primary care and neurology

## 2021-03-05 NOTE — BH CONSULTATION LIAISON ASSESSMENT NOTE - CURRENT MEDICATION
MEDICATIONS  (STANDING):  amphetamine/dextroamphetamine 30 milliGRAM(s) Oral two times a day  diVALproex  milliGRAM(s) Oral two times a day  gabapentin 600 milliGRAM(s) Oral three times a day  influenza   Vaccine 0.5 milliLiter(s) IntraMuscular once  methadone    Tablet 115 milliGRAM(s) Oral daily  nicotine - 21 mG/24Hr(s) Patch 1 patch Transdermal daily    MEDICATIONS  (PRN):  LORazepam     Tablet 2 milliGRAM(s) Oral every 2 hours PRN CIWA-Ar score increase by 2 points and a total score of 7 or less  LORazepam   Injectable 2 milliGRAM(s) IV Push every 1 hour PRN CIWA-Ar score 8 or greater  oxyCODONE    IR 30 milliGRAM(s) Oral every 6 hours PRN Moderate to severe pain (6-10)

## 2021-03-06 LAB
PROLACTIN SERPL-MCNC: 6.7 NG/ML — SIGNIFICANT CHANGE UP (ref 4.1–18.4)
VALPROATE SERPL-MCNC: 35.6 UG/ML — LOW (ref 50–100)

## 2021-03-06 PROCEDURE — 95718 EEG PHYS/QHP 2-12 HR W/VEEG: CPT

## 2021-03-06 PROCEDURE — 95720 EEG PHY/QHP EA INCR W/VEEG: CPT

## 2021-03-06 PROCEDURE — 99232 SBSQ HOSP IP/OBS MODERATE 35: CPT | Mod: GC

## 2021-03-06 RX ORDER — DIVALPROEX SODIUM 500 MG/1
750 TABLET, DELAYED RELEASE ORAL
Refills: 0 | Status: DISCONTINUED | OUTPATIENT
Start: 2021-03-06 | End: 2021-03-06

## 2021-03-06 RX ORDER — METHADONE HYDROCHLORIDE 40 MG/1
23 TABLET ORAL
Qty: 0 | Refills: 0 | DISCHARGE
Start: 2021-03-06

## 2021-03-06 RX ORDER — DIVALPROEX SODIUM 500 MG/1
750 TABLET, DELAYED RELEASE ORAL
Refills: 0 | Status: DISCONTINUED | OUTPATIENT
Start: 2021-03-06 | End: 2021-03-07

## 2021-03-06 RX ORDER — DIVALPROEX SODIUM 500 MG/1
500 TABLET, DELAYED RELEASE ORAL
Refills: 0 | Status: DISCONTINUED | OUTPATIENT
Start: 2021-03-07 | End: 2021-03-07

## 2021-03-06 RX ORDER — METHADONE HYDROCHLORIDE 40 MG/1
70 TABLET ORAL
Qty: 0 | Refills: 0 | DISCHARGE
Start: 2021-03-06

## 2021-03-06 RX ADMIN — GABAPENTIN 600 MILLIGRAM(S): 400 CAPSULE ORAL at 14:17

## 2021-03-06 RX ADMIN — OXYCODONE HYDROCHLORIDE 30 MILLIGRAM(S): 5 TABLET ORAL at 17:39

## 2021-03-06 RX ADMIN — OXYCODONE HYDROCHLORIDE 30 MILLIGRAM(S): 5 TABLET ORAL at 04:56

## 2021-03-06 RX ADMIN — Medication 1 PATCH: at 18:19

## 2021-03-06 RX ADMIN — DEXTROAMPHETAMINE SACCHARATE, AMPHETAMINE ASPARTATE, DEXTROAMPHETAMINE SULFATE AND AMPHETAMINE SULFATE 30 MILLIGRAM(S): 1.875; 1.875; 1.875; 1.875 TABLET ORAL at 06:20

## 2021-03-06 RX ADMIN — OXYCODONE HYDROCHLORIDE 30 MILLIGRAM(S): 5 TABLET ORAL at 04:06

## 2021-03-06 RX ADMIN — OXYCODONE HYDROCHLORIDE 30 MILLIGRAM(S): 5 TABLET ORAL at 18:09

## 2021-03-06 RX ADMIN — OXYCODONE HYDROCHLORIDE 30 MILLIGRAM(S): 5 TABLET ORAL at 11:18

## 2021-03-06 RX ADMIN — DIVALPROEX SODIUM 750 MILLIGRAM(S): 500 TABLET, DELAYED RELEASE ORAL at 21:56

## 2021-03-06 RX ADMIN — GABAPENTIN 600 MILLIGRAM(S): 400 CAPSULE ORAL at 21:56

## 2021-03-06 RX ADMIN — Medication 1 PATCH: at 11:19

## 2021-03-06 RX ADMIN — GABAPENTIN 600 MILLIGRAM(S): 400 CAPSULE ORAL at 06:20

## 2021-03-06 RX ADMIN — DEXTROAMPHETAMINE SACCHARATE, AMPHETAMINE ASPARTATE, DEXTROAMPHETAMINE SULFATE AND AMPHETAMINE SULFATE 30 MILLIGRAM(S): 1.875; 1.875; 1.875; 1.875 TABLET ORAL at 18:19

## 2021-03-06 RX ADMIN — Medication 1 PATCH: at 06:23

## 2021-03-06 RX ADMIN — METHADONE HYDROCHLORIDE 115 MILLIGRAM(S): 40 TABLET ORAL at 11:19

## 2021-03-06 RX ADMIN — DIVALPROEX SODIUM 500 MILLIGRAM(S): 500 TABLET, DELAYED RELEASE ORAL at 06:20

## 2021-03-06 RX ADMIN — OXYCODONE HYDROCHLORIDE 30 MILLIGRAM(S): 5 TABLET ORAL at 11:48

## 2021-03-06 RX ADMIN — Medication 1 PATCH: at 11:20

## 2021-03-06 RX ADMIN — OXYCODONE HYDROCHLORIDE 30 MILLIGRAM(S): 5 TABLET ORAL at 23:52

## 2021-03-06 NOTE — PROGRESS NOTE ADULT - ASSESSMENT
This present admission, he has been abstinent of alprazolam for 4 days. He was in his usual state of health until he was in his ambulette back to his apartment when he had a seizure. He recalls dropping his coffee and then losing consciousness and hitting his R temple and then waking up in the ambulette. EMS was called and he had another GTC x1 minute en route to [facility].  EEG showed generalized epilepsy     continue valproic acid

## 2021-03-06 NOTE — PROGRESS NOTE ADULT - PROBLEM SELECTOR PLAN 3
Likely 2/2 seizure. ESR 33 with history of osteo. Unlikely recurrence as pain at its baseline. RVP negative, remains afebrile  -If febrile, consider MRI spine, TTE though abstinent from IVDU for 8 months per patient  -Hold on abx. F/u cxs Likely 2/2 seizure. ESR 33 with history of osteo. Unlikely recurrence as pain at its baseline. RVP negative, remains afebrile  -If febrile, consider MRI spine, TTE though abstinent from IVDU for 8 months per patient  -Hold on abx  - Cultures remain negative to date

## 2021-03-06 NOTE — PROGRESS NOTE ADULT - PROBLEM SELECTOR PLAN 2
- psych consulted, recommending depakote for use of anxiety stabilization and no further contraindication to d/c

## 2021-03-06 NOTE — PROGRESS NOTE ADULT - PROBLEM SELECTOR PLAN 8
IMPROVE = 0  Regular diet  Seizure precautions  Nicotine patch  Activity as tolerated IMPROVE = 0  Regular diet  Seizure precautions  Nicotine patch  Activity as tolerated  D/c tomorrow

## 2021-03-06 NOTE — PROGRESS NOTE ADULT - PROBLEM SELECTOR PLAN 1
Likely 2/2 benzodiazepine withdrawal  - will d/c CIWA  - EEG w/ epileptiform discharges  - started depakote yesterday, will check level prior to third dose at 1Pm today  - Seizure precautions Likely 2/2 benzodiazepine withdrawal  - will d/c CIWA  - EEG w/ epileptiform discharges  - started depakote yesterday, will check level prior to third dose at 6PM today  - Seizure precautions

## 2021-03-06 NOTE — EEG REPORT - NS EEG TEXT BOX
MONSERRAT ROLON [id number] 41y (1980)M  Admitting MD: Dr. Fernando Serna    Study Date: 03-6-21 08:00-13:00 3-6-21  x5hrs    --------------------------------------------------------------------------------------------------  History:  CC/ HPI Patient is a 41y old  Male who presents with a chief complaint of Seizure (05 Mar 2021 07:14)    amphetamine/dextroamphetamine 30 milliGRAM(s) Oral two times a day  gabapentin 600 milliGRAM(s) Oral three times a day  influenza   Vaccine 0.5 milliLiter(s) IntraMuscular once  methadone    Tablet 115 milliGRAM(s) Oral daily  nicotine - 21 mG/24Hr(s) Patch 1 patch Transdermal daily    --------------------------------------------------------------------------------------------------  Study Interpretation:    [[[Abbreviation Key:  PDR=alpha rhythm/posterior dominant rhythm. A-P=anterior posterior gradient.  Amplitude: ‘very low’:<20; ‘low’:20-50; ‘medium’:; ‘high’:>200uV.  Persistence for periodic/rhythmic patterns (% of epoch) ‘rare’:<1%; ‘occasional’:1-10%; ‘frequent’:10-50%; ‘abundant’:50-90%; ‘continuous’:>90%.  Persistence for sporadic discharges: ‘rare’:<1/hr; ‘occasional’:1/min-1/hr; ‘frequent’:>1/min; ‘abundant’:>1/10 sec.  GRDA=generalized rhythmic delta activity, LRDA=lateralized rhythmic delta activity, TIRDA=temporal intermittent rhythmic delta activity, FIRDA=frontal intermittent rhythmic activity. LPD=PLED=lateralized periodic discharges, GPD=generalized periodic discharges, BiPDs=BiPLEDs=bilateral independent periodic epileptiform discharges, SIRPID=stimulus induced rhythmic, periodic, or ictal appearing discharges.  Modifiers: +F=with fast component, +S=with spike component, +R=with rhythmic component.  S-B=burst suppression pattern.  Max=maximal. N1-drowsy, N2-stage II sleep, N3-slow wave sleep.  HV=hyperventilation, PS=photic stimulation]]]    FINDINGS:  The background was continuous, spontaneously variable and reactive.  During wakefulness, the posteriorly dominant rhythm consisted of symmetric, well modulated 9-9.5 Hz activity, with an amplitude to 40 uV, that attenuated to eye opening.  Low amplitude central beta was noted in wakefulness.  Frequent Lambda noted    Background Slowing:  Generalized slowing: none was present.  Focal slowing: none was present.    Sleep Background:  Drowsiness was characterized by fragmentation, attenuation, and slowing of the background activity.      Epileptiform Activity:   none    Events:  No clinical events were recorded.  No seizures were recorded.    Activation Procedures:   -Hyperventilation was not performed.    -Photic stimulation was not performed.    Artifacts:  Intermittent myogenic and external motion artifacts were noted.    ECG:  The heart rate on single channel ECG at baseline was predominantly near BPM = 60-70  -----------------------------------------------------------------------------------------------------    EEG Classification / Summary:  normal EEG study, awake / drowsy     -----------------------------------------------------------------------------------------------------    Clinical Impression:  Normal study    Yovany Winter MD  EEG/Epilepsy Attending

## 2021-03-06 NOTE — EEG REPORT - NS EEG TEXT BOX
MONSERRAT ROLON [id number] 41y (1980)M  Admitting MD: Dr. Fernando Serna    Study Date: 03-5-21 08:00-08:00 3-6-21  x24hrs  (Note: includes partial studies from yesterday)  --------------------------------------------------------------------------------------------------  History:  CC/ HPI Patient is a 41y old  Male who presents with a chief complaint of Seizure (05 Mar 2021 07:14)    amphetamine/dextroamphetamine 30 milliGRAM(s) Oral two times a day  gabapentin 600 milliGRAM(s) Oral three times a day  influenza   Vaccine 0.5 milliLiter(s) IntraMuscular once  methadone    Tablet 115 milliGRAM(s) Oral daily  nicotine - 21 mG/24Hr(s) Patch 1 patch Transdermal daily    --------------------------------------------------------------------------------------------------  Study Interpretation:    [[[Abbreviation Key:  PDR=alpha rhythm/posterior dominant rhythm. A-P=anterior posterior gradient.  Amplitude: ‘very low’:<20; ‘low’:20-50; ‘medium’:; ‘high’:>200uV.  Persistence for periodic/rhythmic patterns (% of epoch) ‘rare’:<1%; ‘occasional’:1-10%; ‘frequent’:10-50%; ‘abundant’:50-90%; ‘continuous’:>90%.  Persistence for sporadic discharges: ‘rare’:<1/hr; ‘occasional’:1/min-1/hr; ‘frequent’:>1/min; ‘abundant’:>1/10 sec.  GRDA=generalized rhythmic delta activity, LRDA=lateralized rhythmic delta activity, TIRDA=temporal intermittent rhythmic delta activity, FIRDA=frontal intermittent rhythmic activity. LPD=PLED=lateralized periodic discharges, GPD=generalized periodic discharges, BiPDs=BiPLEDs=bilateral independent periodic epileptiform discharges, SIRPID=stimulus induced rhythmic, periodic, or ictal appearing discharges.  Modifiers: +F=with fast component, +S=with spike component, +R=with rhythmic component.  S-B=burst suppression pattern.  Max=maximal. N1-drowsy, N2-stage II sleep, N3-slow wave sleep.  HV=hyperventilation, PS=photic stimulation]]]    FINDINGS:  The background was continuous, spontaneously variable and reactive.  During wakefulness, the posteriorly dominant rhythm consisted of symmetric, well modulated 9-9.5 Hz activity, with an amplitude to 40 uV, that attenuated to eye opening.  Low amplitude central beta was noted in wakefulness.  Frequent Lambda noted    Background Slowing:  Generalized slowing: none was present.  Focal slowing: none was present.    Sleep Background:  Drowsiness was characterized by fragmentation, attenuation, and slowing of the background activity.      Epileptiform Activity:   none    Events:  No clinical events were recorded.  No seizures were recorded.    Activation Procedures:   -Hyperventilation was not performed.    -Photic stimulation was not performed.    Artifacts:  Intermittent myogenic and external motion artifacts were noted.    ECG:  The heart rate on single channel ECG at baseline was predominantly near BPM = 60-70  -----------------------------------------------------------------------------------------------------    EEG Classification / Summary:  normal EEG study, awake / drowsy     -----------------------------------------------------------------------------------------------------    Clinical Impression:  Normal study  Previously seen Generalized spike and slow wave complexes not visualized. Previously seen findings are typically seen in patients with a primary generalized epilepsy syndrome.      Yovany Winter MD  EEG/Epilepsy Attending

## 2021-03-07 ENCOUNTER — TRANSCRIPTION ENCOUNTER (OUTPATIENT)
Age: 41
End: 2021-03-07

## 2021-03-07 VITALS
RESPIRATION RATE: 18 BRPM | SYSTOLIC BLOOD PRESSURE: 126 MMHG | OXYGEN SATURATION: 96 % | HEART RATE: 70 BPM | TEMPERATURE: 98 F | DIASTOLIC BLOOD PRESSURE: 68 MMHG

## 2021-03-07 PROCEDURE — 99239 HOSP IP/OBS DSCHRG MGMT >30: CPT | Mod: GC

## 2021-03-07 RX ORDER — DIVALPROEX SODIUM 500 MG/1
1 TABLET, DELAYED RELEASE ORAL
Qty: 60 | Refills: 0
Start: 2021-03-07 | End: 2021-04-05

## 2021-03-07 RX ORDER — DIVALPROEX SODIUM 500 MG/1
1 TABLET, DELAYED RELEASE ORAL
Qty: 30 | Refills: 0
Start: 2021-03-07 | End: 2021-04-05

## 2021-03-07 RX ORDER — ACETAMINOPHEN 500 MG
1 TABLET ORAL
Qty: 3 | Refills: 0
Start: 2021-03-07 | End: 2021-03-09

## 2021-03-07 RX ADMIN — Medication 1 PATCH: at 07:15

## 2021-03-07 RX ADMIN — GABAPENTIN 600 MILLIGRAM(S): 400 CAPSULE ORAL at 12:13

## 2021-03-07 RX ADMIN — OXYCODONE HYDROCHLORIDE 30 MILLIGRAM(S): 5 TABLET ORAL at 07:41

## 2021-03-07 RX ADMIN — OXYCODONE HYDROCHLORIDE 30 MILLIGRAM(S): 5 TABLET ORAL at 00:50

## 2021-03-07 RX ADMIN — Medication 1 PATCH: at 12:08

## 2021-03-07 RX ADMIN — DEXTROAMPHETAMINE SACCHARATE, AMPHETAMINE ASPARTATE, DEXTROAMPHETAMINE SULFATE AND AMPHETAMINE SULFATE 30 MILLIGRAM(S): 1.875; 1.875; 1.875; 1.875 TABLET ORAL at 05:52

## 2021-03-07 RX ADMIN — GABAPENTIN 600 MILLIGRAM(S): 400 CAPSULE ORAL at 05:52

## 2021-03-07 RX ADMIN — METHADONE HYDROCHLORIDE 115 MILLIGRAM(S): 40 TABLET ORAL at 11:51

## 2021-03-07 RX ADMIN — DIVALPROEX SODIUM 500 MILLIGRAM(S): 500 TABLET, DELAYED RELEASE ORAL at 07:15

## 2021-03-07 RX ADMIN — OXYCODONE HYDROCHLORIDE 30 MILLIGRAM(S): 5 TABLET ORAL at 08:30

## 2021-03-07 NOTE — PROGRESS NOTE ADULT - PROBLEM SELECTOR PLAN 7
- Trend CMP  - Likely due to muscle break down iso seizure as CK elevated as well
- Trend CMP  - Likely due to muscle break down due to seizure as CK elevated as well
Trend CMP  Likely due to muscle break down iso seizure as CK elevated as well
- Trend CMP  - Likely due to muscle break down iso seizure as CK elevated as well

## 2021-03-07 NOTE — PROGRESS NOTE ADULT - PROBLEM SELECTOR PROBLEM 4
Benzodiazepine abuse
Spine appears normal, range of motion is not limited, no muscle or joint tenderness
Benzodiazepine abuse

## 2021-03-07 NOTE — PROGRESS NOTE ADULT - PROBLEM SELECTOR PLAN 1
Likely 2/2 benzodiazepine withdrawal  - will d/c CIWA  - EEG w/ epileptiform discharges  - started depakote yesterday, Valproate level was low, dose increased to 500 mg AM and 750 mg in PM, re-check level at 6 pm  - Seizure precautions Likely 2/2 benzodiazepine withdrawal  - will d/c CIWA  - EEG w/ epileptiform discharges  - Cont valproate as per neuro recs.  - Seizure precautions - activities restrictions were reviewed w/ patient. Refrain from driving,  operation of heavy machinery and other restrictions as outlined by neuro for at least 1 yr pending neuro f/u.   -Close follow up OP for med titration and med level check.

## 2021-03-07 NOTE — PROGRESS NOTE ADULT - PROBLEM SELECTOR PLAN 6
Oxycodone 30 mg q6h PRN for moderate to severe pain  Gabapentin 600 mg TID
- Oxycodone 30 mg q6h PRN for moderate to severe pain  - Gabapentin 600 mg TID

## 2021-03-07 NOTE — PROGRESS NOTE ADULT - PROBLEM SELECTOR PLAN 5
Confirmed with clinic  - c/w methadone 115mg daily  - QTc 450

## 2021-03-07 NOTE — DISCHARGE NOTE NURSING/CASE MANAGEMENT/SOCIAL WORK - PATIENT PORTAL LINK FT
You can access the FollowMyHealth Patient Portal offered by [facility] by registering at the following website: [email or web address]. By joining [facility]’s FollowMyHealth portal, you will also be able to view your health information using other applications (apps) compatible with our system.

## 2021-03-07 NOTE — PROGRESS NOTE ADULT - SUBJECTIVE AND OBJECTIVE BOX
Neurology Progress    MONSERRAT [id number]    HPI:  41 year old man with a PMHx of T5/T6 spinal osteomyelitis (diagnosed initially in 2014 s/p washout, with recurrence in December 2018 s/p needle aspiration with no growth, readmission in 2019 for IV abx), heroin use (states has been snorted, last 1 week ago, has not used IV drugs in 8 mo), general anxiety disorder, here s/p seizure this morning.    His last seizure was a year ago and the first one distant. He estimates he has had 4 in total and that they have always been in the setting of decreasing his benzodiazepine use. Three months ago he wanted to discontinue his alprazolam 2 mg TID and his PCP Dr. Healy agreed. He tolerated discontinuation but due to uncontrolled anxiety obtained alprazolam and for this reason obtained benzodiazepines without a prescription and has been intermittently taking PO and inhaled alprazolam three times a day for 1-2 weeks at a time with 1-2 weeks of abstinence in between. He has been attending his methadone clinic (115 mg qd) in addition to taking oxycodone 30 mg q6h PRN for back pain. He has not injected heroine for 8 months, citing his 7 month old daughter as a protective factor, and has been inhaling heroine last 4 days ago. His back pain is unchanged.     This present admission, he has been abstinent of alprazolam for 4 days. He was in his usual state of health until he was in his ambulette back to his apartment when he had a seizure. He recalls dropping his coffee and then losing consciousness and hitting his R temple and then waking up in the ambulette. EMS was called and he had another GTC x1 minute en route to [facility] He denies any fevers, chest pain, or shortness of breath. No agitation, worsening anxiety, tremors. Recently fractured R wrist, used split.    At [facility] he was foudn to be febrile to 100.5. he received vancomycin 1 g, zosyn 3.375, gabapentin, oxycodone 30 mg, and 2 L NS. He left the ED to sign a lease and then returned. (03 Mar 2021 20:11)      Past Medical History  Methadone maintenance therapy patient    Seizure    Osteomyelitis of lumbar spine    IV drug abuse        Past Surgical History  H/O Spinal surgery    No significant past surgical history        MEDICATIONS    amphetamine/dextroamphetamine 30 milliGRAM(s) Oral two times a day  diVALproex  milliGRAM(s) Oral two times a day  gabapentin 600 milliGRAM(s) Oral three times a day  influenza   Vaccine 0.5 milliLiter(s) IntraMuscular once  methadone    Tablet 115 milliGRAM(s) Oral daily  nicotine - 21 mG/24Hr(s) Patch 1 patch Transdermal daily  oxyCODONE    IR 30 milliGRAM(s) Oral every 6 hours PRN         Family history: No history of dementia, strokes, or seizures   FAMILY HISTORY:  FH: dementia      SOCIAL HISTORY -- No history of tobacco or alcohol use     Allergies    Zosyn (Hives)    Intolerances            Vital Signs Last 24 Hrs  T(C): 36.8 (06 Mar 2021 06:19), Max: 36.8 (06 Mar 2021 06:19)  T(F): 98.2 (06 Mar 2021 06:19), Max: 98.2 (06 Mar 2021 06:19)  HR: 66 (06 Mar 2021 06:19) (66 - 82)  BP: 114/71 (06 Mar 2021 06:19) (114/71 - 135/70)  BP(mean): --  RR: 18 (06 Mar 2021 06:19) (16 - 18)  SpO2: 97% (06 Mar 2021 06:19) (97% - 100%)        On Neurological Examination:    Mental Status - Patient is alert, awake, oriented X3. .   Follows commands well and able to answer questions appropriately. Mood and affect  normal  Follow simple commands able to repeat  able to name.  Speech - Fluent no Dysarthria  no  Aphasia                              Cranial Nerves - Extraocular muscle intact  GIGI Facial symmetry Tongue midline, CnV1to V3 intact gross hearing intact      Motor Exam -   Right upper  5/5 throughout  Left upper 5/5 throughtout  Right lower- 5/5 throughout  Left lower 5/5 throughout  Coordination -finger to nose intact  Muscle tone - is normal all over. No asymmetry is seen.      Sensory    Bilateral intact to light touch    Gait -  normal  no ataxia     GENERAL Exam:     Nontoxic , No Acute Distress   	  HEENT:  normocephalic, atraumatic  		  LUNGS:	Clear bilaterally  No Wheeze      VASCULAR: no carotid brui  	  HEART:	 Normal S1S2   No murmur RRR        	  MUSCULOSKELETAL: Normal Range of Motion  	   SKIN:      Normal   No Ecchymosis               LABS:  CBC Full  -  ( 05 Mar 2021 07:54 )  WBC Count : 6.76 K/uL  RBC Count : 5.16 M/uL  Hemoglobin : 15.0 g/dL  Hematocrit : 46.8 %  Platelet Count - Automated : 153 K/uL  Mean Cell Volume : 90.7 fL  Mean Cell Hemoglobin : 29.1 pg  Mean Cell Hemoglobin Concentration : 32.1 gm/dL  Auto Neutrophil # : x  Auto Lymphocyte # : x  Auto Monocyte # : x  Auto Eosinophil # : x  Auto Basophil # : x  Auto Neutrophil % : x  Auto Lymphocyte % : x  Auto Monocyte % : x  Auto Eosinophil % : x  Auto Basophil % : x      03-05    135  |  99  |  11  ----------------------------<  98  4.3   |  25  |  0.59    Ca    9.8      05 Mar 2021 07:57  Phos  4.0     03-05  Mg     2.3     03-05    TPro  8.4<H>  /  Alb  4.3  /  TBili  0.5  /  DBili  x   /  AST  34  /  ALT  50<H>  /  AlkPhos  170<H>  03-05    Hemoglobin A1C:     LIVER FUNCTIONS - ( 05 Mar 2021 07:57 )  Alb: 4.3 g/dL / Pro: 8.4 g/dL / ALK PHOS: 170 U/L / ALT: 50 U/L / AST: 34 U/L / GGT: x           Vitamin B12         RADIOLOGY    EEG  · EEG Report	  MONSERRAT ROLON [id number] (1980)M  Admitting MD: Dr. Fernando Serna    Study Date: 03-5-21 08:00-19:00 3-5-21  x11hrs  --------------------------------------------------------------------------------------------------  History:  CC/ HPI Patient is a 41y old  Male who presents with a chief complaint of Seizure (05 Mar 2021 07:14)    amphetamine/dextroamphetamine 30 milliGRAM(s) Oral two times a day  gabapentin 600 milliGRAM(s) Oral three times a day  influenza   Vaccine 0.5 milliLiter(s) IntraMuscular once  methadone    Tablet 115 milliGRAM(s) Oral daily  nicotine - 21 mG/24Hr(s) Patch 1 patch Transdermal daily    --------------------------------------------------------------------------------------------------  Study Interpretation:    [[[Abbreviation Key:  PDR=alpha rhythm/posterior dominant rhythm. A-P=anterior posterior gradient.  Amplitude: ‘very low’:<20; ‘low’:20-50; ‘medium’:; ‘high’:>200uV.  Persistence for periodic/rhythmic patterns (% of epoch) ‘rare’:<1%; ‘occasional’:1-10%; ‘frequent’:10-50%; ‘abundant’:50-90%; ‘continuous’:>90%.  Persistence for sporadic discharges: ‘rare’:<1/hr; ‘occasional’:1/min-1/hr; ‘frequent’:>1/min; ‘abundant’:>1/10 sec.  GRDA=generalized rhythmic delta activity, LRDA=lateralized rhythmic delta activity, TIRDA=temporal intermittent rhythmic delta activity, FIRDA=frontal intermittent rhythmic activity. LPD=PLED=lateralized periodic discharges, GPD=generalized periodic discharges, BiPDs=BiPLEDs=bilateral independent periodic epileptiform discharges, SIRPID=stimulus induced rhythmic, periodic, or ictal appearing discharges.  Modifiers: +F=with fast component, +S=with spike component, +R=with rhythmic component.  S-B=burst suppression pattern.  Max=maximal. N1-drowsy, N2-stage II sleep, N3-slow wave sleep.  HV=hyperventilation, PS=photic stimulation]]]    FINDINGS:  The background was continuous, spontaneously variable and reactive.  During wakefulness, the posteriorly dominant rhythm consisted of symmetric, well modulated 9-9.5 Hz activity, with an amplitude to 40 uV, that attenuated to eye opening.  Low amplitude central beta was noted in wakefulness.  Frequent Lambda noted    Background Slowing:  Generalized slowing: none was present.  Focal slowing: none was present.    Sleep Background:  Drowsiness was characterized by fragmentation, attenuation, and slowing of the background activity.      Epileptiform Activity:   none    Events:  No clinical events were recorded.  No seizures were recorded.    Activation Procedures:   -Hyperventilation was not performed.    -Photic stimulation was not performed.    Artifacts:  Intermittent myogenic and external motion artifacts were noted.    ECG:  The heart rate on single channel ECG at baseline was predominantly near BPM = 60-70  -----------------------------------------------------------------------------------------------------    EEG Classification / Summary:  normal EEG study, awake / drowsy     -----------------------------------------------------------------------------------------------------    Clinical Impression:  Previously seen Generalized spike and slow wave complexes not visualized in final hours of recording. Previously seen findings are typically seen in patients with a primary generalized epilepsy syndrome.  Clinical correlation (inquiry re: presence of myoclonus, age of onset of seizures) recommended.    Yovany Winter MD  EEG/Epilepsy Attending      Electronic Signatures:  Yovany Winter)  (Signed 05-Mar-2021 23:13)  	Authored: EEG REPORT      Last Updated: 05-Mar-2021 23:13 by Stev schoenberg 
*************************************  NEUROLOGY PROGRESS NOTE  **************************************    MONSERRAT ROLON  Male  [id number]    Subjective:  Patient was seen and examined at the bedside. Stated that he felt well and denied any further convulsion episodes.    Additional History:  Patient stated that his first seizure was about 15 years ago and was also described as a generalized tonic-clonic event. Stated that it was also in the setting of stopping benzodiazepines. Denied any myoclonic episodes in the past. Stated that occasionally does have episodes of acting out his dreams.    ROS:  Review of systems positive for all above in subjective, otherwise negative.       VITAL SIGNS:  Vital Signs Last 24 Hrs  T(C): 36.7 (05 Mar 2021 06:16), Max: 36.9 (04 Mar 2021 21:38)  T(F): 98 (05 Mar 2021 06:16), Max: 98.5 (04 Mar 2021 21:38)  HR: 71 (05 Mar 2021 06:16) (60 - 71)  BP: 95/64 (05 Mar 2021 06:16) (95/64 - 115/57)  BP(mean): --  RR: 16 (05 Mar 2021 06:16) (16 - 18)  SpO2: 98% (05 Mar 2021 06:16) (98% - 99%)    PHYSICAL EXAMINATION:  General: Well-developed, well nourished, in no acute distress.  Neurologic:  - Mental Status:  Alert, awake, oriented to person, place, and time; Speech is fluent with intact naming comprehension.  - Cranial Nerves II-XII:  VFF, EOMI, PERRLA, V1-V3 intact, no facial asymmetry, t/p midline.  - Motor:  Strength is 5/5 throughout except R  strength which is 4/5.  There is no pronator drift.  Normal muscle bulk and tone throughout.  - Reflexes:  2+ and symmetric at the biceps, triceps, brachioradialis, knees, and ankles.  Plantar responses flexor.  - Sensory:  Intact to light touch throughout.  - Coordination:  Finger-nose-finger without dysmetria.    - Gait:  Deferred    LABS:                          15.0   6.76  )-----------( 153      ( 05 Mar 2021 07:54 )             46.8     03-05    135  |  99  |  11  ----------------------------<  98  4.3   |  25  |  0.59    Ca    9.8      05 Mar 2021 07:57  Phos  4.0     03-05  Mg     2.3     03-05    TPro  8.4<H>  /  Alb  4.3  /  TBili  0.5  /  DBili  x   /  AST  34  /  ALT  50<H>  /  AlkPhos  170<H>  03-05    RADIOLOGY & ADDITIONAL STUDIES:      EEG:    EEG Classification / Summary:  Abnormal EEG study, awake / drowsy / asleep    -Rare polyphasic epileptiform discharges were present in isolation or briefly repetitive near 5hz.      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  Clinical correlation (inquiry re: presence of myoclonus, age of onset of seizures) recommended.      
Beverley Kitchen MD  PGY 1 Department of Internal Medicine  Pager: [phone] ([facility])       Patient is a 41y old  Male who presents with a chief complaint of Seizure (06 Mar 2021 09:25)      SUBJECTIVE / OVERNIGHT EVENTS: Pt seen and examined. No acute overnight events. Patient's 6pm Valproate level was low yesterday evening, dose changed to Depakote 500 mg in AM, 750 mg in PM.   Denies fevers, chills, CP, SOB, Abdominal pain, N/V, Constipation, Diarrhea        MEDICATIONS  (STANDING):  amphetamine/dextroamphetamine 30 milliGRAM(s) Oral two times a day  diVALproex  milliGRAM(s) Oral <User Schedule>  diVALproex  milliGRAM(s) Oral <User Schedule>  gabapentin 600 milliGRAM(s) Oral three times a day  influenza   Vaccine 0.5 milliLiter(s) IntraMuscular once  methadone    Tablet 115 milliGRAM(s) Oral daily  nicotine - 21 mG/24Hr(s) Patch 1 patch Transdermal daily    MEDICATIONS  (PRN):  oxyCODONE    IR 30 milliGRAM(s) Oral every 6 hours PRN Moderate to severe pain (6-10)      I&O's Summary      Vital Signs Last 24 Hrs  T(C): 36.9 (07 Mar 2021 05:50), Max: 37.2 (06 Mar 2021 17:48)  T(F): 98.4 (07 Mar 2021 05:50), Max: 98.9 (06 Mar 2021 17:48)  HR: 70 (07 Mar 2021 05:50) (70 - 94)  BP: 126/68 (07 Mar 2021 05:50) (107/71 - 126/68)  BP(mean): --  RR: 18 (07 Mar 2021 05:50) (18 - 20)  SpO2: 96% (07 Mar 2021 05:50) (96% - 98%)    CAPILLARY BLOOD GLUCOSE          PHYSICAL EXAM:  General: WN/WD NAD, EEG in place  HEENT: PERRLA, EOMI, moist mucous membranes  Neurology: A&Ox3, nonfocal, GALLAGHER x 4  Respiratory: CTA B/L, normal respiratory effort, no wheezes, crackles, rales  CV: RRR, S1S2, no murmurs, rubs or gallops  Abdominal: Soft, NT, ND +BS  Extremities: No edema, + peripheral pulses       LABS:    03-05    135  |  99  |  11  ----------------------------<  98  4.3   |  25  |  0.59    Ca    9.8      05 Mar 2021 07:57  Mg     2.3     03-05  Phos  4.0     03-05  TPro  8.4<H>  /  Alb  4.3  /  TBili  0.5  /  DBili  x   /  AST  34  /  ALT  50<H>  /  AlkPhos  170<H>  03-05      
Mary Wilcox MD PGY5  Pager: [phone]  After 7PM please page [phone]    Patient is a 41y old  Male who presents with a chief complaint of Seizure (05 Mar 2021 07:14)      INTERVAL HPI/OVERNIGHT EVENTS: No acute overnight events. Pt denies chest pain., SOB, nausea, vomiting, tremors, headache, anxiety. Pt currently on EEG.      REVIEW OF SYSTEMS:  Constitutional:     [ ] negative [ ] fevers [ ] chills [ ] weight loss [ ] weight gain  HEENT:                  [ ] negative [ ] dry eyes [ ] eye irritation [ ] postnasal drip [ ] nasal congestion  CV:                         [ ] negative  [ ] chest pain [ ] orthopnea [ ] palpitations [ ] murmur  Resp:                     [ ] negative [ ] cough [ ] shortness of breath [ ] dyspnea [ ] wheezing [ ] sputum [ ] hemoptysis  GI:                          [ ] negative [ ] nausea [ ] vomiting [ ] diarrhea [ ] constipation [ ] abd pain [ ] dysphagia   :                        [ ] negative [ ] dysuria [ ] nocturia [ ] hematuria [ ] increased urinary frequency  Musculoskeletal: [ ] negative [ ] back pain [ ] myalgias [ ] arthralgias [ ] fracture  Skin:                       [ ] negative [ ] rash [ ] itch  Neurological:        [ ] negative [ ] headache [ ] dizziness [ ] syncope [ ] weakness [ ] numbness  Psychiatric:           [ ] negative [ ] anxiety [ ] depression  Endocrine:            [ ] negative [ ] diabetes [ ] thyroid problem  Heme/Lymph:      [ ] negative [ ] anemia [ ] bleeding problem  Allergic/Immune: [ ] negative [ ] itchy eyes [ ] nasal discharge [ ] hives [ ] angioedema    [x] All other systems negative    Vital Signs Last 24 Hrs  T(C): 36.7 (05 Mar 2021 06:16), Max: 36.9 (04 Mar 2021 21:38)  T(F): 98 (05 Mar 2021 06:16), Max: 98.5 (04 Mar 2021 21:38)  HR: 71 (05 Mar 2021 06:16) (60 - 71)  BP: 95/64 (05 Mar 2021 06:16) (95/64 - 115/57)  BP(mean): --  RR: 16 (05 Mar 2021 06:16) (16 - 18)  SpO2: 98% (05 Mar 2021 06:16) (98% - 99%)  I&O's Summary    04 Mar 2021 07:01  -  05 Mar 2021 07:00  --------------------------------------------------------  IN: 0 mL / OUT: 560 mL / NET: -560 mL        PHYSICAL EXAM:  General: WN/WD NAD  HEENT: PERRLA, EOMI, moist mucous membranes  Neurology: A&Ox3, nonfocal, GALLAGHER x 4, no tremors or tongue fasciculations  Respiratory: CTA B/L, normal respiratory effort, no wheezes, crackles, rales  CV: RRR, S1S2, no murmurs, rubs or gallops  Abdominal: Soft, NT, ND +BS  Extremities: No edema, + peripheral pulses      LABS:                        15.0   6.76  )-----------( 153      ( 05 Mar 2021 07:54 )             46.8     Hb Trend: 15.0<--, 14.5<--, 14.3<--  WBC Trend: 6.76<--, 6.44<--, 7.73<--  Plt Trend: 153<--, 133<--, 275<--          03-05    135  |  99  |  11  ----------------------------<  98  4.3   |  25  |  0.59    Ca    9.8      05 Mar 2021 07:57  Phos  4.0     03-05  Mg     2.3     03-05    TPro  8.4<H>  /  Alb  4.3  /  TBili  0.5  /  DBili  x   /  AST  34  /  ALT  50<H>  /  AlkPhos  170<H>  03-05          CAPILLARY BLOOD GLUCOSE          --      No growth to date.    .Blood Blood-Peripheral          RADIOLOGY & ADDITIONAL TESTS:    Imaging Personally Reviewed:  [x] YES  [ ] NO [ ] No New Imaging Last 24hrs  Abnormal EEG study, awake / drowsy / asleep    -Rare polyphasic epileptiform discharges were present in isolation or briefly repetitive near 5hz.      Consultant(s) Notes Reviewed:  [x] YES  [ ] NO    Care Discussed with Consultants/Other Providers [x] YES  [ ] NO
Mary Wilcox MD PGY5  Pager: [phone]  After 7PM please page [phone]    Patient is a 41y old  Male who presents with a chief complaint of Seizure (06 Mar 2021 07:25)      INTERVAL HPI/OVERNIGHT EVENTS: No acute overnight events. Denies chest pain, abdominal pain, nausea, vomiting, diarrhea, headache, weakness, numbness.     REVIEW OF SYSTEMS:  Constitutional:     [ ] negative [ ] fevers [ ] chills [ ] weight loss [ ] weight gain  HEENT:                  [ ] negative [ ] dry eyes [ ] eye irritation [ ] postnasal drip [ ] nasal congestion  CV:                         [ ] negative  [ ] chest pain [ ] orthopnea [ ] palpitations [ ] murmur  Resp:                     [ ] negative [ ] cough [ ] shortness of breath [ ] dyspnea [ ] wheezing [ ] sputum [ ] hemoptysis  GI:                          [ ] negative [ ] nausea [ ] vomiting [ ] diarrhea [ ] constipation [ ] abd pain [ ] dysphagia   :                        [ ] negative [ ] dysuria [ ] nocturia [ ] hematuria [ ] increased urinary frequency  Musculoskeletal: [ ] negative [ ] back pain [ ] myalgias [ ] arthralgias [ ] fracture  Skin:                       [ ] negative [ ] rash [ ] itch  Neurological:        [ ] negative [ ] headache [ ] dizziness [ ] syncope [ ] weakness [ ] numbness  Psychiatric:           [ ] negative [ ] anxiety [ ] depression  Endocrine:            [ ] negative [ ] diabetes [ ] thyroid problem  Heme/Lymph:      [ ] negative [ ] anemia [ ] bleeding problem  Allergic/Immune: [ ] negative [ ] itchy eyes [ ] nasal discharge [ ] hives [ ] angioedema    [x] All other systems negative    Vital Signs Last 24 Hrs  T(C): 36.8 (06 Mar 2021 06:19), Max: 36.8 (06 Mar 2021 06:19)  T(F): 98.2 (06 Mar 2021 06:19), Max: 98.2 (06 Mar 2021 06:19)  HR: 66 (06 Mar 2021 06:19) (66 - 82)  BP: 114/71 (06 Mar 2021 06:19) (114/71 - 135/70)  BP(mean): --  RR: 18 (06 Mar 2021 06:19) (16 - 18)  SpO2: 97% (06 Mar 2021 06:19) (97% - 100%)  I&O's Summary      PHYSICAL EXAM:  General: WN/WD NAD, EEG in place  HEENT: PERRLA, EOMI, moist mucous membranes  Neurology: A&Ox3, nonfocal, GALLAGHER x 4  Respiratory: CTA B/L, normal respiratory effort, no wheezes, crackles, rales  CV: RRR, S1S2, no murmurs, rubs or gallops  Abdominal: Soft, NT, ND +BS  Extremities: No edema, + peripheral pulses      LABS:                        15.0   6.76  )-----------( 153      ( 05 Mar 2021 07:54 )             46.8     Hb Trend: 15.0<--, 14.5<--, 14.3<--  WBC Trend: 6.76<--, 6.44<--, 7.73<--  Plt Trend: 153<--, 133<--, 275<--          03-05    135  |  99  |  11  ----------------------------<  98  4.3   |  25  |  0.59    Ca    9.8      05 Mar 2021 07:57  Phos  4.0     03-05  Mg     2.3     03-05    TPro  8.4<H>  /  Alb  4.3  /  TBili  0.5  /  DBili  x   /  AST  34  /  ALT  50<H>  /  AlkPhos  170<H>  03-05          CAPILLARY BLOOD GLUCOSE          --      No growth to date.    .Blood Blood-Peripheral          RADIOLOGY & ADDITIONAL TESTS:    Imaging Personally Reviewed:  [ ] YES  [ ] NO [ ] No New Imaging Last 24hrs    Consultant(s) Notes Reviewed:  [x] YES  [ ] NO    Care Discussed with Consultants/Other Providers [x] YES  [ ] NO
Mary Wilcox MD PGY5  Pager: [phone]  After 7PM please page [phone]    Patient is a 41y old  Male who presents with a chief complaint of Seizure (04 Mar 2021 07:48)      INTERVAL HPI/OVERNIGHT EVENTS: No acute overnight events. Pt denies chest pain, SOB, abd pain, n/v/d. He reports that his back pain is stable at its usual severity. He is asking for his methadone.       REVIEW OF SYSTEMS:  Constitutional:     [ ] negative [ ] fevers [ ] chills [ ] weight loss [ ] weight gain  HEENT:                  [ ] negative [ ] dry eyes [ ] eye irritation [ ] postnasal drip [ ] nasal congestion  CV:                         [ ] negative  [ ] chest pain [ ] orthopnea [ ] palpitations [ ] murmur  Resp:                     [ ] negative [ ] cough [ ] shortness of breath [ ] dyspnea [ ] wheezing [ ] sputum [ ] hemoptysis  GI:                          [ ] negative [ ] nausea [ ] vomiting [ ] diarrhea [ ] constipation [ ] abd pain [ ] dysphagia   :                        [ ] negative [ ] dysuria [ ] nocturia [ ] hematuria [ ] increased urinary frequency  Musculoskeletal: [ ] negative [ ] back pain [ ] myalgias [ ] arthralgias [ ] fracture  Skin:                       [ ] negative [ ] rash [ ] itch  Neurological:        [ ] negative [ ] headache [ ] dizziness [ ] syncope [ ] weakness [ ] numbness  Psychiatric:           [ ] negative [ ] anxiety [ ] depression  Endocrine:            [ ] negative [ ] diabetes [ ] thyroid problem  Heme/Lymph:      [ ] negative [ ] anemia [ ] bleeding problem  Allergic/Immune: [ ] negative [ ] itchy eyes [ ] nasal discharge [ ] hives [ ] angioedema    [x] All other systems negative    Vital Signs Last 24 Hrs  T(C): 36.7 (04 Mar 2021 01:53), Max: 36.8 (03 Mar 2021 23:05)  T(F): 98 (04 Mar 2021 01:53), Max: 98.3 (03 Mar 2021 23:05)  HR: 62 (04 Mar 2021 07:35) (45 - 70)  BP: 121/77 (04 Mar 2021 07:35) (102/64 - 124/73)  BP(mean): 0 (03 Mar 2021 23:05) (0 - 0)  RR: 17 (04 Mar 2021 01:53) (16 - 18)  SpO2: 100% (04 Mar 2021 01:53) (98% - 100%)  I&O's Summary      PHYSICAL EXAM:  General: WN/WD NAD  HEENT: PERRLA, EOMI, moist mucous membranes  Neurology: A&Ox3, nonfocal, GALLAGHER x 4, sensation intact inall extremities, no saddle anesthesia  Respiratory: CTA B/L, normal respiratory effort, no wheezes, crackles, rales  CV: RRR, S1S2, no murmurs, rubs or gallops  Abdominal: Soft, NT, ND +BS  Extremities: No edema, + peripheral pulses  Incisions: Midline spine scar C/D/I    LABS:                        14.5   6.44  )-----------( 133      ( 04 Mar 2021 07:12 )             44.3     Hb Trend: 14.5<--, 14.3<--  WBC Trend: 6.44<--, 7.73<--  Plt Trend: 133<--, 275<--          03-04    134<L>  |  101  |  10  ----------------------------<  93  4.3   |  20<L>  |  0.66    Ca    9.3      04 Mar 2021 07:12  Phos  3.7     03-04  Mg     2.4     03-04    TPro  8.0  /  Alb  4.1  /  TBili  0.5  /  DBili  x   /  AST  46<H>  /  ALT  49<H>  /  AlkPhos  162<H>  -04    Creatine Kinase, Serum: 369 U/L <H> (21 @ 19:22)    PT/INR - ( 03 Mar 2021 10:06 )   PT: 11.8 sec;   INR: 1.03 ratio         PTT - ( 03 Mar 2021 10:06 )  PTT:31.4 sec  Urinalysis Basic - ( 03 Mar 2021 10:05 )    Color: Yellow / Appearance: Clear / S.031 / pH: x  Gluc: x / Ketone: Negative  / Bili: Negative / Urobili: 6 mg/dL   Blood: x / Protein: Trace / Nitrite: Negative   Leuk Esterase: Negative / RBC: 1 /HPF / WBC 2 /HPF   Sq Epi: x / Non Sq Epi: 2 /HPF / Bacteria: Negative      CAPILLARY BLOOD GLUCOSE          --      No growth    .Urine Clean Catch (Midstream)          RADIOLOGY & ADDITIONAL TESTS:    Imaging Personally Reviewed:  [x] YES  [ ] NO [ ] No New Imaging Last 24hrs    Consultant(s) Notes Reviewed:  [x] YES  [ ] NO    Care Discussed with Consultants/Other Providers [x] YES  [ ] NO

## 2021-03-07 NOTE — PROGRESS NOTE ADULT - PROBLEM SELECTOR PLAN 4
-No tremors, sweating, anxiety presently.  -Hold on benzodiazepines  -SW consult

## 2021-03-07 NOTE — PROGRESS NOTE ADULT - PROBLEM SELECTOR PROBLEM 5
Methadone maintenance therapy patient

## 2021-03-07 NOTE — PROGRESS NOTE ADULT - ASSESSMENT
41 year old man with a PMHx of T5/T6 spinal osteomyelitis (diagnosed initially in 2014 s/p washout, with recurrence in December 2018 s/p needle aspiration with no growth, readmission in 2019 for IV abx, T1-T7 spinal fusion), heroin use (states has been snorted, last 1 week ago, has not used IV drugs in 8 mo), general anxiety disorder presenting with seizure most likely secondary to benzodiazepine withdrawal, now course complicated by epilepsy seen on EEG, started on anti-epileptic therapy

## 2021-03-07 NOTE — DISCHARGE NOTE NURSING/CASE MANAGEMENT/SOCIAL WORK - NSDCFUADDAPPT_GEN_ALL_CORE_FT
Epilepsy clinic: [address] [facility]. ([phone])  please call and schedule an appointment within one week !     Please follow up with primary care and neurology

## 2021-03-07 NOTE — CHART NOTE - NSCHARTNOTEFT_GEN_A_CORE
Briefly, this is a 41 year old RH male with a past medical history of T5/T6 osteomyelitis (s/p washout in 2014, recurrence in 2018, and treatment in 2019), IVDU heroin use (last IV use 8 months prior, last inhalation use 4 days prior), benzodiazepine use disorder (use of Xanax), general anxiety disorder who presented after seizure episodes. 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 Patient had been started on Depakote and currently is maintained on 500-750mg regimen.     Recs:  [] continue Depakote 500mg in AM and 750mg in PM regimen  [] will require visit with epileptologist within 1 week and levels to be drawn as outpatient  [] advised patient to not take any further benzodiazepines  [] may benefit from further drug rehabilitation counseling  [] will need MRI with epilepsy protocol that can be done as outpatient  [] advise patient to not drive for at least 1 year, operate heavy machinery, avoid heights, pools, bathtubs, locked doors  [] advise patient to monitor for any signs of bleeding, abdominal pain, and rash and to call neurology for further instructions  [] upon discharge will need follow up with epilepsy provider at [address] [place] [address]. ([phone])    Case discussed with neurology attending, Dr. Schoenberg. Briefly, this is a 41 year old RH male with a past medical history of T5/T6 osteomyelitis (s/p washout in 2014, recurrence in 2018, and treatment in 2019), IVDU heroin use (last IV use 8 months prior, last inhalation use 4 days prior), benzodiazepine use disorder (use of Xanax), general anxiety disorder who presented after seizure episodes. 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 Patient had been started on Depakote and currently is maintained on 500-750mg regimen.     Recs:  [] continue Depakote 500mg in AM and 750mg in PM regimen  [] will require visit with epileptologist within 1 week and levels to be drawn as outpatient  [] advised patient to not take any further benzodiazepines  [] may benefit from further drug rehabilitation counseling  [] will need MRI with epilepsy protocol that can be done as outpatient  [] advise patient to not drive for at least 1 year, operate heavy machinery, avoid heights, pools, bathtubs, locked doors  [] advise patient to monitor for any signs of bleeding, abdominal pain, and rash and to call neurology for further instructions  [] upon discharge will need follow up with epilepsy provider at [address] [place] [address]. ([phone])    Case discussed with neurology attending, Dr. Schoenberg.      Patient can rest of work up as outpatient  urged to take depakote  Lschoenberg

## 2021-03-07 NOTE — PROGRESS NOTE ADULT - PROBLEM SELECTOR PLAN 8
IMPROVE = 0  Regular diet  Seizure precautions  Nicotine patch  Activity as tolerated  D/c once Valproic level therapeutic

## 2021-03-07 NOTE — PROGRESS NOTE ADULT - PROBLEM SELECTOR PROBLEM 2
Patients need to be seen for routine medication f/u as she is on a chronic prescription. Patient was also suppose to f/u in April for pap however did not.    Medication filled for only 30 days w/o refills, will need to be seen before additional refills are given to ensure medication efficacy and patient safety.   
Refill request received for Zoloft 50mg    Last office visit: 3/7/18  Next office visit: Visit date not found  Last refill: 3/7/18  Last labs: 4/25/18    Please sign if agreeable, due for an appt.          
Spoke with pt, appt made for 9/18  
Anxiety

## 2021-03-07 NOTE — PROGRESS NOTE ADULT - PROBLEM SELECTOR PLAN 3
Likely 2/2 seizure. ESR 33 with history of osteo. Unlikely recurrence as pain at its baseline. RVP negative, remains afebrile  -If febrile, consider MRI spine, TTE though abstinent from IVDU for 8 months per patient  -Hold on abx  - Cultures remain negative to date

## 2021-03-07 NOTE — PROGRESS NOTE ADULT - ATTENDING COMMENTS
Patient seen and examined with neurology team and above note reviewed and I agree with assessment and plan as outlined. Patient with hx as noted and here for seizures. His exam is currently nonfocal other than mild right  weakness.     EEG reviewed in detail with Dr. Tovar and showed generalized spike and wave pattern and was concerning for a primary seizure disorder.    Plan: Would start depakote 500mg BID and follow up on levels in 2 days with albumin     2. Continue VEEG for one more day along with seizure precautions     3. Patient will need to follow up with epilepsy attending at [address] [place] [address], [address] [place] [address] in 2-3 weeks from discharge    All questions answered and patient understood plan.
Pt seen in [facility], currently on EEG. Denies any complaints at this time.   41M w/hx of T5/T6 spinal osteomyelitis (2014 s/p washout, recurrence 2018 s/p needle aspiration with no growth, readmission in 2019 for IV abx, T1-T7 spinal fusion), recent heroin use via snorting, general anxiety disorder p/w seizure 2/2 benzodiazepine withdrawal vs generalized seizure d/o   #Seizure: likely provoked in the setting of recent benzodiazepine withdrawal but EEG w/generalized spike and slow wave complexes suggestive of primary generalized epilepsy. Neuro following, recommending Depakote and EEG monitoring overnight.   #Polysubstance use: hx of heroin dependence, now in [facility], methadone dose verified with clinic.  #Chronic back pain: C/w Oxy IR PRN  #Anxiety: on Adderall? PCP unavailable on 3/5. Psych consulted as pt reports being on multiple benzos in the past without help.   Dispo: Home tomorrow after final neuro recs.
The patient was seen and evaluated at bedside. No o/n events. No new seizure activities. Tolerating meds well. No complaints. Stable for DC. Appreciate neuro recs. Explained to pt the need for close follow up as well as abstinence from further drug use. Close follow up including med titration, med level checks were discussed w/ pt. Activities restrictions as above discussed. Stable for DC home today. 35 minutes spent preparing DC, counseling pt and coordination of care.
Pt seen and evaluated at bedside. Appeared comfortable. No tremors/seizure like activities. EEG ongoing. Labs reviewed. Exam, pt well appearing in bed, comfortable. Chest clear, abd benign. Nonfocal.     -EEG f/u.  -Neuro recs appreciated.  -DC if stable and once cleared by neuro.     Rest of plan as above.
Pt seen in ED around 12:00PM, laying in stretcher comfortably, has no complaints.   41M w/hx of T5/T6 spinal osteomyelitis (2014 s/p washout, recurrence 2018 s/p needle aspiration with no growth, readmission in 2019 for IV abx, T1-T7 spinal fusion), recent heroin use via snorting, general anxiety disorder p/w seizure 2/2 benzodiazepine withdrawal.  #Seizure: provoked in setting of benzo discontinuation, CTH wnl. Seen by neuro in ED, pending EEG, monitor off AEDs.   #Polysubstance use: hx of heroin dependence, now in [facility], methadone dose verified with clinic. Pt also snorting heroin in addition to methadone, will make clinic aware to drug use.   #Chronic back pain: C/w Oxy IR PRN  #Anxiety: on Adderall? per PCP. Obtain collateral from Dr. Orr.  #Fever: low grade in ER upon arrival 3/3, s/p vanc/zosyn, can hold off abx as temp likely in setting of seizure, Urine/Bld Cx so far no growth.   Dispo Pending

## 2021-03-14 ENCOUNTER — TRANSCRIPTION ENCOUNTER (OUTPATIENT)
Age: 41
End: 2021-03-14

## 2021-03-16 PROBLEM — F11.20 OPIOID DEPENDENCE, UNCOMPLICATED: Chronic | Status: ACTIVE | Noted: 2021-03-03

## 2021-03-16 PROBLEM — R56.9 UNSPECIFIED CONVULSIONS: Chronic | Status: ACTIVE | Noted: 2021-03-03

## 2021-03-22 ENCOUNTER — APPOINTMENT (OUTPATIENT)
Dept: NEUROLOGY | Facility: CLINIC | Age: 41
End: 2021-03-22
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
## 2021-03-24 ENCOUNTER — APPOINTMENT (OUTPATIENT)
Dept: NEUROLOGY | Facility: CLINIC | Age: 41
End: 2021-03-24
Payer: MEDICAID

## 2021-03-24 VITALS
HEIGHT: 68 IN | HEART RATE: 93 BPM | BODY MASS INDEX: 34.86 KG/M2 | WEIGHT: 230 LBS | SYSTOLIC BLOOD PRESSURE: 140 MMHG | DIASTOLIC BLOOD PRESSURE: 93 MMHG

## 2021-03-24 VITALS — TEMPERATURE: 93.4 F

## 2021-03-24 DIAGNOSIS — Z98.1 ARTHRODESIS STATUS: ICD-10-CM

## 2021-03-24 PROCEDURE — 99072 ADDL SUPL MATRL&STAF TM PHE: CPT

## 2021-03-24 PROCEDURE — 99214 OFFICE O/P EST MOD 30 MIN: CPT

## 2021-03-24 NOTE — ASSESSMENT
[FreeTextEntry1] : A/p- 42 y/o man taking methadone and oxycodone as well as Adderal had a witnessed seizure and ?sp/wave on EEG at [facility].\par - CBC with diff, Cmp, valproic acid, ammonia \par - MRI brain\par - RTC 2m  with EEG
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
## 2021-03-24 NOTE — PHYSICAL EXAM
[FreeTextEntry1] : Neuro- alert and oriented although appears slightly sedated\par Cn intact except right  exotropia\par Motor no drift or tremor\par ffm ok\par gait narrow based

## 2021-03-31 ENCOUNTER — APPOINTMENT (OUTPATIENT)
Dept: NEUROLOGY | Facility: CLINIC | Age: 41
End: 2021-03-31

## 2021-05-05 ENCOUNTER — APPOINTMENT (OUTPATIENT)
Dept: NEUROLOGY | Facility: CLINIC | Age: 41
End: 2021-05-05

## 2021-05-09 ENCOUNTER — EMERGENCY (EMERGENCY)
Facility: HOSPITAL | Age: 41
LOS: 0 days | Discharge: HOME | End: 2021-05-09
Attending: STUDENT IN AN ORGANIZED HEALTH CARE EDUCATION/TRAINING PROGRAM
Payer: MEDICAID

## 2021-05-09 VITALS
HEIGHT: 68 IN | TEMPERATURE: 98 F | SYSTOLIC BLOOD PRESSURE: 111 MMHG | WEIGHT: 259.93 LBS | OXYGEN SATURATION: 95 % | HEART RATE: 96 BPM | DIASTOLIC BLOOD PRESSURE: 75 MMHG | RESPIRATION RATE: 17 BRPM

## 2021-05-09 VITALS — SYSTOLIC BLOOD PRESSURE: 111 MMHG | DIASTOLIC BLOOD PRESSURE: 73 MMHG | HEART RATE: 71 BPM

## 2021-05-09 DIAGNOSIS — Z91.14 PATIENT'S OTHER NONCOMPLIANCE WITH MEDICATION REGIMEN: ICD-10-CM

## 2021-05-09 DIAGNOSIS — G40.909 EPILEPSY, UNSPECIFIED, NOT INTRACTABLE, WITHOUT STATUS EPILEPTICUS: ICD-10-CM

## 2021-05-09 DIAGNOSIS — Z98.890 OTHER SPECIFIED POSTPROCEDURAL STATES: Chronic | ICD-10-CM

## 2021-05-09 DIAGNOSIS — F11.21 OPIOID DEPENDENCE, IN REMISSION: ICD-10-CM

## 2021-05-09 DIAGNOSIS — Z88.1 ALLERGY STATUS TO OTHER ANTIBIOTIC AGENTS STATUS: ICD-10-CM

## 2021-05-09 LAB
ALBUMIN SERPL ELPH-MCNC: 3.5 G/DL — SIGNIFICANT CHANGE UP (ref 3.3–5)
ALP SERPL-CCNC: 110 U/L — SIGNIFICANT CHANGE UP (ref 40–120)
ALT FLD-CCNC: 48 U/L — SIGNIFICANT CHANGE UP (ref 12–78)
ANION GAP SERPL CALC-SCNC: 4 MMOL/L — LOW (ref 5–17)
AST SERPL-CCNC: 31 U/L — SIGNIFICANT CHANGE UP (ref 15–37)
BASOPHILS # BLD AUTO: 0.03 K/UL — SIGNIFICANT CHANGE UP (ref 0–0.2)
BASOPHILS NFR BLD AUTO: 0.5 % — SIGNIFICANT CHANGE UP (ref 0–2)
BILIRUB SERPL-MCNC: 0.2 MG/DL — SIGNIFICANT CHANGE UP (ref 0.2–1.2)
BUN SERPL-MCNC: 21 MG/DL — SIGNIFICANT CHANGE UP (ref 7–23)
CALCIUM SERPL-MCNC: 8.7 MG/DL — SIGNIFICANT CHANGE UP (ref 8.5–10.1)
CHLORIDE SERPL-SCNC: 107 MMOL/L — SIGNIFICANT CHANGE UP (ref 96–108)
CK SERPL-CCNC: 255 U/L — SIGNIFICANT CHANGE UP (ref 26–308)
CO2 SERPL-SCNC: 28 MMOL/L — SIGNIFICANT CHANGE UP (ref 22–31)
CREAT SERPL-MCNC: 0.71 MG/DL — SIGNIFICANT CHANGE UP (ref 0.5–1.3)
EOSINOPHIL # BLD AUTO: 0.18 K/UL — SIGNIFICANT CHANGE UP (ref 0–0.5)
EOSINOPHIL NFR BLD AUTO: 2.9 % — SIGNIFICANT CHANGE UP (ref 0–6)
GLUCOSE SERPL-MCNC: 101 MG/DL — HIGH (ref 70–99)
HCT VFR BLD CALC: 40.9 % — SIGNIFICANT CHANGE UP (ref 39–50)
HGB BLD-MCNC: 13.9 G/DL — SIGNIFICANT CHANGE UP (ref 13–17)
IMM GRANULOCYTES NFR BLD AUTO: 0.3 % — SIGNIFICANT CHANGE UP (ref 0–1.5)
LACTATE SERPL-SCNC: 1.2 MMOL/L — SIGNIFICANT CHANGE UP (ref 0.7–2)
LYMPHOCYTES # BLD AUTO: 1.27 K/UL — SIGNIFICANT CHANGE UP (ref 1–3.3)
LYMPHOCYTES # BLD AUTO: 20.7 % — SIGNIFICANT CHANGE UP (ref 13–44)
MCHC RBC-ENTMCNC: 30.2 PG — SIGNIFICANT CHANGE UP (ref 27–34)
MCHC RBC-ENTMCNC: 34 GM/DL — SIGNIFICANT CHANGE UP (ref 32–36)
MCV RBC AUTO: 88.7 FL — SIGNIFICANT CHANGE UP (ref 80–100)
MONOCYTES # BLD AUTO: 0.4 K/UL — SIGNIFICANT CHANGE UP (ref 0–0.9)
MONOCYTES NFR BLD AUTO: 6.5 % — SIGNIFICANT CHANGE UP (ref 2–14)
NEUTROPHILS # BLD AUTO: 4.25 K/UL — SIGNIFICANT CHANGE UP (ref 1.8–7.4)
NEUTROPHILS NFR BLD AUTO: 69.1 % — SIGNIFICANT CHANGE UP (ref 43–77)
NRBC # BLD: 0 /100 WBCS — SIGNIFICANT CHANGE UP (ref 0–0)
PLATELET # BLD AUTO: 224 K/UL — SIGNIFICANT CHANGE UP (ref 150–400)
POTASSIUM SERPL-MCNC: 3.8 MMOL/L — SIGNIFICANT CHANGE UP (ref 3.5–5.3)
POTASSIUM SERPL-SCNC: 3.8 MMOL/L — SIGNIFICANT CHANGE UP (ref 3.5–5.3)
PROT SERPL-MCNC: 7.8 GM/DL — SIGNIFICANT CHANGE UP (ref 6–8.3)
RBC # BLD: 4.61 M/UL — SIGNIFICANT CHANGE UP (ref 4.2–5.8)
RBC # FLD: 13.3 % — SIGNIFICANT CHANGE UP (ref 10.3–14.5)
SODIUM SERPL-SCNC: 139 MMOL/L — SIGNIFICANT CHANGE UP (ref 135–145)
WBC # BLD: 6.15 K/UL — SIGNIFICANT CHANGE UP (ref 3.8–10.5)
WBC # FLD AUTO: 6.15 K/UL — SIGNIFICANT CHANGE UP (ref 3.8–10.5)

## 2021-05-09 PROCEDURE — 99284 EMERGENCY DEPT VISIT MOD MDM: CPT

## 2021-05-09 RX ORDER — DIVALPROEX SODIUM 500 MG/1
1 TABLET, DELAYED RELEASE ORAL
Qty: 0 | Refills: 0 | DISCHARGE
Start: 2021-05-09 | End: 2021-05-15

## 2021-05-09 RX ORDER — DIVALPROEX SODIUM 500 MG/1
1 TABLET, DELAYED RELEASE ORAL
Qty: 14 | Refills: 0
Start: 2021-05-09 | End: 2021-07-07

## 2021-05-09 RX ORDER — GABAPENTIN 400 MG/1
600 CAPSULE ORAL ONCE
Refills: 0 | Status: COMPLETED | OUTPATIENT
Start: 2021-05-09 | End: 2021-05-09

## 2021-05-09 RX ORDER — OXYCODONE HYDROCHLORIDE 5 MG/1
30 TABLET ORAL ONCE
Refills: 0 | Status: DISCONTINUED | OUTPATIENT
Start: 2021-05-09 | End: 2021-05-09

## 2021-05-09 RX ORDER — DIVALPROEX SODIUM 500 MG/1
1 TABLET, DELAYED RELEASE ORAL
Qty: 14 | Refills: 0
Start: 2021-05-09 | End: 2021-05-15

## 2021-05-09 RX ORDER — DIVALPROEX SODIUM 500 MG/1
1 TABLET, DELAYED RELEASE ORAL
Qty: 7 | Refills: 0
Start: 2021-05-09 | End: 2021-05-15

## 2021-05-09 RX ORDER — DIVALPROEX SODIUM 500 MG/1
750 TABLET, DELAYED RELEASE ORAL ONCE
Refills: 0 | Status: COMPLETED | OUTPATIENT
Start: 2021-05-09 | End: 2021-05-09

## 2021-05-09 RX ADMIN — OXYCODONE HYDROCHLORIDE 30 MILLIGRAM(S): 5 TABLET ORAL at 04:31

## 2021-05-09 RX ADMIN — GABAPENTIN 600 MILLIGRAM(S): 400 CAPSULE ORAL at 04:28

## 2021-05-09 RX ADMIN — DIVALPROEX SODIUM 750 MILLIGRAM(S): 500 TABLET, DELAYED RELEASE ORAL at 04:28

## 2021-05-09 NOTE — ED PROVIDER NOTE - IV ALTEPLASE EXCL REL HIDDEN
Results reported to patient--grossly benign, calcium low but replaced in ER, other labs as expected in ESRD patient, US shows no evidence of DVT  Pt. reports feeling better after meds  pt. agrees to f/u with primary care outpt., nephro, pt. will attend dialysis ASAP as outpt., or return if symptoms develop such as sob, palpitations, chest pain, worsening edema  pt. understands to return to ED if symptoms worsen; will d/c show

## 2021-05-09 NOTE — ED PROVIDER NOTE - CARE PROVIDER_API CALL
Mónica Cherry  NEUROLOGY  [address] [place] [address]  Phone: [phone]  Fax: [phone]  Follow Up Time: 1-3 Days

## 2021-05-09 NOTE — ED PROVIDER NOTE - NSFOLLOWUPINSTRUCTIONS_ED_ALL_ED_FT
Please return to Emergency Department immediately for any new, concerning, or worsening symptoms.   Please follow-up with Neurology as recommended.    Please take prescriptions as discussed.

## 2021-05-09 NOTE — ED PROVIDER NOTE - OBJECTIVE STATEMENT
42 yo male  states he ran out of all hs medications including pain medications and seizure medication, alst dose this morning. Witnessed seizure earlier today, denies tongue biting or urinary/bowel incontinence 42 yo male with PMH seizures, anxiety, h/o opioid use now on methadone, chronic back pain presents to ED for evaluation s/p seizure earlier today. Witnessed seizure earlier today, denies tongue biting or urinary/bowel incontinence. Pt reports he is not compliant with his medications usually but also reports that he ran out. Also states he has worsened back pain after the seizure. Denies sensorimotor deficits, bowel or bladder incontinence or retention, saddle anesthesia. Denies fevers or chills.

## 2021-05-09 NOTE — ED ADULT TRIAGE NOTE - CHIEF COMPLAINT QUOTE
had a sz 30 minutes ago , hit his head , also feels depress , and has upper back pain, denies S/I H/i

## 2021-05-09 NOTE — ED PROVIDER NOTE - PATIENT PORTAL LINK FT
You can access the FollowMyHealth Patient Portal offered by [facility] by registering at the following website: [email or web address]. By joining [employer]’s FollowMyHealth portal, you will also be able to view your health information using other applications (apps) compatible with our system.

## 2021-05-09 NOTE — ED PROVIDER NOTE - CLINICAL SUMMARY MEDICAL DECISION MAKING FREE TEXT BOX
seizure and back pain, non complaint with medication, last dose aed a efw days ago - labs, ekg, medicate, reassess

## 2021-05-09 NOTE — ED ADULT NURSE NOTE - OBJECTIVE STATEMENT
patient received, in bed anxious, came here for upper back pain, stated had seizure around 2030 last night, also feeling anxious, states ran out of methadone and oxycodone for 2 days now patient received, in bed anxious, came here for upper back pain, stated had seizure around 2030 last night, states ran out of methadone and oxycodone for 2 days now. denies si/hi

## 2021-05-26 ENCOUNTER — APPOINTMENT (OUTPATIENT)
Dept: NEUROLOGY | Facility: CLINIC | Age: 41
End: 2021-05-26
Payer: MEDICAID

## 2021-05-26 VITALS
HEART RATE: 81 BPM | BODY MASS INDEX: 42.59 KG/M2 | WEIGHT: 281 LBS | DIASTOLIC BLOOD PRESSURE: 77 MMHG | SYSTOLIC BLOOD PRESSURE: 118 MMHG | HEIGHT: 68 IN

## 2021-05-26 DIAGNOSIS — Z86.59 PERSONAL HISTORY OF OTHER MENTAL AND BEHAVIORAL DISORDERS: ICD-10-CM

## 2021-05-26 DIAGNOSIS — F11.11 OPIOID ABUSE, IN REMISSION: ICD-10-CM

## 2021-05-26 DIAGNOSIS — G40.909 EPILEPSY, UNSPECIFIED, NOT INTRACTABLE, W/OUT STATUS EPILEPTICUS: ICD-10-CM

## 2021-05-26 PROCEDURE — 99214 OFFICE O/P EST MOD 30 MIN: CPT

## 2021-05-26 PROCEDURE — 95816 EEG AWAKE AND DROWSY: CPT

## 2021-05-26 RX ORDER — DEXTROAMPHETAMINE SULFATE, DEXTROAMPHETAMINE SACCHARATE, AMPHETAMINE SULFATE AND AMPHETAMINE ASPARTATE 7.5; 7.5; 7.5; 7.5 MG/1; MG/1; MG/1; MG/1
30 CAPSULE, EXTENDED RELEASE ORAL
Refills: 0 | Status: ACTIVE | COMMUNITY

## 2021-05-26 NOTE — PHYSICAL EXAM
[Person] : oriented to person [Place] : oriented to place [Time] : oriented to time [Concentration Intact] : normal concentrating ability [Visual Intact] : visual attention was ~T not ~L decreased [Naming Objects] : no difficulty naming common objects [Fluency] : fluency intact [Past History] : adequate knowledge of personal past history [Cranial Nerves Optic (II)] : visual acuity intact bilaterally,  visual fields full to confrontation, pupils equal round and reactive to light [Cranial Nerves Oculomotor (III)] : extraocular motion intact [Cranial Nerves Trigeminal (V)] : facial sensation intact symmetrically [Cranial Nerves Facial (VII)] : face symmetrical [Cranial Nerves Vestibulocochlear (VIII)] : hearing was intact bilaterally [Cranial Nerves Glossopharyngeal (IX)] : tongue and palate midline [Cranial Nerves Accessory (XI - Cranial And Spinal)] : head turning and shoulder shrug symmetric [Cranial Nerves Hypoglossal (XII)] : there was no tongue deviation with protrusion [Motor Tone] : muscle tone was normal in all four extremities [Motor Strength] : muscle strength was normal in all four extremities [No Muscle Atrophy] : normal bulk in all four extremities [Sensation Tactile Decrease] : light touch was intact [Abnormal Walk] : normal gait [Balance] : balance was intact [Short Term Intact] : short term memory intact [Remote Intact] : remote memory intact [Registration Intact] : recent registration memory intact [PERRL With Normal Accommodation] : pupils were equal in size, round, reactive to light, with normal accommodation [Extraocular Movements] : extraocular movements were intact [] : no respiratory distress [Past-pointing] : there was no past-pointing [Tremor] : no tremor present [Plantar Reflex Right Only] : normal on the right [Plantar Reflex Left Only] : normal on the left [FreeTextEntry9] : DTR symmetric

## 2021-05-26 NOTE — HISTORY OF PRESENT ILLNESS
[FreeTextEntry1] : Patient was last seen by Dr Birmingham. He has not completed MRI ordered or blood work. EEG done today. \par He reports  on 5.8.21 went to the ED because he felt like he was going to have a seizure. His eyes was rolling in back of his head and body felt tense was walking around and talking while it occurred. He did  not have a seizure. states he was taking Depakote 500-750mg. He reports  he stopped taking Depakote 3 weeks ago because it was making him tired and not motivated.\par States since he stopped it he has been taking xanax from the streets and has been making feel better  and happier.\par Denies any recurrent events.\par \par  \par \par \par meds\par oxycodone 4/d\par methadone 125/d\par Adderal 30mg bid\par \par  \par

## 2021-06-24 ENCOUNTER — EMERGENCY (EMERGENCY)
Facility: HOSPITAL | Age: 41
LOS: 0 days | Discharge: HOME | End: 2021-06-25
Attending: EMERGENCY MEDICINE
Payer: MEDICAID

## 2021-06-24 VITALS
TEMPERATURE: 98 F | HEIGHT: 68 IN | RESPIRATION RATE: 15 BRPM | SYSTOLIC BLOOD PRESSURE: 118 MMHG | HEART RATE: 91 BPM | WEIGHT: 278 LBS | DIASTOLIC BLOOD PRESSURE: 76 MMHG | OXYGEN SATURATION: 96 %

## 2021-06-24 DIAGNOSIS — M46.26 OSTEOMYELITIS OF VERTEBRA, LUMBAR REGION: ICD-10-CM

## 2021-06-24 DIAGNOSIS — F11.20 OPIOID DEPENDENCE, UNCOMPLICATED: ICD-10-CM

## 2021-06-24 DIAGNOSIS — R56.9 UNSPECIFIED CONVULSIONS: ICD-10-CM

## 2021-06-24 DIAGNOSIS — Z98.890 OTHER SPECIFIED POSTPROCEDURAL STATES: Chronic | ICD-10-CM
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
## 2021-06-24 DIAGNOSIS — S93.401A SPRAIN OF UNSPECIFIED LIGAMENT OF RIGHT ANKLE, INITIAL ENCOUNTER: ICD-10-CM

## 2021-06-24 DIAGNOSIS — F19.10 OTHER PSYCHOACTIVE SUBSTANCE ABUSE, UNCOMPLICATED: ICD-10-CM
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
## 2021-06-24 DIAGNOSIS — Z88.0 ALLERGY STATUS TO PENICILLIN: ICD-10-CM
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
## 2021-06-24 DIAGNOSIS — M25.571 PAIN IN RIGHT ANKLE AND JOINTS OF RIGHT FOOT: ICD-10-CM

## 2021-06-24 PROCEDURE — 99283 EMERGENCY DEPT VISIT LOW MDM: CPT

## 2021-06-24 NOTE — ED ADULT TRIAGE NOTE - CHIEF COMPLAINT QUOTE
right foot sprain, stated twisted ankle when he was walking, states hasn't slept in 3 days, denies n/v/dizziness, denies hitting head when he fell on the ground, denies drinking and smoking

## 2021-06-25 VITALS
RESPIRATION RATE: 18 BRPM | DIASTOLIC BLOOD PRESSURE: 86 MMHG | TEMPERATURE: 98 F | SYSTOLIC BLOOD PRESSURE: 110 MMHG | HEART RATE: 96 BPM | OXYGEN SATURATION: 99 %

## 2021-06-25 PROCEDURE — 73610 X-RAY EXAM OF ANKLE: CPT | Mod: 26,RT

## 2021-06-25 RX ORDER — OXYCODONE HYDROCHLORIDE 5 MG/1
30 TABLET ORAL ONCE
Refills: 0 | Status: DISCONTINUED | OUTPATIENT
Start: 2021-06-25 | End: 2021-06-25

## 2021-06-25 RX ORDER — GABAPENTIN 400 MG/1
600 CAPSULE ORAL ONCE
Refills: 0 | Status: COMPLETED | OUTPATIENT
Start: 2021-06-25 | End: 2021-06-25

## 2021-06-25 RX ADMIN — OXYCODONE HYDROCHLORIDE 30 MILLIGRAM(S): 5 TABLET ORAL at 02:09

## 2021-06-25 RX ADMIN — GABAPENTIN 600 MILLIGRAM(S): 400 CAPSULE ORAL at 02:09

## 2021-06-25 RX ADMIN — OXYCODONE HYDROCHLORIDE 30 MILLIGRAM(S): 5 TABLET ORAL at 04:54
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
## 2021-06-25 NOTE — ED PROVIDER NOTE - PHYSICAL EXAMINATION
Gen: Alert, NAD, well appearing  Head: NC, AT, EOMI, normal lids/conjunctiva  ENT: normal hearing, patent oropharynx without erythema/exudate, uvula midline  Neck: +supple, no tenderness, +Trachea midline  Pulm: Bilateral BS, normal resp effort, no wheeze/stridor/retractions  CV: RRR, no M/R/G, +dist pulses  Abd: soft, NT/ND, Negative Bessemer signs, +BS, no palpable masses  Mskel: +right ankle pain on movement, minimal swelling, ttp medial and lateral ankle, no erythema/cyanosis  Skin: no rash, warm/dry  Neuro: AAOx3, no apparent sensory/motor deficits, coordination intact

## 2021-06-25 NOTE — ED ADULT NURSE NOTE - NSIMPLEMENTINTERV_GEN_ALL_ED
Implemented All Fall Risk Interventions:  [place] to call system. Call bell, personal items and telephone within reach. Instruct patient to call for assistance. Room bathroom lighting operational. Non-slip footwear when patient is off stretcher. Physically safe environment: no spills, clutter or unnecessary equipment. Stretcher in lowest position, wheels locked, appropriate side rails in place. Provide visual cue, wrist band, yellow gown, etc. Monitor gait and stability. Monitor for mental status changes and reorient to person, place, and time. Review medications for side effects contributing to fall risk. Reinforce activity limits and safety measures with patient and family.

## 2021-06-25 NOTE — ED PROVIDER NOTE - PATIENT PORTAL LINK FT
You can access the FollowMyHealth Patient Portal offered by [facility] by registering at the following website: [email or web address]. By joining [employer]’s FollowMyHealth portal, you will also be able to view your health information using other applications (apps) compatible with our system.

## 2021-06-25 NOTE — ED PROVIDER NOTE - OBJECTIVE STATEMENT
Pertinent PMH/PSH/FHx/SHx and Review of Systems contained within:  Patient presents to the ED for right ankle pain.  Patient was walking when he twisted his right ankle, denies head injury or fall.  Denies knee pain.  Patient has not had his daily oxy or gabapentin, wants that for pain.  +Smoker, denies use of other illict drugs or h/o alchol abuse    ROS: No fever/chills, No headache/photophobia/eye pain/changes in vision, No ear pain/sore throat/dysphagia, No chest pain/palpitations, no SOB/cough/wheeze/stridor, No abdominal pain, No N/V/D/melena, no dysuria/frequency/discharge, No new neck/back pain, no rash, no changes in neurological status/function.

## 2021-06-25 NOTE — ED PROVIDER NOTE - CLINICAL SUMMARY MEDICAL DECISION MAKING FREE TEXT BOX
PAtient twisted right ankle when walking.  VSS.  Radiographic images were obtained and reviewed.  No acute fractures, dislocations, or foreign body noted.  If official read by radiology is different or identifies acute pathology, patient will be contacted by a member of staff. Ace wrap given, cane/crutches offered. Patient advised to continue rest, ice, analgesia as needed for pain. Ortho referral and outpatient MRI for persistent symptoms discussed.  Discussed results and outcome of today's visit with the patient.  Patient advised to please follow up with another healthcare provider within the next 24 hours and return to the Emergency Department for worsening symptoms or any other concerns.  Patient advised that their doctor may call  to follow up on the specific results of the tests performed today in the emergency department.   Patient appears well on discharge.

## 2021-06-30 ENCOUNTER — EMERGENCY (EMERGENCY)
Facility: HOSPITAL | Age: 41
LOS: 0 days | Discharge: HOME | End: 2021-07-01
Attending: EMERGENCY MEDICINE
Payer: MEDICAID

## 2021-06-30 ENCOUNTER — EMERGENCY (EMERGENCY)
Facility: HOSPITAL | Age: 41
LOS: 0 days | Discharge: HOME | End: 2021-06-30
Attending: EMERGENCY MEDICINE
Payer: MEDICAID

## 2021-06-30 VITALS
OXYGEN SATURATION: 95 % | HEART RATE: 79 BPM | HEIGHT: 68 IN | TEMPERATURE: 98 F | DIASTOLIC BLOOD PRESSURE: 59 MMHG | WEIGHT: 270.07 LBS | SYSTOLIC BLOOD PRESSURE: 116 MMHG | RESPIRATION RATE: 19 BRPM

## 2021-06-30 VITALS
HEART RATE: 77 BPM | TEMPERATURE: 98 F | DIASTOLIC BLOOD PRESSURE: 65 MMHG | WEIGHT: 270.07 LBS | SYSTOLIC BLOOD PRESSURE: 111 MMHG | HEIGHT: 68 IN | RESPIRATION RATE: 18 BRPM | OXYGEN SATURATION: 99 %

## 2021-06-30 VITALS
SYSTOLIC BLOOD PRESSURE: 100 MMHG | DIASTOLIC BLOOD PRESSURE: 54 MMHG | OXYGEN SATURATION: 95 % | RESPIRATION RATE: 17 BRPM | TEMPERATURE: 98 F | HEART RATE: 59 BPM
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
## 2021-06-30 DIAGNOSIS — G40.909 EPILEPSY, UNSPECIFIED, NOT INTRACTABLE, WITHOUT STATUS EPILEPTICUS: ICD-10-CM

## 2021-06-30 DIAGNOSIS — Z88.8 ALLERGY STATUS TO OTHER DRUGS, MEDICAMENTS AND BIOLOGICAL SUBSTANCES: ICD-10-CM

## 2021-06-30 DIAGNOSIS — F41.9 ANXIETY DISORDER, UNSPECIFIED: ICD-10-CM

## 2021-06-30 DIAGNOSIS — M86.8X9 OTHER OSTEOMYELITIS, UNSPECIFIED SITES: ICD-10-CM

## 2021-06-30 DIAGNOSIS — Z98.1 ARTHRODESIS STATUS: ICD-10-CM

## 2021-06-30 DIAGNOSIS — Z91.041 RADIOGRAPHIC DYE ALLERGY STATUS: ICD-10-CM
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
## 2021-06-30 DIAGNOSIS — M46.26 OSTEOMYELITIS OF VERTEBRA, LUMBAR REGION: ICD-10-CM

## 2021-06-30 DIAGNOSIS — Z98.890 OTHER SPECIFIED POSTPROCEDURAL STATES: Chronic | ICD-10-CM

## 2021-06-30 DIAGNOSIS — S09.90XA UNSPECIFIED INJURY OF HEAD, INITIAL ENCOUNTER: ICD-10-CM

## 2021-06-30 DIAGNOSIS — Z20.822 CONTACT WITH AND (SUSPECTED) EXPOSURE TO COVID-19: ICD-10-CM

## 2021-06-30 DIAGNOSIS — M46.20 OSTEOMYELITIS OF VERTEBRA, SITE UNSPECIFIED: ICD-10-CM

## 2021-06-30 DIAGNOSIS — F90.9 ATTENTION-DEFICIT HYPERACTIVITY DISORDER, UNSPECIFIED TYPE: ICD-10-CM

## 2021-06-30 LAB
ALBUMIN SERPL ELPH-MCNC: 3.5 G/DL — SIGNIFICANT CHANGE UP (ref 3.3–5)
ALP SERPL-CCNC: 102 U/L — SIGNIFICANT CHANGE UP (ref 40–120)
ALT FLD-CCNC: 54 U/L — SIGNIFICANT CHANGE UP (ref 12–78)
ANION GAP SERPL CALC-SCNC: 7 MMOL/L — SIGNIFICANT CHANGE UP (ref 5–17)
AST SERPL-CCNC: 48 U/L — HIGH (ref 15–37)
BASOPHILS # BLD AUTO: 0.04 K/UL — SIGNIFICANT CHANGE UP (ref 0–0.2)
BASOPHILS NFR BLD AUTO: 0.5 % — SIGNIFICANT CHANGE UP (ref 0–2)
BILIRUB SERPL-MCNC: 0.5 MG/DL — SIGNIFICANT CHANGE UP (ref 0.2–1.2)
BUN SERPL-MCNC: 19 MG/DL — SIGNIFICANT CHANGE UP (ref 7–23)
CALCIUM SERPL-MCNC: 8.9 MG/DL — SIGNIFICANT CHANGE UP (ref 8.5–10.1)
CHLORIDE SERPL-SCNC: 103 MMOL/L — SIGNIFICANT CHANGE UP (ref 96–108)
CO2 SERPL-SCNC: 25 MMOL/L — SIGNIFICANT CHANGE UP (ref 22–31)
CREAT SERPL-MCNC: 0.81 MG/DL — SIGNIFICANT CHANGE UP (ref 0.5–1.3)
EOSINOPHIL # BLD AUTO: 0.13 K/UL — SIGNIFICANT CHANGE UP (ref 0–0.5)
EOSINOPHIL NFR BLD AUTO: 1.5 % — SIGNIFICANT CHANGE UP (ref 0–6)
ETHANOL SERPL-MCNC: <10 MG/DL — SIGNIFICANT CHANGE UP (ref 0–10)
FLUAV AG NPH QL: SIGNIFICANT CHANGE UP
FLUBV AG NPH QL: SIGNIFICANT CHANGE UP
GLUCOSE BLDC GLUCOMTR-MCNC: 113 MG/DL — HIGH (ref 70–99)
GLUCOSE SERPL-MCNC: 119 MG/DL — HIGH (ref 70–99)
HCT VFR BLD CALC: 39.8 % — SIGNIFICANT CHANGE UP (ref 39–50)
HGB BLD-MCNC: 13.3 G/DL — SIGNIFICANT CHANGE UP (ref 13–17)
IMM GRANULOCYTES NFR BLD AUTO: 0.6 % — SIGNIFICANT CHANGE UP (ref 0–1.5)
LYMPHOCYTES # BLD AUTO: 1.49 K/UL — SIGNIFICANT CHANGE UP (ref 1–3.3)
LYMPHOCYTES # BLD AUTO: 16.8 % — SIGNIFICANT CHANGE UP (ref 13–44)
MAGNESIUM SERPL-MCNC: 2.1 MG/DL — SIGNIFICANT CHANGE UP (ref 1.6–2.6)
MCHC RBC-ENTMCNC: 29.5 PG — SIGNIFICANT CHANGE UP (ref 27–34)
MCHC RBC-ENTMCNC: 33.4 GM/DL — SIGNIFICANT CHANGE UP (ref 32–36)
MCV RBC AUTO: 88.2 FL — SIGNIFICANT CHANGE UP (ref 80–100)
MONOCYTES # BLD AUTO: 0.45 K/UL — SIGNIFICANT CHANGE UP (ref 0–0.9)
MONOCYTES NFR BLD AUTO: 5.1 % — SIGNIFICANT CHANGE UP (ref 2–14)
NEUTROPHILS # BLD AUTO: 6.7 K/UL — SIGNIFICANT CHANGE UP (ref 1.8–7.4)
NEUTROPHILS NFR BLD AUTO: 75.5 % — SIGNIFICANT CHANGE UP (ref 43–77)
NRBC # BLD: 0 /100 WBCS — SIGNIFICANT CHANGE UP (ref 0–0)
PLATELET # BLD AUTO: 288 K/UL — SIGNIFICANT CHANGE UP (ref 150–400)
POTASSIUM SERPL-MCNC: 3.6 MMOL/L — SIGNIFICANT CHANGE UP (ref 3.5–5.3)
POTASSIUM SERPL-SCNC: 3.6 MMOL/L — SIGNIFICANT CHANGE UP (ref 3.5–5.3)
PROT SERPL-MCNC: 8.6 GM/DL — HIGH (ref 6–8.3)
RBC # BLD: 4.51 M/UL — SIGNIFICANT CHANGE UP (ref 4.2–5.8)
RBC # FLD: 13.7 % — SIGNIFICANT CHANGE UP (ref 10.3–14.5)
SARS-COV-2 RNA SPEC QL NAA+PROBE: SIGNIFICANT CHANGE UP
SODIUM SERPL-SCNC: 135 MMOL/L — SIGNIFICANT CHANGE UP (ref 135–145)
VALPROATE SERPL-MCNC: <3 UG/ML — LOW (ref 50–100)
WBC # BLD: 8.86 K/UL — SIGNIFICANT CHANGE UP (ref 3.8–10.5)
WBC # FLD AUTO: 8.86 K/UL — SIGNIFICANT CHANGE UP (ref 3.8–10.5)

## 2021-06-30 PROCEDURE — 70450 CT HEAD/BRAIN W/O DYE: CPT | Mod: 26,MA

## 2021-06-30 PROCEDURE — 99285 EMERGENCY DEPT VISIT HI MDM: CPT

## 2021-06-30 PROCEDURE — 93010 ELECTROCARDIOGRAM REPORT: CPT

## 2021-06-30 RX ORDER — DIVALPROEX SODIUM 500 MG/1
500 TABLET, DELAYED RELEASE ORAL ONCE
Refills: 0 | Status: COMPLETED | OUTPATIENT
Start: 2021-06-30 | End: 2021-06-30

## 2021-06-30 RX ORDER — SODIUM CHLORIDE 9 MG/ML
1000 INJECTION INTRAMUSCULAR; INTRAVENOUS; SUBCUTANEOUS ONCE
Refills: 0 | Status: COMPLETED | OUTPATIENT
Start: 2021-06-30 | End: 2021-06-30

## 2021-06-30 RX ORDER — OXYCODONE HYDROCHLORIDE 5 MG/1
30 TABLET ORAL ONCE
Refills: 0 | Status: DISCONTINUED | OUTPATIENT
Start: 2021-06-30 | End: 2021-06-30

## 2021-06-30 RX ORDER — DIVALPROEX SODIUM 500 MG/1
750 TABLET, DELAYED RELEASE ORAL ONCE
Refills: 0 | Status: COMPLETED | OUTPATIENT
Start: 2021-06-30 | End: 2021-06-30

## 2021-06-30 RX ADMIN — OXYCODONE HYDROCHLORIDE 30 MILLIGRAM(S): 5 TABLET ORAL at 11:27

## 2021-06-30 RX ADMIN — OXYCODONE HYDROCHLORIDE 30 MILLIGRAM(S): 5 TABLET ORAL at 09:03

## 2021-06-30 RX ADMIN — SODIUM CHLORIDE 1000 MILLILITER(S): 9 INJECTION INTRAMUSCULAR; INTRAVENOUS; SUBCUTANEOUS at 08:05

## 2021-06-30 RX ADMIN — DIVALPROEX SODIUM 500 MILLIGRAM(S): 500 TABLET, DELAYED RELEASE ORAL at 08:06

## 2021-06-30 NOTE — ED PROVIDER NOTE - CONSTITUTIONAL, MLM
normal... Well appearing, awake, alert, oriented to person, place, time/situation and in no apparent distress. Speaking in clear full sentences no nasal flaring no shoulders retractions no diaphoresis, no drooling,, appears very comfortable sitting up in the stretcher in a bright light room

## 2021-06-30 NOTE — ED ADULT NURSE NOTE - NSIMPLEMENTINTERV_GEN_ALL_ED
Implemented All Fall Risk Interventions:  [place] to call system. Call bell, personal items and telephone within reach. Instruct patient to call for assistance. Room bathroom lighting operational. Non-slip footwear when patient is off stretcher. Physically safe environment: no spills, clutter or unnecessary equipment. Stretcher in lowest position, wheels locked, appropriate side rails in place. Provide visual cue, wrist band, yellow gown, etc. Monitor gait and stability. Monitor for mental status changes and reorient to person, place, and time. Review medications for side effects contributing to fall risk. Reinforce activity limits and safety measures with patient and family.

## 2021-06-30 NOTE — ED ADULT NURSE NOTE - NSIMPLEMENTINTERV_GEN_ALL_ED
Implemented All Universal Safety Interventions:  [place] to call system. Call bell, personal items and telephone within reach. Instruct patient to call for assistance. Room bathroom lighting operational. Non-slip footwear when patient is off stretcher. Physically safe environment: no spills, clutter or unnecessary equipment. Stretcher in lowest position, wheels locked, appropriate side rails in place. Specific interventions were implemented:

## 2021-06-30 NOTE — ED PROVIDER NOTE - ENMT, MLM
Airway patent, Nasal mucosa clear. Mouth with normal mucosa. Throat has no vesicles, no oropharyngeal exudates and uvula is midline. No tongue bite ancelmo

## 2021-06-30 NOTE — ED ADULT NURSE NOTE - OBJECTIVE STATEMENT
Patient aox3. Pt with hx of Seizures and ADD. Pt with hx of spinal fusion surgery. He states he had a seizure this morning, he states he was unable to hold his coffee, dropped it a few times. He takes Depakote for seizures but admits to not being able to take meds every day. Patient reports he also takes Adderral and Oxycodone. Patient requesting all his daily meds today.

## 2021-06-30 NOTE — ED PROVIDER NOTE - PHYSICAL EXAMINATION
Vitals: WNL  Gen: AAOx3, NAD, sitting comfortably in stretcher  Head: contusion to posterior scalp 2x3 cm, no skin break, otherwise ncat, perrla, eomi b/l  Neck: supple, no lymphadenopathy, no midline deviation  Heart: rrr, no m/r/g  Lungs: CTA b/l, no rales/ronchi/wheezes  Abd: soft, nontender, non-distended, no rebound or guarding  Ext: no clubbing/cyanosis/edema  Neuro: sensation and muscle strength intact b/l, steady gait, CN2-12 intact b/l

## 2021-06-30 NOTE — ED PROVIDER NOTE - OBJECTIVE STATEMENT
41 years old male walked in c/o seizure while going home from work in the car as a passenger this morning. Pt sts he has a hx of seizure since 2018 was diagnosed seizure in Jan of this year and last seizure was in Jan of this year. Pt sts he takes Depakote 500 mg in am and 750 mg at night and he only takes it some times not daily. Pt denies hx of alcohol abuse. Pt also has a hx of adhd and chronic back pain take oxycodone.  Pt denies trauma to the head, headache, dizziness, blurred visions, light sensitivities, focal/distal weakness or numbness, neck/back pain, cough, sob, chest pain, nausea, vomiting, fever, chills, abd pain, dysuria, hematuria, or irregular bowel movements. Pt had full covid vaccines Moderna.

## 2021-06-30 NOTE — ED PROVIDER NOTE - CARE PROVIDER_API CALL
Syed Thibodeaux)  Neurology  [address], [address]  [place] [address]  Phone: [phone]  Fax: [phone]  Follow Up Time: 4-6 Days

## 2021-06-30 NOTE — ED PROVIDER NOTE - OBJECTIVE STATEMENT
42 yo M s/p seizure at 8 pm (3 hours ago).  Pt. had an aura, seizure was witnessed, reportedly fell to floor and hit head, then started shaking all over <1 min.  Pt. woke up without complaints except mild posterior head pain from fall.  No other complaints.  Of note, pt. was seen earlier same day for seizures and sent home from ER after negative w/u (Depakote level notably negative).  Pt. admits to non-compliance with Depakote.    ROS: negative for fever, cough, chest pain, shortness of breath, abd pain, nausea, vomiting, diarrhea, rash, paresthesia, and weakness--all other systems reviewed are negative.   PMH: osteomyelitis of spine s/p fusion sx, epilepsy, adhd, anxiety; Meds: non-compliant w/depakote, also on oxycodone, Adderall, gabapentin; SH: Denies smoking/drinking, + hx of IVDA

## 2021-06-30 NOTE — ED ADULT TRIAGE NOTE - CHIEF COMPLAINT QUOTE
Pt s/p seizure at 2130pm, as per pt witness by friend.  h/o seizures ( takes Depakote), substance abuse ( on Methadone), depression, osteomyelitis

## 2021-06-30 NOTE — ED PROVIDER NOTE - PATIENT PORTAL LINK FT
You can access the FollowMyHealth Patient Portal offered by [facility] by registering at the following website: [email or web address]. By joining [employer]’s FollowMyHealth portal, you will also be able to view your health information using other applications (apps) compatible with our system.

## 2021-06-30 NOTE — ED PROVIDER NOTE - PROGRESS NOTE DETAILS
pt has not had any seizures pt is ambulating with normal gaits to and from the bathroom ate and tolerated lunch pt sts he does not need depakote prescriptions. Pt is advised to take his depakote and return if symptoms persist or worsen.

## 2021-06-30 NOTE — ED PROVIDER NOTE - NEUROLOGICAL MOTOR
normal [General Appearance - Well Developed] : well developed [General Appearance - Well Nourished] : well nourished [Normal Appearance] : normal appearance [General Appearance - In No Acute Distress] : no acute distress [Abdomen Soft] : soft [Well Groomed] : well groomed [Costovertebral Angle Tenderness] : no ~M costovertebral angle tenderness [Abdomen Tenderness] : non-tender [Urethral Meatus] : meatus normal [Urinary Bladder Findings] : the bladder was normal on palpation [Scrotum] : the scrotum was normal [Testes Mass (___cm)] : there were no testicular masses [Edema] : no peripheral edema [] : no rash [Exaggerated Use Of Accessory Muscles For Inspiration] : no accessory muscle use [Respiration, Rhythm And Depth] : normal respiratory rhythm and effort [Oriented To Time, Place, And Person] : oriented to person, place, and time [Not Anxious] : not anxious [Affect] : the affect was normal [Mood] : the mood was normal [Normal Station and Gait] : the gait and station were normal for the patient's age [No Focal Deficits] : no focal deficits [No Palpable Adenopathy] : no palpable adenopathy

## 2021-06-30 NOTE — ED ADULT NURSE NOTE - INTERVENTIONS DEFINITIONS
Non-slip footwear when patient is off stretcher/Stretcher in lowest position, wheels locked, appropriate side rails in place/Monitor gait and stability/Monitor for mental status changes and reorient to person, place, and time

## 2021-06-30 NOTE — ED PROVIDER NOTE - CLINICAL SUMMARY MEDICAL DECISION MAKING FREE TEXT BOX
40 yo M with head trauma s/p seizure/fall  -basic labs, etoh, ammonia, drug screen, lactate, depakote level, CT brain/cervical, EKG, ns hydration bolus, give depakote in ER, finger stick, cardiac monitor, Sx precautions  -f/u results, reeval

## 2021-06-30 NOTE — ED ADULT TRIAGE NOTE - CHIEF COMPLAINT QUOTE
pt a&o x4 pt states a seizure today while passenger in car. Pt states " I was out of it and dropped my coffee, it felt like my seizure". pt hx of epilepsy. Last seizure January 2021. Takes Depakote but ran out of medication.

## 2021-06-30 NOTE — ED PROVIDER NOTE - PATIENT PORTAL LINK FT
You can access the FollowMyHealth Patient Portal offered by [facility] by registering at the following website: [email or web address]. By joining [employer]’s FollowMyHealth portal, you will also be able to view your health information using other applications (apps) compatible with our system.

## 2021-07-01 ENCOUNTER — EMERGENCY (EMERGENCY)
Facility: HOSPITAL | Age: 41
LOS: 0 days | Discharge: HOME | End: 2021-07-02
Attending: EMERGENCY MEDICINE
Payer: MEDICAID

## 2021-07-01 VITALS
HEART RATE: 74 BPM | RESPIRATION RATE: 18 BRPM | OXYGEN SATURATION: 95 % | HEIGHT: 68 IN | SYSTOLIC BLOOD PRESSURE: 108 MMHG | DIASTOLIC BLOOD PRESSURE: 74 MMHG | WEIGHT: 270.07 LBS | TEMPERATURE: 98 F

## 2021-07-01 VITALS
RESPIRATION RATE: 18 BRPM | SYSTOLIC BLOOD PRESSURE: 121 MMHG | HEART RATE: 68 BPM | OXYGEN SATURATION: 97 % | TEMPERATURE: 98 F | DIASTOLIC BLOOD PRESSURE: 74 MMHG

## 2021-07-01 DIAGNOSIS — G40.909 EPILEPSY, UNSPECIFIED, NOT INTRACTABLE, WITHOUT STATUS EPILEPTICUS: ICD-10-CM

## 2021-07-01 DIAGNOSIS — Z88.1 ALLERGY STATUS TO OTHER ANTIBIOTIC AGENTS STATUS: ICD-10-CM

## 2021-07-01 DIAGNOSIS — F11.20 OPIOID DEPENDENCE, UNCOMPLICATED: ICD-10-CM

## 2021-07-01 DIAGNOSIS — M46.26 OSTEOMYELITIS OF VERTEBRA, LUMBAR REGION: ICD-10-CM

## 2021-07-01 DIAGNOSIS — F19.10 OTHER PSYCHOACTIVE SUBSTANCE ABUSE, UNCOMPLICATED: ICD-10-CM

## 2021-07-01 DIAGNOSIS — Z59.0 HOMELESSNESS: ICD-10-CM

## 2021-07-01 DIAGNOSIS — Z98.890 OTHER SPECIFIED POSTPROCEDURAL STATES: Chronic | ICD-10-CM

## 2021-07-01 LAB
ALBUMIN SERPL ELPH-MCNC: 3.4 G/DL — SIGNIFICANT CHANGE UP (ref 3.3–5)
ALP SERPL-CCNC: 110 U/L — SIGNIFICANT CHANGE UP (ref 40–120)
ALT FLD-CCNC: 66 U/L — SIGNIFICANT CHANGE UP (ref 12–78)
AMMONIA BLD-MCNC: 47 UMOL/L — HIGH (ref 11–32)
AMPHET UR-MCNC: NEGATIVE — SIGNIFICANT CHANGE UP
ANION GAP SERPL CALC-SCNC: 6 MMOL/L — SIGNIFICANT CHANGE UP (ref 5–17)
AST SERPL-CCNC: 47 U/L — HIGH (ref 15–37)
BARBITURATES UR SCN-MCNC: NEGATIVE — SIGNIFICANT CHANGE UP
BASOPHILS # BLD AUTO: 0.02 K/UL — SIGNIFICANT CHANGE UP (ref 0–0.2)
BASOPHILS NFR BLD AUTO: 0.2 % — SIGNIFICANT CHANGE UP (ref 0–2)
BENZODIAZ UR-MCNC: POSITIVE — SIGNIFICANT CHANGE UP
BILIRUB SERPL-MCNC: 0.4 MG/DL — SIGNIFICANT CHANGE UP (ref 0.2–1.2)
BUN SERPL-MCNC: 15 MG/DL — SIGNIFICANT CHANGE UP (ref 7–23)
CALCIUM SERPL-MCNC: 8.8 MG/DL — SIGNIFICANT CHANGE UP (ref 8.5–10.1)
CHLORIDE SERPL-SCNC: 104 MMOL/L — SIGNIFICANT CHANGE UP (ref 96–108)
CO2 SERPL-SCNC: 27 MMOL/L — SIGNIFICANT CHANGE UP (ref 22–31)
COCAINE METAB.OTHER UR-MCNC: NEGATIVE — SIGNIFICANT CHANGE UP
CREAT SERPL-MCNC: 0.83 MG/DL — SIGNIFICANT CHANGE UP (ref 0.5–1.3)
EOSINOPHIL # BLD AUTO: 0.09 K/UL — SIGNIFICANT CHANGE UP (ref 0–0.5)
EOSINOPHIL NFR BLD AUTO: 1.1 % — SIGNIFICANT CHANGE UP (ref 0–6)
ETHANOL SERPL-MCNC: <10 MG/DL — SIGNIFICANT CHANGE UP (ref 0–10)
GLUCOSE SERPL-MCNC: 124 MG/DL — HIGH (ref 70–99)
HCT VFR BLD CALC: 38.2 % — LOW (ref 39–50)
HGB BLD-MCNC: 12.9 G/DL — LOW (ref 13–17)
IMM GRANULOCYTES NFR BLD AUTO: 0.4 % — SIGNIFICANT CHANGE UP (ref 0–1.5)
LACTATE SERPL-SCNC: 2.1 MMOL/L — HIGH (ref 0.7–2)
LYMPHOCYTES # BLD AUTO: 1.41 K/UL — SIGNIFICANT CHANGE UP (ref 1–3.3)
LYMPHOCYTES # BLD AUTO: 16.9 % — SIGNIFICANT CHANGE UP (ref 13–44)
MCHC RBC-ENTMCNC: 30.2 PG — SIGNIFICANT CHANGE UP (ref 27–34)
MCHC RBC-ENTMCNC: 33.8 GM/DL — SIGNIFICANT CHANGE UP (ref 32–36)
MCV RBC AUTO: 89.5 FL — SIGNIFICANT CHANGE UP (ref 80–100)
METHADONE UR-MCNC: POSITIVE — SIGNIFICANT CHANGE UP
MONOCYTES # BLD AUTO: 0.33 K/UL — SIGNIFICANT CHANGE UP (ref 0–0.9)
MONOCYTES NFR BLD AUTO: 4 % — SIGNIFICANT CHANGE UP (ref 2–14)
NEUTROPHILS # BLD AUTO: 6.46 K/UL — SIGNIFICANT CHANGE UP (ref 1.8–7.4)
NEUTROPHILS NFR BLD AUTO: 77.4 % — HIGH (ref 43–77)
NRBC # BLD: 0 /100 WBCS — SIGNIFICANT CHANGE UP (ref 0–0)
OPIATES UR-MCNC: POSITIVE — SIGNIFICANT CHANGE UP
PCP SPEC-MCNC: SIGNIFICANT CHANGE UP
PCP UR-MCNC: NEGATIVE — SIGNIFICANT CHANGE UP
PLATELET # BLD AUTO: 279 K/UL — SIGNIFICANT CHANGE UP (ref 150–400)
POTASSIUM SERPL-MCNC: 4.5 MMOL/L — SIGNIFICANT CHANGE UP (ref 3.5–5.3)
POTASSIUM SERPL-SCNC: 4.5 MMOL/L — SIGNIFICANT CHANGE UP (ref 3.5–5.3)
PROT SERPL-MCNC: 8.4 GM/DL — HIGH (ref 6–8.3)
RBC # BLD: 4.27 M/UL — SIGNIFICANT CHANGE UP (ref 4.2–5.8)
RBC # FLD: 13.6 % — SIGNIFICANT CHANGE UP (ref 10.3–14.5)
SODIUM SERPL-SCNC: 137 MMOL/L — SIGNIFICANT CHANGE UP (ref 135–145)
THC UR QL: POSITIVE — SIGNIFICANT CHANGE UP
VALPROATE SERPL-MCNC: 21 UG/ML — LOW (ref 50–100)
WBC # BLD: 8.34 K/UL — SIGNIFICANT CHANGE UP (ref 3.8–10.5)
WBC # FLD AUTO: 8.34 K/UL — SIGNIFICANT CHANGE UP (ref 3.8–10.5)

## 2021-07-01 PROCEDURE — 93010 ELECTROCARDIOGRAM REPORT: CPT

## 2021-07-01 PROCEDURE — 99284 EMERGENCY DEPT VISIT MOD MDM: CPT

## 2021-07-01 PROCEDURE — 72125 CT NECK SPINE W/O DYE: CPT | Mod: 26,MA

## 2021-07-01 PROCEDURE — 70450 CT HEAD/BRAIN W/O DYE: CPT | Mod: 26,MA

## 2021-07-01 RX ORDER — SODIUM CHLORIDE 9 MG/ML
1000 INJECTION INTRAMUSCULAR; INTRAVENOUS; SUBCUTANEOUS ONCE
Refills: 0 | Status: COMPLETED | OUTPATIENT
Start: 2021-07-01 | End: 2021-07-01

## 2021-07-01 RX ORDER — OXYCODONE HYDROCHLORIDE 5 MG/1
30 TABLET ORAL ONCE
Refills: 0 | Status: DISCONTINUED | OUTPATIENT
Start: 2021-07-01 | End: 2021-07-01

## 2021-07-01 RX ORDER — GABAPENTIN 400 MG/1
600 CAPSULE ORAL ONCE
Refills: 0 | Status: COMPLETED | OUTPATIENT
Start: 2021-07-01 | End: 2021-07-01

## 2021-07-01 RX ORDER — DEXTROAMPHETAMINE SACCHARATE, AMPHETAMINE ASPARTATE, DEXTROAMPHETAMINE SULFATE AND AMPHETAMINE SULFATE 1.875; 1.875; 1.875; 1.875 MG/1; MG/1; MG/1; MG/1
30 TABLET ORAL ONCE
Refills: 0 | Status: DISCONTINUED | OUTPATIENT
Start: 2021-07-01 | End: 2021-07-01

## 2021-07-01 RX ADMIN — SODIUM CHLORIDE 1000 MILLILITER(S): 9 INJECTION INTRAMUSCULAR; INTRAVENOUS; SUBCUTANEOUS at 00:00

## 2021-07-01 RX ADMIN — DIVALPROEX SODIUM 750 MILLIGRAM(S): 500 TABLET, DELAYED RELEASE ORAL at 01:09

## 2021-07-01 RX ADMIN — DEXTROAMPHETAMINE SACCHARATE, AMPHETAMINE ASPARTATE, DEXTROAMPHETAMINE SULFATE AND AMPHETAMINE SULFATE 30 MILLIGRAM(S): 1.875; 1.875; 1.875; 1.875 TABLET ORAL at 01:10

## 2021-07-01 RX ADMIN — OXYCODONE HYDROCHLORIDE 30 MILLIGRAM(S): 5 TABLET ORAL at 01:17

## 2021-07-01 RX ADMIN — SODIUM CHLORIDE 1000 MILLILITER(S): 9 INJECTION INTRAMUSCULAR; INTRAVENOUS; SUBCUTANEOUS at 01:40

## 2021-07-01 RX ADMIN — GABAPENTIN 600 MILLIGRAM(S): 400 CAPSULE ORAL at 01:10

## 2021-07-01 RX ADMIN — OXYCODONE HYDROCHLORIDE 30 MILLIGRAM(S): 5 TABLET ORAL at 02:02

## 2021-07-01 SDOH — ECONOMIC STABILITY - HOUSING INSECURITY: HOMELESSNESS: Z59.0

## 2021-07-01 NOTE — ED ADULT NURSE NOTE - OBJECTIVE STATEMENT
Pt thought he was about to have a seizure. Also complains of back pain, hx of osteomyelitis. Received a 41M walked in in the ED p/w c/o medical eval for seizure, patient was seen here yesterday for the same complaint, had a full worked up in the ED and was dc home, h/o IV drug abuse  Methadone maintenance therapy patient  Osteomyelitis of lumbar spine  Seizure. Denies any chills or fever, walking steadily in the ED.  Seen and evaluated by Dr Holley.   Pending SW in the morning for shelter placement.

## 2021-07-01 NOTE — ED ADULT TRIAGE NOTE - CHIEF COMPLAINT QUOTE
pt states he feels like he is going to have another seizure.  was seen at [facility] today for same.  aura- eyes rolling, dropping things, leg spasms.

## 2021-07-01 NOTE — ED ADULT NURSE NOTE - NSIMPLEMENTINTERV_GEN_ALL_ED
Implemented All Fall with Harm Risk Interventions:  [place] to call system. Call bell, personal items and telephone within reach. Instruct patient to call for assistance. Room bathroom lighting operational. Non-slip footwear when patient is off stretcher. Physically safe environment: no spills, clutter or unnecessary equipment. Stretcher in lowest position, wheels locked, appropriate side rails in place. Provide visual cue, wrist band, yellow gown, etc. Monitor gait and stability. Monitor for mental status changes and reorient to person, place, and time. Review medications for side effects contributing to fall risk. Reinforce activity limits and safety measures with patient and family. Provide visual clues: red socks.

## 2021-07-01 NOTE — ED ADULT NURSE NOTE - CHIEF COMPLAINT QUOTE
pt states he feels like he is going to have another seizure.  was seen at [facility] today for same.  aura- eyes rolling, dropping things, leg spasms.

## 2021-07-02 VITALS
OXYGEN SATURATION: 96 % | HEART RATE: 74 BPM | SYSTOLIC BLOOD PRESSURE: 96 MMHG | DIASTOLIC BLOOD PRESSURE: 63 MMHG | RESPIRATION RATE: 16 BRPM

## 2021-07-02 RX ORDER — DIVALPROEX SODIUM 500 MG/1
500 TABLET, DELAYED RELEASE ORAL ONCE
Refills: 0 | Status: COMPLETED | OUTPATIENT
Start: 2021-07-02 | End: 2021-07-02

## 2021-07-02 RX ADMIN — DIVALPROEX SODIUM 500 MILLIGRAM(S): 500 TABLET, DELAYED RELEASE ORAL at 04:56

## 2021-07-02 NOTE — ED PROVIDER NOTE - PATIENT PORTAL LINK FT
You can access the FollowMyHealth Patient Portal offered by [facility] by registering at the following website: [email or web address]. By joining [employer]’s FollowMyHealth portal, you will also be able to view your health information using other applications (apps) compatible with our system.

## 2021-07-02 NOTE — ED PROVIDER NOTE - PHYSICAL EXAMINATION
Vitals: WNL  Gen: AAOx3, NAD, sitting comfortably in stretcher  Head: ncat, perrla, eomi b/l  Neck: supple, no lymphadenopathy, no midline deviation  Heart: rrr, no m/r/g  Lungs: CTA b/l, no rales/ronchi/wheezes  Abd: soft, nontender, non-distended, no rebound or guarding  Ext: no clubbing/cyanosis/edema  Neuro: sensation and muscle strength intact b/l, steady gait, CN2-12 intact b/l

## 2021-07-02 NOTE — ED PROVIDER NOTE - OBJECTIVE STATEMENT
42 yo M with seizures.  Pt. states he had a seizure today.  But admits that he has meds now.  When asked about repeat seizures, pt. later admits that he's homeless because of ongoing court case involving family and he has no place to sleep, so he came to the ER.  No other complaints.   ROS: negative for fever, cough, headache, chest pain, shortness of breath, abd pain, nausea, vomiting, diarrhea, rash, paresthesia, and weakness--all other systems reviewed are negative.   PMH: osteomyelitis of spine s/p fusion sx, epilepsy, adhd, anxiety; Meds: non-compliant w/depakote, also on oxycodone, Adderall, gabapentin; SH: Denies smoking/drinking, + hx of IVDA

## 2021-07-02 NOTE — ED PROVIDER NOTE - CLINICAL SUMMARY MEDICAL DECISION MAKING FREE TEXT BOX
42 yo M initially came to ER for seizure complaints, now admits he did not have a seizure; he came because he is homeless, with nowhere to go  -pt. agrees to leave at 5 am to go to methadone clinic and he will find shelter thereafter  -no indication for labs/imaging at this time  -will order normal dose of Depakote for seizure prophylaxis   -d/c in AM with shelter referral

## 2021-07-02 NOTE — ED PROVIDER NOTE - CARE PROVIDER_API CALL
Syed Thibodeaux)  Neurology  [address], [address]  [place] [address]  Phone: [phone]  Fax: [phone]  Follow Up Time: 4-6 Days

## 2021-07-02 NOTE — ED PROVIDER NOTE - PROGRESS NOTE DETAILS
Pt. reports feeling better after meds  pt. agrees to f/u with primary care outpt. asap, referred to neuro for additional f/u   pt. understands to return to ED if symptoms worsen; will d/c; pt. will continue current Depakote as directed

## 2021-07-06 ENCOUNTER — EMERGENCY (EMERGENCY)
Facility: HOSPITAL | Age: 41
LOS: 0 days | Discharge: HOME | End: 2021-07-06
Attending: STUDENT IN AN ORGANIZED HEALTH CARE EDUCATION/TRAINING PROGRAM
Payer: MEDICAID

## 2021-07-06 VITALS
RESPIRATION RATE: 17 BRPM | DIASTOLIC BLOOD PRESSURE: 68 MMHG | TEMPERATURE: 98 F | HEART RATE: 69 BPM | SYSTOLIC BLOOD PRESSURE: 108 MMHG | OXYGEN SATURATION: 96 %

## 2021-07-06 VITALS
TEMPERATURE: 98 F | RESPIRATION RATE: 18 BRPM | HEIGHT: 68 IN | DIASTOLIC BLOOD PRESSURE: 73 MMHG | HEART RATE: 79 BPM | WEIGHT: 270.07 LBS | SYSTOLIC BLOOD PRESSURE: 112 MMHG | OXYGEN SATURATION: 93 %

## 2021-07-06 DIAGNOSIS — M86.9 OSTEOMYELITIS, UNSPECIFIED: ICD-10-CM

## 2021-07-06 DIAGNOSIS — F19.11 OTHER PSYCHOACTIVE SUBSTANCE ABUSE, IN REMISSION: ICD-10-CM

## 2021-07-06 DIAGNOSIS — Z98.890 OTHER SPECIFIED POSTPROCEDURAL STATES: Chronic | ICD-10-CM

## 2021-07-06 DIAGNOSIS — Z88.8 ALLERGY STATUS TO OTHER DRUGS, MEDICAMENTS AND BIOLOGICAL SUBSTANCES: ICD-10-CM

## 2021-07-06 DIAGNOSIS — Z76.0 ENCOUNTER FOR ISSUE OF REPEAT PRESCRIPTION: ICD-10-CM

## 2021-07-06 DIAGNOSIS — F11.21 OPIOID DEPENDENCE, IN REMISSION: ICD-10-CM

## 2021-07-06 DIAGNOSIS — G40.909 EPILEPSY, UNSPECIFIED, NOT INTRACTABLE, WITHOUT STATUS EPILEPTICUS: ICD-10-CM

## 2021-07-06 PROCEDURE — 99283 EMERGENCY DEPT VISIT LOW MDM: CPT

## 2021-07-06 RX ORDER — ALPRAZOLAM 0.25 MG
2 TABLET ORAL ONCE
Refills: 0 | Status: DISCONTINUED | OUTPATIENT
Start: 2021-07-06 | End: 2021-07-06

## 2021-07-06 RX ADMIN — Medication 2 MILLIGRAM(S): at 19:19

## 2021-07-06 NOTE — ED ADULT NURSE NOTE - NSFALLRSKHARMRISK_ED_ALL_ED
Post Discharge Call Back    Discharge Date: 4/16/18  LACE Score: 5  Contacted patient post surgery for total knee revision arthroplasty  Are you doing okay at home: YES  Do you have your follow-up appointment scheduled: YES  Did you get all of your medications filled:YES  Do you have any questions about your medications: NO  Is your pain controlled now: YES  What are you using for blood clot prevention: Coumadin , and is taking the correct dose: YES  Did you understand your discharge instructions: YES  Have you had a BM since surgery: YES  Are you taking stool softeners: YES  Are you wearing your BETSY stockings during the day and removing them at bedtime: YES  Have you noticed a severe increase in swelling in your surgical leg since being discharged home: NO  Are you having any drainage coming from your incision: NO  Are you having calf pain in either extremity: NO  Are you having any shortness of breath, sharp chest pain, or feel like your heart is racing: NO    Susanna Brennan RN    Date: 4/21/18        
no
24-Jan-2019

## 2021-07-06 NOTE — ED PROVIDER NOTE - PATIENT PORTAL LINK FT
You can access the FollowMyHealth Patient Portal offered by [facility] by registering at the following website: [email or web address]. By joining [employer]’s FollowMyHealth portal, you will also be able to view your health information using other applications (apps) compatible with our system.

## 2021-07-06 NOTE — ED ADULT TRIAGE NOTE - CHIEF COMPLAINT QUOTE
pt states forgot to take methadone 130mg po this morning. pt also did not take depakote 500mg bid  xanax 2mg tid, adderal 30mg bid for 2-3 days. hx: osteomyelitis

## 2021-07-06 NOTE — ED PROVIDER NOTE - CLINICAL SUMMARY MEDICAL DECISION MAKING FREE TEXT BOX
Pt presents today requesting his medications, is currently going through a separation with wife and is unable to obtain his regular medications. However, states he will attempt to go to his house to get it with assistance with police. Pt will be given dosage of Xanax,  otherwise instructed to followup with his PMD to obtain his medications.

## 2021-07-06 NOTE — ED ADULT NURSE NOTE - OBJECTIVE STATEMENT
Pt c/o medication refill. States that he did not take his Methadone, depakote, adderal and xanax for 2-3 day. Pt states he wants medications at this time. Denies chest pain, headaches and sob.

## 2021-08-08 ENCOUNTER — EMERGENCY (EMERGENCY)
Facility: HOSPITAL | Age: 41
LOS: 0 days | Discharge: HOME | End: 2021-08-08
Attending: EMERGENCY MEDICINE
Payer: MEDICAID

## 2021-08-08 VITALS
HEART RATE: 92 BPM | WEIGHT: 250 LBS | SYSTOLIC BLOOD PRESSURE: 124 MMHG | RESPIRATION RATE: 16 BRPM | TEMPERATURE: 98 F | OXYGEN SATURATION: 95 % | HEIGHT: 68 IN | DIASTOLIC BLOOD PRESSURE: 69 MMHG

## 2021-08-08 DIAGNOSIS — F17.210 NICOTINE DEPENDENCE, CIGARETTES, UNCOMPLICATED: ICD-10-CM

## 2021-08-08 DIAGNOSIS — M54.9 DORSALGIA, UNSPECIFIED: ICD-10-CM

## 2021-08-08 DIAGNOSIS — Z88.8 ALLERGY STATUS TO OTHER DRUGS, MEDICAMENTS AND BIOLOGICAL SUBSTANCES: ICD-10-CM

## 2021-08-08 DIAGNOSIS — Z98.890 OTHER SPECIFIED POSTPROCEDURAL STATES: Chronic | ICD-10-CM

## 2021-08-08 DIAGNOSIS — Z79.899 OTHER LONG TERM (CURRENT) DRUG THERAPY: ICD-10-CM

## 2021-08-08 DIAGNOSIS — F41.9 ANXIETY DISORDER, UNSPECIFIED: ICD-10-CM

## 2021-08-08 DIAGNOSIS — Z76.0 ENCOUNTER FOR ISSUE OF REPEAT PRESCRIPTION: ICD-10-CM

## 2021-08-08 DIAGNOSIS — G89.29 OTHER CHRONIC PAIN: ICD-10-CM

## 2021-08-08 PROCEDURE — 99283 EMERGENCY DEPT VISIT LOW MDM: CPT

## 2021-08-08 RX ORDER — ALPRAZOLAM 0.25 MG
1 TABLET ORAL
Qty: 2 | Refills: 0
Start: 2021-08-08 | End: 2021-08-09

## 2021-08-08 RX ORDER — OXYCODONE AND ACETAMINOPHEN 5; 325 MG/1; MG/1
1 TABLET ORAL
Qty: 2 | Refills: 0
Start: 2021-08-08 | End: 2021-08-08

## 2021-08-08 NOTE — ED ADULT NURSE NOTE - OBJECTIVE STATEMENT
Pt A&Ox3 states he stopped taking his anxiety meds, Xanax 2mg. Complains of feeling anxious after having an argument with his wife 2 days ago and back pain from previous surgery. Pt A&Ox3 states he stopped taking his anxiety meds, Xanax 2mg. Complains of feeling anxious after having an argument with his wife 2 days ago and back pain from previous surgery. Denies CP . SOB, Denies SI/HI

## 2021-08-08 NOTE — ED PROVIDER NOTE - CARE PLAN
Principal Discharge DX:	Medication refill   Principal Discharge DX:	Medication refill  Secondary Diagnosis:	Back pain, chronic

## 2021-08-08 NOTE — ED PROVIDER NOTE - PATIENT PORTAL LINK FT
You can access the FollowMyHealth Patient Portal offered by [facility] by registering at the following website: [email or web address]. By joining [employer]’s FollowMyHealth portal, you will also be able to view your health information using other applications (apps) compatible with our system.

## 2021-08-08 NOTE — ED PROVIDER NOTE - CONSTITUTIONAL, MLM
Well appearing, awake, alert, oriented to person, place, time/situation and in no apparent distress. Speaking in clear full sentences no nasal flaring no shoulders retractions no diaphoresis, smiling pleasant cooperative normal...

## 2021-08-08 NOTE — ED PROVIDER NOTE - OBJECTIVE STATEMENT
41 years old male walked with a back pack sts he is feeling anxious and ran out of his xanax 2 mg and oxycodone 30 mg for couple of days. Pt however denies harmful thoughts to himself or others, visual/auditory hallucinations. headache, dizziness, blurred visions, light sensitivities, focal/distal weakness or numbness, neck/back/calfs pain, cough, sob, chest pain, nausea, vomiting, fever, chills, abd pain, dysuria or irregular bowel movements. Pt sts he had covid vaccines. Pt sts has suffered from back pain hx of sx of T1 tp T12 years ago.

## 2021-08-08 NOTE — ED PROVIDER NOTE - MUSCULOSKELETAL MINIMAL EXAM
Pt is ambulating with normal gaits without assists to and from the bathroom/atraumatic/normal range of motion/neck supple/motor intact

## 2021-08-08 NOTE — ED ADULT TRIAGE NOTE - CHIEF COMPLAINT QUOTE
Pt states he stopped taking his anxiety meds, Xanax 2mg. Complains of feeling anxious and back pain.

## 2021-08-08 NOTE — ED PROVIDER NOTE - CLINICAL SUMMARY MEDICAL DECISION MAKING FREE TEXT BOX
hx, exam, Pt is pleasant cooperative ambulating with normal gaits without assists. Pt denies harmful thoughts to himself or others, or visual/auditory hallucinations. Pt sts he will see his psychiatrist to follow up. Pt is prescribed xanax 2 mg two tabs and percocet 5/325 mg two tabs for his chronic back pain

## 2021-09-11 ENCOUNTER — EMERGENCY (EMERGENCY)
Facility: HOSPITAL | Age: 41
LOS: 1 days | Discharge: HOME | End: 2021-09-11
Attending: EMERGENCY MEDICINE | Admitting: EMERGENCY MEDICINE
Payer: MEDICAID

## 2021-09-11 VITALS
OXYGEN SATURATION: 97 % | HEIGHT: 68 IN | RESPIRATION RATE: 20 BRPM | DIASTOLIC BLOOD PRESSURE: 118 MMHG | SYSTOLIC BLOOD PRESSURE: 152 MMHG | HEART RATE: 127 BPM | TEMPERATURE: 99 F

## 2021-09-11 VITALS
SYSTOLIC BLOOD PRESSURE: 122 MMHG | OXYGEN SATURATION: 92 % | HEART RATE: 95 BPM | RESPIRATION RATE: 14 BRPM | DIASTOLIC BLOOD PRESSURE: 70 MMHG

## 2021-09-11 DIAGNOSIS — Z98.890 OTHER SPECIFIED POSTPROCEDURAL STATES: Chronic | ICD-10-CM

## 2021-09-11 LAB
ALBUMIN SERPL ELPH-MCNC: 4.1 G/DL — SIGNIFICANT CHANGE UP (ref 3.3–5)
ALP SERPL-CCNC: 109 U/L — SIGNIFICANT CHANGE UP (ref 40–120)
ALT FLD-CCNC: 38 U/L — SIGNIFICANT CHANGE UP (ref 4–41)
ANION GAP SERPL CALC-SCNC: 14 MMOL/L — SIGNIFICANT CHANGE UP (ref 7–14)
AST SERPL-CCNC: 54 U/L — HIGH (ref 4–40)
BASOPHILS # BLD AUTO: 0.03 K/UL — SIGNIFICANT CHANGE UP (ref 0–0.2)
BASOPHILS NFR BLD AUTO: 0.3 % — SIGNIFICANT CHANGE UP (ref 0–2)
BILIRUB SERPL-MCNC: 0.3 MG/DL — SIGNIFICANT CHANGE UP (ref 0.2–1.2)
BUN SERPL-MCNC: 24 MG/DL — HIGH (ref 7–23)
CALCIUM SERPL-MCNC: 9.3 MG/DL — SIGNIFICANT CHANGE UP (ref 8.4–10.5)
CHLORIDE SERPL-SCNC: 106 MMOL/L — SIGNIFICANT CHANGE UP (ref 98–107)
CO2 SERPL-SCNC: 24 MMOL/L — SIGNIFICANT CHANGE UP (ref 22–31)
CREAT SERPL-MCNC: 0.93 MG/DL — SIGNIFICANT CHANGE UP (ref 0.5–1.3)
EOSINOPHIL # BLD AUTO: 0.16 K/UL — SIGNIFICANT CHANGE UP (ref 0–0.5)
EOSINOPHIL NFR BLD AUTO: 1.7 % — SIGNIFICANT CHANGE UP (ref 0–6)
GLUCOSE SERPL-MCNC: 97 MG/DL — SIGNIFICANT CHANGE UP (ref 70–99)
HCT VFR BLD CALC: 38.9 % — LOW (ref 39–50)
HGB BLD-MCNC: 12.7 G/DL — LOW (ref 13–17)
IANC: 6.89 K/UL — SIGNIFICANT CHANGE UP (ref 1.5–8.5)
IMM GRANULOCYTES NFR BLD AUTO: 0.4 % — SIGNIFICANT CHANGE UP (ref 0–1.5)
LIDOCAIN IGE QN: 17 U/L — SIGNIFICANT CHANGE UP (ref 7–60)
LYMPHOCYTES # BLD AUTO: 1.53 K/UL — SIGNIFICANT CHANGE UP (ref 1–3.3)
LYMPHOCYTES # BLD AUTO: 16.2 % — SIGNIFICANT CHANGE UP (ref 13–44)
MCHC RBC-ENTMCNC: 29.8 PG — SIGNIFICANT CHANGE UP (ref 27–34)
MCHC RBC-ENTMCNC: 32.6 GM/DL — SIGNIFICANT CHANGE UP (ref 32–36)
MCV RBC AUTO: 91.3 FL — SIGNIFICANT CHANGE UP (ref 80–100)
MONOCYTES # BLD AUTO: 0.78 K/UL — SIGNIFICANT CHANGE UP (ref 0–0.9)
MONOCYTES NFR BLD AUTO: 8.3 % — SIGNIFICANT CHANGE UP (ref 2–14)
NEUTROPHILS # BLD AUTO: 6.89 K/UL — SIGNIFICANT CHANGE UP (ref 1.8–7.4)
NEUTROPHILS NFR BLD AUTO: 73.1 % — SIGNIFICANT CHANGE UP (ref 43–77)
NRBC # BLD: 0 /100 WBCS — SIGNIFICANT CHANGE UP
NRBC # FLD: 0 K/UL — SIGNIFICANT CHANGE UP
PLATELET # BLD AUTO: 212 K/UL — SIGNIFICANT CHANGE UP (ref 150–400)
POTASSIUM SERPL-MCNC: 4.8 MMOL/L — SIGNIFICANT CHANGE UP (ref 3.5–5.3)
POTASSIUM SERPL-SCNC: 4.8 MMOL/L — SIGNIFICANT CHANGE UP (ref 3.5–5.3)
PROT SERPL-MCNC: 7.2 G/DL — SIGNIFICANT CHANGE UP (ref 6–8.3)
RBC # BLD: 4.26 M/UL — SIGNIFICANT CHANGE UP (ref 4.2–5.8)
RBC # FLD: 13.6 % — SIGNIFICANT CHANGE UP (ref 10.3–14.5)
SARS-COV-2 RNA SPEC QL NAA+PROBE: SIGNIFICANT CHANGE UP
SODIUM SERPL-SCNC: 144 MMOL/L — SIGNIFICANT CHANGE UP (ref 135–145)
TOXICOLOGY SCREEN, DRUGS OF ABUSE, SERUM RESULT: SIGNIFICANT CHANGE UP
WBC # BLD: 9.43 K/UL — SIGNIFICANT CHANGE UP (ref 3.8–10.5)
WBC # FLD AUTO: 9.43 K/UL — SIGNIFICANT CHANGE UP (ref 3.8–10.5)

## 2021-09-11 PROCEDURE — 71045 X-RAY EXAM CHEST 1 VIEW: CPT | Mod: 26

## 2021-09-11 PROCEDURE — 99285 EMERGENCY DEPT VISIT HI MDM: CPT | Mod: 25

## 2021-09-11 PROCEDURE — 93010 ELECTROCARDIOGRAM REPORT: CPT

## 2021-09-11 RX ORDER — METHADONE HYDROCHLORIDE 40 MG/1
20 TABLET ORAL ONCE
Refills: 0 | Status: COMPLETED | OUTPATIENT
Start: 2021-09-11 | End: 2021-09-11

## 2021-09-11 RX ORDER — SODIUM CHLORIDE 9 MG/ML
1000 INJECTION INTRAMUSCULAR; INTRAVENOUS; SUBCUTANEOUS ONCE
Refills: 0 | Status: COMPLETED | OUTPATIENT
Start: 2021-09-11 | End: 2021-09-11

## 2021-09-11 RX ADMIN — SODIUM CHLORIDE 2000 MILLILITER(S): 9 INJECTION INTRAMUSCULAR; INTRAVENOUS; SUBCUTANEOUS at 12:32

## 2021-09-11 NOTE — ED PROVIDER NOTE - NSFOLLOWUPINSTRUCTIONS_ED_ALL_ED_FT
You were seen and evaluated in the Emergency Department for your substance abuse. You were evaluated clinically and with laboratory studies.    At this time your clinical evaluation and history do not demonstrate any acute, life-threatening medical conditions warranting emergent treatment. However, we strongly recommend you follow up with one of our Psychiatric consultants (or your own) for further evaluation of your symptoms by calling the following number to make an appointment:    [facility]  [facility]al Crisis [facility]  [address] [place] [phone]  WALK IN TIMES: M-F 9 AM - 7PM    Should you develop new or worsening chest pain, shortness of breath, fevers, chills, nausea, vomiting, diarrhea, or constipation; suicidal or homicidal thoughts - please return to the ED for immediate evaluation.     We also strongly encourage you make an appointment with your Primary Care Physician for a comprehensive evaluation of your health.

## 2021-09-11 NOTE — PROVIDER CONTACT NOTE (OTHER) - ASSESSMENT
SW contacted by Clinical Provider Carlee, informs Pt is cleared for D/C requesting transportation home. SW met with Pt, while attempting to confirm Pt demographic information and transportation needs, Pt began yelling SW, as address on file was not up to date, Pt demanding his clothing, stating he’s walking out, yelling again at  “get me the hell out of here” lunged at .  MD and RN came into room, Pt yelling and becoming increasingly demanding and agitated, RN contacted Security. SW was informed Pt left the hospital. Pt accompanied by PCA came back asked for metrocard.  SW provided metrocard. No further SW intervention required at this time.

## 2021-09-11 NOTE — ED PROVIDER NOTE - PHYSICAL EXAMINATION
GEN - NAD; non-toxic; Altered  HENT - NC/AT, No visible Ecchymosis, No Abrasions, No Lac/Tears, MMM, no discharge; (+) White Powder in nares  EYES - EOMI, PERRL, no conjunctival pallor, no scleral icterus  NECK - Neck supple, No LAD, No Swelling  PULM - CTA B/L,  symmetric breath sounds  CV -  Tachy, S1 S2, no murmurs 2+ Pulses B/L UE  GI - (-) Peoples's, (-) Rovsings, (-) McBurneys; NT/ND, soft, no guarding, no rebound, no masses    MSK/EXT- no CVA tenderness; (+) 2 + pitting symmetric edema, no gross deformity, warm and well perfused, no calf tenderness/swelling/erythema   SPINE - CTL Spine without midline tenderness, stepoffs, deform  SKIN - no rash or bruising  NEUROLOGIC - moving all 4 ext, but depressed mental status responsive intermittently to verbal stimuli, responsive to painful stimuli GEN - NAD; non-toxic; Altered  HENT - NC/AT, No visible Ecchymosis, No Abrasions, No Lac/Tears, MMM, no discharge; (+) White Powder in nares  EYES - EOMI, PERRL, no conjunctival pallor, no scleral icterus  NECK - Neck supple, No LAD, No Swelling  PULM - CTA B/L,  symmetric breath sounds  CV -  Tachy, S1 S2, no murmurs 2+ Pulses B/L UE  GI - (-) Peoples's, (-) Rovsings, (-) McBurneys; NT/ND, soft, no guarding, no rebound, no masses    MSK/EXT- no CVA tenderness; (+) 2 + pitting symmetric edema, no gross deformity, warm and well perfused, no calf tenderness/swelling/erythema   SPINE - CTL Spine without midline tenderness, stepoffs, deform  SKIN - no rash or bruising  NEUROLOGIC - moving all 4 ext, but depressed mental status responsive intermittently to verbal stimuli, responsive to painful stimuli  ATTENDING PHYSICAL EXAM  GEN - Lethartic - sleeping but arousable temporarily to tactile stimuli.  RR 12-14reg. O2 100%RA.    HEAD - NC/AT; EYES pupils 4mm b/l and reactive  NECK: Neck supple, no JVD  PULMONARY - CTA b/l, symmetric breath sounds, no w/r/r  CARDIAC -s1s2, RRR, no M,R,G  ABDOMEN - +NABS, obese, NT, soft, no guarding, no rebound, no masses   BACK - no CVA tenderness, No apparent vertebral or paravertebral tenderness  EXTREMITIES - symmetric pulses, 2+ dp, capillary refill < 2 seconds, no clubbing, no cyanosis, no edema

## 2021-09-11 NOTE — ED ADULT NURSE REASSESSMENT NOTE - NS ED NURSE REASSESS COMMENT FT1
Er pt awake, getting out bed, gait stable, demanding his Methadone and wanted to leave the hosp.  pt radha voided qs, SW called for transp. pt did not wanted to wait, demanding his belongings,  had lunch tolerated well, D/C with instructions pt was getting upset, walking in hallway on his underway, Security was call to help pt on his way to Exit.   pt left all belongings given.      Marisa Vasquez RN

## 2021-09-11 NOTE — ED PROVIDER NOTE - PROGRESS NOTE DETAILS
Serafin - Patient re-assessed.  Now awake.  Given food, eating well.  reports was using 'benzos' this morning which was what was making him 'out of it'.  Requesting discharge as needs to go to methadone clinic. Resident Carlee: pt ambulatory in ED with stable gait, tolerating PO intake, denies any back pain, moving all 4 Ext. Declined rectal temperature. AO x 3, clinically sober, requesting to go home. SW facilitating transport back home.

## 2021-09-11 NOTE — ED ADULT NURSE NOTE - OBJECTIVE STATEMENT
Er pt coming by EMS for AMS found on street, pt coming with possible OD, some white substance noted to nasal area, with respond to tactile stimuli and few words when ask repeatedly and goes to sleep.   pt morbib obese, with mult. IV tracks to amando. upper extrem.   poor venues accessed, right IV 20g angio cath with US done,  on CM NS Tachy, 115b/min, lungs clear, resp. shallow 10b/min on Oxygen 4L/min, labs sent, kept NPO, pending dispo.  Marisa Vasquez RN

## 2021-09-11 NOTE — ED PROVIDER NOTE - PATIENT PORTAL LINK FT
You can access the FollowMyHealth Patient Portal offered by [facility] by registering at the following website: [email or web address]. By joining [employer]’s FollowMyHealth portal, you will also be able to view your health information using other applications (apps) compatible with our system.

## 2021-09-11 NOTE — ED PROVIDER NOTE - OBJECTIVE STATEMENT
41 male, hx: IVDA, OM (T Spine, s/p multiple washouts and regression) - presents with AMS found wandering on the street with white powder in his nose. Pt brought immediately back to the main. Responsive to painful and verbal stimuli - but is unable to recall what brought him to the ED. Denies any toxic co-ingestants. Denies any IVDA. 41 male, hx: IVDA, OM (T Spine, s/p multiple washouts and regression) - presents with AMS found wandering on the street with white powder in his nose. Pt brought immediately back to the main. Responsive to painful and verbal stimuli - but is unable to recall what brought him to the ED. Denies any toxic co-ingestants. Denies any IVDA.  Attending - Agree with above.  I evaluated patient myself. 31 y/o M with PMH as noted, BIBA after reportedly walking in people's backyards,  Patient states does not know why he is in ED, and quickly falls back to sleep.  Does not provide any other history.

## 2021-09-11 NOTE — ED ADULT TRIAGE NOTE - CHIEF COMPLAINT QUOTE
was found wandering and acting bizarre in peoples front yards has possible psych hx  c/o back pain takes oxycodone pt is lethargic at times and appears to be nodding off pt admits to taking xanax cannot quantify

## 2021-09-11 NOTE — ED PROVIDER NOTE - CLINICAL SUMMARY MEDICAL DECISION MAKING FREE TEXT BOX
41 male, hx: IVDA, OM (T Spine, s/p multiple washouts and regression) - presents with AMS found wandering on the street with white powder in his nose. Pt brought immediately back to the main. Responsive to painful and verbal stimuli - but is unable to recall what brought him to the ED. Denies any toxic co-ingestants. Denies any IVDA. Plan: suspect occult infection (rectal temp), CBC, CMP, EKG, CXR, UA, UCx, COVID, Normal Saline, UTox, Tox Serum. Currently maintaining spontaneous respirations - will closely monitor with EtCO2. If rectally febrile - will pursue BCx, IV Abx, and consider imaging for CTL Spine to r/o OM. Evaluation is NOT consistent with Spinal Epidural Abscess at this time - moving all 4 ext with 5/5 Strength.

## 2021-09-14 ENCOUNTER — EMERGENCY (EMERGENCY)
Facility: HOSPITAL | Age: 41
LOS: 0 days | Discharge: HOME | End: 2021-09-15
Attending: STUDENT IN AN ORGANIZED HEALTH CARE EDUCATION/TRAINING PROGRAM
Payer: MEDICAID

## 2021-09-14 VITALS
HEART RATE: 100 BPM | WEIGHT: 300.93 LBS | TEMPERATURE: 99 F | HEIGHT: 68 IN | SYSTOLIC BLOOD PRESSURE: 133 MMHG | RESPIRATION RATE: 22 BRPM | DIASTOLIC BLOOD PRESSURE: 78 MMHG | OXYGEN SATURATION: 93 %

## 2021-09-14 DIAGNOSIS — M46.26 OSTEOMYELITIS OF VERTEBRA, LUMBAR REGION: ICD-10-CM

## 2021-09-14 DIAGNOSIS — R06.02 SHORTNESS OF BREATH: ICD-10-CM

## 2021-09-14 DIAGNOSIS — J45.909 UNSPECIFIED ASTHMA, UNCOMPLICATED: ICD-10-CM

## 2021-09-14 DIAGNOSIS — F19.10 OTHER PSYCHOACTIVE SUBSTANCE ABUSE, UNCOMPLICATED: ICD-10-CM

## 2021-09-14 DIAGNOSIS — Z98.890 OTHER SPECIFIED POSTPROCEDURAL STATES: Chronic | ICD-10-CM

## 2021-09-14 DIAGNOSIS — Z20.822 CONTACT WITH AND (SUSPECTED) EXPOSURE TO COVID-19: ICD-10-CM

## 2021-09-14 LAB
ALBUMIN SERPL ELPH-MCNC: 3.1 G/DL — LOW (ref 3.3–5)
ALP SERPL-CCNC: 110 U/L — SIGNIFICANT CHANGE UP (ref 40–120)
ALT FLD-CCNC: 52 U/L — SIGNIFICANT CHANGE UP (ref 12–78)
AMPHET UR-MCNC: NEGATIVE — SIGNIFICANT CHANGE UP
ANION GAP SERPL CALC-SCNC: 2 MMOL/L — LOW (ref 5–17)
APPEARANCE UR: CLEAR — SIGNIFICANT CHANGE UP
APTT BLD: 30.2 SEC — SIGNIFICANT CHANGE UP (ref 27.5–35.5)
AST SERPL-CCNC: 75 U/L — HIGH (ref 15–37)
BARBITURATES UR SCN-MCNC: NEGATIVE — SIGNIFICANT CHANGE UP
BASOPHILS # BLD AUTO: 0.02 K/UL — SIGNIFICANT CHANGE UP (ref 0–0.2)
BASOPHILS NFR BLD AUTO: 0.3 % — SIGNIFICANT CHANGE UP (ref 0–2)
BENZODIAZ UR-MCNC: POSITIVE — SIGNIFICANT CHANGE UP
BILIRUB SERPL-MCNC: 0.5 MG/DL — SIGNIFICANT CHANGE UP (ref 0.2–1.2)
BILIRUB UR-MCNC: NEGATIVE — SIGNIFICANT CHANGE UP
BUN SERPL-MCNC: 15 MG/DL — SIGNIFICANT CHANGE UP (ref 7–23)
CALCIUM SERPL-MCNC: 8.9 MG/DL — SIGNIFICANT CHANGE UP (ref 8.5–10.1)
CHLORIDE SERPL-SCNC: 111 MMOL/L — HIGH (ref 96–108)
CO2 SERPL-SCNC: 28 MMOL/L — SIGNIFICANT CHANGE UP (ref 22–31)
COCAINE METAB.OTHER UR-MCNC: NEGATIVE — SIGNIFICANT CHANGE UP
COLOR SPEC: YELLOW — SIGNIFICANT CHANGE UP
CREAT SERPL-MCNC: 0.7 MG/DL — SIGNIFICANT CHANGE UP (ref 0.5–1.3)
D DIMER BLD IA.RAPID-MCNC: 473 NG/ML DDU — HIGH
DIFF PNL FLD: NEGATIVE — SIGNIFICANT CHANGE UP
EOSINOPHIL # BLD AUTO: 0.42 K/UL — SIGNIFICANT CHANGE UP (ref 0–0.5)
EOSINOPHIL NFR BLD AUTO: 5.6 % — SIGNIFICANT CHANGE UP (ref 0–6)
FLUAV AG NPH QL: SIGNIFICANT CHANGE UP
FLUBV AG NPH QL: SIGNIFICANT CHANGE UP
GLUCOSE SERPL-MCNC: 109 MG/DL — HIGH (ref 70–99)
GLUCOSE UR QL: NEGATIVE MG/DL — SIGNIFICANT CHANGE UP
HCT VFR BLD CALC: 28.9 % — LOW (ref 39–50)
HGB BLD-MCNC: 9.6 G/DL — LOW (ref 13–17)
IMM GRANULOCYTES NFR BLD AUTO: 0.4 % — SIGNIFICANT CHANGE UP (ref 0–1.5)
INR BLD: 1.04 RATIO — SIGNIFICANT CHANGE UP (ref 0.88–1.16)
KETONES UR-MCNC: NEGATIVE — SIGNIFICANT CHANGE UP
LEUKOCYTE ESTERASE UR-ACNC: NEGATIVE — SIGNIFICANT CHANGE UP
LYMPHOCYTES # BLD AUTO: 0.64 K/UL — LOW (ref 1–3.3)
LYMPHOCYTES # BLD AUTO: 8.5 % — LOW (ref 13–44)
MCHC RBC-ENTMCNC: 30.4 PG — SIGNIFICANT CHANGE UP (ref 27–34)
MCHC RBC-ENTMCNC: 33.2 GM/DL — SIGNIFICANT CHANGE UP (ref 32–36)
MCV RBC AUTO: 91.5 FL — SIGNIFICANT CHANGE UP (ref 80–100)
METHADONE UR-MCNC: POSITIVE — SIGNIFICANT CHANGE UP
MONOCYTES # BLD AUTO: 0.32 K/UL — SIGNIFICANT CHANGE UP (ref 0–0.9)
MONOCYTES NFR BLD AUTO: 4.3 % — SIGNIFICANT CHANGE UP (ref 2–14)
NEUTROPHILS # BLD AUTO: 6.09 K/UL — SIGNIFICANT CHANGE UP (ref 1.8–7.4)
NEUTROPHILS NFR BLD AUTO: 80.9 % — HIGH (ref 43–77)
NITRITE UR-MCNC: NEGATIVE — SIGNIFICANT CHANGE UP
NRBC # BLD: 0 /100 WBCS — SIGNIFICANT CHANGE UP (ref 0–0)
OPIATES UR-MCNC: POSITIVE — SIGNIFICANT CHANGE UP
PCP SPEC-MCNC: SIGNIFICANT CHANGE UP
PCP UR-MCNC: NEGATIVE — SIGNIFICANT CHANGE UP
PH UR: 6 — SIGNIFICANT CHANGE UP (ref 5–8)
PLATELET # BLD AUTO: 118 K/UL — LOW (ref 150–400)
POTASSIUM SERPL-MCNC: 4.4 MMOL/L — SIGNIFICANT CHANGE UP (ref 3.5–5.3)
POTASSIUM SERPL-SCNC: 4.4 MMOL/L — SIGNIFICANT CHANGE UP (ref 3.5–5.3)
PROT SERPL-MCNC: 7.5 GM/DL — SIGNIFICANT CHANGE UP (ref 6–8.3)
PROT UR-MCNC: NEGATIVE MG/DL — SIGNIFICANT CHANGE UP
PROTHROM AB SERPL-ACNC: 12 SEC — SIGNIFICANT CHANGE UP (ref 10.6–13.6)
RBC # BLD: 3.16 M/UL — LOW (ref 4.2–5.8)
RBC # FLD: 13.5 % — SIGNIFICANT CHANGE UP (ref 10.3–14.5)
SARS-COV-2 RNA SPEC QL NAA+PROBE: SIGNIFICANT CHANGE UP
SODIUM SERPL-SCNC: 141 MMOL/L — SIGNIFICANT CHANGE UP (ref 135–145)
SP GR SPEC: 1.02 — SIGNIFICANT CHANGE UP (ref 1.01–1.02)
THC UR QL: NEGATIVE — SIGNIFICANT CHANGE UP
UROBILINOGEN FLD QL: 1 MG/DL
WBC # BLD: 7.52 K/UL — SIGNIFICANT CHANGE UP (ref 3.8–10.5)
WBC # FLD AUTO: 7.52 K/UL — SIGNIFICANT CHANGE UP (ref 3.8–10.5)

## 2021-09-14 PROCEDURE — 99285 EMERGENCY DEPT VISIT HI MDM: CPT

## 2021-09-14 PROCEDURE — 71045 X-RAY EXAM CHEST 1 VIEW: CPT | Mod: 26

## 2021-09-14 RX ORDER — ALBUTEROL 90 UG/1
2 AEROSOL, METERED ORAL ONCE
Refills: 0 | Status: DISCONTINUED | OUTPATIENT
Start: 2021-09-14 | End: 2021-09-15

## 2021-09-14 RX ORDER — OXYCODONE AND ACETAMINOPHEN 5; 325 MG/1; MG/1
2 TABLET ORAL ONCE
Refills: 0 | Status: DISCONTINUED | OUTPATIENT
Start: 2021-09-14 | End: 2021-09-14

## 2021-09-14 RX ORDER — ALPRAZOLAM 0.25 MG
2 TABLET ORAL ONCE
Refills: 0 | Status: DISCONTINUED | OUTPATIENT
Start: 2021-09-14 | End: 2021-09-14

## 2021-09-14 RX ADMIN — OXYCODONE AND ACETAMINOPHEN 2 TABLET(S): 5; 325 TABLET ORAL at 23:02

## 2021-09-14 RX ADMIN — OXYCODONE AND ACETAMINOPHEN 2 TABLET(S): 5; 325 TABLET ORAL at 22:32

## 2021-09-14 RX ADMIN — Medication 2 MILLIGRAM(S): at 21:22

## 2021-09-14 RX ADMIN — Medication 125 MILLIGRAM(S): at 19:44

## 2021-09-14 NOTE — ED PROVIDER NOTE - CARE PROVIDERS DIRECT ADDRESSES
,DirectAddress_Unknown,[email or web address]

## 2021-09-14 NOTE — ED ADULT TRIAGE NOTE - CHIEF COMPLAINT QUOTE
SOB x 2 months, bilateral wheezing, anxiety and ran out of meds, diaphoretic with weakness, placed on stretcher

## 2021-09-14 NOTE — ED ADULT NURSE NOTE - ED STAT RN HANDOFF DETAILS
Report given to receiving RN Peg, pts history, current condition and reason for admission discussed, safety concerns addressed and reviewed, pt currently in stable condition, IV flushes for patency and site shows no signs or symptoms of infiltrate, dressing is clean dry and intact, pt is aware of plan of care. Pt education deemed successful at time of report after patient demonstrates successful teach back for proficiency. Report received from RN at 7pm. Assessment available on KB. Pt opn cardiac monitor, in no acute distress.  will continue to monitor

## 2021-09-14 NOTE — ED ADULT NURSE NOTE - OBJECTIVE STATEMENT
PT c/o of loer back pain, Difficulty breathing for the past 2 weeks and reported feeling anxious. Denies any SI or HI. Pt has hx of IV drug abused.. denies any cough or fever

## 2021-09-14 NOTE — ED PROVIDER NOTE - OBJECTIVE STATEMENT
40 yo male w/PMH of seizure and drug abuse presents to the ED for SOB and wheezing for the past x2 months. Pt denies h/o asthma, pt states he is "wheezing like crazy." Pt does not use an inhaler. At bedside pt is requesting Xanax and lidocaine shot. Pt appears to be in no distress but is slight sleepy. 42 yo male w/PMH of seizure and drug abuse presents to the ED for SOB and wheezing for the past x2 months. Pt denies h/o asthma, pt states he is "wheezing like crazy." Pt does not use an inhaler. At bedside pt is requesting Xanax and lidocaine patch. Pt appears to be in no distress but is slightly sleepy.

## 2021-09-14 NOTE — ED PROVIDER NOTE - PROGRESS NOTE DETAILS
Lenny: Pt care signed out to me at change of shift. Pending D-dimer, CXR. Pt requesting home meds - Xanax / Oxycodone, given. CXR w/ low lung volumes, no acute pathology. D-dimer positive, will add on CTA chest. US guided line placed x 2. CTA non-diagnostic, low clinical suspicion for PE. SPO2 > 95% RA. Resting comfortably in NAD. Stable for d/c home. Instructed for f/u w/ PCP, given Neuro f/u. Return signs / symptoms d/w pt. He understands and agrees w/ this plan. Lenny: Pt care signed out to me at change of shift. Pending D-dimer, CXR. Pt requesting home meds - Xanax / Oxycodone, given. CXR w/ low lung volumes, no acute pathology. D-dimer positive, will add on CTA chest. US guided line placed x 2. CTA non-diagnostic, low clinical suspicion for PE. SPO2 > 95% RA. Hgb 9.6 / Plts 118 on initial CBC (Hgb 12.7 / Plts 212 9/11), no bleeding hx, will rpt CBC. Rpt Hgb 12.1 / Plts 148. Resting comfortably in NAD. Stable for d/c home. Instructed for f/u w/ PCP, given Neuro f/u. Return signs / symptoms d/w pt. He understands and agrees w/ this plan.

## 2021-09-14 NOTE — ED PROVIDER NOTE - PATIENT PORTAL LINK FT
You can access the FollowMyHealth Patient Portal offered by [facility] by registering at the following website: [email or web address]. By joining [employer]’s FollowMyHealth portal, you will also be able to view your health information using other applications (apps) compatible with our system. You can access the FollowMyHealth Patient Portal offered by [facility] by registering at the following website: [email or web address]. By joining [employer]’s FollowMyHealth portal, you will also be able to view your health information using other applications (apps) compatible with our system.

## 2021-09-14 NOTE — PATIENT PROFILE ADULT - NSPROEDAABILITYLEARNOTHER_GEN_A_NUR
-- DO NOT REPLY / DO NOT REPLY ALL --  -- Message is from the [facility]--    General Patient Message      Reason for Call: Patient is at appointment with Podiatry Dr Brian Calhoun DPM, [address], [place] Ph: [phone]. Office is requesting patient medication list to be faxed to: [phone]    Caller Information       Type Contact Phone    09/14/2021 01:04 PM CDT Phone (Incoming) Renee Menon (Self) [phone] (M)          Alternative phone number: none    Turnaround time given to caller:   \"This message will be sent to [state Provider's name]. The clinical team will fulfill your request as soon as they review your message.\"     none

## 2021-09-14 NOTE — ED PROVIDER NOTE - CARE PROVIDER_API CALL
Kelly Baxter)  Medicine  [address], [address]  [place] [address]  Phone: [phone]  Fax: [phone]  Follow Up Time: Routine    Otto Whitmore)  Clinical Neurophysiology; Neurology  [address], [address]  [place] [address]  Phone: [phone]  Fax: [phone]  Follow Up Time: Routine

## 2021-09-14 NOTE — ED PROVIDER NOTE - NS ED MD DISPO DISCHARGE
Large Joint Aspiration/Injection  Date/Time: 3/26/2018 2:34 PM  Performed by: PALOMO RODRIGUEZ  Authorized by: PALOMO RODRIGUEZ     Consent Done?:  Yes (Verbal)  Indications:  Pain  Timeout: Prior to procedure the correct patient, procedure, and site was verified      Location:  Knee  Site:  R knee and L knee  Prep: Patient was prepped and draped in usual sterile fashion    Approach:  Anteromedial  Medications:  20 mg EUFLEXXA 10 mg/mL(mw 2.4 -3.6 million); 20 mg EUFLEXXA 10 mg/mL(mw 2.4 -3.6 million)      
Home

## 2021-09-14 NOTE — ED PROVIDER NOTE - PROVIDER TOKENS
PROVIDER:[TOKEN:[[id number]],FOLLOWUP:[Routine]],PROVIDER:[TOKEN:[[id number]],FOLLOWUP:[Routine]]

## 2021-09-15 ENCOUNTER — INPATIENT (INPATIENT)
Facility: HOSPITAL | Age: 41
LOS: 0 days | Discharge: LEFT BEFORE DISCHARGE | DRG: 204 | End: 2021-09-16
Attending: INTERNAL MEDICINE | Admitting: INTERNAL MEDICINE
Payer: MEDICAID

## 2021-09-15 VITALS
HEART RATE: 82 BPM | RESPIRATION RATE: 18 BRPM | HEIGHT: 68 IN | WEIGHT: 302.03 LBS | DIASTOLIC BLOOD PRESSURE: 58 MMHG | SYSTOLIC BLOOD PRESSURE: 98 MMHG | TEMPERATURE: 98 F | OXYGEN SATURATION: 98 %

## 2021-09-15 VITALS
HEART RATE: 76 BPM | OXYGEN SATURATION: 98 % | TEMPERATURE: 98 F | SYSTOLIC BLOOD PRESSURE: 123 MMHG | DIASTOLIC BLOOD PRESSURE: 86 MMHG | RESPIRATION RATE: 16 BRPM

## 2021-09-15 DIAGNOSIS — M54.9 DORSALGIA, UNSPECIFIED: ICD-10-CM

## 2021-09-15 DIAGNOSIS — Z72.0 TOBACCO USE: ICD-10-CM

## 2021-09-15 DIAGNOSIS — R56.9 UNSPECIFIED CONVULSIONS: ICD-10-CM

## 2021-09-15 DIAGNOSIS — F41.9 ANXIETY DISORDER, UNSPECIFIED: ICD-10-CM

## 2021-09-15 DIAGNOSIS — F90.9 ATTENTION-DEFICIT HYPERACTIVITY DISORDER, UNSPECIFIED TYPE: ICD-10-CM

## 2021-09-15 DIAGNOSIS — R41.82 ALTERED MENTAL STATUS, UNSPECIFIED: ICD-10-CM

## 2021-09-15 DIAGNOSIS — Z87.898 PERSONAL HISTORY OF OTHER SPECIFIED CONDITIONS: ICD-10-CM

## 2021-09-15 DIAGNOSIS — F19.10 OTHER PSYCHOACTIVE SUBSTANCE ABUSE, UNCOMPLICATED: ICD-10-CM

## 2021-09-15 DIAGNOSIS — G47.33 OBSTRUCTIVE SLEEP APNEA (ADULT) (PEDIATRIC): ICD-10-CM

## 2021-09-15 DIAGNOSIS — R06.02 SHORTNESS OF BREATH: ICD-10-CM

## 2021-09-15 DIAGNOSIS — Z98.890 OTHER SPECIFIED POSTPROCEDURAL STATES: Chronic | ICD-10-CM

## 2021-09-15 DIAGNOSIS — R63.8 OTHER SYMPTOMS AND SIGNS CONCERNING FOOD AND FLUID INTAKE: ICD-10-CM

## 2021-09-15 DIAGNOSIS — Z29.9 ENCOUNTER FOR PROPHYLACTIC MEASURES, UNSPECIFIED: ICD-10-CM

## 2021-09-15 DIAGNOSIS — F39 UNSPECIFIED MOOD [AFFECTIVE] DISORDER: ICD-10-CM

## 2021-09-15 LAB
ALBUMIN SERPL ELPH-MCNC: 3.7 G/DL — SIGNIFICANT CHANGE UP (ref 3.3–5)
ALP SERPL-CCNC: 98 U/L — SIGNIFICANT CHANGE UP (ref 40–120)
ALT FLD-CCNC: 43 U/L — SIGNIFICANT CHANGE UP (ref 10–45)
ANION GAP SERPL CALC-SCNC: 11 MMOL/L — SIGNIFICANT CHANGE UP (ref 5–17)
APPEARANCE UR: CLEAR — SIGNIFICANT CHANGE UP
AST SERPL-CCNC: 42 U/L — HIGH (ref 10–40)
BACTERIA # UR AUTO: NEGATIVE — SIGNIFICANT CHANGE UP
BASOPHILS # BLD AUTO: 0.02 K/UL — SIGNIFICANT CHANGE UP (ref 0–0.2)
BASOPHILS # BLD AUTO: 0.02 K/UL — SIGNIFICANT CHANGE UP (ref 0–0.2)
BASOPHILS NFR BLD AUTO: 0.2 % — SIGNIFICANT CHANGE UP (ref 0–2)
BASOPHILS NFR BLD AUTO: 0.2 % — SIGNIFICANT CHANGE UP (ref 0–2)
BILIRUB SERPL-MCNC: 0.3 MG/DL — SIGNIFICANT CHANGE UP (ref 0.2–1.2)
BILIRUB UR-MCNC: NEGATIVE — SIGNIFICANT CHANGE UP
BUN SERPL-MCNC: 18 MG/DL — SIGNIFICANT CHANGE UP (ref 7–23)
CALCIUM SERPL-MCNC: 9.5 MG/DL — SIGNIFICANT CHANGE UP (ref 8.4–10.5)
CHLORIDE SERPL-SCNC: 105 MMOL/L — SIGNIFICANT CHANGE UP (ref 96–108)
CO2 SERPL-SCNC: 23 MMOL/L — SIGNIFICANT CHANGE UP (ref 22–31)
COLOR SPEC: YELLOW — SIGNIFICANT CHANGE UP
CREAT SERPL-MCNC: 0.56 MG/DL — SIGNIFICANT CHANGE UP (ref 0.5–1.3)
DIFF PNL FLD: NEGATIVE — SIGNIFICANT CHANGE UP
EOSINOPHIL # BLD AUTO: 0.01 K/UL — SIGNIFICANT CHANGE UP (ref 0–0.5)
EOSINOPHIL # BLD AUTO: 0.01 K/UL — SIGNIFICANT CHANGE UP (ref 0–0.5)
EOSINOPHIL NFR BLD AUTO: 0.1 % — SIGNIFICANT CHANGE UP (ref 0–6)
EOSINOPHIL NFR BLD AUTO: 0.1 % — SIGNIFICANT CHANGE UP (ref 0–6)
EPI CELLS # UR: 5 /HPF — SIGNIFICANT CHANGE UP
GLUCOSE SERPL-MCNC: 170 MG/DL — HIGH (ref 70–99)
GLUCOSE UR QL: NEGATIVE — SIGNIFICANT CHANGE UP
HCT VFR BLD CALC: 35.1 % — LOW (ref 39–50)
HCT VFR BLD CALC: 36.5 % — LOW (ref 39–50)
HGB BLD-MCNC: 11.4 G/DL — LOW (ref 13–17)
HGB BLD-MCNC: 12.1 G/DL — LOW (ref 13–17)
HYALINE CASTS # UR AUTO: 1 /LPF — SIGNIFICANT CHANGE UP (ref 0–2)
IMM GRANULOCYTES NFR BLD AUTO: 0.7 % — SIGNIFICANT CHANGE UP (ref 0–1.5)
IMM GRANULOCYTES NFR BLD AUTO: 0.8 % — SIGNIFICANT CHANGE UP (ref 0–1.5)
KETONES UR-MCNC: NEGATIVE — SIGNIFICANT CHANGE UP
LEUKOCYTE ESTERASE UR-ACNC: NEGATIVE — SIGNIFICANT CHANGE UP
LYMPHOCYTES # BLD AUTO: 0.48 K/UL — LOW (ref 1–3.3)
LYMPHOCYTES # BLD AUTO: 1.09 K/UL — SIGNIFICANT CHANGE UP (ref 1–3.3)
LYMPHOCYTES # BLD AUTO: 6 % — LOW (ref 13–44)
LYMPHOCYTES # BLD AUTO: 8.2 % — LOW (ref 13–44)
MCHC RBC-ENTMCNC: 30 PG — SIGNIFICANT CHANGE UP (ref 27–34)
MCHC RBC-ENTMCNC: 30.2 PG — SIGNIFICANT CHANGE UP (ref 27–34)
MCHC RBC-ENTMCNC: 32.5 GM/DL — SIGNIFICANT CHANGE UP (ref 32–36)
MCHC RBC-ENTMCNC: 33.2 GM/DL — SIGNIFICANT CHANGE UP (ref 32–36)
MCV RBC AUTO: 90.6 FL — SIGNIFICANT CHANGE UP (ref 80–100)
MCV RBC AUTO: 93.1 FL — SIGNIFICANT CHANGE UP (ref 80–100)
MONOCYTES # BLD AUTO: 0.04 K/UL — SIGNIFICANT CHANGE UP (ref 0–0.9)
MONOCYTES # BLD AUTO: 0.68 K/UL — SIGNIFICANT CHANGE UP (ref 0–0.9)
MONOCYTES NFR BLD AUTO: 0.5 % — LOW (ref 2–14)
MONOCYTES NFR BLD AUTO: 5.1 % — SIGNIFICANT CHANGE UP (ref 2–14)
NEUTROPHILS # BLD AUTO: 11.4 K/UL — HIGH (ref 1.8–7.4)
NEUTROPHILS # BLD AUTO: 7.42 K/UL — HIGH (ref 1.8–7.4)
NEUTROPHILS NFR BLD AUTO: 85.6 % — HIGH (ref 43–77)
NEUTROPHILS NFR BLD AUTO: 92.5 % — HIGH (ref 43–77)
NITRITE UR-MCNC: NEGATIVE — SIGNIFICANT CHANGE UP
NRBC # BLD: 0 /100 WBCS — SIGNIFICANT CHANGE UP (ref 0–0)
NRBC # BLD: 0 /100 WBCS — SIGNIFICANT CHANGE UP (ref 0–0)
PH UR: 6 — SIGNIFICANT CHANGE UP (ref 5–8)
PLATELET # BLD AUTO: 148 K/UL — LOW (ref 150–400)
PLATELET # BLD AUTO: 173 K/UL — SIGNIFICANT CHANGE UP (ref 150–400)
POTASSIUM SERPL-MCNC: 4.1 MMOL/L — SIGNIFICANT CHANGE UP (ref 3.5–5.3)
POTASSIUM SERPL-SCNC: 4.1 MMOL/L — SIGNIFICANT CHANGE UP (ref 3.5–5.3)
PROT SERPL-MCNC: 7.1 G/DL — SIGNIFICANT CHANGE UP (ref 6–8.3)
PROT UR-MCNC: ABNORMAL
RBC # BLD: 3.77 M/UL — LOW (ref 4.2–5.8)
RBC # BLD: 4.03 M/UL — LOW (ref 4.2–5.8)
RBC # FLD: 13.5 % — SIGNIFICANT CHANGE UP (ref 10.3–14.5)
RBC # FLD: 13.5 % — SIGNIFICANT CHANGE UP (ref 10.3–14.5)
RBC CASTS # UR COMP ASSIST: 3 /HPF — SIGNIFICANT CHANGE UP (ref 0–4)
SARS-COV-2 RNA SPEC QL NAA+PROBE: SIGNIFICANT CHANGE UP
SODIUM SERPL-SCNC: 139 MMOL/L — SIGNIFICANT CHANGE UP (ref 135–145)
SP GR SPEC: 1.05 — HIGH (ref 1.01–1.02)
UROBILINOGEN FLD QL: ABNORMAL
WBC # BLD: 13.3 K/UL — HIGH (ref 3.8–10.5)
WBC # BLD: 8.03 K/UL — SIGNIFICANT CHANGE UP (ref 3.8–10.5)
WBC # FLD AUTO: 13.3 K/UL — HIGH (ref 3.8–10.5)
WBC # FLD AUTO: 8.03 K/UL — SIGNIFICANT CHANGE UP (ref 3.8–10.5)
WBC UR QL: 1 /HPF — SIGNIFICANT CHANGE UP (ref 0–5)

## 2021-09-15 PROCEDURE — 99285 EMERGENCY DEPT VISIT HI MDM: CPT

## 2021-09-15 PROCEDURE — 70450 CT HEAD/BRAIN W/O DYE: CPT | Mod: 26,MG

## 2021-09-15 PROCEDURE — G1004: CPT

## 2021-09-15 PROCEDURE — 99223 1ST HOSP IP/OBS HIGH 75: CPT

## 2021-09-15 PROCEDURE — 71275 CT ANGIOGRAPHY CHEST: CPT | Mod: 26,MC

## 2021-09-15 RX ORDER — DIVALPROEX SODIUM 500 MG/1
500 TABLET, DELAYED RELEASE ORAL DAILY
Refills: 0 | Status: DISCONTINUED | OUTPATIENT
Start: 2021-09-15 | End: 2021-09-16

## 2021-09-15 RX ORDER — IPRATROPIUM/ALBUTEROL SULFATE 18-103MCG
3 AEROSOL WITH ADAPTER (GRAM) INHALATION EVERY 6 HOURS
Refills: 0 | Status: DISCONTINUED | OUTPATIENT
Start: 2021-09-15 | End: 2021-09-16

## 2021-09-15 RX ORDER — LANOLIN ALCOHOL/MO/W.PET/CERES
3 CREAM (GRAM) TOPICAL AT BEDTIME
Refills: 0 | Status: DISCONTINUED | OUTPATIENT
Start: 2021-09-15 | End: 2021-09-16

## 2021-09-15 RX ORDER — OLANZAPINE 15 MG/1
10 TABLET, FILM COATED ORAL AT BEDTIME
Refills: 0 | Status: DISCONTINUED | OUTPATIENT
Start: 2021-09-15 | End: 2021-09-16

## 2021-09-15 RX ORDER — ALPRAZOLAM 0.25 MG
1 TABLET ORAL ONCE
Refills: 0 | Status: DISCONTINUED | OUTPATIENT
Start: 2021-09-15 | End: 2021-09-15

## 2021-09-15 RX ORDER — NICOTINE POLACRILEX 2 MG
4 GUM BUCCAL EVERY 6 HOURS
Refills: 0 | Status: DISCONTINUED | OUTPATIENT
Start: 2021-09-15 | End: 2021-09-16

## 2021-09-15 RX ORDER — DEXTROAMPHETAMINE SACCHARATE, AMPHETAMINE ASPARTATE, DEXTROAMPHETAMINE SULFATE AND AMPHETAMINE SULFATE 1.875; 1.875; 1.875; 1.875 MG/1; MG/1; MG/1; MG/1
30 TABLET ORAL
Refills: 0 | Status: DISCONTINUED | OUTPATIENT
Start: 2021-09-15 | End: 2021-09-16

## 2021-09-15 RX ORDER — ENOXAPARIN SODIUM 100 MG/ML
40 INJECTION SUBCUTANEOUS EVERY 24 HOURS
Refills: 0 | Status: DISCONTINUED | OUTPATIENT
Start: 2021-09-16 | End: 2021-09-16

## 2021-09-15 RX ORDER — GABAPENTIN 400 MG/1
800 CAPSULE ORAL THREE TIMES A DAY
Refills: 0 | Status: DISCONTINUED | OUTPATIENT
Start: 2021-09-15 | End: 2021-09-16

## 2021-09-15 RX ORDER — ALPRAZOLAM 0.25 MG
2 TABLET ORAL ONCE
Refills: 0 | Status: DISCONTINUED | OUTPATIENT
Start: 2021-09-15 | End: 2021-09-15

## 2021-09-15 RX ORDER — DIVALPROEX SODIUM 500 MG/1
750 TABLET, DELAYED RELEASE ORAL AT BEDTIME
Refills: 0 | Status: DISCONTINUED | OUTPATIENT
Start: 2021-09-15 | End: 2021-09-16

## 2021-09-15 RX ORDER — NICOTINE POLACRILEX 2 MG
1 GUM BUCCAL DAILY
Refills: 0 | Status: DISCONTINUED | OUTPATIENT
Start: 2021-09-15 | End: 2021-09-16

## 2021-09-15 RX ORDER — GABAPENTIN 400 MG/1
1 CAPSULE ORAL
Qty: 0 | Refills: 0 | DISCHARGE

## 2021-09-15 RX ORDER — OXYCODONE HYDROCHLORIDE 5 MG/1
10 TABLET ORAL ONCE
Refills: 0 | Status: DISCONTINUED | OUTPATIENT
Start: 2021-09-15 | End: 2021-09-15

## 2021-09-15 RX ORDER — OXYCODONE AND ACETAMINOPHEN 5; 325 MG/1; MG/1
1 TABLET ORAL EVERY 6 HOURS
Refills: 0 | Status: DISCONTINUED | OUTPATIENT
Start: 2021-09-15 | End: 2021-09-15

## 2021-09-15 RX ORDER — INFLUENZA VIRUS VACCINE 15; 15; 15; 15 UG/.5ML; UG/.5ML; UG/.5ML; UG/.5ML
0.5 SUSPENSION INTRAMUSCULAR ONCE
Refills: 0 | Status: DISCONTINUED | OUTPATIENT
Start: 2021-09-15 | End: 2021-09-16

## 2021-09-15 RX ADMIN — OXYCODONE HYDROCHLORIDE 10 MILLIGRAM(S): 5 TABLET ORAL at 23:39

## 2021-09-15 RX ADMIN — DEXTROAMPHETAMINE SACCHARATE, AMPHETAMINE ASPARTATE, DEXTROAMPHETAMINE SULFATE AND AMPHETAMINE SULFATE 30 MILLIGRAM(S): 1.875; 1.875; 1.875; 1.875 TABLET ORAL at 18:10

## 2021-09-15 RX ADMIN — GABAPENTIN 800 MILLIGRAM(S): 400 CAPSULE ORAL at 21:13

## 2021-09-15 RX ADMIN — Medication 1 PATCH: at 21:12

## 2021-09-15 RX ADMIN — Medication 2 MILLIGRAM(S): at 23:38

## 2021-09-15 RX ADMIN — OLANZAPINE 10 MILLIGRAM(S): 15 TABLET, FILM COATED ORAL at 21:13

## 2021-09-15 NOTE — H&P ADULT - PROBLEM SELECTOR PLAN 1
-Patient states he has had 1 month of shortness of breath and wheezing  -Pulse oximetry >95% on room air  -Does have faint wheezing on exam, continue with nebulizers for now  -Likely can have outpatient pulm follow up

## 2021-09-15 NOTE — CHART NOTE - NSCHARTNOTEFT_GEN_A_CORE
Patient requesting Xanax 2 mg TID. states that he takes it for Anxiety and it  was prescribed by his PCP.  - ISTOP records shows that he got  only 2 days supply of 2mg on 8/8/21   -Patient with a history of IVDU  Dr Abel Attending  was consulted and recommended to give 1 dose of Xanax 1 mg PO  now and will re-evaluate in am     Yasmany Samayoa, ANP-C  Department of Medicine  [id number] Patient requesting Xanax 2 mg TID, states that he takes it for Anxiety and it  was prescribed by his PCP.  ISTOP records shows that he got  only 2 days supply of 2mg on 8/8/21   Patient with a history of IVDU.  Dr Abel Attending  was consulted and recommended to give 1 dose of Xanax 1 mg PO  now and will re-evaluate in am     Yasmany Samayoa, ANP-C  Department of Medicine  [id number]

## 2021-09-15 NOTE — H&P ADULT - NSHPPHYSICALEXAM_GEN_ALL_CORE
.  VITAL SIGNS:  T(C): 36.8 (09-15-21 @ 13:06), Max: 37 (09-14-21 @ 17:42)  T(F): 98.3 (09-15-21 @ 13:06), Max: 98.6 (09-14-21 @ 17:42)  HR: 80 (09-15-21 @ 16:06) (76 - 100)  BP: 108/67 (09-15-21 @ 16:06) (98/58 - 133/78)  BP(mean): --  RR: 16 (09-15-21 @ 16:06) (15 - 22)  SpO2: 95% (09-15-21 @ 16:06) (93% - 98%)  Wt(kg): --    PHYSICAL EXAM:    Constitutional: Morbidly obese man, WDWN resting comfortably in bed in NAD, somnolent  Head: NC/AT  Eyes: EOMI, anicteric sclera  ENT: no nasal discharge; uvula midline, no oropharyngeal erythema or exudates; MMM  Respiratory: Faint wheezes, no rhonchi or rales, no retractions  Cardiac: +S1/S2; RRR; no M/R/G  Gastrointestinal: Obese, soft, NT/ND; no rebound or guarding; +BSx4  Extremities: WWP, no clubbing or cyanosis; 2+ pitting edema bilaterally  Musculoskeletal: NROM x4; no joint swelling, tenderness or erythema  Dermatologic: skin warm, dry and intact; no rashes, wounds, or scars  Neurologic: AAOx3; CNII-XII grossly intact; no focal deficits  Psychiatric: Displaying drug seeking behavior, somnolent, poor historian, odd affect

## 2021-09-15 NOTE — H&P ADULT - PROBLEM SELECTOR PLAN 7
-Patient with decreasing oxygen levels while sleeping per ER, CPAP at night ordered  -Will need outpatient sleep study

## 2021-09-15 NOTE — ED PROVIDER NOTE - OBJECTIVE STATEMENT
42 yo M with a PMH of prior IVDU now on methadone, obesity, seizures p/w urinary incontinence. States for weeks he has been experiencing sxs intermittently. No other urinary complaints. Went to [facility] before arrival to ED and was transferred for AMS/concern for drug tox. Was seen in [place] yesterday for SOB, had negative CTA chest. Also requesting medication refill which he admits is his primary reason for calling EMS. Requesting xanax, gabapentin, depakote and zyprexa. States he tried making an appt with PMD but nothing available until October. Denies nausea, vomiting, fever, chills, chest pain, cough, abd pain, diarrhea, hematuria, dysuria, weakness, syncope. Pt denies any other recreational drug use. TOok methadone today.

## 2021-09-15 NOTE — H&P ADULT - PROBLEM SELECTOR PLAN 2
-Patient states he has had urinary incontinence for the last month but no fecal incontinence, back pain or saddle anesthesia  -UA/UC ordered, follow up results  -Likely can have outpatient urology follow up

## 2021-09-15 NOTE — H&P ADULT - NSHPLABSRESULTS_GEN_ALL_CORE
.  LABS:                         11.4   13.30 )-----------( 173      ( 15 Sep 2021 14:17 )             35.1     09-15    139  |  105  |  18  ----------------------------<  170<H>  4.1   |  23  |  0.56    Ca    9.5      15 Sep 2021 14:17    TPro  7.1  /  Alb  3.7  /  TBili  0.3  /  DBili  x   /  AST  42<H>  /  ALT  43  /  AlkPhos  98  09-15    PT/INR - ( 14 Sep 2021 19:25 )   PT: 12.0 sec;   INR: 1.04 ratio         PTT - ( 14 Sep 2021 19:25 )  PTT:30.2 sec  Urinalysis Basic - ( 15 Sep 2021 14:17 )    Color: Yellow / Appearance: Clear / S.047 / pH: x  Gluc: x / Ketone: Negative  / Bili: Negative / Urobili: 2 mg/dL   Blood: x / Protein: 30 mg/dL / Nitrite: Negative   Leuk Esterase: Negative / RBC: 3 /hpf / WBC 1 /HPF   Sq Epi: x / Non Sq Epi: 5 /hpf / Bacteria: Negative                RADIOLOGY, EKG & ADDITIONAL TESTS: Reviewed.

## 2021-09-15 NOTE — ED PROVIDER NOTE - PROGRESS NOTE DETAILS
Spoke with Dr. Abel, he is aware pt is being admitted and is accepting him to his service  Pt being admitted for hypoxia, AMS. Concern for DANIA, needs additional w/u  Desaturation when sleeping, arousable to verbal stimuli  Denies any other recreational drugs. Took his methadone today  Missy Kinney PA-C

## 2021-09-15 NOTE — ED PROVIDER NOTE - CONSTITUTIONAL, MLM
normal... Well appearing, awake, alert, oriented to person, place, time/situation and in no apparent distress. Sleepy appearing but arousable to verbal stimuli.

## 2021-09-15 NOTE — ED ADULT NURSE NOTE - CHIEF COMPLAINT QUOTE
urinary incontinence   SOB Denies fever or chills HX Heroin abuse Took Methadone  Seen at [facility] last night  FS in

## 2021-09-15 NOTE — ED PROVIDER NOTE - ATTENDING CONTRIBUTION TO CARE
Private Physician Speedy Byrnes  41y male  pmh, anxiety, adhd, prior IVDA currently on methadone. Seizure do. PT was seen at [facility] last night had C/O shortness of breath, CTA low prob and pt dc home. Subsequently seen at  and had AMS difficult to arouse. Pt states was sleeepy and  was tired. Private Physician Speedy Byrnes  41y male  pmh, anxiety, adhd, prior IVDA currently on methadone. Seizure do. PT was seen at [facility] last night had C/O shortness of breath, CTA low prob and pt dc home. Subsequently seen at  and had AMS difficult to arouse. Pt states was sleepy and  was tired. NO ha, fever chills, cp, nvdc, PE Adult male awake arousable to loud voice, Heent normocephalic atraumatic neck supple chest clear anterior & posterior cv no rubs, gallops or murmurs abd soft +Bs neuro moves all extr, GCS 14 E(3) V(5) M(6).   Poncho Odonnell MD, Facep

## 2021-09-15 NOTE — H&P ADULT - HISTORY OF PRESENT ILLNESS
41 year old male with PMH IVDU on methadone, seizures, ADD, mood disorder and anxiety who presents with multiple medical complaints. Patient is a very poor history, unclear if he is currently under the influence of drugs, very somnolent. On 9/11, he went to the ED at [facility] after being found wandering the street and was discharged to his methadone clinic. Yesterday, he went to [facility] for shortness of breath and wheezing and was discharged He states that he has run out of his medications and tried to see his physician but wouldn't be able to get an appointment because of the upcoming [facility] holidays. He would like new prescriptions of his medications (main reason for presentation). Additionally he states that he has been wheezing for the past month with associated dyspnea and has had urinary incontinence but no red flag symptoms like back pain or saddle anesthesia. Upon arrival to the ED, vital signs were BP 98/58, HR 82, RR 18, temperature 98.3 degrees Farenheit and saturating 98% on room air. CT head was unremarkable and the patient was admitted for further workup. 41 year old male with PMH IVDU on methadone, seizures, ADD, mood disorder and anxiety who presents with multiple medical complaints. Patient is a very poor history, unclear if he is currently under the influence of drugs, very somnolent. On 9/11, he went to the ED at [facility] after being found wandering the street and was discharged to his methadone clinic. Yesterday, he went to [facility] for shortness of breath and wheezing and was discharged. He states that he has run out of his medications and tried to see his physician but wouldn't be able to get an appointment because of the upcoming [facility] holidays. He would like new prescriptions of his medications (main reason for presentation). Additionally he states that he has been wheezing for the past month with associated dyspnea and has had urinary incontinence but no red flag symptoms like back pain or saddle anesthesia. Upon arrival to the ED, vital signs were BP 98/58, HR 82, RR 18, temperature 98.3 degrees Farenheit and saturating 98% on room air. CT head was unremarkable and the patient was admitted for further workup.

## 2021-09-15 NOTE — ED ADULT NURSE NOTE - OBJECTIVE STATEMENT
Pt A&Ox4, ambualtory with steady gait. Pt presented to ED with request of med refill Pt A&Ox4, ambulatory with steady gait. Pt presented to ED with request of med refill. Pt initially presented to [facility] for med refill. Per EMS, pt became out of it when the provider went to check on him and was sternal rubbed to be awoken. Upon their arrival, pt still appeared to be a little out if it. Denies taking any medications today.   Pt states he is on Methadone, Oxy, Gabapentin and Xanax (which he needs the refill of). Also endorses having "urine problems" and asking for a new pair of underwear.   Denies CP, SOB, N/V/D, dizziness, LOC, weakness, numbness, tingling, fever, chills, loss of bowel function, blood in urine or stool, changes in vision/speech/gait.

## 2021-09-15 NOTE — H&P ADULT - PROBLEM SELECTOR PLAN 9
-Patient with a history of anxiety, states he takes Xanax 2mg BID however, [facility]STOP only showing 1 prescription for 1 day supply and no benzos on ,multiple urine tox reports-would hold for now in the setting of somnolence and confirm with his doctors in the AM

## 2021-09-15 NOTE — H&P ADULT - PROBLEM SELECTOR PLAN 3
-Patient with a history of IVDU, most recent notes state he was on methadone 115mg but now states that he was on 450mg reduced to 350mg  -Will have to confirm dosing with his methadone clinic-[facility] in [place]  -U tox ordered

## 2021-09-15 NOTE — ED PROVIDER NOTE - CLINICAL SUMMARY MEDICAL DECISION MAKING FREE TEXT BOX
Adult male w prior ivda. pw ams from , Pt arousable to loud voice concerns for drug intoxication. Plan check labs, observere/metabolism.  Poncho Odonnell MD, Facep

## 2021-09-15 NOTE — H&P ADULT - PROBLEM SELECTOR PLAN 10
-Patient with a history of chronic back pain, states he takes gabapentin 800mg TID and another 400mg at bedtime as well as oxycodone 30mg q6 hours however, that prescription is old-he did get a one day supply of percocet  -Will continue Percocet PRN for now, hold gabapentin in the setting of his sedation and medications will need to be confirmed with his doctors -Patient with a history of chronic back pain, states he takes gabapentin 800mg TID and another 400mg at bedtime as well as oxycodone 30mg q6 hours however, the oxycodone prescription is old-he did get a one day supply of percocet  -Will continue Percocet PRN for now  -Pharmacist was able to confirm gabapentin 800mg TID but not the 400mg at bedtime-will hold the night time dose until confirmed

## 2021-09-15 NOTE — H&P ADULT - NSHPREVIEWOFSYSTEMS_GEN_ALL_CORE
REVIEW OF SYSTEMS:    CONSTITUTIONAL: No weakness, fevers or chills  EYES/ENT: No visual changes;  No vertigo or throat pain   NECK: No pain or stiffness  RESPIRATORY: No cough, endorses shortness of breath and wheezing  CARDIOVASCULAR: No chest pain or palpitations  GASTROINTESTINAL: No abdominal or epigastric pain. No nausea, vomiting, or hematemesis; No diarrhea or constipation. No melena or hematochezia.  GENITOURINARY: Endorses incontinence, no hematuria  NEUROLOGICAL: No numbness or weakness  SKIN: No itching, burning, rashes, or lesions  MSK: No joint pain, no back pain  HEME: No easy bleeding, no easy bruising  All other review of systems is negative unless indicated above.

## 2021-09-15 NOTE — H&P ADULT - ASSESSMENT
41 year old male with PMH IVDU on methadone, seizures, ADD, mood disorder and anxiety who presents with multiple medical complaints.

## 2021-09-15 NOTE — ED ADULT TRIAGE NOTE - CHIEF COMPLAINT QUOTE
urinary incontinence   SOB Denies fever or chills HX Heroin abuse Took Methadone  Seen at [facility] last night  FS in

## 2021-09-15 NOTE — H&P ADULT - PROBLEM SELECTOR PLAN 5
-Patient with a history of seizures, continue with Depakote 250mg in the morning and 750mg at bedtime -Patient with a history of seizures, continue with Depakote 500mg in the morning and 750mg at bedtime

## 2021-09-15 NOTE — CHART NOTE - NSCHARTNOTEFT_GEN_A_CORE
Others' Prescriptions  Patient Name: Blas SheetsBirth Date: 1980  Address: [address] [place], [place] [address]Zsf: Male  Rx Written	Rx Dispensed	Drug	Quantity	Days Supply	Prescriber Name	Prescriber Deepti #	Payment Method  08/20/2021	08/20/2021	dextroamp-amphetamin 30 mg tab	60	30	Erickson Orr MD	[id number]	Medicaid  Dispenser [facility]    Patient Name: Blas SheetsBirth Date: 1980  Address: [address] [place], [place] [address]Uab: Male  Rx Written	Rx Dispensed	Drug	Quantity	Days Supply	Prescriber Name	Prescriber Deepti #	Payment Method  08/08/2021	08/08/2021	oxycodone-acetaminophen 5-325 mg tablet	2	1	Jonathan Field MD	[id number]	Medicaid  Dispenser [facility] #[id number]  08/08/2021	08/08/2021	alprazolam 2 mg tablet	2	2	FieldJonathan MD	[id number]	Medicaid  Dispenser [facility] #[id number]  06/21/2021	06/23/2021	oxycodone hcl 30 mg tablet	120	30	Erickson Orr MD	[id number]	[place]  Dispenser [facility] #[id number]  06/21/2021	06/21/2021	dextroamp-amphetamin 30 mg tab	60	30	Erickson Orr MD	[id number]	[place]  Dispenser [facility] #[id number]  05/11/2021	05/16/2021	dextroamp-amphetamin 30 mg tab	60	30	Erickson Orr MD	[id number]	Medicaid  Dispenser [facility] #[id number]  05/11/2021	05/14/2021	oxycodone hcl 30 mg tablet	120	30	Erickson Orr MD	[id number]	[place]  Dispenser [facility] #[id number]  03/10/2021	03/23/2021	dextroamp-amphetamin 30 mg tab	60	30	Erickson Orr MD	[id number]	Insurance  Dispenser [facility]    Patient Name: Blas SheetsBirth Date: 1980  Address: [address] [place], [place] [address]Oln: Male  Rx Written	Rx Dispensed	Drug	Quantity	Days Supply	Prescriber Name	Prescriber Deepti #	Payment Method  04/13/2021	04/19/2021	dextroamp-amphetamin 30 mg tab	60	30	Erickson Orr MD	[id number]	Medicaid  Dispenser [facility] #[id number]  04/13/2021	04/13/2021	oxycodone hcl 30 mg tablet	120	30	Erickson Orr MD	[id number]	[place]  Dispenser [facility] #[id number]  03/10/2021	03/15/2021	oxycodone hcl 30 mg tablet	120	30	rEickson Orr MD	[id number]	[place]  Dispenser [facility] #[id number]  02/16/2021	02/23/2021	dextroamp-amphetamin 30 mg tab	60	30	Erickson Orr MD	[id number]	Medicaid  Dispenser [facility] #[id number]  02/16/2021	02/16/2021	oxycodone hcl 30 mg tablet	120	30	YuErickson bhandari MD	[id number]	[place]  [facility] #[id number]  01/19/2021	01/27/2021	dextroamp-amphetamin 30 mg tab	60	30	Erickson Orr MD	[id number]	Medicaid  Dispenser [facility] #[id number]  01/19/2021	01/19/2021	oxycodone hcl 30 mg tablet	120	30	YuErickson bhandari MD	[id number]	[place]  Dispenser [facility] #[id number]  12/21/2020	12/21/2020	oxycodone hcl 30 mg tablet	120	30	YuErickson bhandari MD	[id number]	[facility] #[id number]  12/21/2020	12/21/2020	dextroamp-amphetamin 30 mg tab	60	30	YuErickson bhandari MD	[id number]	Medicaid  Dispenser [facility] #[id number]  11/18/2020	11/18/2020	dextroamp-amphetamin 30 mg tab	60	30	Erickson Orr MD	[id number]	Medicaid  Dispenser [facility] #[id number]  11/18/2020	11/18/2020	oxycodone hcl 30 mg tablet	120	30	Erickson Orr MD	[id number]	[place]  Dispenser [facility] #[id number]  10/13/2020	10/21/2020	dextroamp-amphetamin 30 mg tab	60	30	Erickson Orr MD	[id number]	Medicaid  Dispenser [facility] #[id number]  10/13/2020	10/13/2020	oxycodone hcl 30 mg tablet	120	30	Erickson Orr MD	[id number]	[place]  [facility] #[id number]  09/16/2020	09/16/2020	dextroamp-amphetamin 30 mg tab	60	30	Erickson Orr MD	[id number]	Medicaid  Dispenser [facility] #[id number]  09/16/2020	09/16/2020	oxycodone hcl 30 mg tablet	120	30	Erickson Orr MD	[id number]	[place]  Dispenser [facility] #[id number]

## 2021-09-15 NOTE — ED ADULT NURSE NOTE - CHPI ED NUR SYMPTOMS NEG
no abdominal distension/no abdominal pain/no chills/no confusion/no fever/no nausea/no pain/no vomiting/no weakness

## 2021-09-16 ENCOUNTER — EMERGENCY (EMERGENCY)
Facility: HOSPITAL | Age: 41
LOS: 0 days | Discharge: LEFT BEFORE DISCHARGE | End: 2021-09-17
Attending: STUDENT IN AN ORGANIZED HEALTH CARE EDUCATION/TRAINING PROGRAM
Payer: MEDICAID

## 2021-09-16 ENCOUNTER — TRANSCRIPTION ENCOUNTER (OUTPATIENT)
Age: 41
End: 2021-09-16

## 2021-09-16 VITALS
SYSTOLIC BLOOD PRESSURE: 138 MMHG | DIASTOLIC BLOOD PRESSURE: 76 MMHG | OXYGEN SATURATION: 99 % | RESPIRATION RATE: 20 BRPM | HEART RATE: 88 BPM | TEMPERATURE: 98 F

## 2021-09-16 VITALS
HEART RATE: 90 BPM | DIASTOLIC BLOOD PRESSURE: 80 MMHG | OXYGEN SATURATION: 95 % | SYSTOLIC BLOOD PRESSURE: 120 MMHG | TEMPERATURE: 98 F | RESPIRATION RATE: 18 BRPM

## 2021-09-16 VITALS
DIASTOLIC BLOOD PRESSURE: 74 MMHG | OXYGEN SATURATION: 97 % | WEIGHT: 304.02 LBS | RESPIRATION RATE: 17 BRPM | TEMPERATURE: 99 F | SYSTOLIC BLOOD PRESSURE: 132 MMHG | HEART RATE: 95 BPM | HEIGHT: 68 IN

## 2021-09-16 DIAGNOSIS — F41.9 ANXIETY DISORDER, UNSPECIFIED: ICD-10-CM

## 2021-09-16 DIAGNOSIS — Z53.29 PROCEDURE AND TREATMENT NOT CARRIED OUT BECAUSE OF PATIENT'S DECISION FOR OTHER REASONS: ICD-10-CM

## 2021-09-16 DIAGNOSIS — R60.0 LOCALIZED EDEMA: ICD-10-CM

## 2021-09-16 DIAGNOSIS — F11.20 OPIOID DEPENDENCE, UNCOMPLICATED: ICD-10-CM

## 2021-09-16 DIAGNOSIS — Z87.898 PERSONAL HISTORY OF OTHER SPECIFIED CONDITIONS: ICD-10-CM

## 2021-09-16 DIAGNOSIS — R56.9 UNSPECIFIED CONVULSIONS: ICD-10-CM

## 2021-09-16 DIAGNOSIS — Z98.890 OTHER SPECIFIED POSTPROCEDURAL STATES: Chronic | ICD-10-CM

## 2021-09-16 DIAGNOSIS — R06.02 SHORTNESS OF BREATH: ICD-10-CM

## 2021-09-16 DIAGNOSIS — Z88.0 ALLERGY STATUS TO PENICILLIN: ICD-10-CM

## 2021-09-16 DIAGNOSIS — R79.89 OTHER SPECIFIED ABNORMAL FINDINGS OF BLOOD CHEMISTRY: ICD-10-CM

## 2021-09-16 DIAGNOSIS — M46.26 OSTEOMYELITIS OF VERTEBRA, LUMBAR REGION: ICD-10-CM

## 2021-09-16 LAB
ALBUMIN SERPL ELPH-MCNC: 3.1 G/DL — LOW (ref 3.3–5)
ALBUMIN SERPL ELPH-MCNC: 3.7 G/DL — SIGNIFICANT CHANGE UP (ref 3.3–5)
ALP SERPL-CCNC: 91 U/L — SIGNIFICANT CHANGE UP (ref 40–120)
ALP SERPL-CCNC: 98 U/L — SIGNIFICANT CHANGE UP (ref 40–120)
ALT FLD-CCNC: 38 U/L — SIGNIFICANT CHANGE UP (ref 10–45)
ALT FLD-CCNC: 48 U/L — SIGNIFICANT CHANGE UP (ref 12–78)
AMPHET UR-MCNC: POSITIVE
ANION GAP SERPL CALC-SCNC: 12 MMOL/L — SIGNIFICANT CHANGE UP (ref 5–17)
ANION GAP SERPL CALC-SCNC: 4 MMOL/L — LOW (ref 5–17)
AST SERPL-CCNC: 28 U/L — SIGNIFICANT CHANGE UP (ref 10–40)
AST SERPL-CCNC: 40 U/L — HIGH (ref 15–37)
BARBITURATES UR SCN-MCNC: NEGATIVE — SIGNIFICANT CHANGE UP
BASOPHILS # BLD AUTO: 0.04 K/UL — SIGNIFICANT CHANGE UP (ref 0–0.2)
BASOPHILS NFR BLD AUTO: 0.4 % — SIGNIFICANT CHANGE UP (ref 0–2)
BENZODIAZ UR-MCNC: POSITIVE
BILIRUB SERPL-MCNC: 0.2 MG/DL — SIGNIFICANT CHANGE UP (ref 0.2–1.2)
BILIRUB SERPL-MCNC: 0.3 MG/DL — SIGNIFICANT CHANGE UP (ref 0.2–1.2)
BUN SERPL-MCNC: 17 MG/DL — SIGNIFICANT CHANGE UP (ref 7–23)
BUN SERPL-MCNC: 19 MG/DL — SIGNIFICANT CHANGE UP (ref 7–23)
CALCIUM SERPL-MCNC: 8.3 MG/DL — LOW (ref 8.5–10.1)
CALCIUM SERPL-MCNC: 8.8 MG/DL — SIGNIFICANT CHANGE UP (ref 8.4–10.5)
CHLORIDE SERPL-SCNC: 104 MMOL/L — SIGNIFICANT CHANGE UP (ref 96–108)
CHLORIDE SERPL-SCNC: 111 MMOL/L — HIGH (ref 96–108)
CO2 SERPL-SCNC: 23 MMOL/L — SIGNIFICANT CHANGE UP (ref 22–31)
CO2 SERPL-SCNC: 27 MMOL/L — SIGNIFICANT CHANGE UP (ref 22–31)
COCAINE METAB.OTHER UR-MCNC: NEGATIVE — SIGNIFICANT CHANGE UP
COVID-19 SPIKE DOMAIN AB INTERP: POSITIVE
COVID-19 SPIKE DOMAIN ANTIBODY RESULT: >250 U/ML — HIGH
CREAT SERPL-MCNC: 0.59 MG/DL — SIGNIFICANT CHANGE UP (ref 0.5–1.3)
CREAT SERPL-MCNC: 0.74 MG/DL — SIGNIFICANT CHANGE UP (ref 0.5–1.3)
CULTURE RESULTS: SIGNIFICANT CHANGE UP
EOSINOPHIL # BLD AUTO: 0.37 K/UL — SIGNIFICANT CHANGE UP (ref 0–0.5)
EOSINOPHIL NFR BLD AUTO: 4.2 % — SIGNIFICANT CHANGE UP (ref 0–6)
GLUCOSE SERPL-MCNC: 142 MG/DL — HIGH (ref 70–99)
GLUCOSE SERPL-MCNC: 201 MG/DL — HIGH (ref 70–99)
HCT VFR BLD CALC: 37.8 % — LOW (ref 39–50)
HCT VFR BLD CALC: 38.5 % — LOW (ref 39–50)
HGB BLD-MCNC: 12.3 G/DL — LOW (ref 13–17)
HGB BLD-MCNC: 12.4 G/DL — LOW (ref 13–17)
IMM GRANULOCYTES NFR BLD AUTO: 1 % — SIGNIFICANT CHANGE UP (ref 0–1.5)
LYMPHOCYTES # BLD AUTO: 1.76 K/UL — SIGNIFICANT CHANGE UP (ref 1–3.3)
LYMPHOCYTES # BLD AUTO: 19.8 % — SIGNIFICANT CHANGE UP (ref 13–44)
MAGNESIUM SERPL-MCNC: 2.2 MG/DL — SIGNIFICANT CHANGE UP (ref 1.6–2.6)
MCHC RBC-ENTMCNC: 30.2 PG — SIGNIFICANT CHANGE UP (ref 27–34)
MCHC RBC-ENTMCNC: 30.4 PG — SIGNIFICANT CHANGE UP (ref 27–34)
MCHC RBC-ENTMCNC: 31.9 GM/DL — LOW (ref 32–36)
MCHC RBC-ENTMCNC: 32.8 GM/DL — SIGNIFICANT CHANGE UP (ref 32–36)
MCV RBC AUTO: 92.2 FL — SIGNIFICANT CHANGE UP (ref 80–100)
MCV RBC AUTO: 95.3 FL — SIGNIFICANT CHANGE UP (ref 80–100)
METHADONE UR-MCNC: POSITIVE
MONOCYTES # BLD AUTO: 0.51 K/UL — SIGNIFICANT CHANGE UP (ref 0–0.9)
MONOCYTES NFR BLD AUTO: 5.7 % — SIGNIFICANT CHANGE UP (ref 2–14)
NEUTROPHILS # BLD AUTO: 6.14 K/UL — SIGNIFICANT CHANGE UP (ref 1.8–7.4)
NEUTROPHILS NFR BLD AUTO: 68.9 % — SIGNIFICANT CHANGE UP (ref 43–77)
NRBC # BLD: 0 /100 WBCS — SIGNIFICANT CHANGE UP (ref 0–0)
NRBC # BLD: 0 /100 WBCS — SIGNIFICANT CHANGE UP (ref 0–0)
NT-PROBNP SERPL-SCNC: 80 PG/ML — SIGNIFICANT CHANGE UP (ref 0–300)
OPIATES UR-MCNC: POSITIVE
OXYCODONE UR-MCNC: POSITIVE
PCP SPEC-MCNC: SIGNIFICANT CHANGE UP
PCP UR-MCNC: NEGATIVE — SIGNIFICANT CHANGE UP
PHOSPHATE SERPL-MCNC: 2.8 MG/DL — SIGNIFICANT CHANGE UP (ref 2.5–4.5)
PLATELET # BLD AUTO: 167 K/UL — SIGNIFICANT CHANGE UP (ref 150–400)
POTASSIUM SERPL-MCNC: 3.6 MMOL/L — SIGNIFICANT CHANGE UP (ref 3.5–5.3)
POTASSIUM SERPL-MCNC: 4.4 MMOL/L — SIGNIFICANT CHANGE UP (ref 3.5–5.3)
POTASSIUM SERPL-SCNC: 3.6 MMOL/L — SIGNIFICANT CHANGE UP (ref 3.5–5.3)
POTASSIUM SERPL-SCNC: 4.4 MMOL/L — SIGNIFICANT CHANGE UP (ref 3.5–5.3)
PROT SERPL-MCNC: 7 G/DL — SIGNIFICANT CHANGE UP (ref 6–8.3)
PROT SERPL-MCNC: 7.3 GM/DL — SIGNIFICANT CHANGE UP (ref 6–8.3)
RBC # BLD: 4.04 M/UL — LOW (ref 4.2–5.8)
RBC # BLD: 4.1 M/UL — LOW (ref 4.2–5.8)
RBC # FLD: 13.7 % — SIGNIFICANT CHANGE UP (ref 10.3–14.5)
RBC # FLD: 13.9 % — SIGNIFICANT CHANGE UP (ref 10.3–14.5)
SARS-COV-2 IGG+IGM SERPL QL IA: >250 U/ML — HIGH
SARS-COV-2 IGG+IGM SERPL QL IA: POSITIVE
SODIUM SERPL-SCNC: 139 MMOL/L — SIGNIFICANT CHANGE UP (ref 135–145)
SODIUM SERPL-SCNC: 142 MMOL/L — SIGNIFICANT CHANGE UP (ref 135–145)
SPECIMEN SOURCE: SIGNIFICANT CHANGE UP
THC UR QL: NEGATIVE — SIGNIFICANT CHANGE UP
TROPONIN I SERPL-MCNC: <.015 NG/ML — SIGNIFICANT CHANGE UP (ref 0.01–0.04)
WBC # BLD: 7.59 K/UL — SIGNIFICANT CHANGE UP (ref 3.8–10.5)
WBC # BLD: 8.91 K/UL — SIGNIFICANT CHANGE UP (ref 3.8–10.5)
WBC # FLD AUTO: 7.59 K/UL — SIGNIFICANT CHANGE UP (ref 3.8–10.5)
WBC # FLD AUTO: 8.91 K/UL — SIGNIFICANT CHANGE UP (ref 3.8–10.5)

## 2021-09-16 PROCEDURE — U0003: CPT

## 2021-09-16 PROCEDURE — 80053 COMPREHEN METABOLIC PANEL: CPT

## 2021-09-16 PROCEDURE — 93010 ELECTROCARDIOGRAM REPORT: CPT

## 2021-09-16 PROCEDURE — 94660 CPAP INITIATION&MGMT: CPT

## 2021-09-16 PROCEDURE — 36415 COLL VENOUS BLD VENIPUNCTURE: CPT

## 2021-09-16 PROCEDURE — U0005: CPT

## 2021-09-16 PROCEDURE — 70450 CT HEAD/BRAIN W/O DYE: CPT | Mod: MG

## 2021-09-16 PROCEDURE — G0378: CPT

## 2021-09-16 PROCEDURE — 99285 EMERGENCY DEPT VISIT HI MDM: CPT

## 2021-09-16 PROCEDURE — 86769 SARS-COV-2 COVID-19 ANTIBODY: CPT

## 2021-09-16 PROCEDURE — 83735 ASSAY OF MAGNESIUM: CPT

## 2021-09-16 PROCEDURE — 80307 DRUG TEST PRSMV CHEM ANLYZR: CPT

## 2021-09-16 PROCEDURE — 83880 ASSAY OF NATRIURETIC PEPTIDE: CPT

## 2021-09-16 PROCEDURE — 71045 X-RAY EXAM CHEST 1 VIEW: CPT | Mod: 26

## 2021-09-16 PROCEDURE — 81001 URINALYSIS AUTO W/SCOPE: CPT

## 2021-09-16 PROCEDURE — 84100 ASSAY OF PHOSPHORUS: CPT

## 2021-09-16 PROCEDURE — 82962 GLUCOSE BLOOD TEST: CPT

## 2021-09-16 PROCEDURE — 85025 COMPLETE CBC W/AUTO DIFF WBC: CPT

## 2021-09-16 PROCEDURE — 94640 AIRWAY INHALATION TREATMENT: CPT

## 2021-09-16 PROCEDURE — G1004: CPT

## 2021-09-16 PROCEDURE — 87086 URINE CULTURE/COLONY COUNT: CPT

## 2021-09-16 PROCEDURE — 85027 COMPLETE CBC AUTOMATED: CPT

## 2021-09-16 RX ORDER — GABAPENTIN 400 MG/1
1 CAPSULE ORAL
Qty: 0 | Refills: 0 | DISCHARGE

## 2021-09-16 RX ORDER — DIVALPROEX SODIUM 500 MG/1
1.5 TABLET, DELAYED RELEASE ORAL
Qty: 0 | Refills: 0 | DISCHARGE

## 2021-09-16 RX ORDER — DIVALPROEX SODIUM 500 MG/1
1 TABLET, DELAYED RELEASE ORAL
Qty: 0 | Refills: 0 | DISCHARGE

## 2021-09-16 RX ORDER — VALPROIC ACID (AS SODIUM SALT) 250 MG/5ML
3 SOLUTION, ORAL ORAL
Qty: 9 | Refills: 0
Start: 2021-09-16 | End: 2021-09-18

## 2021-09-16 RX ORDER — FUROSEMIDE 40 MG
40 TABLET ORAL ONCE
Refills: 0 | Status: DISCONTINUED | OUTPATIENT
Start: 2021-09-16 | End: 2021-09-16

## 2021-09-16 RX ORDER — OLANZAPINE 15 MG/1
1 TABLET, FILM COATED ORAL
Qty: 0 | Refills: 0 | DISCHARGE

## 2021-09-16 RX ORDER — METHADONE HYDROCHLORIDE 40 MG/1
145 TABLET ORAL DAILY
Refills: 0 | Status: DISCONTINUED | OUTPATIENT
Start: 2021-09-16 | End: 2021-09-16

## 2021-09-16 RX ORDER — OXYCODONE HYDROCHLORIDE 5 MG/1
1 TABLET ORAL
Qty: 0 | Refills: 0 | DISCHARGE

## 2021-09-16 RX ORDER — OLANZAPINE 15 MG/1
1 TABLET, FILM COATED ORAL
Qty: 3 | Refills: 0
Start: 2021-09-16 | End: 2021-09-18

## 2021-09-16 RX ORDER — GABAPENTIN 400 MG/1
1 CAPSULE ORAL
Qty: 3 | Refills: 0
Start: 2021-09-16 | End: 2021-09-18

## 2021-09-16 RX ORDER — GABAPENTIN 400 MG/1
1 CAPSULE ORAL
Qty: 9 | Refills: 0
Start: 2021-09-16 | End: 2021-09-18

## 2021-09-16 RX ORDER — FUROSEMIDE 40 MG
40 TABLET ORAL
Refills: 0 | Status: DISCONTINUED | OUTPATIENT
Start: 2021-09-16 | End: 2021-09-16

## 2021-09-16 RX ORDER — VALPROIC ACID (AS SODIUM SALT) 250 MG/5ML
2 SOLUTION, ORAL ORAL
Qty: 6 | Refills: 0
Start: 2021-09-16 | End: 2021-09-18

## 2021-09-16 RX ORDER — ALPRAZOLAM 0.25 MG
1 TABLET ORAL
Qty: 0 | Refills: 0 | DISCHARGE

## 2021-09-16 RX ADMIN — METHADONE HYDROCHLORIDE 145 MILLIGRAM(S): 40 TABLET ORAL at 13:16

## 2021-09-16 RX ADMIN — DIVALPROEX SODIUM 500 MILLIGRAM(S): 500 TABLET, DELAYED RELEASE ORAL at 11:40

## 2021-09-16 RX ADMIN — DEXTROAMPHETAMINE SACCHARATE, AMPHETAMINE ASPARTATE, DEXTROAMPHETAMINE SULFATE AND AMPHETAMINE SULFATE 30 MILLIGRAM(S): 1.875; 1.875; 1.875; 1.875 TABLET ORAL at 07:00

## 2021-09-16 RX ADMIN — GABAPENTIN 800 MILLIGRAM(S): 400 CAPSULE ORAL at 14:07

## 2021-09-16 RX ADMIN — GABAPENTIN 800 MILLIGRAM(S): 400 CAPSULE ORAL at 06:59

## 2021-09-16 RX ADMIN — Medication 1 PATCH: at 07:00

## 2021-09-16 RX ADMIN — ENOXAPARIN SODIUM 40 MILLIGRAM(S): 100 INJECTION SUBCUTANEOUS at 11:41

## 2021-09-16 RX ADMIN — Medication 1 PATCH: at 11:43

## 2021-09-16 RX ADMIN — Medication 1 PATCH: at 11:40

## 2021-09-16 RX ADMIN — OXYCODONE HYDROCHLORIDE 10 MILLIGRAM(S): 5 TABLET ORAL at 00:09

## 2021-09-16 RX ADMIN — Medication 3 MILLILITER(S): at 11:49

## 2021-09-16 NOTE — PROGRESS NOTE ADULT - PROBLEM SELECTOR PLAN 4
-Patient has smoked 1PPD since age 18, continue with nicotine patch and gum -Patient with a history of IVDU, most recent notes state he was on methadone 115mg but now states that he was on 450mg reduced to 350mg  -confirmed with outpatient records (records in paper chart) that patient was receiving 145mg methadone as an outpatient   -U tox positive for benzo, amphetamine, opiates, methadone and oxycodone -Patient with a history of IVDU, most recent notes state he was on methadone 115mg but now states that he was on 450mg reduced to 350mg  -confirmed with outpatient records from [facility] medical records department (records in paper chart) that patient was receiving 145mg methadone as an outpatient   -U tox positive for benzo, amphetamine, opiates, methadone and oxycodone

## 2021-09-16 NOTE — PROGRESS NOTE ADULT - SUBJECTIVE AND OBJECTIVE BOX
PROGRESS NOTE:   Authored by Nena Urban MD PGY-1  Pager [phone] [facility], [address] [facility]   Please page night float  after 7PM    Patient is a 41y old  Male who presents with a chief complaint of Shortness of breath (15 Sep 2021 17:13)      SUBJECTIVE / OVERNIGHT EVENTS: Overnight, patient requested additional doses of anxiety and     ADDITIONAL REVIEW OF SYSTEMS:    MEDICATIONS  (STANDING):  albuterol/ipratropium for Nebulization 3 milliLiter(s) Nebulizer every 6 hours  amphetamine/dextroamphetamine 30 milliGRAM(s) Oral two times a day  diVALproex  milliGRAM(s) Oral at bedtime  diVALproex  milliGRAM(s) Oral daily  enoxaparin Injectable 40 milliGRAM(s) SubCutaneous every 24 hours  gabapentin 800 milliGRAM(s) Oral three times a day  influenza   Vaccine 0.5 milliLiter(s) IntraMuscular once  nicotine  Polacrilex Gum 4 milliGRAM(s) Oral every 6 hours  nicotine - 21 mG/24Hr(s) Patch 1 patch Transdermal daily  OLANZapine 10 milliGRAM(s) Oral at bedtime    MEDICATIONS  (PRN):  melatonin 3 milliGRAM(s) Oral at bedtime PRN Insomnia      CAPILLARY BLOOD GLUCOSE      POCT Blood Glucose.: 182 mg/dL (15 Sep 2021 13:09)    I&O's Summary      PHYSICAL EXAM:  Vital Signs Last 24 Hrs  T(C): 36.3 (16 Sep 2021 05:23), Max: 36.8 (15 Sep 2021 13:06)  T(F): 97.3 (16 Sep 2021 05:23), Max: 98.3 (15 Sep 2021 13:06)  HR: 75 (16 Sep 2021 07:05) (75 - 90)  BP: 121/70 (16 Sep 2021 05:23) (98/58 - 133/93)  BP(mean): --  RR: 18 (16 Sep 2021 05:23) (16 - 18)  SpO2: 94% (16 Sep 2021 07:05) (94% - 100%)    CONSTITUTIONAL: NAD, well-developed  RESPIRATORY: Normal respiratory effort; lungs are clear to auscultation bilaterally  CARDIOVASCULAR: Regular rate and rhythm, normal S1 and S2, no murmur/rub/gallop; No lower extremity edema; Peripheral pulses are 2+ bilaterally  ABDOMEN: Nontender to palpation, normoactive bowel sounds, no rebound/guarding  MUSCULOSKELETAL: no clubbing or cyanosis of digits; no joint swelling or tenderness to palpation  PSYCH: A+O to person, place, and time; affect appropriate    LABS:                        11.4   13.30 )-----------( 173      ( 15 Sep 2021 14:17 )             35.1     09-15    139  |  105  |  18  ----------------------------<  170<H>  4.1   |  23  |  0.56    Ca    9.5      15 Sep 2021 14:17    TPro  7.1  /  Alb  3.7  /  TBili  0.3  /  DBili  x   /  AST  42<H>  /  ALT  43  /  AlkPhos  98  09-15    PT/INR - ( 14 Sep 2021 19:25 )   PT: 12.0 sec;   INR: 1.04 ratio         PTT - ( 14 Sep 2021 19:25 )  PTT:30.2 sec      Urinalysis Basic - ( 15 Sep 2021 14:17 )    Color: Yellow / Appearance: Clear / S.047 / pH: x  Gluc: x / Ketone: Negative  / Bili: Negative / Urobili: 2 mg/dL   Blood: x / Protein: 30 mg/dL / Nitrite: Negative   Leuk Esterase: Negative / RBC: 3 /hpf / WBC 1 /HPF   Sq Epi: x / Non Sq Epi: 5 /hpf / Bacteria: Negative          RADIOLOGY & ADDITIONAL TESTS:  Results Reviewed:   Imaging Personally Reviewed:  Electrocardiogram Personally Reviewed:    COORDINATION OF CARE:  Care Discussed with Consultants/Other Providers [Y/N]:  Prior or Outpatient Records Reviewed [Y/N]:   PROGRESS NOTE:   Authored by Nena Urban MD PGY-1  Pager [phone] [facility], [address] [facility]   Please page night float  after 7PM    Patient is a 41y old  Male who presents with a chief complaint of Shortness of breath (15 Sep 2021 17:13)      SUBJECTIVE / OVERNIGHT EVENTS: Overnight, patient requested additional doses of anxiety and pain medication. This AM patient requesting 2mg xanax and 30mg oxycodone along with methadone. Explained to patient that the team will have to confirm his home dose of methadone, and that we are unable to give him higher doses of pain and anxiety medications at this time because they have not been prescribed. Patient endorses continued shortness of breath and urinary incontinence (states he has had urinary incontinence for the past month). Patient amenable to staying in the hospital for the workup of his SOB and urinary incontinence. Patient states he has not tried duonebs yet but is amenable to trying.     ADDITIONAL REVIEW OF SYSTEMS: negative     MEDICATIONS  (STANDING):  albuterol/ipratropium for Nebulization 3 milliLiter(s) Nebulizer every 6 hours  amphetamine/dextroamphetamine 30 milliGRAM(s) Oral two times a day  diVALproex  milliGRAM(s) Oral at bedtime  diVALproex  milliGRAM(s) Oral daily  enoxaparin Injectable 40 milliGRAM(s) SubCutaneous every 24 hours  gabapentin 800 milliGRAM(s) Oral three times a day  influenza   Vaccine 0.5 milliLiter(s) IntraMuscular once  nicotine  Polacrilex Gum 4 milliGRAM(s) Oral every 6 hours  nicotine - 21 mG/24Hr(s) Patch 1 patch Transdermal daily  OLANZapine 10 milliGRAM(s) Oral at bedtime    MEDICATIONS  (PRN):  melatonin 3 milliGRAM(s) Oral at bedtime PRN Insomnia      CAPILLARY BLOOD GLUCOSE      POCT Blood Glucose.: 182 mg/dL (15 Sep 2021 13:09)    I&O's Summary      PHYSICAL EXAM:  Vital Signs Last 24 Hrs  T(C): 36.3 (16 Sep 2021 05:23), Max: 36.8 (15 Sep 2021 13:06)  T(F): 97.3 (16 Sep 2021 05:23), Max: 98.3 (15 Sep 2021 13:06)  HR: 75 (16 Sep 2021 07:05) (75 - 90)  BP: 121/70 (16 Sep 2021 05:23) (98/58 - 133/93)  BP(mean): --  RR: 18 (16 Sep 2021 05:23) (16 - 18)  SpO2: 94% (16 Sep 2021 07:05) (94% - 100%)    CONSTITUTIONAL: NAD, well-developed  RESPIRATORY: Normal respiratory effort; bilateral expiratory wheezing   CARDIOVASCULAR: Regular rate and rhythm, normal S1 and S2, no murmur/rub/gallop; Peripheral pulses are 2+ bilaterally  ABDOMEN: Nontender to palpation, normoactive bowel sounds, no rebound/guarding  MUSCULOSKELETAL: no clubbing or cyanosis of digits; no joint swelling or tenderness to palpation  PSYCH: A+O to person, place, and time; affect appropriate    LABS:                        11.4   13.30 )-----------( 173      ( 15 Sep 2021 14:17 )             35.1     09-15    139  |  105  |  18  ----------------------------<  170<H>  4.1   |  23  |  0.56    Ca    9.5      15 Sep 2021 14:17    TPro  7.1  /  Alb  3.7  /  TBili  0.3  /  DBili  x   /  AST  42<H>  /  ALT  43  /  AlkPhos  98  -15    PT/INR - ( 14 Sep 2021 19:25 )   PT: 12.0 sec;   INR: 1.04 ratio         PTT - ( 14 Sep 2021 19:25 )  PTT:30.2 sec      Urinalysis Basic - ( 15 Sep 2021 14:17 )    Color: Yellow / Appearance: Clear / S.047 / pH: x  Gluc: x / Ketone: Negative  / Bili: Negative / Urobili: 2 mg/dL   Blood: x / Protein: 30 mg/dL / Nitrite: Negative   Leuk Esterase: Negative / RBC: 3 /hpf / WBC 1 /HPF   Sq Epi: x / Non Sq Epi: 5 /hpf / Bacteria: Negative          RADIOLOGY & ADDITIONAL TESTS:  Results Reviewed:   Imaging Personally Reviewed:  Electrocardiogram Personally Reviewed:    COORDINATION OF CARE:  Care Discussed with Consultants/Other Providers [Y/N]:  Prior or Outpatient Records Reviewed [Y/N]:   PROGRESS NOTE:   Authored by Nena Urban MD PGY-1  Pager [phone] [facility], [address] [facility]   Please page night float  after 7PM    Patient is a 41y old  Male who presents with a chief complaint of Shortness of breath (15 Sep 2021 17:13)      SUBJECTIVE / OVERNIGHT EVENTS: Overnight, patient requested additional doses of anxiety and pain medication. This AM patient requesting 2mg xanax and 30mg oxycodone along with methadone. Explained to patient that the team will have to confirm his home dose of methadone, and that we are unable to give him higher doses of pain and anxiety medications at this time because they have not been prescribed. Patient endorses continued shortness of breath and urinary incontinence (states he has had urinary incontinence for the past month). Patient amenable to staying in the hospital for the workup of his SOB and urinary incontinence. Patient states he has not tried duonebs yet but is amenable to trying.     ADDITIONAL REVIEW OF SYSTEMS: negative     MEDICATIONS  (STANDING):  albuterol/ipratropium for Nebulization 3 milliLiter(s) Nebulizer every 6 hours  amphetamine/dextroamphetamine 30 milliGRAM(s) Oral two times a day  diVALproex  milliGRAM(s) Oral at bedtime  diVALproex  milliGRAM(s) Oral daily  enoxaparin Injectable 40 milliGRAM(s) SubCutaneous every 24 hours  gabapentin 800 milliGRAM(s) Oral three times a day  influenza   Vaccine 0.5 milliLiter(s) IntraMuscular once  nicotine  Polacrilex Gum 4 milliGRAM(s) Oral every 6 hours  nicotine - 21 mG/24Hr(s) Patch 1 patch Transdermal daily  OLANZapine 10 milliGRAM(s) Oral at bedtime    MEDICATIONS  (PRN):  melatonin 3 milliGRAM(s) Oral at bedtime PRN Insomnia      CAPILLARY BLOOD GLUCOSE      POCT Blood Glucose.: 182 mg/dL (15 Sep 2021 13:09)    I&O's Summary      PHYSICAL EXAM:  Vital Signs Last 24 Hrs  T(C): 36.3 (16 Sep 2021 05:23), Max: 36.8 (15 Sep 2021 13:06)  T(F): 97.3 (16 Sep 2021 05:23), Max: 98.3 (15 Sep 2021 13:06)  HR: 75 (16 Sep 2021 07:05) (75 - 90)  BP: 121/70 (16 Sep 2021 05:23) (98/58 - 133/93)  BP(mean): --  RR: 18 (16 Sep 2021 05:23) (16 - 18)  SpO2: 94% (16 Sep 2021 07:05) (94% - 100%)    CONSTITUTIONAL: NAD, well-developed  RESPIRATORY: Normal respiratory effort; bilateral expiratory wheezing   CARDIOVASCULAR: Regular rate and rhythm, normal S1 and S2, no murmur/rub/gallop; bilateral pitting edema Peripheral pulses are 2+ bilaterally  ABDOMEN: Nontender to palpation, normoactive bowel sounds, no rebound/guarding  MUSCULOSKELETAL: no clubbing or cyanosis of digits; no joint swelling or tenderness to palpation  PSYCH: A+O to person, place, and time; affect appropriate    LABS:                        11.4   13.30 )-----------( 173      ( 15 Sep 2021 14:17 )             35.1     09-15    139  |  105  |  18  ----------------------------<  170<H>  4.1   |  23  |  0.56    Ca    9.5      15 Sep 2021 14:17    TPro  7.1  /  Alb  3.7  /  TBili  0.3  /  DBili  x   /  AST  42<H>  /  ALT  43  /  AlkPhos  98  09-15    PT/INR - ( 14 Sep 2021 19:25 )   PT: 12.0 sec;   INR: 1.04 ratio         PTT - ( 14 Sep 2021 19:25 )  PTT:30.2 sec      Urinalysis Basic - ( 15 Sep 2021 14:17 )    Color: Yellow / Appearance: Clear / S.047 / pH: x  Gluc: x / Ketone: Negative  / Bili: Negative / Urobili: 2 mg/dL   Blood: x / Protein: 30 mg/dL / Nitrite: Negative   Leuk Esterase: Negative / RBC: 3 /hpf / WBC 1 /HPF   Sq Epi: x / Non Sq Epi: 5 /hpf / Bacteria: Negative          RADIOLOGY & ADDITIONAL TESTS:  Results Reviewed:   Imaging Personally Reviewed:  Electrocardiogram Personally Reviewed:    COORDINATION OF CARE:  Care Discussed with Consultants/Other Providers [Y/N]:  Prior or Outpatient Records Reviewed [Y/N]:

## 2021-09-16 NOTE — DISCHARGE NOTE PROVIDER - HOSPITAL COURSE
41 year old male with PMH IVDU on methadone, seizures, ADD, mood disorder and anxiety who presents with multiple medical complaints. Patient is a very poor history, unclear if he is currently under the influence of drugs, very somnolent. On 9/11, he went to the ED at [facility] after being found wandering the street and was discharged to his methadone clinic. Yesterday, he went to [facility] for shortness of breath and wheezing and was discharged. He states that he has run out of his medications and tried to see his physician but wouldn't be able to get an appointment because of the upcoming [facility] holidays. He would like new prescriptions of his medications (main reason for presentation). Additionally he states that he has been wheezing for the past month with associated dyspnea and has had urinary incontinence but no red flag symptoms like back pain or saddle anesthesia. Upon arrival to the ED, vital signs were BP 98/58, HR 82, RR 18, temperature 98.3 degrees Farenheit and saturating 98% on room air. CT head was unremarkable.     Patient admitted to medicine for further management. Patient was treated with duonebs for his shortness of breath while inpatient and will follow up with pulmonology outpatient. For his urinary continence patient had a urinalysis which contained protein but was negative for infection. Patient had a bladder scan and was not retaining urine. Patient will be referred to urology for further outpatient follow up. Patient's methadone was continued while he was inpatient. He will follow up with his PCP          Problem/Plan - 3:  ·  Problem: Drug abuse.   ·  Plan: -Patient with a history of IVDU, most recent notes state he was on methadone 115mg but now states that he was on 450mg reduced to 350mg  -Will have to confirm dosing with his methadone clinic-[facility]  -U tox positive for benzo, amphetamine, opiates, methadone and oxycodone.     Problem/Plan - 4:  ·  Problem: Tobacco abuse.   ·  Plan: -Patient has smoked 1PPD since age 18, continue with nicotine patch and gum.     Problem/Plan - 5:  ·  Problem: Seizures.   ·  Plan: -Patient with a history of seizures, continue with Depakote 500mg in the morning and 750mg at bedtime.     Problem/Plan - 6:  ·  Problem: Attention deficit hyperactivity disorder.   ·  Plan: -Patient with a history of ADD, continue home dose of Adderall BID.     Problem/Plan - 7:  ·  Problem: Obstructive sleep apnea.   ·  Plan: -Patient with decreasing oxygen levels while sleeping per ER, CPAP at night ordered  -Will need outpatient sleep study.     Problem/Plan - 8:  ·  Problem: Mood disorder.   ·  Plan: -Patient with a history of mood disorder, continue home dose of zyprexa 10mg at bedtime.     Problem/Plan - 9:  ·  Problem: Anxiety.   ·  Plan: -Patient with a history of anxiety, states he takes Xanax 2mg BID however, ISTOP only showing 1 prescription for 1 day supply, will hold off on xanax for now.     Problem/Plan - 10:  ·  Problem: Back pain.   ·  Plan; -Patient with a history of chronic back pain, states he takes gabapentin 800mg TID and another 400mg at bedtime as well as oxycodone 30mg q6 hours however, the oxycodone prescription is old-he did get a one day supply of percocet  -Will continue Percocet PRN for now  -Pharmacist was able to confirm gabapentin 800mg TID but not the 400mg at bedtime-will hold the night time dose until confirmed.     Problem/Plan - 11:  ·  Problem: Need for prophylactic measure.   ·  Plan: -Lovenox  -No GI prophylaxis indicated.     Problem/Plan - 12:  ·  Problem: Nutrition, metabolism, and development symptoms.   ·  Plan: -No IVF indicated  -Replete to K>4 and Mg>2  -Regular diet.     41 year old male with PMH IVDU on methadone, seizures, ADD, mood disorder and anxiety who presents with multiple medical complaints. Patient is a very poor history, unclear if he is currently under the influence of drugs, very somnolent. On 9/11, he went to the ED at [facility] after being found wandering the street and was discharged to his methadone clinic. Yesterday, he went to [facility] for shortness of breath and wheezing and was discharged. He states that he has run out of his medications and tried to see his physician but wouldn't be able to get an appointment because of the upcoming [facility] holidays. He would like new prescriptions of his medications (main reason for presentation). Additionally he states that he has been wheezing for the past month with associated dyspnea and has had urinary incontinence but no red flag symptoms like back pain or saddle anesthesia. Upon arrival to the ED, vital signs were BP 98/58, HR 82, RR 18, temperature 98.3 degrees Farenheit and saturating 98% on room air. CT head was unremarkable.     Patient admitted to medicine for further management. Patient was treated with duonebs for his shortness of breath while inpatient. For his urinary continence patient had a urinalysis which contained protein but was negative for infection. Patient had a bladder scan and was not retaining urine. Patient will be referred to urology for further outpatient follow up. Patient's methadone was continued while he was inpatient. Patient has bilaterally lower extremity edema and cardiology recommended an echocardiogram, to trial IV Lasix and have bilateral lower extremity doppler studies. Patient decided to leave [place] before having these tests. Patient states he will follow up with PCP and was advised to follow up with cardiology, pulmonology and urology as an outpatient.     Patient advised on the risks of leaving against medical advice and has capacity to leave at this time. Patient wants to leave despite risks of leaving including worsening of his difficulty breathing and lower extremity edema and progression of underlying disease.

## 2021-09-16 NOTE — PROGRESS NOTE ADULT - PROBLEM SELECTOR PLAN 6
-Patient with a history of ADD, continue home dose of Adderall BID -Patient with a history of seizures, continue with Depakote 500mg in the morning and 750mg at bedtime

## 2021-09-16 NOTE — DISCHARGE NOTE PROVIDER - NSDCMRMEDTOKEN_GEN_ALL_CORE_FT
Depakote 500 mg oral delayed release tablet: 1.5 tab(s) orally once a day (at bedtime)  Depakote 500 mg oral delayed release tablet: 1 tab(s) orally once a day  dextroamphetamine-amphetamine 30 mg oral tablet: 1 tab(s) orally 2 times a day  gabapentin 400 mg oral tablet: 1 tab(s) orally once a day (at bedtime)  Melatonin: 3 tab(s) orally once a day (at bedtime)  note: dose unknown  methadone: 145 milligram(s) orally once a day  note: [facility] [phone] / pt was on this dose when discharged from facility on 9/10/21  Neurontin 800 mg oral tablet: 1 tab(s) orally 3 times a day  oxyCODONE 30 mg oral tablet: 1 tab(s) orally every 6 hours  note: last dispensed June 2021 #120 tabs  Percocet: prn    note: per pt started taking after he ran out of oxycodone - gets off the street   Xanax 2 mg oral tablet: 1 tab(s) orally 2 times a day  note: dispensed as 1 tab daily but pt takes twice daily  ZyPREXA 10 mg oral tablet: 1 tab(s) orally once a day   Depakote 500 mg oral delayed release tablet: 1.5 tab(s) orally once a day (at bedtime)  Depakote 500 mg oral delayed release tablet: 1 tab(s) orally once a day  dextroamphetamine-amphetamine 30 mg oral tablet: 1 tab(s) orally 2 times a day  gabapentin 400 mg oral tablet: 1 tab(s) orally once a day (at bedtime)  Melatonin: 3 tab(s) orally once a day (at bedtime)  note: dose unknown  methadone: 145 milligram(s) orally once a day  note: [phone] / pt was on this dose when discharged from facility on 9/10/21  Neurontin 800 mg oral tablet: 1 tab(s) orally 3 times a day  ZyPREXA 10 mg oral tablet: 1 tab(s) orally once a day

## 2021-09-16 NOTE — PROGRESS NOTE ADULT - PROBLEM SELECTOR PLAN 9
-Patient with a history of anxiety, states he takes Xanax 2mg BID however, ISTOP only showing 1 prescription for 1 day supply and no benzos on ,multiple urine tox reports-would hold for now in the setting of somnolence and confirm with his doctors in the AM -Patient with a history of anxiety, states he takes Xanax 2mg BID however, ISTOP only showing 1 prescription for 1 day supply, will hold off on xanax for now -Patient with a history of mood disorder, continue home dose of zyprexa 10mg at bedtime

## 2021-09-16 NOTE — PROGRESS NOTE ADULT - PROBLEM SELECTOR PLAN 10
-Patient with a history of chronic back pain, states he takes gabapentin 800mg TID and another 400mg at bedtime as well as oxycodone 30mg q6 hours however, the oxycodone prescription is old-he did get a one day supply of percocet  -Will continue Percocet PRN for now  -Pharmacist was able to confirm gabapentin 800mg TID but not the 400mg at bedtime-will hold the night time dose until confirmed -Patient with a history of anxiety, states he takes Xanax 2mg BID however, ISTOP only showing 1 prescription for 1 day supply, will hold off on xanax for now

## 2021-09-16 NOTE — PROGRESS NOTE ADULT - PROBLEM SELECTOR PLAN 2
-Patient states he has had urinary incontinence for the last month but no fecal incontinence, back pain or saddle anesthesia  -UA/UC ordered, follow up results  -Likely can have outpatient urology follow up -Patient states he has had urinary incontinence for the last month but no fecal incontinence, back pain or saddle anesthesia  -UA with protein, urine culture pending   - will check post void residual   -Likely can have outpatient urology follow up -Patient states he has had 1 month of shortness of breath and wheezing  -Pulse oximetry >95% on room air  -Does have faint wheezing on exam, continue with nebulizers for now

## 2021-09-16 NOTE — ED PROVIDER NOTE - COVID-19 ORDERING FACILITY
Patient straight cath with sterile technique, urine sample obtained and sent to lab. IV inserted blood sent to lab.    WALT/Dmitriy

## 2021-09-16 NOTE — DISCHARGE NOTE PROVIDER - NSDCCPCAREPLAN_GEN_ALL_CORE_FT
PRINCIPAL DISCHARGE DIAGNOSIS  Diagnosis: Lower extremity edema  Assessment and Plan of Treatment: While you were in the hospital you were found to have significant edema in your lower extremities. You were recommended to have a cardiology workup but left against medical advice before this workup was complete. When you leave the hospital please follow up with our cardiology team or return to the hospital for further management.      SECONDARY DISCHARGE DIAGNOSES  Diagnosis: Shortness of breath  Assessment and Plan of Treatment: When you came to the hospital you had shortness of breath, but left before your workup was complete. When you leave the hospital please follow up with our pulmonology team or return to the hospital for further management.    Diagnosis: H/O urinary incontinence  Assessment and Plan of Treatment: When you came to the hospital you had urinary incontinence, but left before your workup was complete. When you leave the hospital please follow up with our pulmonology team or return to the hospital for further management.

## 2021-09-16 NOTE — PROGRESS NOTE ADULT - ASSESSMENT
41 year old male with PMH IVDU on methadone, seizures, ADD, mood disorder and anxiety who presents with multiple medical complaints. 41 year old male with PMH IVDU on methadone, seizures, ADD, mood disorder and anxiety who presents with shortness of breath and urinary incontinence

## 2021-09-16 NOTE — PROGRESS NOTE ADULT - PROBLEM SELECTOR PLAN 8
-Patient with a history of mood disorder, continue home dose of zyprexa 10mg at bedtime -Patient with decreasing oxygen levels while sleeping per ER, CPAP at night ordered  -Will need outpatient sleep study

## 2021-09-16 NOTE — PROGRESS NOTE ADULT - PROBLEM SELECTOR PLAN 11
-Lovenox  -No GI prophylaxis indicated -Patient with a history of chronic back pain, states he takes gabapentin 800mg TID and another 400mg at bedtime as well as oxycodone 30mg q6 hours however, the oxycodone prescription is old-he did get a one day supply of percocet  -Will continue Percocet PRN for now  -Pharmacist was able to confirm gabapentin 800mg TID but not the 400mg at bedtime-will hold the night time dose until confirmed

## 2021-09-16 NOTE — CONSULT NOTE ADULT - SUBJECTIVE AND OBJECTIVE BOX
C A R D I O L O G Y  *********************    DATE OF SERVICE: 09-16-21    HISTORY OF PRESENT ILLNESS: HPI:  Patient is a 40 y/o male with PMH of IVDU on methadone, seizures, ADD, mood disorder and anxiety who presents with SOB, LE edema, abd distention, and Urinary incontinence. Cardiology consulted for further evaluation. Patient reports SOB with minimal exertion (even getting dressed) and LE edema over the past month. Denies prior cardiac history such as CAD/MI/CHF but denies any recent cardiac testing. Denies chest pain, palpitations, dizziness, or syncope.    Per chart - On 9/11, he went to the ED at [facility] after being found wandering the street and was discharged to his methadone clinic. On 9/14, he went to [facility] for shortness of breath and wheezing and was discharged. He states that he has run out of his medications and tried to see his physician but wouldn't be able to get an appointment because of the upcoming [facility] holidays. He would like new prescriptions of his medications. Additionally he states that he has been wheezing for the past month with associated dyspnea and has had urinary incontinence.      PAST MEDICAL & SURGICAL HISTORY:  IV drug abuse    Osteomyelitis of lumbar spine    Seizure    Methadone maintenance therapy patient    H/O Spinal surgery      MEDICATIONS:  MEDICATIONS  (STANDING):  albuterol/ipratropium for Nebulization 3 milliLiter(s) Nebulizer every 6 hours  amphetamine/dextroamphetamine 30 milliGRAM(s) Oral two times a day  diVALproex  milliGRAM(s) Oral at bedtime  diVALproex  milliGRAM(s) Oral daily  enoxaparin Injectable 40 milliGRAM(s) SubCutaneous every 24 hours  gabapentin 800 milliGRAM(s) Oral three times a day  influenza   Vaccine 0.5 milliLiter(s) IntraMuscular once  methadone    Tablet 145 milliGRAM(s) Oral daily  nicotine  Polacrilex Gum 4 milliGRAM(s) Oral every 6 hours  nicotine - 21 mG/24Hr(s) Patch 1 patch Transdermal daily  OLANZapine 10 milliGRAM(s) Oral at bedtime      Allergies    Zosyn (Hives)    Intolerances        FAMILY HISTORY:  FH: dementia      Non-contributary for premature coronary disease or sudden cardiac death    SOCIAL HISTORY:    [ ] Non-smoker  [x ] Smoker  [ ] Alcohol    FLU VACCINE THIS YEAR STARTS IN AUGUST:  [ ] Yes    [ ] No    IF OVER 65 HAVE YOU EVER HAD A PNA VACCINE:  [ ] Yes    [ ] No       [ ] N/A      REVIEW OF SYSTEMS:  [ ]chest pain  [ x ]shortness of breath  [  ]palpitations  [  ]syncope  [ ]near syncope [ ]upper extremity weakness   [ ] lower extremity weakness  [  ]diplopia  [  ]altered mental status   [  ]fevers  [ ]chills [ ]nausea  [ ]vomitting  [  ]dysphagia    [ ]abdominal pain  [ ]melena  [ ]BRBPR    [  ]epistaxis  [  ]rash    [x ]lower extremity edema        [X] All others negative	  [ ] Unable to obtain      LABS:	 	    CARDIAC MARKERS:                              12.3   7.59  )-----------( 167      ( 16 Sep 2021 10:35 )             38.5     Hb Trend: 12.3<--, 11.4<--    09-16    139  |  104  |  17  ----------------------------<  201<H>  3.6   |  23  |  0.59    Ca    8.8      16 Sep 2021 10:35  Phos  2.8     09-16  Mg     2.2     09-16    TPro  7.0  /  Alb  3.7  /  TBili  0.2  /  DBili  x   /  AST  28  /  ALT  38  /  AlkPhos  91  09-16    Creatinine Trend: 0.59<--, 0.56<--, 0.70<--, 0.93<--    Coags:      proBNP:   Lipid Profile:   HgA1c:   TSH:         PHYSICAL EXAM:  T(C): 36.3 (09-16-21 @ 05:23), Max: 36.8 (09-15-21 @ 13:06)  HR: 94 (09-16-21 @ 10:10) (75 - 94)  BP: 121/70 (09-16-21 @ 05:23) (98/58 - 133/93)  RR: 18 (09-16-21 @ 05:23) (16 - 18)  SpO2: 95% (09-16-21 @ 10:10) (94% - 100%)  Wt(kg): --   BMI (kg/m2): 46.5 (09-15-21 @ 21:38)  I&O's Summary    16 Sep 2021 07:01  -  16 Sep 2021 12:18  --------------------------------------------------------  IN: 0 mL / OUT: 400 mL / NET: -400 mL      Gen: Appears well in NAD  HEENT:  (-)icterus (-)pallor  CV: N S1 S2 1/6 VAUGHN (+)2 Pulses B/l  Resp:  Clear to auscultation B/L, normal effort  GI: (+) BS Soft, NT, ND  Lymph:  (+) B/L LE pitting Edema, (-)obvious lymphadenopathy  Skin: Warm to touch, Normal turgor  Psych: Appropriate mood and affect    TELEMETRY: None 	      ECG: Pending (Ordered)	    RADIOLOGY:         CXR: < from: Xray Chest 1 View- PORTABLE-Urgent (Xray Chest 1 View- PORTABLE-Urgent .) (09.14.21 @ 20:26) >  IMPRESSION: Clear lungs at this time.    < end of copied text >    < from: CT Angio Chest PE Protocol w/ IV Cont (09.15.21 @ 00:38) >  IMPRESSION:  Study is nondiagnostic for detection of pulmonary emboli. If there is continued concern for pulmonary embolus recommend VQ scan.    < end of copied text >      ASSESSMENT/PLAN: Patient is a 40 y/o male with PMH of IVDU on methadone, seizures, ADD, mood disorder and anxiety who presents with SOB, LE edema, abd distention, and Urinary incontinence. Cardiology consulted for further evaluation.    - Start IV lasix 40mg BID - strict I/Os, daily standing weights  - Check EKG  - f/u BNP (added to AM labs)  - Check echo to eval LV function and LE dopplers to r/o DVT  - Check Abd US given abd distention  - Further recs pending above    Cornelio Jerome PA-C  Pager: [phone]
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
## 2021-09-16 NOTE — DISCHARGE NOTE PROVIDER - CARE PROVIDER_API CALL
Erickson Orr (MD)  Internal Medicine; Nephrology  [address], [address]  [place] [address]  Phone: [phone]  Fax: [phone]  Follow Up Time:

## 2021-09-16 NOTE — DISCHARGE NOTE PROVIDER - NSFOLLOWUPCLINICSTOKEN_GEN_ALL_ED_FT
[id number]: || ||00\01||False;[id number]: || ||00\01||False; [id number]: || ||00\01||False;[id number]: || ||00\01||False;[id number]: || ||00\01||False;

## 2021-09-16 NOTE — DISCHARGE NOTE NURSING/CASE MANAGEMENT/SOCIAL WORK - PATIENT PORTAL LINK FT
You can access the FollowMyHealth Patient Portal offered by [facility] by registering at the following website: [email or web address]. By joining [facility]’s FollowMyHealth portal, you will also be able to view your health information using other applications (apps) compatible with our system.

## 2021-09-16 NOTE — PROGRESS NOTE ADULT - PROBLEM SELECTOR PLAN 5
-Patient with a history of seizures, continue with Depakote 500mg in the morning and 750mg at bedtime -Patient has smoked 1PPD since age 18, continue with nicotine patch and gum

## 2021-09-16 NOTE — PROGRESS NOTE ADULT - PROBLEM SELECTOR PLAN 3
-Patient with a history of IVDU, most recent notes state he was on methadone 115mg but now states that he was on 450mg reduced to 350mg  -Will have to confirm dosing with his methadone clinic-[facility] in [place]  -U tox ordered -Patient with a history of IVDU, most recent notes state he was on methadone 115mg but now states that he was on 450mg reduced to 350mg  -Will have to confirm dosing with his methadone clinic-[facility] in [place]  -U tox positive for benzo, amphetamine, opiates, methadone and oxycodone -Patient with a history of IVDU, most recent notes state he was on methadone 115mg but now states that he was on 450mg reduced to 350mg  -confirmed with outpatient records (records in paper chart) that patient was receiving 145mg methadone as an outpatient   -U tox positive for benzo, amphetamine, opiates, methadone and oxycodone -Patient states he has had urinary incontinence for the last month but no fecal incontinence, back pain or saddle anesthesia  -UA with protein, urine culture pending   - will check post void residual   -Likely can have outpatient urology follow up

## 2021-09-16 NOTE — DISCHARGE NOTE PROVIDER - NSFOLLOWUPCLINICS_GEN_ALL_ED_FT
[facility] Pulmonolgy and Sleep Medicine  Pulmonology  [address], [address]  [place] [address]  Phone: [phone]  Fax:     [place] [facility]  Urology  [address], [address] [address]  Phone: [phone]  Fax:      [place] [facility]  Urology  [address] [place] [address] [address]  [place] [address]  Phone: [phone]  Fax:     [facility] Pulmonolgy and Sleep [facility]  Pulmonology  [address]  [place] [address]  Phone: [phone]  Fax:     [facility]  Cardiology  [address] [place] [address]  Phone: [phone]  Fax:

## 2021-09-16 NOTE — ED ADULT NURSE NOTE - NSIMPLEMENTINTERV_GEN_ALL_ED
Implemented All Universal Safety Interventions:  [place] to call system. Call bell, personal items and telephone within reach. Instruct patient to call for assistance. Room bathroom lighting operational. Non-slip footwear when patient is off stretcher. Physically safe environment: no spills, clutter or unnecessary equipment. Stretcher in lowest position, wheels locked, appropriate side rails in place.

## 2021-09-16 NOTE — ED PROVIDER NOTE - OBJECTIVE STATEMENT
41 year old male presents today c/o SOB and anxiety, pt was at [facility] earlier but signed AMA, 41 year old male presents today c/o SOB and anxiety, pt was at [facility] earlier but signed AMA,, pt appears sleepy and asking for medication for pain

## 2021-09-16 NOTE — ED ADULT NURSE NOTE - ALCOHOL PRE SCREEN (AUDIT - C)
Physical Therapy   Daily Treatment     Patient Name: Guillermina Recio  Age:  83 y.o., Sex:  female  Medical Record #: [id number]  Today's Date: 1/7/2020     Precautions  Precautions: (P) Posterior Hip Precautions, Weight Bearing As Tolerated Right Lower Extremity, Fall Risk  Comments: (P) Monitor BP and O2 sat, fear of falling    Subjective    Pt was sitting in w/c upon arrival and agreeable to tx.  Requested to use restroom.       Objective       01/07/20 0901   Precautions   Precautions Posterior Hip Precautions;Weight Bearing As Tolerated Right Lower Extremity;Fall Risk   Comments Monitor BP and O2 sat, fear of falling   Vitals   O2 (LPM) 4   O2 Delivery Nasal Cannula   Interdisciplinary Plan of Care Collaboration   Patient Position at End of Therapy Seated;Self Releasing Lap Belt Applied;Call Light within Reach;Tray Table within Reach;Phone within Reach   PT Total Time Spent   PT Individual Total Time Spent (Mins) 30   PT Charge Group   PT Gait Training 1   PT Therapeutic Activities 1     FIM Walking Score:  1 - Total Assistance  Walking Description:  (13ft x1, 28ft x 1 with 2x turns, SBA FWW, assistance with FWW)    Toilet transfer: grab bar, SPV; mod I for hygiene, pulling pants up/down      Assessment    Pt with improving standing tolerance, good safety awareness with toilet transfer. Pt intermittently self distracting and requires frequent rest breaks d/t respiratory function    Plan    Safety education, posterior hip precautions, therapeutic exercise, gait training fww, up/down 1 step with FWW, standing tolerance/ balance      Statement Selected

## 2021-09-16 NOTE — ED PROVIDER NOTE - PATIENT PORTAL LINK FT
You can access the FollowMyHealth Patient Portal offered by [facility] by registering at the following website: [email or web address]. By joining [facility]’s FollowMyHealth portal, you will also be able to view your health information using other applications (apps) compatible with our system.

## 2021-09-16 NOTE — PROGRESS NOTE ADULT - PROBLEM SELECTOR PLAN 1
-Patient states he has had 1 month of shortness of breath and wheezing  -Pulse oximetry >95% on room air  -Does have faint wheezing on exam, continue with nebulizers for now  -Likely can have outpatient pulm follow up - patient has bilateral pitting edema that has not been worked up previously   - cardiology consult

## 2021-09-16 NOTE — PROGRESS NOTE ADULT - PROBLEM SELECTOR PLAN 7
-Patient with decreasing oxygen levels while sleeping per ER, CPAP at night ordered  -Will need outpatient sleep study -Patient with a history of ADD, continue home dose of Adderall BID

## 2021-09-17 ENCOUNTER — INPATIENT (INPATIENT)
Facility: HOSPITAL | Age: 41
LOS: 5 days | Discharge: LEFT BEFORE DISCHARGE | End: 2021-09-23
Attending: INTERNAL MEDICINE | Admitting: INTERNAL MEDICINE
Payer: MEDICAID

## 2021-09-17 VITALS
SYSTOLIC BLOOD PRESSURE: 131 MMHG | OXYGEN SATURATION: 98 % | TEMPERATURE: 98 F | HEART RATE: 100 BPM | RESPIRATION RATE: 20 BRPM | HEIGHT: 68 IN | DIASTOLIC BLOOD PRESSURE: 73 MMHG

## 2021-09-17 DIAGNOSIS — F39 UNSPECIFIED MOOD [AFFECTIVE] DISORDER: ICD-10-CM

## 2021-09-17 DIAGNOSIS — R06.00 DYSPNEA, UNSPECIFIED: ICD-10-CM

## 2021-09-17 DIAGNOSIS — Z87.898 PERSONAL HISTORY OF OTHER SPECIFIED CONDITIONS: ICD-10-CM

## 2021-09-17 DIAGNOSIS — I10 ESSENTIAL (PRIMARY) HYPERTENSION: ICD-10-CM

## 2021-09-17 DIAGNOSIS — R09.89 OTHER SPECIFIED SYMPTOMS AND SIGNS INVOLVING THE CIRCULATORY AND RESPIRATORY SYSTEMS: ICD-10-CM

## 2021-09-17 DIAGNOSIS — F41.9 ANXIETY DISORDER, UNSPECIFIED: ICD-10-CM

## 2021-09-17 DIAGNOSIS — Z29.9 ENCOUNTER FOR PROPHYLACTIC MEASURES, UNSPECIFIED: ICD-10-CM

## 2021-09-17 DIAGNOSIS — F17.200 NICOTINE DEPENDENCE, UNSPECIFIED, UNCOMPLICATED: ICD-10-CM

## 2021-09-17 DIAGNOSIS — F11.20 OPIOID DEPENDENCE, UNCOMPLICATED: ICD-10-CM

## 2021-09-17 DIAGNOSIS — Z98.890 OTHER SPECIFIED POSTPROCEDURAL STATES: Chronic | ICD-10-CM

## 2021-09-17 DIAGNOSIS — G40.909 EPILEPSY, UNSPECIFIED, NOT INTRACTABLE, WITHOUT STATUS EPILEPTICUS: ICD-10-CM

## 2021-09-17 LAB
ALBUMIN SERPL ELPH-MCNC: 4.2 G/DL — SIGNIFICANT CHANGE UP (ref 3.3–5)
ALP SERPL-CCNC: 108 U/L — SIGNIFICANT CHANGE UP (ref 40–120)
ALT FLD-CCNC: 43 U/L — HIGH (ref 4–41)
ANION GAP SERPL CALC-SCNC: 10 MMOL/L — SIGNIFICANT CHANGE UP (ref 7–14)
APTT BLD: 19.1 SEC — LOW (ref 27.5–35.5)
APTT BLD: 31.1 SEC — SIGNIFICANT CHANGE UP (ref 27–36.3)
AST SERPL-CCNC: 43 U/L — HIGH (ref 4–40)
B PERT DNA SPEC QL NAA+PROBE: SIGNIFICANT CHANGE UP
B PERT+PARAPERT DNA PNL SPEC NAA+PROBE: SIGNIFICANT CHANGE UP
BASOPHILS # BLD AUTO: 0.05 K/UL — SIGNIFICANT CHANGE UP (ref 0–0.2)
BASOPHILS NFR BLD AUTO: 0.5 % — SIGNIFICANT CHANGE UP (ref 0–2)
BILIRUB SERPL-MCNC: 0.3 MG/DL — SIGNIFICANT CHANGE UP (ref 0.2–1.2)
BLOOD GAS VENOUS COMPREHENSIVE RESULT: SIGNIFICANT CHANGE UP
BORDETELLA PARAPERTUSSIS (RAPRVP): SIGNIFICANT CHANGE UP
BUN SERPL-MCNC: 16 MG/DL — SIGNIFICANT CHANGE UP (ref 7–23)
C PNEUM DNA SPEC QL NAA+PROBE: SIGNIFICANT CHANGE UP
CALCIUM SERPL-MCNC: 9.1 MG/DL — SIGNIFICANT CHANGE UP (ref 8.4–10.5)
CHLORIDE SERPL-SCNC: 102 MMOL/L — SIGNIFICANT CHANGE UP (ref 98–107)
CO2 SERPL-SCNC: 26 MMOL/L — SIGNIFICANT CHANGE UP (ref 22–31)
CREAT SERPL-MCNC: 0.61 MG/DL — SIGNIFICANT CHANGE UP (ref 0.5–1.3)
D DIMER BLD IA.RAPID-MCNC: 620 NG/ML DDU — HIGH
EOSINOPHIL # BLD AUTO: 0.26 K/UL — SIGNIFICANT CHANGE UP (ref 0–0.5)
EOSINOPHIL NFR BLD AUTO: 2.6 % — SIGNIFICANT CHANGE UP (ref 0–6)
FLUAV SUBTYP SPEC NAA+PROBE: SIGNIFICANT CHANGE UP
FLUBV RNA SPEC QL NAA+PROBE: SIGNIFICANT CHANGE UP
GLUCOSE SERPL-MCNC: 111 MG/DL — HIGH (ref 70–99)
HADV DNA SPEC QL NAA+PROBE: SIGNIFICANT CHANGE UP
HCOV 229E RNA SPEC QL NAA+PROBE: SIGNIFICANT CHANGE UP
HCOV HKU1 RNA SPEC QL NAA+PROBE: SIGNIFICANT CHANGE UP
HCOV NL63 RNA SPEC QL NAA+PROBE: SIGNIFICANT CHANGE UP
HCOV OC43 RNA SPEC QL NAA+PROBE: SIGNIFICANT CHANGE UP
HCT VFR BLD CALC: 38.3 % — LOW (ref 39–50)
HGB BLD-MCNC: 13 G/DL — SIGNIFICANT CHANGE UP (ref 13–17)
HMPV RNA SPEC QL NAA+PROBE: SIGNIFICANT CHANGE UP
HPIV1 RNA SPEC QL NAA+PROBE: SIGNIFICANT CHANGE UP
HPIV2 RNA SPEC QL NAA+PROBE: SIGNIFICANT CHANGE UP
HPIV3 RNA SPEC QL NAA+PROBE: SIGNIFICANT CHANGE UP
HPIV4 RNA SPEC QL NAA+PROBE: SIGNIFICANT CHANGE UP
IANC: 7.19 K/UL — SIGNIFICANT CHANGE UP (ref 1.5–8.5)
IMM GRANULOCYTES NFR BLD AUTO: 0.6 % — SIGNIFICANT CHANGE UP (ref 0–1.5)
INR BLD: 1.06 RATIO — SIGNIFICANT CHANGE UP (ref 0.88–1.16)
INR BLD: 3.5 RATIO — HIGH (ref 0.88–1.16)
LYMPHOCYTES # BLD AUTO: 1.58 K/UL — SIGNIFICANT CHANGE UP (ref 1–3.3)
LYMPHOCYTES # BLD AUTO: 16 % — SIGNIFICANT CHANGE UP (ref 13–44)
M PNEUMO DNA SPEC QL NAA+PROBE: SIGNIFICANT CHANGE UP
MCHC RBC-ENTMCNC: 30.8 PG — SIGNIFICANT CHANGE UP (ref 27–34)
MCHC RBC-ENTMCNC: 33.9 GM/DL — SIGNIFICANT CHANGE UP (ref 32–36)
MCV RBC AUTO: 90.8 FL — SIGNIFICANT CHANGE UP (ref 80–100)
MONOCYTES # BLD AUTO: 0.71 K/UL — SIGNIFICANT CHANGE UP (ref 0–0.9)
MONOCYTES NFR BLD AUTO: 7.2 % — SIGNIFICANT CHANGE UP (ref 2–14)
NEUTROPHILS # BLD AUTO: 7.19 K/UL — SIGNIFICANT CHANGE UP (ref 1.8–7.4)
NEUTROPHILS NFR BLD AUTO: 73.1 % — SIGNIFICANT CHANGE UP (ref 43–77)
NRBC # BLD: 0 /100 WBCS — SIGNIFICANT CHANGE UP
NRBC # FLD: 0 K/UL — SIGNIFICANT CHANGE UP
NT-PROBNP SERPL-SCNC: 10 PG/ML — SIGNIFICANT CHANGE UP
PLATELET # BLD AUTO: 139 K/UL — LOW (ref 150–400)
PLATELET # BLD AUTO: 221 K/UL — SIGNIFICANT CHANGE UP (ref 150–400)
POTASSIUM SERPL-MCNC: 4.2 MMOL/L — SIGNIFICANT CHANGE UP (ref 3.5–5.3)
POTASSIUM SERPL-SCNC: 4.2 MMOL/L — SIGNIFICANT CHANGE UP (ref 3.5–5.3)
PROT SERPL-MCNC: 7.6 G/DL — SIGNIFICANT CHANGE UP (ref 6–8.3)
PROTHROM AB SERPL-ACNC: 12.1 SEC — SIGNIFICANT CHANGE UP (ref 10.6–13.6)
PROTHROM AB SERPL-ACNC: 38.2 SEC — HIGH (ref 10.6–13.6)
RAPID RVP RESULT: SIGNIFICANT CHANGE UP
RBC # BLD: 4.22 M/UL — SIGNIFICANT CHANGE UP (ref 4.2–5.8)
RBC # FLD: 13.7 % — SIGNIFICANT CHANGE UP (ref 10.3–14.5)
RSV RNA SPEC QL NAA+PROBE: SIGNIFICANT CHANGE UP
RV+EV RNA SPEC QL NAA+PROBE: SIGNIFICANT CHANGE UP
SARS-COV-2 RNA SPEC QL NAA+PROBE: SIGNIFICANT CHANGE UP
SODIUM SERPL-SCNC: 138 MMOL/L — SIGNIFICANT CHANGE UP (ref 135–145)
TROPONIN T, HIGH SENSITIVITY RESULT: 7 NG/L — SIGNIFICANT CHANGE UP
WBC # BLD: 9.85 K/UL — SIGNIFICANT CHANGE UP (ref 3.8–10.5)
WBC # FLD AUTO: 9.85 K/UL — SIGNIFICANT CHANGE UP (ref 3.8–10.5)

## 2021-09-17 PROCEDURE — 99223 1ST HOSP IP/OBS HIGH 75: CPT

## 2021-09-17 PROCEDURE — 99285 EMERGENCY DEPT VISIT HI MDM: CPT

## 2021-09-17 PROCEDURE — 71045 X-RAY EXAM CHEST 1 VIEW: CPT | Mod: 26

## 2021-09-17 PROCEDURE — 71275 CT ANGIOGRAPHY CHEST: CPT | Mod: 26,77

## 2021-09-17 PROCEDURE — 71275 CT ANGIOGRAPHY CHEST: CPT | Mod: 26,MA

## 2021-09-17 RX ORDER — ENOXAPARIN SODIUM 100 MG/ML
40 INJECTION SUBCUTANEOUS EVERY 12 HOURS
Refills: 0 | Status: DISCONTINUED | OUTPATIENT
Start: 2021-09-17 | End: 2021-09-23

## 2021-09-17 RX ORDER — OLANZAPINE 15 MG/1
10 TABLET, FILM COATED ORAL AT BEDTIME
Refills: 0 | Status: DISCONTINUED | OUTPATIENT
Start: 2021-09-17 | End: 2021-09-23

## 2021-09-17 RX ORDER — ACETAMINOPHEN 500 MG
650 TABLET ORAL EVERY 6 HOURS
Refills: 0 | Status: DISCONTINUED | OUTPATIENT
Start: 2021-09-17 | End: 2021-09-23

## 2021-09-17 RX ORDER — LIDOCAINE 4 G/100G
2 CREAM TOPICAL ONCE
Refills: 0 | Status: COMPLETED | OUTPATIENT
Start: 2021-09-17 | End: 2021-09-17

## 2021-09-17 RX ORDER — DEXTROAMPHETAMINE SACCHARATE, AMPHETAMINE ASPARTATE, DEXTROAMPHETAMINE SULFATE AND AMPHETAMINE SULFATE 1.875; 1.875; 1.875; 1.875 MG/1; MG/1; MG/1; MG/1
30 TABLET ORAL ONCE
Refills: 0 | Status: DISCONTINUED | OUTPATIENT
Start: 2021-09-17 | End: 2021-09-17

## 2021-09-17 RX ORDER — GABAPENTIN 400 MG/1
400 CAPSULE ORAL AT BEDTIME
Refills: 0 | Status: DISCONTINUED | OUTPATIENT
Start: 2021-09-17 | End: 2021-09-23

## 2021-09-17 RX ORDER — FUROSEMIDE 40 MG
20 TABLET ORAL DAILY
Refills: 0 | Status: DISCONTINUED | OUTPATIENT
Start: 2021-09-18 | End: 2021-09-18

## 2021-09-17 RX ORDER — NICOTINE POLACRILEX 2 MG
1 GUM BUCCAL DAILY
Refills: 0 | Status: DISCONTINUED | OUTPATIENT
Start: 2021-09-17 | End: 2021-09-23

## 2021-09-17 RX ORDER — VALPROIC ACID (AS SODIUM SALT) 250 MG/5ML
500 SOLUTION, ORAL ORAL DAILY
Refills: 0 | Status: DISCONTINUED | OUTPATIENT
Start: 2021-09-18 | End: 2021-09-23

## 2021-09-17 RX ORDER — METHADONE HYDROCHLORIDE 40 MG/1
145 TABLET ORAL
Qty: 0 | Refills: 0 | DISCHARGE

## 2021-09-17 RX ORDER — DEXTROAMPHETAMINE SACCHARATE, AMPHETAMINE ASPARTATE, DEXTROAMPHETAMINE SULFATE AND AMPHETAMINE SULFATE 1.875; 1.875; 1.875; 1.875 MG/1; MG/1; MG/1; MG/1
30 TABLET ORAL
Refills: 0 | Status: DISCONTINUED | OUTPATIENT
Start: 2021-09-17 | End: 2021-09-23

## 2021-09-17 RX ORDER — VALPROIC ACID (AS SODIUM SALT) 250 MG/5ML
750 SOLUTION, ORAL ORAL AT BEDTIME
Refills: 0 | Status: DISCONTINUED | OUTPATIENT
Start: 2021-09-17 | End: 2021-09-23

## 2021-09-17 RX ORDER — GABAPENTIN 400 MG/1
800 CAPSULE ORAL
Refills: 0 | Status: DISCONTINUED | OUTPATIENT
Start: 2021-09-17 | End: 2021-09-23

## 2021-09-17 RX ORDER — SODIUM CHLORIDE 9 MG/ML
1000 INJECTION INTRAMUSCULAR; INTRAVENOUS; SUBCUTANEOUS ONCE
Refills: 0 | Status: COMPLETED | OUTPATIENT
Start: 2021-09-17 | End: 2021-09-17

## 2021-09-17 RX ORDER — ALPRAZOLAM 0.25 MG
1 TABLET ORAL
Refills: 0 | Status: DISCONTINUED | OUTPATIENT
Start: 2021-09-17 | End: 2021-09-18

## 2021-09-17 RX ORDER — ALPRAZOLAM 0.25 MG
2 TABLET ORAL ONCE
Refills: 0 | Status: DISCONTINUED | OUTPATIENT
Start: 2021-09-17 | End: 2021-09-17

## 2021-09-17 RX ORDER — METHADONE HYDROCHLORIDE 40 MG/1
145 TABLET ORAL ONCE
Refills: 0 | Status: DISCONTINUED | OUTPATIENT
Start: 2021-09-17 | End: 2021-09-17

## 2021-09-17 RX ORDER — LANOLIN ALCOHOL/MO/W.PET/CERES
3 CREAM (GRAM) TOPICAL AT BEDTIME
Refills: 0 | Status: DISCONTINUED | OUTPATIENT
Start: 2021-09-17 | End: 2021-09-23

## 2021-09-17 RX ORDER — LANOLIN ALCOHOL/MO/W.PET/CERES
3 CREAM (GRAM) TOPICAL
Qty: 0 | Refills: 0 | DISCHARGE

## 2021-09-17 RX ADMIN — METHADONE HYDROCHLORIDE 145 MILLIGRAM(S): 40 TABLET ORAL at 14:04

## 2021-09-17 RX ADMIN — LIDOCAINE 2 PATCH: 4 CREAM TOPICAL at 22:17

## 2021-09-17 RX ADMIN — GABAPENTIN 400 MILLIGRAM(S): 400 CAPSULE ORAL at 22:18

## 2021-09-17 RX ADMIN — SODIUM CHLORIDE 1000 MILLILITER(S): 9 INJECTION INTRAMUSCULAR; INTRAVENOUS; SUBCUTANEOUS at 02:52

## 2021-09-17 RX ADMIN — DEXTROAMPHETAMINE SACCHARATE, AMPHETAMINE ASPARTATE, DEXTROAMPHETAMINE SULFATE AND AMPHETAMINE SULFATE 30 MILLIGRAM(S): 1.875; 1.875; 1.875; 1.875 TABLET ORAL at 15:06

## 2021-09-17 RX ADMIN — Medication 2 MILLIGRAM(S): at 13:32

## 2021-09-17 RX ADMIN — Medication 2 MILLIGRAM(S): at 03:02

## 2021-09-17 RX ADMIN — Medication 750 MILLIGRAM(S): at 23:38

## 2021-09-17 RX ADMIN — Medication 1 MILLIGRAM(S): at 23:37

## 2021-09-17 RX ADMIN — OLANZAPINE 10 MILLIGRAM(S): 15 TABLET, FILM COATED ORAL at 22:18
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
## 2021-09-17 NOTE — ED PROVIDER NOTE - PROGRESS NOTE DETAILS
Dr. Robb: called 's methadone clinic ([facility] [phone]) Dr. Robb: called pt's methadone clinic ([facility] [phone]), state that they were caring for pt inpatient until 7 days ago but have not been providing him with methadone since then; they state pt does have a different outpatient methadone provider who I should contact Dr. Robb: I called pt's most recent methadone clinic ([facility]) at [phone] but clinic not open now.  I called another phone number affiliated with the clinic [phone] but also closed at this time.  I am unable to confirm last dosage given or when.  145 mg is pt's most recent dose as per Rx writer and recent eMAR.  Last dose received yesterday 9/16 at 1 PM before pt signed out AMA.  Discussed with pharmacist [place] and pt is not appearing intoxicated and it would benefit pt to receive his normal dose. Resident Carlee: preliminary eval concerning for B/L LE Edema with concern for CHF. Case endorsed to Corby Abel MD on Tele. Pt signed out to me, Dr. Mendiola, by Dr. Robb. Pt does not have PE on CTA. Pending INR check. Likely admission.

## 2021-09-17 NOTE — H&P ADULT - PROBLEM SELECTOR PLAN 1
PAVON likely multifactorial and related to chronic deconditioning vs CHF vs pulm HTN vs DANIA/OHS vs COPD/asthma  Has prominent heart on CT which may be related to chronic lung disease contributing, pulm HTN. Will need echo to further evaluate  Pt with mild wheezing on exam and unclear if ever pulm f/u  -start albuterol PRN  -pulm f/u as outpatient for official PFTs to r/o asthma/COPD  -will need CPAP as outpatient if has hx of DANIA which patient reports  -TTE to r/o CHF  -start lasix 20 IV qd given significant LE edema  -strict I's and O's and daily weights  -LE duplex to r/o DVT  -US abdomen for abdominal distension  -EKG NSR without ischemic changes, no CP  -monitor on tele  -smoking cessation, nicotine patch ordered  -cardiology consult in AM

## 2021-09-17 NOTE — CHART NOTE - NSCHARTNOTEFT_GEN_A_CORE
I have personally reviewed this patient's ISTOP, reference #[id number]     08/08/2021 08/08/2021 oxycodone-acetaminophen 5-325 mg tablet  2 1 Jonathan Field MD [id number] [facility] #[id number]   08/08/2021 08/08/2021 alprazolam 2 mg tablet  2 2 Jonathan Field MD [id number] [facility] #[id number]   06/21/2021 06/23/2021 oxycodone hcl 30 mg tablet  120 30 Erickson Orr MD [id number] [facility] #[id number]   06/21/2021 06/21/2021 dextroamp-amphetamin 30 mg tab  60 30 Erickson Orr MD [id number] [facility] #[id number]

## 2021-09-17 NOTE — ED PROVIDER NOTE - PHYSICAL EXAMINATION
PHYSICAL EXAM:  GENERAL: Sitting comfortable in bed, in no acute distress  HENMT: Atraumatic, moist mucous membranes, no oropharyngeal exudates or vesicles, uvula is midline  EYES: Clear bilaterally, PERRL, EOMs intact b/l  HEART: RRR, nl S1/S2, no murmurs  RESPIRATORY: Mild crackles at bases b/l, no wheezes/rhonchi  ABDOMEN: +BS, soft, nontender, nondistended  EXTREMITIES: 2+ pitting edema b/l, +2 radial pulses b/l  NEURO:  A&Ox4, no focal motor deficits or sensory deficits   Heme/LYMPH: No ecchymosis or bruising, no anterior/posterior cervical or supraclavicular LAD  SKIN:  Skin warm, dry and intact. No evidence of rash.

## 2021-09-17 NOTE — H&P ADULT - ASSESSMENT
41M with PMHx IVDU on methadone, seizures, ADD, mood disorder, anxiety, HTN presenting with PAVON for 2-3 months and LE swelling 3-6 months.

## 2021-09-17 NOTE — H&P ADULT - NSHPPHYSICALEXAM_GEN_ALL_CORE
PHYSICAL EXAM:  Vital Signs Last 24 Hrs  T(C): 36.6 (09-17-21 @ 18:37)  T(F): 97.9 (09-17-21 @ 18:37), Max: 98.1 (09-17-21 @ 12:41)  HR: 79 (09-17-21 @ 18:37) (79 - 100)  BP: 117/59 (09-17-21 @ 18:37)  BP(mean): --  RR: 20 (09-17-21 @ 18:37) (18 - 20)  SpO2: 100% (09-17-21 @ 18:37) (98% - 100%)  Wt(kg): --    Constitutional: NAD, awake and alert, obese  EYES: EOMI, conjunctiva clear  ENT:  Normal Hearing, no tonsillar exudates   Neck: Soft and supple , no thyromegaly   Respiratory: Breath sounds are clear bilaterally, fine intermittent wheezing, No rales or rhonchi, no tachypnea, no accessory muscle use  Cardiovascular: S1 and S2, regular rate and rhythm, no Murmurs, gallops or rubs, no JVD, 2+ bilateral pitting edema  Gastrointestinal: Bowel Sounds present, soft, nontender, +abdominal distension, no guarding, no rebound  Extremities: No cyanosis or clubbing; warm to touch  Neurological: No focal deficits, CN II-XII intact bilaterally, sensation to light touch intact in all extremities. Pupils are equally reactive to light and symmetrical in size.   Musculoskeletal: 5/5 strength b/l upper and lower extremities; no joint swelling.  Skin: No rashes, no ulcerations

## 2021-09-17 NOTE — H&P ADULT - PROBLEM SELECTOR PLAN 3
Methadone 135mg qd per patient and gets from [place] [phone] ext [phone]. Attempted to call overnight but was unsuccessful in reaching.  -methadone needs to be confirmed and resumed in AM

## 2021-09-17 NOTE — ED PROVIDER NOTE - CLINICAL SUMMARY MEDICAL DECISION MAKING FREE TEXT BOX
42 y/o M PMH IVDU on methadone, seizures, ADD, mood disorder and anxiety presenting with 1 month of gradual worsening dyspnea on exertion and b/l LE swelling. Fluid overloaded on exam. Not hypoxic, afebrile, vitals stable. clinical presentation and exam concerning for HF v nephrotic syndrome v PE. Will obtain basic labs, trop, BNP, VBG, EKG, CTA chest eval PE and reassess.

## 2021-09-17 NOTE — H&P ADULT - PROBLEM SELECTOR PLAN 2
Urinary incontinence predominantly at night. Advised to f/u  as soon as possible after discharge  Ua on last admission negative for infection but with mild proteinuria

## 2021-09-17 NOTE — H&P ADULT - HISTORY OF PRESENT ILLNESS
41M with PMHx IVDU on methadone, seizures, ADD, mood disorder, anxiety, HTN presenting with PAVON for 2-3 months and LE swelling 3-6 months. Recently admitted at [facility] for similar complaints but had to leave AMA for a court issue which is now resolved. He was unable to get TTE done as recommended by cardiologist. Pt notes he weighed 220lbs last year and has had a very poor diet and now weighs about 306 lbs. Over the last 2-3 months he has had significant PAVON limiting him to about one block and <1 flight of stairs. SOB is better with rest and denies orthopnea, PND. He was previously more functional with ADLs but now more limited. He also notes audible wheezing at time but without asthma hx but smokes 1PPD and wants to quit. Not on inhalers currently. He also had been told of likely DANIA but is not on CPAP at this time. Of note patient has remote hx of thoracic OM with surgery in 2014 and had chronic back pain after. He was on oxycodone for a long time up until a month ago when he was discharged from [facility] rehab ([facility]) in early september. He now no longer takes oxycodone. He states he takes aderral, depakote, methadone 135mg qd - [place] [phone], ext [phone]. Provider called number but unable to reach anyone overnight to confirm dose. On last hospitalization two days ago he was give 145mg. He also was last prescribed alprazolam 1 month ago per ISTOP 2mg 2 pills for 2 day supply. He states he sparingly takes these. Pt denies fevers, chills, CP, abdominal pain, vomiting, diarrhea, epistaxis, bloody BMs. He endorses urinary incontinence sometimes at night and was told on last admission to see  which he has not been able to yet. Of note patient had a recent INR one day ago that was 3.5 and recheck here was normal.    Pt received methadone 145mg and alprazolam 2mg prior to my evaluation. Currently he is amenable to alprazolam 1mg BID PRN.    In ED, CTA limited but without main right left PE. probnp normal. LE edema evident on exam. VSS

## 2021-09-17 NOTE — ED ADULT TRIAGE NOTE - CHIEF COMPLAINT QUOTE
Pt was seen at [facility], pt was sent in for elevated Pt/inr. Pt with sob x months  and lower leg edema . Pt also reports had a few episodes of urinary incontinence. Pt on methadone states missed his dose today.  Pt also reports mild chest discomfort .

## 2021-09-17 NOTE — H&P ADULT - NSHPREVIEWOFSYSTEMS_GEN_ALL_CORE
ROS:    Constitutional: [ ] fevers [ ] chills [x ] weight gain  HEENT: [ ] dry eyes [ ] eye irritation   CV: [ ] chest pain [ ] orthopnea [ ] palpitations [ ] murmur  Resp: [ ] cough [x ] shortness of breath [ x] dyspnea [ ] wheezing   GI: [ ] nausea [ ] vomiting [ ] diarrhea [ ] constipation [ ] abd pain [x ] abdominal distension   : [ ] dysuria [ ] nocturia [ ] hematuria [ ] increased urinary frequency  Musculoskeletal: [ ] back pain [ ] myalgias [ ] arthralgias  Skin: [ ] rash [ ] itch  Neurological: [ ] headache [ ] dizziness [ ] syncope   Endocrine: [ ] diabetes [ ] thyroid problem  Hematologic/Lymphatic: [ ] anemia [ ] bleeding problem  Allergic/Immunologic: [ ] itchy eyes [ ] nasal discharge   [x ] All other systems negative

## 2021-09-17 NOTE — H&P ADULT - NSICDXPASTMEDICALHX_GEN_ALL_CORE_FT
PAST MEDICAL HISTORY:  HTN (hypertension)     IV drug abuse     Methadone maintenance therapy patient     Osteomyelitis of lumbar spine     Seizure

## 2021-09-17 NOTE — H&P ADULT - NSHPLABSRESULTS_GEN_ALL_CORE
I have personally reviewed this patient's labs below:                        13.0   9.85  )-----------( 221      ( 17 Sep 2021 14:20 )             38.3     09-17-21 @ 14:20    138  |  102  |  16             --------------------------< 111<H>     4.2  |  26  | 0.61    eGFR AA: 146  eGFR N-AA: 126    Calcium: 9.1  Phosphorus: --  Magnesium: --    AST: 43<H>    ALT: 43<H>  AlkPhos: 108  Protein: 7.6  Albumin: 4.2  TBili: 0.3  D-Bili: --  09-16-21 @ 22:29    142  |  111<H>  |  19             --------------------------< 142<H>     4.4  |  27  | 0.74    eGFR AA: 135  eGFR N-AA: 116    Calcium: 8.3<L>  Phosphorus: --  Magnesium: --    AST: 40<H>    ALT: 48  AlkPhos: 98  Protein: 7.3  Albumin: 3.1<L>  TBili: 0.3  D-Bili: --    Probnp 10    RVP negative    VBG - ( 17 Sep 2021 14:20 )  pH: 7.37  /  pCO2: 48    /  pO2: 58    / HCO3: 28    / Base Excess: 1.7   /  SvO2: 89.9  / Lactate: 1.6            I have personally reviewed this patient's EKG and my independent interpretation is NSR, no ST depressions or elevations    I have personally reviewed this patient's CXR and my interpretation is clear lungs    Imaging personally reviewed below:  CTA chest:  IMPRESSION:    Limited evaluation for segmental and subsegmental pulmonary embolism. No main, left right, or lobar pulmonary embolism.    Prominent right heart is unchanged, echo recommended for complete evaluation

## 2021-09-17 NOTE — ED ADULT NURSE NOTE - CHIEF COMPLAINT QUOTE
Pt was seen at [facility], pt was sent in for elevated Pt/inr. Pt with sob x months  and lower leg edema . Pt also reports had a few episodes of urinary incontinence. Pt on methadone states missed his dose today.  Pt also reports mild chest discomfort .

## 2021-09-17 NOTE — H&P ADULT - NSICDXFAMILYHX_GEN_ALL_CORE_FT
FAMILY HISTORY:  FH: dementia    Mother  Still living? Unknown  FH: diabetes mellitus, Age at diagnosis: Age Unknown

## 2021-09-17 NOTE — ED PROVIDER NOTE - ATTENDING CONTRIBUTION TO CARE
Dr. Robb: 42 yo male with PMH IVDU on methadone (145 mg daily), seizure disorder, ADD, mood disorder, signed out AMA from [facility] yesterday due to legal issue that he states is now resolved, in ED now with continued SOB for 1 month.  Pt states that at [facility] they attempted to do a CTPE study but were not able to due to IV infiltration.  No fever, cough or other complaints.  On exam pt chronically-ill appearing but in NAD, heart tachycardic, lungs CTAB, abd NTND, LEs with equal 2+ pitting edema and erythematous ?stasis changes, strength 5/5 in all extremities. Dr. Robb: 42 yo male with PMH IVDU on methadone (145 mg daily), seizure disorder, ADD, mood disorder, signed out AMA from [facility] yesterday due to legal issue that he states is now resolved, in ED now with continued SOB for 1 month.  Pt states that at [facility] they attempted to do a CTPE study but were not able to due to IV infiltration.  He states that at [facility] he was told that his INR was elevated, but for unclear reasons.  He is not on warfarin or other substances that increase INR, and he has no significant known liver disease.  No fever, cough or other complaints.  On exam pt chronically-ill appearing but in NAD, heart tachycardic, lungs CTAB, abd NTND, LEs with equal 2+ pitting edema and erythematous ?stasis changes, strength 5/5 in all extremities.

## 2021-09-17 NOTE — ED PROVIDER NOTE - NS ED ROS FT
Constitutional: no fever and no chills  Eyes: no discharge, no pain, no visual changes  ENMT: no ear pain, no dysphagia or throat pain  Neck: no pain, no stiffness  CV: no chest pain, no palpitations, (+) edema  Resp: no cough, (+) shortness of breath/PAVON  Abd: no abdominal pain, no nausea or vomiting, no diarrhea  : no dysuria, no hematuria  MSK: no neck pain, no joint pain  Neuro: no LOC, no gait abnormality, no headache, no sensory deficits, no weakness  Skin: no rashes, no lacerations

## 2021-09-17 NOTE — H&P ADULT - PROBLEM SELECTOR PLAN 6
Last prescribed alprazolam 1 month ago per ISTOP 2mg 2 pills for 2 day supply. He states he sparingly takes these.  Currently he is amenable to alprazolam 1mg BID PRN.

## 2021-09-17 NOTE — ED PROVIDER NOTE - OBJECTIVE STATEMENT
40 y/o M PMH IVDU on methadone, seizures, ADD, mood disorder and anxiety presenting with 1 month of gradual onset dyspnea on exertion and b/l LE swelling. Pt was admitted to [facility] on 9/15 for the same symptoms however left AMA as he had a court date yesterday he could not miss. Pt notes swelling and SOB have worsened over the past month, and is sometimes associated with wheezing. He has not been started on any medications for this. He denies any fevers/chills, chest pain, palpitations, lightheadedness/syncope, abd pain, n/v/d, urinary sxs, focal numbness or weakness. Pt also notes he is followed by methadone clinic at [facility], and has not taken his dose today or his other home medications.

## 2021-09-18 LAB
COVID-19 SPIKE DOMAIN AB INTERP: POSITIVE
COVID-19 SPIKE DOMAIN ANTIBODY RESULT: >250 U/ML — HIGH
SARS-COV-2 IGG+IGM SERPL QL IA: >250 U/ML — HIGH
SARS-COV-2 IGG+IGM SERPL QL IA: POSITIVE

## 2021-09-18 PROCEDURE — 93970 EXTREMITY STUDY: CPT | Mod: 26

## 2021-09-18 PROCEDURE — 76705 ECHO EXAM OF ABDOMEN: CPT | Mod: 26

## 2021-09-18 RX ORDER — ALPRAZOLAM 0.25 MG
2 TABLET ORAL
Refills: 0 | Status: DISCONTINUED | OUTPATIENT
Start: 2021-09-18 | End: 2021-09-19

## 2021-09-18 RX ORDER — INFLUENZA VIRUS VACCINE 15; 15; 15; 15 UG/.5ML; UG/.5ML; UG/.5ML; UG/.5ML
0.5 SUSPENSION INTRAMUSCULAR ONCE
Refills: 0 | Status: DISCONTINUED | OUTPATIENT
Start: 2021-09-18 | End: 2021-09-23

## 2021-09-18 RX ORDER — METHADONE HYDROCHLORIDE 40 MG/1
145 TABLET ORAL DAILY
Refills: 0 | Status: DISCONTINUED | OUTPATIENT
Start: 2021-09-18 | End: 2021-09-19

## 2021-09-18 RX ORDER — FUROSEMIDE 40 MG
40 TABLET ORAL DAILY
Refills: 0 | Status: DISCONTINUED | OUTPATIENT
Start: 2021-09-19 | End: 2021-09-23

## 2021-09-18 RX ORDER — FUROSEMIDE 40 MG
20 TABLET ORAL ONCE
Refills: 0 | Status: COMPLETED | OUTPATIENT
Start: 2021-09-18 | End: 2021-09-18

## 2021-09-18 RX ORDER — ALBUTEROL 90 UG/1
2 AEROSOL, METERED ORAL EVERY 6 HOURS
Refills: 0 | Status: DISCONTINUED | OUTPATIENT
Start: 2021-09-18 | End: 2021-09-23

## 2021-09-18 RX ADMIN — ENOXAPARIN SODIUM 40 MILLIGRAM(S): 100 INJECTION SUBCUTANEOUS at 18:07

## 2021-09-18 RX ADMIN — OLANZAPINE 10 MILLIGRAM(S): 15 TABLET, FILM COATED ORAL at 22:18

## 2021-09-18 RX ADMIN — Medication 1 MILLIGRAM(S): at 10:40

## 2021-09-18 RX ADMIN — GABAPENTIN 800 MILLIGRAM(S): 400 CAPSULE ORAL at 10:47

## 2021-09-18 RX ADMIN — Medication 2 MILLIGRAM(S): at 21:19

## 2021-09-18 RX ADMIN — GABAPENTIN 400 MILLIGRAM(S): 400 CAPSULE ORAL at 22:19

## 2021-09-18 RX ADMIN — LIDOCAINE 2 PATCH: 4 CREAM TOPICAL at 10:30

## 2021-09-18 RX ADMIN — Medication 1 PATCH: at 12:50

## 2021-09-18 RX ADMIN — Medication 500 MILLIGRAM(S): at 12:51

## 2021-09-18 RX ADMIN — DEXTROAMPHETAMINE SACCHARATE, AMPHETAMINE ASPARTATE, DEXTROAMPHETAMINE SULFATE AND AMPHETAMINE SULFATE 30 MILLIGRAM(S): 1.875; 1.875; 1.875; 1.875 TABLET ORAL at 06:05

## 2021-09-18 RX ADMIN — ENOXAPARIN SODIUM 40 MILLIGRAM(S): 100 INJECTION SUBCUTANEOUS at 06:05

## 2021-09-18 RX ADMIN — GABAPENTIN 800 MILLIGRAM(S): 400 CAPSULE ORAL at 18:07

## 2021-09-18 RX ADMIN — METHADONE HYDROCHLORIDE 145 MILLIGRAM(S): 40 TABLET ORAL at 12:50

## 2021-09-18 RX ADMIN — LIDOCAINE 2 PATCH: 4 CREAM TOPICAL at 06:02

## 2021-09-18 RX ADMIN — DEXTROAMPHETAMINE SACCHARATE, AMPHETAMINE ASPARTATE, DEXTROAMPHETAMINE SULFATE AND AMPHETAMINE SULFATE 30 MILLIGRAM(S): 1.875; 1.875; 1.875; 1.875 TABLET ORAL at 18:05

## 2021-09-18 RX ADMIN — Medication 1 PATCH: at 19:52

## 2021-09-18 RX ADMIN — Medication 750 MILLIGRAM(S): at 22:19

## 2021-09-18 RX ADMIN — Medication 20 MILLIGRAM(S): at 06:05

## 2021-09-18 RX ADMIN — Medication 20 MILLIGRAM(S): at 18:08

## 2021-09-18 RX ADMIN — Medication 3 MILLIGRAM(S): at 23:21

## 2021-09-18 NOTE — CHART NOTE - NSCHARTNOTEFT_GEN_A_CORE
Pt's most recent dose as per Rx writer and recent eMAR was 145mg.  I called pt's current methadone clinic at [phone] several times but line is busy. I also called [phone] but also closed at this time. I also attempted to reach [facility] [phone] but goes to [facility]. Pt was recently admitted to [facility] but left ama prior to team being able to contact clinic, however at that time review of outpatient records revealed that patient was receiving 145mg methadone as an outpatient. Discussed with Dr. Abel, will order 145mg methadone for today and tomorrow, team will call clinic on Monday to conform dose. Pt's most recent dose Methadone as per Rx writer and recent eMAR was 145mg.  I called pt's current methadone clinic at [phone] several times but line is busy. I also called [phone] but also closed at this time. [phone] but [facility] to [facility]. Pt was recently admitted to [facility] but left ama prior to team being able to contact clinic, however at that time review of outpatient records revealed that patient was receiving 145mg methadone as an outpatient. Discussed with Dr. Abel, will order 145mg methadone for today and tomorrow, team will call clinic on Monday to conform dose.

## 2021-09-18 NOTE — CONSULT NOTE ADULT - SUBJECTIVE AND OBJECTIVE BOX
Date of service 9/18/2021    HISTORY OF PRESENT ILLNESS: HPI:    41M with PMHx Heroin abuse, now on methadone, tobacco abuse, hx spinal OM s/p surgery, chronic back pain, seizures, ADD, mood disorder, anxiety, and HTN presenting with PAVON for 2-3 months and LE swelling 3-6 months. Last TTE 2019 with structurally normal heart.  Denies chest pain, reports PAVON and LE edema for weeks.    Recently admitted at [facility] for similar complaints but had to leave AMA for a court issue which is now resolved. He was unable to get TTE done as recommended by cardiologist. Pt notes he weighed 220lbs last year and has had a very poor diet and now weighs about 306 lbs. Over the last 2-3 months he has had significant PAVON limiting him to about one block and <1 flight of stairs. SOB is better with rest and denies orthopnea, PND. He was previously more functional with ADLs but now more limited. He also notes audible wheezing at time but without asthma hx but smokes 1PPD and wants to quit. Not on inhalers currently. He also had been told of likely DANIA but is not on CPAP at this time. Of note patient has remote hx of thoracic OM with surgery in 2014 and had chronic back pain after. He was on oxycodone for a long time up until a month ago when he was discharged from [facility] rehab ([facility]) in early september. He now no longer takes oxycodone. He states he takes aderral, depakote, methadone 135mg qd - [place] [phone], ext [phone]. Provider called number but unable to reach anyone overnight to confirm dose. On last hospitalization two days ago he was give 145mg. He also was last prescribed alprazolam 1 month ago per ISTOP 2mg 2 pills for 2 day supply. He states he sparingly takes these. Pt denies fevers, chills, CP, abdominal pain, vomiting, diarrhea, epistaxis, bloody BMs. He endorses urinary incontinence sometimes at night and was told on last admission to see  which he has not been able to yet. Of note patient had a recent INR one day ago that was 3.5 and recheck here was normal.    Pt received methadone 145mg and alprazolam 2mg prior to my evaluation. Currently he is amenable to alprazolam 1mg BID PRN.    PAST MEDICAL & SURGICAL HISTORY:  IV drug abuse    Osteomyelitis of lumbar spine    Seizure    Methadone maintenance therapy patient    HTN (hypertension)    H/O Spinal surgery      MEDICATIONS  (STANDING):  amphetamine/dextroamphetamine 30 milliGRAM(s) Oral two times a day  enoxaparin Injectable 40 milliGRAM(s) SubCutaneous every 12 hours  furosemide   Injectable 20 milliGRAM(s) IV Push once  gabapentin 400 milliGRAM(s) Oral at bedtime  gabapentin 800 milliGRAM(s) Oral <User Schedule>  influenza   Vaccine 0.5 milliLiter(s) IntraMuscular once  methadone    Tablet 145 milliGRAM(s) Oral daily  nicotine - 21 mG/24Hr(s) Patch 1 patch Transdermal daily  OLANZapine 10 milliGRAM(s) Oral at bedtime  valproic acid 750 milliGRAM(s) Oral at bedtime  valproic acid 500 milliGRAM(s) Oral daily      Allergies  Zosyn (Hives)      FAMILY HISTORY:  FH: dementia  FH: diabetes mellitus (Mother)  Noncontributory for premature coronary disease or sudden cardiac death    SOCIAL HISTORY:    [x ] Non-smoker  [ ] Smoker  [ ] Alcohol    FLU VACCINE THIS YEAR STARTS IN AUGUST:  [ ] Yes    [ ] No    IF OVER 65 HAVE YOU EVER HAD A PNA VACCINE:  [ ] Yes    [ ] No       [ ] N/A      REVIEW OF SYSTEMS:  [ ]chest pain  [  x]shortness of breath  [  ]palpitations  [  ]syncope  [ ]near syncope [ ]upper extremity weakness   [ ] lower extremity weakness  [  ]diplopia  [  ]altered mental status   [  ]fevers  [ ]chills [ ]nausea  [ ]vomiting  [  ]dysphagia    [ ]abdominal pain  [ ]melena  [ ]BRBPR    [  ]epistaxis  [  ]rash    [ x]lower extremity edema        [x ] All others negative	  [ ] Unable to obtain      LABS:	 	    CARDIAC MARKERS ( 16 Sep 2021 22:29 )  <.015 ng/mL / x     / x     / x     / x                            13.0   9.85  )-----------( 221      ( 17 Sep 2021 14:20 )             38.3     138  |  102  |  16  ----------------------------<  111<H>  4.2   |  26  |  0.61    Ca    9.1      17 Sep 2021 14:20    TPro  7.6  /  Alb  4.2  /  TBili  0.3  /  DBili  x   /  AST  43<H>  /  ALT  43<H>  /  AlkPhos  108  09-17    Creatinine Trend: 0.61<--, 0.74<--, 0.59<--, 0.56<--, 0.70<--, 0.93<--    PHYSICAL EXAM:  T(C): 37.1 (09-18-21 @ 10:00), Max: 37.1 (09-18-21 @ 10:00)  HR: 70 (09-18-21 @ 10:00) (70 - 83)  BP: 127/54 (09-18-21 @ 10:00) (106/65 - 142/80)  RR: 16 (09-18-21 @ 10:00) (16 - 20)  SpO2: 98% (09-18-21 @ 10:00) (95% - 100%)  Wt(kg): --   BMI (kg/m2): 46.2 (09-17-21 @ 12:41)  I&O's Summary      Gen: Appears well in NAD  HEENT:  (-)icterus (-)pallor  CV: N S1 S2 1/6 VAUGHN (+)2 Pulses B/l  Resp:  Clear to ausculatation B/L, normal effort  GI: (+) BS Soft, NT, ND  Lymph:  (3+ pitting LE Edema, (-)obvious lymphadenopathy  Skin: Warm to touch, Normal turgor  Psych: Appropriate mood and affect    TELEMETRY: 	 SR     ECG:  	NSR    RADIOLOGY:   < from: CT Angio Chest PE Protocol w/ IV Cont (09.17.21 @ 14:40) >  IMPRESSION:    Limited evaluation for segmental and subsegmental pulmonary embolism. No main, left right, or lobar pulmonary embolism.    Prominent right heart is unchanged, echo recommended for complete evaluation    < end of copied text >         ASSESSMENT/PLAN: 	41M with PMHx Heroin abuse, now on methadone, tobacco abuse, hx spinal OM s/p surgery, chronic back pain, seizures, ADD, mood disorder, anxiety, and HTN presenting with PAVON for 2-3 months and LE swelling 3-6 months. Last TTE 2019 with structurally normal heart.    --start IV diuresis Lasix 40 QD  --TTE  --CTPA noted  --further reccs pending above

## 2021-09-19 LAB
ANION GAP SERPL CALC-SCNC: 11 MMOL/L — SIGNIFICANT CHANGE UP (ref 7–14)
BUN SERPL-MCNC: 20 MG/DL — SIGNIFICANT CHANGE UP (ref 7–23)
CALCIUM SERPL-MCNC: 9.2 MG/DL — SIGNIFICANT CHANGE UP (ref 8.4–10.5)
CHLORIDE SERPL-SCNC: 99 MMOL/L — SIGNIFICANT CHANGE UP (ref 98–107)
CO2 SERPL-SCNC: 28 MMOL/L — SIGNIFICANT CHANGE UP (ref 22–31)
CREAT SERPL-MCNC: 0.73 MG/DL — SIGNIFICANT CHANGE UP (ref 0.5–1.3)
GLUCOSE SERPL-MCNC: 163 MG/DL — HIGH (ref 70–99)
HCT VFR BLD CALC: 39.8 % — SIGNIFICANT CHANGE UP (ref 39–50)
HGB BLD-MCNC: 13.1 G/DL — SIGNIFICANT CHANGE UP (ref 13–17)
MAGNESIUM SERPL-MCNC: 2.5 MG/DL — SIGNIFICANT CHANGE UP (ref 1.6–2.6)
MCHC RBC-ENTMCNC: 30.5 PG — SIGNIFICANT CHANGE UP (ref 27–34)
MCHC RBC-ENTMCNC: 32.9 GM/DL — SIGNIFICANT CHANGE UP (ref 32–36)
MCV RBC AUTO: 92.6 FL — SIGNIFICANT CHANGE UP (ref 80–100)
NRBC # BLD: 0 /100 WBCS — SIGNIFICANT CHANGE UP
NRBC # FLD: 0 K/UL — SIGNIFICANT CHANGE UP
PHOSPHATE SERPL-MCNC: 4.7 MG/DL — HIGH (ref 2.5–4.5)
PLATELET # BLD AUTO: 205 K/UL — SIGNIFICANT CHANGE UP (ref 150–400)
POTASSIUM SERPL-MCNC: 3.7 MMOL/L — SIGNIFICANT CHANGE UP (ref 3.5–5.3)
POTASSIUM SERPL-SCNC: 3.7 MMOL/L — SIGNIFICANT CHANGE UP (ref 3.5–5.3)
RBC # BLD: 4.3 M/UL — SIGNIFICANT CHANGE UP (ref 4.2–5.8)
RBC # FLD: 13.7 % — SIGNIFICANT CHANGE UP (ref 10.3–14.5)
SODIUM SERPL-SCNC: 138 MMOL/L — SIGNIFICANT CHANGE UP (ref 135–145)
WBC # BLD: 6.77 K/UL — SIGNIFICANT CHANGE UP (ref 3.8–10.5)
WBC # FLD AUTO: 6.77 K/UL — SIGNIFICANT CHANGE UP (ref 3.8–10.5)

## 2021-09-19 RX ORDER — ALPRAZOLAM 0.25 MG
2 TABLET ORAL THREE TIMES A DAY
Refills: 0 | Status: DISCONTINUED | OUTPATIENT
Start: 2021-09-19 | End: 2021-09-22

## 2021-09-19 RX ADMIN — Medication 40 MILLIGRAM(S): at 06:47

## 2021-09-19 RX ADMIN — DEXTROAMPHETAMINE SACCHARATE, AMPHETAMINE ASPARTATE, DEXTROAMPHETAMINE SULFATE AND AMPHETAMINE SULFATE 30 MILLIGRAM(S): 1.875; 1.875; 1.875; 1.875 TABLET ORAL at 17:45

## 2021-09-19 RX ADMIN — Medication 500 MILLIGRAM(S): at 11:54

## 2021-09-19 RX ADMIN — ENOXAPARIN SODIUM 40 MILLIGRAM(S): 100 INJECTION SUBCUTANEOUS at 06:48

## 2021-09-19 RX ADMIN — GABAPENTIN 800 MILLIGRAM(S): 400 CAPSULE ORAL at 08:49

## 2021-09-19 RX ADMIN — Medication 1 PATCH: at 07:23

## 2021-09-19 RX ADMIN — Medication 750 MILLIGRAM(S): at 23:15

## 2021-09-19 RX ADMIN — GABAPENTIN 400 MILLIGRAM(S): 400 CAPSULE ORAL at 23:15

## 2021-09-19 RX ADMIN — Medication 1 PATCH: at 21:22

## 2021-09-19 RX ADMIN — METHADONE HYDROCHLORIDE 145 MILLIGRAM(S): 40 TABLET ORAL at 11:57

## 2021-09-19 RX ADMIN — OLANZAPINE 10 MILLIGRAM(S): 15 TABLET, FILM COATED ORAL at 23:15

## 2021-09-19 RX ADMIN — Medication 3 MILLIGRAM(S): at 23:16

## 2021-09-19 RX ADMIN — Medication 1 PATCH: at 11:21

## 2021-09-19 RX ADMIN — Medication 1 PATCH: at 11:55

## 2021-09-19 RX ADMIN — ENOXAPARIN SODIUM 40 MILLIGRAM(S): 100 INJECTION SUBCUTANEOUS at 17:46

## 2021-09-19 RX ADMIN — Medication 2 MILLIGRAM(S): at 08:49

## 2021-09-19 RX ADMIN — GABAPENTIN 800 MILLIGRAM(S): 400 CAPSULE ORAL at 16:31

## 2021-09-19 RX ADMIN — GABAPENTIN 800 MILLIGRAM(S): 400 CAPSULE ORAL at 11:56

## 2021-09-19 RX ADMIN — Medication 2 MILLIGRAM(S): at 21:27

## 2021-09-19 RX ADMIN — DEXTROAMPHETAMINE SACCHARATE, AMPHETAMINE ASPARTATE, DEXTROAMPHETAMINE SULFATE AND AMPHETAMINE SULFATE 30 MILLIGRAM(S): 1.875; 1.875; 1.875; 1.875 TABLET ORAL at 06:47

## 2021-09-20 LAB
ANION GAP SERPL CALC-SCNC: 5 MMOL/L — LOW (ref 7–14)
APPEARANCE UR: CLEAR — SIGNIFICANT CHANGE UP
BILIRUB UR-MCNC: NEGATIVE — SIGNIFICANT CHANGE UP
BUN SERPL-MCNC: 18 MG/DL — SIGNIFICANT CHANGE UP (ref 7–23)
CALCIUM SERPL-MCNC: 9.1 MG/DL — SIGNIFICANT CHANGE UP (ref 8.4–10.5)
CHLORIDE SERPL-SCNC: 104 MMOL/L — SIGNIFICANT CHANGE UP (ref 98–107)
CO2 SERPL-SCNC: 25 MMOL/L — SIGNIFICANT CHANGE UP (ref 22–31)
COLOR SPEC: YELLOW — SIGNIFICANT CHANGE UP
CREAT SERPL-MCNC: 0.69 MG/DL — SIGNIFICANT CHANGE UP (ref 0.5–1.3)
DIFF PNL FLD: NEGATIVE — SIGNIFICANT CHANGE UP
GLUCOSE SERPL-MCNC: 108 MG/DL — HIGH (ref 70–99)
GLUCOSE UR QL: NEGATIVE — SIGNIFICANT CHANGE UP
HCT VFR BLD CALC: 40 % — SIGNIFICANT CHANGE UP (ref 39–50)
HGB BLD-MCNC: 13.1 G/DL — SIGNIFICANT CHANGE UP (ref 13–17)
KETONES UR-MCNC: NEGATIVE — SIGNIFICANT CHANGE UP
LEUKOCYTE ESTERASE UR-ACNC: NEGATIVE — SIGNIFICANT CHANGE UP
MAGNESIUM SERPL-MCNC: 2.3 MG/DL — SIGNIFICANT CHANGE UP (ref 1.6–2.6)
MCHC RBC-ENTMCNC: 30.5 PG — SIGNIFICANT CHANGE UP (ref 27–34)
MCHC RBC-ENTMCNC: 32.8 GM/DL — SIGNIFICANT CHANGE UP (ref 32–36)
MCV RBC AUTO: 93.2 FL — SIGNIFICANT CHANGE UP (ref 80–100)
NITRITE UR-MCNC: NEGATIVE — SIGNIFICANT CHANGE UP
NRBC # BLD: 0 /100 WBCS — SIGNIFICANT CHANGE UP
NRBC # FLD: 0 K/UL — SIGNIFICANT CHANGE UP
PH UR: 5.5 — SIGNIFICANT CHANGE UP (ref 5–8)
PHOSPHATE SERPL-MCNC: 4.7 MG/DL — HIGH (ref 2.5–4.5)
PLATELET # BLD AUTO: 198 K/UL — SIGNIFICANT CHANGE UP (ref 150–400)
POTASSIUM SERPL-MCNC: 4.4 MMOL/L — SIGNIFICANT CHANGE UP (ref 3.5–5.3)
POTASSIUM SERPL-SCNC: 4.4 MMOL/L — SIGNIFICANT CHANGE UP (ref 3.5–5.3)
PROT UR-MCNC: NEGATIVE — SIGNIFICANT CHANGE UP
RBC # BLD: 4.29 M/UL — SIGNIFICANT CHANGE UP (ref 4.2–5.8)
RBC # FLD: 13.7 % — SIGNIFICANT CHANGE UP (ref 10.3–14.5)
SODIUM SERPL-SCNC: 134 MMOL/L — LOW (ref 135–145)
SP GR SPEC: 1.02 — SIGNIFICANT CHANGE UP (ref 1–1.05)
UROBILINOGEN FLD QL: SIGNIFICANT CHANGE UP
WBC # BLD: 7.39 K/UL — SIGNIFICANT CHANGE UP (ref 3.8–10.5)
WBC # FLD AUTO: 7.39 K/UL — SIGNIFICANT CHANGE UP (ref 3.8–10.5)

## 2021-09-20 PROCEDURE — 93306 TTE W/DOPPLER COMPLETE: CPT | Mod: 26

## 2021-09-20 RX ORDER — SENNA PLUS 8.6 MG/1
2 TABLET ORAL AT BEDTIME
Refills: 0 | Status: DISCONTINUED | OUTPATIENT
Start: 2021-09-20 | End: 2021-09-23

## 2021-09-20 RX ORDER — METHADONE HYDROCHLORIDE 40 MG/1
145 TABLET ORAL ONCE
Refills: 0 | Status: DISCONTINUED | OUTPATIENT
Start: 2021-09-20 | End: 2021-09-20

## 2021-09-20 RX ORDER — ALPRAZOLAM 0.25 MG
1 TABLET ORAL ONCE
Refills: 0 | Status: DISCONTINUED | OUTPATIENT
Start: 2021-09-20 | End: 2021-09-20

## 2021-09-20 RX ORDER — LIDOCAINE 4 G/100G
1 CREAM TOPICAL ONCE
Refills: 0 | Status: COMPLETED | OUTPATIENT
Start: 2021-09-20 | End: 2021-09-20

## 2021-09-20 RX ORDER — POLYETHYLENE GLYCOL 3350 17 G/17G
17 POWDER, FOR SOLUTION ORAL
Refills: 0 | Status: DISCONTINUED | OUTPATIENT
Start: 2021-09-20 | End: 2021-09-23

## 2021-09-20 RX ADMIN — Medication 500 MILLIGRAM(S): at 11:59

## 2021-09-20 RX ADMIN — ENOXAPARIN SODIUM 40 MILLIGRAM(S): 100 INJECTION SUBCUTANEOUS at 06:18

## 2021-09-20 RX ADMIN — METHADONE HYDROCHLORIDE 145 MILLIGRAM(S): 40 TABLET ORAL at 19:27

## 2021-09-20 RX ADMIN — Medication 2 MILLIGRAM(S): at 06:17

## 2021-09-20 RX ADMIN — Medication 1 PATCH: at 11:00

## 2021-09-20 RX ADMIN — Medication 3 MILLIGRAM(S): at 21:48

## 2021-09-20 RX ADMIN — GABAPENTIN 800 MILLIGRAM(S): 400 CAPSULE ORAL at 18:32

## 2021-09-20 RX ADMIN — POLYETHYLENE GLYCOL 3350 17 GRAM(S): 17 POWDER, FOR SOLUTION ORAL at 18:41

## 2021-09-20 RX ADMIN — DEXTROAMPHETAMINE SACCHARATE, AMPHETAMINE ASPARTATE, DEXTROAMPHETAMINE SULFATE AND AMPHETAMINE SULFATE 30 MILLIGRAM(S): 1.875; 1.875; 1.875; 1.875 TABLET ORAL at 06:18

## 2021-09-20 RX ADMIN — LIDOCAINE 1 PATCH: 4 CREAM TOPICAL at 21:26

## 2021-09-20 RX ADMIN — Medication 5 MILLIGRAM(S): at 21:27

## 2021-09-20 RX ADMIN — DEXTROAMPHETAMINE SACCHARATE, AMPHETAMINE ASPARTATE, DEXTROAMPHETAMINE SULFATE AND AMPHETAMINE SULFATE 30 MILLIGRAM(S): 1.875; 1.875; 1.875; 1.875 TABLET ORAL at 18:35

## 2021-09-20 RX ADMIN — Medication 1 MILLIGRAM(S): at 00:38

## 2021-09-20 RX ADMIN — Medication 1 PATCH: at 11:58

## 2021-09-20 RX ADMIN — SENNA PLUS 2 TABLET(S): 8.6 TABLET ORAL at 21:27

## 2021-09-20 RX ADMIN — Medication 1 PATCH: at 21:06

## 2021-09-20 RX ADMIN — Medication 1 PATCH: at 07:30

## 2021-09-20 RX ADMIN — GABAPENTIN 800 MILLIGRAM(S): 400 CAPSULE ORAL at 12:02

## 2021-09-20 RX ADMIN — GABAPENTIN 800 MILLIGRAM(S): 400 CAPSULE ORAL at 08:59

## 2021-09-20 RX ADMIN — OLANZAPINE 10 MILLIGRAM(S): 15 TABLET, FILM COATED ORAL at 21:25

## 2021-09-20 RX ADMIN — METHADONE HYDROCHLORIDE 145 MILLIGRAM(S): 40 TABLET ORAL at 19:04

## 2021-09-20 RX ADMIN — Medication 40 MILLIGRAM(S): at 06:19

## 2021-09-20 RX ADMIN — Medication 750 MILLIGRAM(S): at 21:27

## 2021-09-20 RX ADMIN — ENOXAPARIN SODIUM 40 MILLIGRAM(S): 100 INJECTION SUBCUTANEOUS at 18:33

## 2021-09-20 RX ADMIN — Medication 2 MILLIGRAM(S): at 21:26

## 2021-09-20 RX ADMIN — Medication 2 MILLIGRAM(S): at 13:55

## 2021-09-20 NOTE — PROGRESS NOTE ADULT - ATTENDING COMMENTS
Patient seen and examined.  Agree with above.   Continue with IV diuresis   Check TTE to evaluate LV function   Further workup pending above    Consuelo Castrejon MD

## 2021-09-20 NOTE — CHART NOTE - NSCHARTNOTEFT_GEN_A_CORE
I personally called 's methadone clinic at [phone] several times but line remains busy. I also called [phone], and left multiple messages with callback number but have yet to receive confirmation. I also attempted to reach [facility] [phone] but goes to voicemail. Dr. Abel made aware and will coordinate with pharmacy to dose medication x1. Primary team to continue to attempt confirmation.    Cristobal Barillas PA-C  Medicine [facility], pgr [phone]  Available on [employer] Teams

## 2021-09-21 LAB
ALBUMIN SERPL ELPH-MCNC: 4 G/DL — SIGNIFICANT CHANGE UP (ref 3.3–5)
ALP SERPL-CCNC: 110 U/L — SIGNIFICANT CHANGE UP (ref 40–120)
ALT FLD-CCNC: 41 U/L — SIGNIFICANT CHANGE UP (ref 4–41)
ANION GAP SERPL CALC-SCNC: 11 MMOL/L — SIGNIFICANT CHANGE UP (ref 7–14)
AST SERPL-CCNC: 39 U/L — SIGNIFICANT CHANGE UP (ref 4–40)
BASOPHILS # BLD AUTO: 0.03 K/UL — SIGNIFICANT CHANGE UP (ref 0–0.2)
BASOPHILS NFR BLD AUTO: 0.4 % — SIGNIFICANT CHANGE UP (ref 0–2)
BILIRUB SERPL-MCNC: 0.3 MG/DL — SIGNIFICANT CHANGE UP (ref 0.2–1.2)
BUN SERPL-MCNC: 18 MG/DL — SIGNIFICANT CHANGE UP (ref 7–23)
CALCIUM SERPL-MCNC: 9.4 MG/DL — SIGNIFICANT CHANGE UP (ref 8.4–10.5)
CHLORIDE SERPL-SCNC: 96 MMOL/L — LOW (ref 98–107)
CO2 SERPL-SCNC: 26 MMOL/L — SIGNIFICANT CHANGE UP (ref 22–31)
CREAT SERPL-MCNC: 0.72 MG/DL — SIGNIFICANT CHANGE UP (ref 0.5–1.3)
EOSINOPHIL # BLD AUTO: 0.45 K/UL — SIGNIFICANT CHANGE UP (ref 0–0.5)
EOSINOPHIL NFR BLD AUTO: 5.8 % — SIGNIFICANT CHANGE UP (ref 0–6)
GLUCOSE SERPL-MCNC: 121 MG/DL — HIGH (ref 70–99)
HCT VFR BLD CALC: 40.9 % — SIGNIFICANT CHANGE UP (ref 39–50)
HGB BLD-MCNC: 13.5 G/DL — SIGNIFICANT CHANGE UP (ref 13–17)
IANC: 4.86 K/UL — SIGNIFICANT CHANGE UP (ref 1.5–8.5)
IMM GRANULOCYTES NFR BLD AUTO: 0.6 % — SIGNIFICANT CHANGE UP (ref 0–1.5)
LYMPHOCYTES # BLD AUTO: 1.71 K/UL — SIGNIFICANT CHANGE UP (ref 1–3.3)
LYMPHOCYTES # BLD AUTO: 22.1 % — SIGNIFICANT CHANGE UP (ref 13–44)
MAGNESIUM SERPL-MCNC: 2.4 MG/DL — SIGNIFICANT CHANGE UP (ref 1.6–2.6)
MCHC RBC-ENTMCNC: 30.5 PG — SIGNIFICANT CHANGE UP (ref 27–34)
MCHC RBC-ENTMCNC: 33 GM/DL — SIGNIFICANT CHANGE UP (ref 32–36)
MCV RBC AUTO: 92.5 FL — SIGNIFICANT CHANGE UP (ref 80–100)
MONOCYTES # BLD AUTO: 0.65 K/UL — SIGNIFICANT CHANGE UP (ref 0–0.9)
MONOCYTES NFR BLD AUTO: 8.4 % — SIGNIFICANT CHANGE UP (ref 2–14)
NEUTROPHILS # BLD AUTO: 4.86 K/UL — SIGNIFICANT CHANGE UP (ref 1.8–7.4)
NEUTROPHILS NFR BLD AUTO: 62.7 % — SIGNIFICANT CHANGE UP (ref 43–77)
NRBC # BLD: 0 /100 WBCS — SIGNIFICANT CHANGE UP
NRBC # FLD: 0 K/UL — SIGNIFICANT CHANGE UP
PHOSPHATE SERPL-MCNC: 4.5 MG/DL — SIGNIFICANT CHANGE UP (ref 2.5–4.5)
PLATELET # BLD AUTO: 202 K/UL — SIGNIFICANT CHANGE UP (ref 150–400)
POTASSIUM SERPL-MCNC: 4 MMOL/L — SIGNIFICANT CHANGE UP (ref 3.5–5.3)
POTASSIUM SERPL-SCNC: 4 MMOL/L — SIGNIFICANT CHANGE UP (ref 3.5–5.3)
PROT SERPL-MCNC: 7.7 G/DL — SIGNIFICANT CHANGE UP (ref 6–8.3)
RBC # BLD: 4.42 M/UL — SIGNIFICANT CHANGE UP (ref 4.2–5.8)
RBC # FLD: 13.3 % — SIGNIFICANT CHANGE UP (ref 10.3–14.5)
SODIUM SERPL-SCNC: 133 MMOL/L — LOW (ref 135–145)
WBC # BLD: 7.75 K/UL — SIGNIFICANT CHANGE UP (ref 3.8–10.5)
WBC # FLD AUTO: 7.75 K/UL — SIGNIFICANT CHANGE UP (ref 3.8–10.5)

## 2021-09-21 RX ORDER — HYDROXYZINE HCL 10 MG
50 TABLET ORAL EVERY 6 HOURS
Refills: 0 | Status: DISCONTINUED | OUTPATIENT
Start: 2021-09-21 | End: 2021-09-23

## 2021-09-21 RX ORDER — LIDOCAINE 4 G/100G
1 CREAM TOPICAL ONCE
Refills: 0 | Status: COMPLETED | OUTPATIENT
Start: 2021-09-21 | End: 2021-09-21

## 2021-09-21 RX ORDER — METHADONE HYDROCHLORIDE 40 MG/1
40 TABLET ORAL ONCE
Refills: 0 | Status: DISCONTINUED | OUTPATIENT
Start: 2021-09-21 | End: 2021-09-21

## 2021-09-21 RX ORDER — METHADONE HYDROCHLORIDE 40 MG/1
145 TABLET ORAL ONCE
Refills: 0 | Status: DISCONTINUED | OUTPATIENT
Start: 2021-09-21 | End: 2021-09-21

## 2021-09-21 RX ADMIN — Medication 1 PATCH: at 12:22

## 2021-09-21 RX ADMIN — LIDOCAINE 1 PATCH: 4 CREAM TOPICAL at 09:26

## 2021-09-21 RX ADMIN — LIDOCAINE 1 PATCH: 4 CREAM TOPICAL at 22:51

## 2021-09-21 RX ADMIN — Medication 750 MILLIGRAM(S): at 22:36

## 2021-09-21 RX ADMIN — DEXTROAMPHETAMINE SACCHARATE, AMPHETAMINE ASPARTATE, DEXTROAMPHETAMINE SULFATE AND AMPHETAMINE SULFATE 30 MILLIGRAM(S): 1.875; 1.875; 1.875; 1.875 TABLET ORAL at 18:10

## 2021-09-21 RX ADMIN — Medication 1 PATCH: at 19:27

## 2021-09-21 RX ADMIN — GABAPENTIN 800 MILLIGRAM(S): 400 CAPSULE ORAL at 16:35

## 2021-09-21 RX ADMIN — GABAPENTIN 400 MILLIGRAM(S): 400 CAPSULE ORAL at 22:36

## 2021-09-21 RX ADMIN — Medication 40 MILLIGRAM(S): at 06:37

## 2021-09-21 RX ADMIN — DEXTROAMPHETAMINE SACCHARATE, AMPHETAMINE ASPARTATE, DEXTROAMPHETAMINE SULFATE AND AMPHETAMINE SULFATE 30 MILLIGRAM(S): 1.875; 1.875; 1.875; 1.875 TABLET ORAL at 06:42

## 2021-09-21 RX ADMIN — Medication 1 PATCH: at 11:50

## 2021-09-21 RX ADMIN — Medication 1 PATCH: at 06:59

## 2021-09-21 RX ADMIN — OLANZAPINE 10 MILLIGRAM(S): 15 TABLET, FILM COATED ORAL at 22:37

## 2021-09-21 RX ADMIN — Medication 500 MILLIGRAM(S): at 12:23

## 2021-09-21 RX ADMIN — SENNA PLUS 2 TABLET(S): 8.6 TABLET ORAL at 22:37

## 2021-09-21 RX ADMIN — GABAPENTIN 800 MILLIGRAM(S): 400 CAPSULE ORAL at 12:22

## 2021-09-21 RX ADMIN — Medication 2 MILLIGRAM(S): at 21:11

## 2021-09-21 RX ADMIN — POLYETHYLENE GLYCOL 3350 17 GRAM(S): 17 POWDER, FOR SOLUTION ORAL at 06:43

## 2021-09-21 RX ADMIN — ENOXAPARIN SODIUM 40 MILLIGRAM(S): 100 INJECTION SUBCUTANEOUS at 18:11

## 2021-09-21 RX ADMIN — ENOXAPARIN SODIUM 40 MILLIGRAM(S): 100 INJECTION SUBCUTANEOUS at 06:38

## 2021-09-21 RX ADMIN — METHADONE HYDROCHLORIDE 40 MILLIGRAM(S): 40 TABLET ORAL at 18:10

## 2021-09-21 RX ADMIN — GABAPENTIN 800 MILLIGRAM(S): 400 CAPSULE ORAL at 08:16

## 2021-09-21 RX ADMIN — LIDOCAINE 1 PATCH: 4 CREAM TOPICAL at 19:27

## 2021-09-21 RX ADMIN — Medication 5 MILLIGRAM(S): at 22:37

## 2021-09-21 RX ADMIN — LIDOCAINE 1 PATCH: 4 CREAM TOPICAL at 10:56

## 2021-09-21 RX ADMIN — Medication 2 MILLIGRAM(S): at 06:43

## 2021-09-21 RX ADMIN — LIDOCAINE 1 PATCH: 4 CREAM TOPICAL at 06:59

## 2021-09-21 NOTE — PROVIDER CONTACT NOTE (OTHER) - REASON
Patient refusing 40mg of methadone and demanding dosage of 145mg
Patient refusing IV access
Patient now accepting 40mg of methadone after speaking with PA

## 2021-09-21 NOTE — PROVIDER CONTACT NOTE (OTHER) - SITUATION
Patient refusing 40mg of methadone and demanding dosage of 145mg
Patient now accepting 40mg of methadone after speaking with PA
Patient refusing IV access

## 2021-09-21 NOTE — SBIRT NOTE ADULT - NSSBIRTDRGNOACTINTDET_GEN_A_CORE
Provided SBIRT services: Full screen Negative. No additional needs in this domain.  Pt known to community methadone clinic: [facility] @ [phone].

## 2021-09-21 NOTE — PROVIDER CONTACT NOTE (OTHER) - RECOMMENDATIONS
As per PA [employer], continue with administration of medication and notify provider for any change in status
As per MAUREEN Coley, will come to bedside and speak to patient. Nursing staff continue to educate patient on importance of adhering to medication regimen
As per PA, educate patient on importance of IV access due to need for administration of IV lasix. If nurses cannot place, will escalate in order to obtain IV access. Night RN included in plan

## 2021-09-21 NOTE — CHART NOTE - NSCHARTNOTEFT_GEN_A_CORE
Patient c/o anxiety. Psychiatry was consulted. D/w Dr. Abel regarding Psychiatry's advice for patient to start hydroxyzine. Dr. Abel agreed to this plan. Will continue to monitor the patient for improved response. Also, I personally called pt's methadone clinic at [phone] several times but line remains busy. I also called [phone], and left multiple messages with callback number but have yet to receive confirmation. I also attempted to reach [facility] [phone] but goes to voicemail. Dr. Abel made aware and will coordinate with pharmacy to dose medication x1. Primary team to continue to attempt confirmation.

## 2021-09-21 NOTE — PROVIDER CONTACT NOTE (OTHER) - BACKGROUND
Patient admitted with dyspnea. Hx of IV drug abuse on methadone, seizures, ADD, mood disorder and anxiety

## 2021-09-21 NOTE — PROVIDER CONTACT NOTE (OTHER) - ACTION/TREATMENT ORDERED:
As per PA, educate patient on importance of IV access due to need for administration of IV lasix. If nurses cannot place, will escalate in order to obtain IV access. Night RN included in plan
As per MAUREEN Coley, will come to bedside and speak to patient. Nursing staff continue to educate patient on importance of adhering to medication regimen
As per [facility] [employer], continue with administration of medication and notify provider for any change in status

## 2021-09-21 NOTE — PROVIDER CONTACT NOTE (OTHER) - ASSESSMENT
Patient is A&o x4. Patient stated he is upset because he has been receiving 145mg of methadone and will not accept 40mg. Patient demands to speak to doctor/PA and stated will not take 40mg of methadone, only 145mg.
RN and nursing management attempted to place IV. Patient is hard stick and ripped out previous IV. Patient is A&Ox4
Patient is A&o x4. Patient stated after speaking with PA will now accept 40mg of methadone until clinic can be reached.

## 2021-09-21 NOTE — CHART NOTE - NSCHARTNOTEFT_GEN_A_CORE
Consult request from Dr Abel to confirm Methadone dose. No acute safety issues    Discussed with ACP. Discussed to confirm Methadone dose with patient's Methadone clinic and can restart at confirmed dose. Monitor qtc.       Call if any further questions

## 2021-09-22 LAB
ANION GAP SERPL CALC-SCNC: 12 MMOL/L — SIGNIFICANT CHANGE UP (ref 7–14)
BUN SERPL-MCNC: 15 MG/DL — SIGNIFICANT CHANGE UP (ref 7–23)
CALCIUM SERPL-MCNC: 9 MG/DL — SIGNIFICANT CHANGE UP (ref 8.4–10.5)
CHLORIDE SERPL-SCNC: 101 MMOL/L — SIGNIFICANT CHANGE UP (ref 98–107)
CO2 SERPL-SCNC: 22 MMOL/L — SIGNIFICANT CHANGE UP (ref 22–31)
CREAT SERPL-MCNC: 0.67 MG/DL — SIGNIFICANT CHANGE UP (ref 0.5–1.3)
GLUCOSE SERPL-MCNC: 120 MG/DL — HIGH (ref 70–99)
HCT VFR BLD CALC: 39.9 % — SIGNIFICANT CHANGE UP (ref 39–50)
HGB BLD-MCNC: 13.5 G/DL — SIGNIFICANT CHANGE UP (ref 13–17)
MAGNESIUM SERPL-MCNC: 2.2 MG/DL — SIGNIFICANT CHANGE UP (ref 1.6–2.6)
MCHC RBC-ENTMCNC: 31 PG — SIGNIFICANT CHANGE UP (ref 27–34)
MCHC RBC-ENTMCNC: 33.8 GM/DL — SIGNIFICANT CHANGE UP (ref 32–36)
MCV RBC AUTO: 91.5 FL — SIGNIFICANT CHANGE UP (ref 80–100)
NRBC # BLD: 0 /100 WBCS — SIGNIFICANT CHANGE UP
NRBC # FLD: 0 K/UL — SIGNIFICANT CHANGE UP
PHOSPHATE SERPL-MCNC: 3.8 MG/DL — SIGNIFICANT CHANGE UP (ref 2.5–4.5)
PLATELET # BLD AUTO: 203 K/UL — SIGNIFICANT CHANGE UP (ref 150–400)
POTASSIUM SERPL-MCNC: 4 MMOL/L — SIGNIFICANT CHANGE UP (ref 3.5–5.3)
POTASSIUM SERPL-SCNC: 4 MMOL/L — SIGNIFICANT CHANGE UP (ref 3.5–5.3)
RBC # BLD: 4.36 M/UL — SIGNIFICANT CHANGE UP (ref 4.2–5.8)
RBC # FLD: 13.2 % — SIGNIFICANT CHANGE UP (ref 10.3–14.5)
SODIUM SERPL-SCNC: 135 MMOL/L — SIGNIFICANT CHANGE UP (ref 135–145)
WBC # BLD: 6.75 K/UL — SIGNIFICANT CHANGE UP (ref 3.8–10.5)
WBC # FLD AUTO: 6.75 K/UL — SIGNIFICANT CHANGE UP (ref 3.8–10.5)

## 2021-09-22 RX ORDER — METHADONE HYDROCHLORIDE 40 MG/1
135 TABLET ORAL DAILY
Refills: 0 | Status: DISCONTINUED | OUTPATIENT
Start: 2021-09-22 | End: 2021-09-23

## 2021-09-22 RX ORDER — NICOTINE POLACRILEX 2 MG
1 GUM BUCCAL
Qty: 7 | Refills: 0
Start: 2021-09-22 | End: 2021-09-28

## 2021-09-22 RX ORDER — SENNA PLUS 8.6 MG/1
2 TABLET ORAL
Qty: 0 | Refills: 0 | DISCHARGE
Start: 2021-09-22

## 2021-09-22 RX ORDER — LIDOCAINE 4 G/100G
1 CREAM TOPICAL ONCE
Refills: 0 | Status: COMPLETED | OUTPATIENT
Start: 2021-09-22 | End: 2021-09-22

## 2021-09-22 RX ORDER — POLYETHYLENE GLYCOL 3350 17 G/17G
17 POWDER, FOR SOLUTION ORAL
Qty: 0 | Refills: 0 | DISCHARGE
Start: 2021-09-22

## 2021-09-22 RX ORDER — ALPRAZOLAM 0.25 MG
2 TABLET ORAL THREE TIMES A DAY
Refills: 0 | Status: DISCONTINUED | OUTPATIENT
Start: 2021-09-22 | End: 2021-09-23

## 2021-09-22 RX ADMIN — Medication 1 PATCH: at 07:30

## 2021-09-22 RX ADMIN — GABAPENTIN 800 MILLIGRAM(S): 400 CAPSULE ORAL at 13:27

## 2021-09-22 RX ADMIN — Medication 1 PATCH: at 19:43

## 2021-09-22 RX ADMIN — METHADONE HYDROCHLORIDE 135 MILLIGRAM(S): 40 TABLET ORAL at 17:57

## 2021-09-22 RX ADMIN — Medication 1 PATCH: at 13:27

## 2021-09-22 RX ADMIN — LIDOCAINE 1 PATCH: 4 CREAM TOPICAL at 20:39

## 2021-09-22 RX ADMIN — ENOXAPARIN SODIUM 40 MILLIGRAM(S): 100 INJECTION SUBCUTANEOUS at 06:19

## 2021-09-22 RX ADMIN — OLANZAPINE 10 MILLIGRAM(S): 15 TABLET, FILM COATED ORAL at 22:28

## 2021-09-22 RX ADMIN — LIDOCAINE 1 PATCH: 4 CREAM TOPICAL at 19:43

## 2021-09-22 RX ADMIN — GABAPENTIN 800 MILLIGRAM(S): 400 CAPSULE ORAL at 17:58

## 2021-09-22 RX ADMIN — DEXTROAMPHETAMINE SACCHARATE, AMPHETAMINE ASPARTATE, DEXTROAMPHETAMINE SULFATE AND AMPHETAMINE SULFATE 30 MILLIGRAM(S): 1.875; 1.875; 1.875; 1.875 TABLET ORAL at 06:26

## 2021-09-22 RX ADMIN — Medication 500 MILLIGRAM(S): at 13:27

## 2021-09-22 RX ADMIN — GABAPENTIN 400 MILLIGRAM(S): 400 CAPSULE ORAL at 22:27

## 2021-09-22 RX ADMIN — DEXTROAMPHETAMINE SACCHARATE, AMPHETAMINE ASPARTATE, DEXTROAMPHETAMINE SULFATE AND AMPHETAMINE SULFATE 30 MILLIGRAM(S): 1.875; 1.875; 1.875; 1.875 TABLET ORAL at 17:57

## 2021-09-22 RX ADMIN — LIDOCAINE 1 PATCH: 4 CREAM TOPICAL at 08:27

## 2021-09-22 RX ADMIN — Medication 2 MILLIGRAM(S): at 22:29

## 2021-09-22 RX ADMIN — Medication 1 PATCH: at 13:30

## 2021-09-22 RX ADMIN — GABAPENTIN 800 MILLIGRAM(S): 400 CAPSULE ORAL at 08:27

## 2021-09-22 RX ADMIN — Medication 40 MILLIGRAM(S): at 06:19

## 2021-09-22 RX ADMIN — ENOXAPARIN SODIUM 40 MILLIGRAM(S): 100 INJECTION SUBCUTANEOUS at 17:58

## 2021-09-22 RX ADMIN — Medication 2 MILLIGRAM(S): at 06:19

## 2021-09-22 RX ADMIN — Medication 750 MILLIGRAM(S): at 22:28

## 2021-09-22 RX ADMIN — Medication 3 MILLIGRAM(S): at 22:28

## 2021-09-22 RX ADMIN — Medication 2 MILLIGRAM(S): at 13:30

## 2021-09-22 NOTE — PROGRESS NOTE ADULT - ASSESSMENT
41M with PMHx IVDU on methadone, seizures, ADD, mood disorder, anxiety, HTN presenting with PAVON for 2-3 months and LE swelling 3-6 months.    Problem/Plan - 1:  ·  Problem: Dyspnea on exertion.   ·  Plan: PAVON likely multifactorial and related to chronic deconditioning vs CHF vs pulm HTN vs DANIA/OHS vs COPD/asthma  Has prominent heart on CT which may be related to chronic lung disease contributing, pulm HTN. Will need echo to further evaluate  Pt with mild wheezing on exam and unclear if ever pulm f/u  -start albuterol PRN  - diuresis as per cards   -smoking cessation, nicotine patch ordered  -cardiology consult     Problem/Plan - 2:  ·  Problem: H/O urinary incontinence.   ·  Plan: Urinary incontinence predominantly at night. Advised to f/u  as soon as possible after discharge  Ua on last admission negative for infection but with mild proteinuria.    Problem/Plan - 3:  ·  Problem: Methadone maintenance therapy patient.   ·  Plan: Methadone 135mg qd per patient and gets from [place] [phone]. Attempted to call overnight but was unsuccessful in reaching.  -methadone needs to be confirmed and resumed in AM.    Problem/Plan - 4:·  Problem: Seizure disorder.   ·  Plan: Resume depakote 500mg qd in AM and 750mg in PM.  Problem/Plan - 5:  ·  Problem: Mood disorder.   ·  Plan: Resume olanzapine 10mg qhs.    Problem/Plan - 6:  ·  Problem: Anxiety.   ·  Plan: Last prescribed alprazolam 1 month ago per ISTOP 2mg 2 pills for 2 day supply. He states he sparingly takes these.  Currently he is amenable to alprazolam 1mg BID PRN.    Problem/Plan - 7:  ·  Problem: Tobacco use disorder.   ·  Plan: Pt amenable to nicotine patch 21mg qd.    Problem/Plan - 8:  ·  Problem: HTN (hypertension).   ·  Plan: Pt reports taking clonidine? PRN but will hold off for now as BP normal.    Problem/Plan - 9:  ·  Problem: Need for prophylactic measure.   ·  Plan: Lovenox 40mg BID
41M with PMHx IVDU on methadone, seizures, ADD, mood disorder, anxiety, HTN presenting with PAVON for 2-3 months and LE swelling 3-6 months.    Problem/Plan - 1:  ·  Problem: Dyspnea on exertion.   ·  Plan: PAVON likely multifactorial and related to chronic deconditioning vs CHF vs pulm HTN vs DANIA/OHS vs COPD/asthma  Has prominent heart on CT which may be related to chronic lung disease contributing, pulm HTN. Will need echo to further evaluate  Pt with mild wheezing on exam and unclear if ever pulm f/u  -start albuterol PRN  - diuresis as per cards   -smoking cessation, nicotine patch ordered  -cardiology consult   Problem/Plan - 2:  ·  Problem: H/O urinary incontinence.   ·  Plan: Urinary incontinence predominantly at night. Advised to f/u  as soon as possible after discharge  Ua on last admission negative for infection but with mild proteinuria.    Problem/Plan - 3:  ·  Problem: Methadone maintenance therapy patient.   ·  Plan: Methadone 135mg qd per patient and gets from [place] [phone]. Attempted to call overnight but was unsuccessful in reaching.  -methadone needs to be confirmed and resumed in AM.  Problem/Plan - 4:·  Problem: Seizure disorder.   ·  Plan: Resume depakote 500mg qd in AM and 750mg in PM.  Problem/Plan - 5:  ·  Problem: Mood disorder.   ·  Plan: Resume olanzapine 10mg qhs.    Problem/Plan - 6:  ·  Problem: Anxiety.   ·  Plan: Last prescribed alprazolam 1 month ago per ISTOP 2mg 2 pills for 2 day supply. He states he sparingly takes these.  Currently he is amenable to alprazolam 1mg BID PRN.    Problem/Plan - 7:  ·  Problem: Tobacco use disorder.   ·  Plan: Pt amenable to nicotine patch 21mg qd.    Problem/Plan - 8:  ·  Problem: HTN (hypertension).   ·  Plan: Pt reports taking clonidine? PRN but will hold off for now as BP normal.    Problem/Plan - 9:  ·  Problem: Need for prophylactic measure.   ·  Plan: Lovenox 40mg BID  
41M with PMHx IVDU on methadone, seizures, ADD, mood disorder, anxiety, HTN presenting with PAVON for 2-3 months and LE swelling 3-6 months.    Problem/Plan - 1:  ·  Problem: Dyspnea on exertion.   ·  Plan: PAVON likely multifactorial and related to chronic deconditioning vs CHF vs pulm HTN vs DANIA/OHS vs COPD/asthma  Has prominent heart on CT which may be related to chronic lung disease contributing, pulm HTN. Will need echo to further evaluate  Pt with mild wheezing on exam and unclear if ever pulm f/u  -start albuterol PRN  - diuresis as per cards   -smoking cessation, nicotine patch ordered  -cardiology consult     Problem/Plan - 2:  ·  Problem: H/O urinary incontinence.   ·  Plan: Urinary incontinence predominantly at night. Advised to f/u  as soon as possible after discharge  Ua on last admission negative for infection but with mild proteinuria.    Problem/Plan - 3:  ·  Problem: Methadone maintenance therapy patient.   ·  Plan: Methadone 135mg qd per patient and gets [facility] [phone]. Attempted to call overnight but was unsuccessful in reaching.  -methadone needs to be confirmed and resumed in AM.    Problem/Plan - 4:  ·  Problem: Seizure disorder.   ·  Plan: Resume depakote 500mg qd in AM and 750mg in PM.  Problem/Plan - 5:  ·  Problem: Mood disorder.   ·  Plan: Resume olanzapine 10mg qhs.    Problem/Plan - 6:  ·  Problem: Anxiety.   ·  Plan: Last prescribed alprazolam 1 month ago per ISTOP 2mg 2 pills for 2 day supply. He states he sparingly takes these.  Currently he is amenable to alprazolam 1mg BID PRN.    Problem/Plan - 7:  ·  Problem: Tobacco use disorder.   ·  Plan: Pt amenable to nicotine patch 21mg qd.    Problem/Plan - 8:  ·  Problem: HTN (hypertension).   ·  Plan: Pt reports taking clonidine? PRN but will hold off for now as BP normal.    Problem/Plan - 9:  ·  Problem: Need for prophylactic measure.   ·  Plan: Lovenox 40mg BID  DASH/TLC diet, 1.2L fluid restriction.  
41M with PMHx IVDU on methadone, seizures, ADD, mood disorder, anxiety, HTN presenting with PAVON for 2-3 months and LE swelling 3-6 months.    Problem/Plan - 1:  ·  Problem: Dyspnea on exertion.   ·  Plan: check streess ashley  - i and o   -start albuterol PRN  - diuresis as per cards   -smoking cessation, nicotine patch ordered  -cardiology consult appreciated   Problem/Plan - 2:  ·  Problem: H/O urinary incontinence.   ·  Plan: Urinary incontinence predominantly at night. Advised to f/u  as soon as possible after discharge  Ua on last admission negative for infection but with mild proteinuria.    Problem/Plan - 3:  ·  Problem: Methadone maintenance therapy patient.   ·  Plan: Methadone 135mg qd per patient and gets from [place] [phone]. Attempted to call overnight but was unsuccessful in reaching.  -methadone needs to be confirmed     Problem/Plan - 4:·  Problem: Seizure disorder.   ·  Plan: Resume depakote 500mg qd in AM and 750mg in PM.  Problem/Plan - 5:  ·  Problem: Mood disorder. ·  Plan: Resume olanzapine 10mg qhs.    Problem/Plan - 6:  ·  Problem: Anxiety.   ·  Plan: Last prescribed alprazolam 1 month ago per ISTOP 2mg 2 pills for 2 day supply. He states he sparingly takes these.  Currently he is amenable to alprazolam 1mg BID PRN.    Problem/Plan - 7:  ·  Problem: Tobacco use disorder.   ·  Plan: Pt amenable to nicotine patch 21mg qd.    Problem/Plan - 8:  ·  Problem: HTN (hypertension).   ·  Plan: Pt reports taking clonidine? PRN but will hold off for now as BP normal.    Problem/Plan - 9:  ·  Problem: Need for prophylactic measure.   ·  Plan: Lovenox 40mg BID
41M with PMHx IVDU on methadone, seizures, ADD, mood disorder, anxiety, HTN presenting with PAVON for 2-3 months and LE swelling 3-6 months.    Problem/Plan - 1:  ·  Problem: Dyspnea on exertion.   ·  Plan: PAVON likely multifactorial and related to chronic deconditioning vs CHF vs pulm HTN vs DANIA/OHS vs COPD/asthma  Has prominent heart on CT which may be related to chronic lung disease contributing, pulm HTN. Will need echo to further evaluate  Pt with mild wheezing on exam and unclear if ever pulm f/u  -start albuterol PRN  - diuresis as per cards   -smoking cessation, nicotine patch ordered  -cardiology consult     Problem/Plan - 2:  ·  Problem: H/O urinary incontinence.   ·  Plan: Urinary incontinence predominantly at night. Advised to f/u  as soon as possible after discharge  Ua on last admission negative for infection but with mild proteinuria.    Problem/Plan - 3:  ·  Problem: Methadone maintenance therapy patient.   ·  Plan: Methadone 135mg qd per patient and gets from [place] [phone]. Attempted to call overnight but was unsuccessful in reaching.  -methadone needs to be confirmed and resumed in AM.  Problem/Plan - 4:·  Problem: Seizure disorder.   ·  Plan: Resume depakote 500mg qd in AM and 750mg in PM.  Problem/Plan - 5:  ·  Problem: Mood disorder.   ·  Plan: Resume olanzapine 10mg qhs.    Problem/Plan - 6:  ·  Problem: Anxiety.   ·  Plan: Last prescribed alprazolam 1 month ago per ISTOP 2mg 2 pills for 2 day supply. He states he sparingly takes these.  Currently he is amenable to alprazolam 1mg BID PRN.    Problem/Plan - 7:  ·  Problem: Tobacco use disorder.   ·  Plan: Pt amenable to nicotine patch 21mg qd.    Problem/Plan - 8:  ·  Problem: HTN (hypertension).   ·  Plan: Pt reports taking clonidine? PRN but will hold off for now as BP normal.    Problem/Plan - 9:  ·  Problem: Need for prophylactic measure.   ·  Plan: Lovenox 40mg BID
CARDIOLOGY ATTENDING    Patient seen and examined. Agree with above. F/u echo - if echo unremarkable then no further inpatient cardiac workup expected.

## 2021-09-22 NOTE — CHART NOTE - NSCHARTNOTEFT_GEN_A_CORE
Called [facility] at [phone], spoke with MONIK Delgado, Pt was receiving 135mg Methadone prior to admission to the hospital, last dose on Sept 15th 2021, of note pt was admitted to [facility] on the 16th.

## 2021-09-22 NOTE — CHART NOTE - NSCHARTNOTESELECT_GEN_ALL_CORE
Event Note
Methadone/Event Note
Event Note
ISTOP/Event Note
Methadone dose/Event Note
psychiatry note/Event Note

## 2021-09-23 ENCOUNTER — TRANSCRIPTION ENCOUNTER (OUTPATIENT)
Age: 41
End: 2021-09-23

## 2021-09-23 VITALS
DIASTOLIC BLOOD PRESSURE: 64 MMHG | SYSTOLIC BLOOD PRESSURE: 125 MMHG | TEMPERATURE: 99 F | HEART RATE: 94 BPM | RESPIRATION RATE: 18 BRPM | OXYGEN SATURATION: 95 %

## 2021-09-23 RX ORDER — ALPRAZOLAM 0.25 MG
1 TABLET ORAL
Qty: 42 | Refills: 0
Start: 2021-09-23 | End: 2021-10-07

## 2021-09-23 RX ORDER — GABAPENTIN 400 MG/1
1 CAPSULE ORAL
Qty: 90 | Refills: 0
Start: 2021-09-23 | End: 2021-10-23

## 2021-09-23 RX ORDER — OLANZAPINE 15 MG/1
1 TABLET, FILM COATED ORAL
Qty: 30 | Refills: 0
Start: 2021-09-23 | End: 2021-10-22

## 2021-09-23 RX ORDER — FUROSEMIDE 40 MG
1 TABLET ORAL
Qty: 30 | Refills: 0
Start: 2021-09-23 | End: 2021-10-23

## 2021-09-23 RX ORDER — ALPRAZOLAM 0.25 MG
0 TABLET ORAL
Qty: 0 | Refills: 0 | DISCHARGE

## 2021-09-23 RX ORDER — ALPRAZOLAM 0.25 MG
1 TABLET ORAL
Qty: 42 | Refills: 0
Start: 2021-09-23 | End: 2021-10-06

## 2021-09-23 RX ORDER — HYDROXYZINE HCL 10 MG
1 TABLET ORAL
Qty: 120 | Refills: 0
Start: 2021-09-23 | End: 2021-10-23

## 2021-09-23 RX ORDER — FUROSEMIDE 40 MG
1 TABLET ORAL
Qty: 30 | Refills: 0
Start: 2021-09-23 | End: 2021-10-22

## 2021-09-23 RX ORDER — OLANZAPINE 15 MG/1
1 TABLET, FILM COATED ORAL
Qty: 30 | Refills: 0
Start: 2021-09-23 | End: 2021-10-23

## 2021-09-23 RX ORDER — GABAPENTIN 400 MG/1
1 CAPSULE ORAL
Qty: 90 | Refills: 0
Start: 2021-09-23 | End: 2021-10-22

## 2021-09-23 RX ORDER — HYDROXYZINE HCL 10 MG
1 TABLET ORAL
Qty: 120 | Refills: 0
Start: 2021-09-23 | End: 2021-10-22

## 2021-09-23 RX ADMIN — Medication 40 MILLIGRAM(S): at 06:32

## 2021-09-23 RX ADMIN — Medication 1 PATCH: at 12:07

## 2021-09-23 RX ADMIN — ENOXAPARIN SODIUM 40 MILLIGRAM(S): 100 INJECTION SUBCUTANEOUS at 06:32

## 2021-09-23 RX ADMIN — Medication 2 MILLIGRAM(S): at 06:34

## 2021-09-23 RX ADMIN — GABAPENTIN 800 MILLIGRAM(S): 400 CAPSULE ORAL at 09:01

## 2021-09-23 RX ADMIN — Medication 2 MILLIGRAM(S): at 15:04

## 2021-09-23 RX ADMIN — METHADONE HYDROCHLORIDE 135 MILLIGRAM(S): 40 TABLET ORAL at 12:06

## 2021-09-23 RX ADMIN — GABAPENTIN 800 MILLIGRAM(S): 400 CAPSULE ORAL at 12:06

## 2021-09-23 RX ADMIN — Medication 1 PATCH: at 07:30

## 2021-09-23 RX ADMIN — DEXTROAMPHETAMINE SACCHARATE, AMPHETAMINE ASPARTATE, DEXTROAMPHETAMINE SULFATE AND AMPHETAMINE SULFATE 30 MILLIGRAM(S): 1.875; 1.875; 1.875; 1.875 TABLET ORAL at 06:34

## 2021-09-23 RX ADMIN — Medication 500 MILLIGRAM(S): at 12:07

## 2021-09-23 NOTE — PROGRESS NOTE ADULT - ATTENDING COMMENTS
Patient seen and examined.  Agree with above.   TTE with normal LV function   Check NST    Consuelo Castrejon MD

## 2021-09-23 NOTE — PROGRESS NOTE ADULT - SUBJECTIVE AND OBJECTIVE BOX
pt seen and examined, no complaints, ROS - .     acetaminophen   Tablet .. 650 milliGRAM(s) Oral every 6 hours PRN  ALBUTerol    90 MICROgram(s) HFA Inhaler 2 Puff(s) Inhalation every 6 hours PRN  ALPRAZolam 2 milliGRAM(s) Oral two times a day PRN  amphetamine/dextroamphetamine 30 milliGRAM(s) Oral two times a day  enoxaparin Injectable 40 milliGRAM(s) SubCutaneous every 12 hours  furosemide   Injectable 40 milliGRAM(s) IV Push daily  gabapentin 400 milliGRAM(s) Oral at bedtime  gabapentin 800 milliGRAM(s) Oral <User Schedule>  influenza   Vaccine 0.5 milliLiter(s) IntraMuscular once  melatonin 3 milliGRAM(s) Oral at bedtime PRN  methadone    Tablet 145 milliGRAM(s) Oral daily  nicotine - 21 mG/24Hr(s) Patch 1 patch Transdermal daily  OLANZapine 10 milliGRAM(s) Oral at bedtime  valproic acid 750 milliGRAM(s) Oral at bedtime  valproic acid 500 milliGRAM(s) Oral daily                            13.1   6.77  )-----------( 205      ( 19 Sep 2021 07:42 )             39.8       Hemoglobin: 13.1 g/dL (09-19 @ 07:42)  Hemoglobin: 13.0 g/dL (09-17 @ 14:20)  Hemoglobin: 12.4 g/dL (09-16 @ 22:29)  Hemoglobin: 12.3 g/dL (09-16 @ 10:35)  Hemoglobin: 11.4 g/dL (09-15 @ 14:17)      09-19    138  |  99  |  20  ----------------------------<  163<H>  3.7   |  28  |  0.73    Ca    9.2      19 Sep 2021 07:42  Phos  4.7     09-19  Mg     2.50     09-19    TPro  7.6  /  Alb  4.2  /  TBili  0.3  /  DBili  x   /  AST  43<H>  /  ALT  43<H>  /  AlkPhos  108  09-17    Creatinine Trend: 0.73<--, 0.61<--, 0.74<--, 0.59<--, 0.56<--, 0.70<--    COAGS:     CARDIAC MARKERS ( 16 Sep 2021 22:29 )  <.015 ng/mL / x     / x     / x     / x            T(C): 36.6 (09-19-21 @ 06:54), Max: 37.1 (09-18-21 @ 10:00)  HR: 70 (09-19-21 @ 06:54) (70 - 98)  BP: 107/65 (09-19-21 @ 06:54) (107/65 - 136/65)  RR: 18 (09-19-21 @ 06:54) (16 - 18)  SpO2: 97% (09-19-21 @ 06:54) (95% - 99%)  Wt(kg): --    I&O's Summary    18 Sep 2021 07:01  -  19 Sep 2021 07:00  --------------------------------------------------------  IN: 818 mL / OUT: 0 mL / NET: 818 mL      Gen: Appears well in NAD  HEENT:  (-)icterus (-)pallor  CV: N S1 S2 1/6 VAUGHN (+)2 Pulses B/l  Resp:  Clear to ausculatation B/L, normal effort  GI: (+) BS Soft, NT, ND  Lymph:  (3+ pitting LE Edema, (-)obvious lymphadenopathy  Skin: Warm to touch, Normal turgor  Psych: Appropriate mood and affect    TELEMETRY: 	 SR     ECG:  	NSR    RADIOLOGY:   < from: CT Angio Chest PE Protocol w/ IV Cont (09.17.21 @ 14:40) >  IMPRESSION:    Limited evaluation for segmental and subsegmental pulmonary embolism. No main, left right, or lobar pulmonary embolism.    Prominent right heart is unchanged, echo recommended for complete evaluation    < end of copied text >         ASSESSMENT/PLAN: 	41M with PMHx Heroin abuse, now on methadone, tobacco abuse, hx spinal OM s/p surgery, chronic back pain, seizures, ADD, mood disorder, anxiety, and HTN presenting with PAVON for 2-3 months and LE swelling 3-6 months. Last TTE 2019 with structurally normal heart.     - Keep net neg with IV lasix   - daily weights , strict I/o   - cont tele  - ECHO pending            
Date of service 09/23/2021    chief complaint: Le edema    extended hpi: 41M with PMHx Heroin abuse, now on methadone, tobacco abuse, hx spinal OM s/p surgery, chronic back pain, seizures, ADD, mood disorder, anxiety, and HTN presenting with PAVON for 2-3 months and LE swelling 3-6 months. Last TTE 2019 with structurally normal heart.    S: no chest pain or sob; still with LE edema; ros otherwise negative.     Review of Systems:   Constitutional: [ ] fevers, [ ] chills.   Skin: [ ] dry skin. [ ] rashes.  Psychiatric: [ ] depression, [ ] anxiety.   Gastrointestinal: [ ] BRBPR, [ ] melena.   Neurological: [ ] confusion. [ ] seizures. [ ] shuffling gait.   Ears,Nose,Mouth and Throat: [ ] ear pain [ ] sore throat.   Eyes: [ ] diplopia.   Respiratory: [ ] hemoptysis. [ ] shortness of breath  Cardiovascular: See HPI above  Hematologic/Lymphatic: [ ] anemia. [ ] painful nodes. [ ] prolonged bleeding.   Genitourinary: [ ] hematuria. [ ] flank pain.   Endocrine: [ ] significant change in weight. [ ] intolerance to heat and cold.     Review of systems [x ] otherwise negative, [ ] otherwise unable to obtain    FH: no family history of sudden cardiac death in first degree relatives    SH: [ ] tobacco, [ ] alcohol, [ ] drugs    acetaminophen   Tablet .. 650 milliGRAM(s) Oral every 6 hours PRN  ALBUTerol    90 MICROgram(s) HFA Inhaler 2 Puff(s) Inhalation every 6 hours PRN  ALPRAZolam 2 milliGRAM(s) Oral three times a day PRN  amphetamine/dextroamphetamine 30 milliGRAM(s) Oral two times a day  bisacodyl 5 milliGRAM(s) Oral at bedtime  enoxaparin Injectable 40 milliGRAM(s) SubCutaneous every 12 hours  furosemide   Injectable 40 milliGRAM(s) IV Push daily  gabapentin 800 milliGRAM(s) Oral <User Schedule>  gabapentin 400 milliGRAM(s) Oral at bedtime  hydrOXYzine hydrochloride 50 milliGRAM(s) Oral every 6 hours PRN  influenza   Vaccine 0.5 milliLiter(s) IntraMuscular once  melatonin 3 milliGRAM(s) Oral at bedtime PRN  methadone    Tablet 135 milliGRAM(s) Oral daily  nicotine - 21 mG/24Hr(s) Patch 1 patch Transdermal daily  OLANZapine 10 milliGRAM(s) Oral at bedtime  polyethylene glycol 3350 17 Gram(s) Oral two times a day  senna 2 Tablet(s) Oral at bedtime  valproic acid 750 milliGRAM(s) Oral at bedtime  valproic acid 500 milliGRAM(s) Oral daily                            13.5   6.75  )-----------( 203      ( 22 Sep 2021 06:37 )             39.9     135  |  101  |  15  ----------------------------<  120<H>  4.0   |  22  |  0.67    Ca    9.0      22 Sep 2021 06:37  Phos  3.8     09-22  Mg     2.20     09-22    T(C): 36.7 (09-23-21 @ 06:25), Max: 36.9 (09-22-21 @ 13:15)  HR: 84 (09-23-21 @ 06:25) (84 - 97)  BP: 121/71 (09-23-21 @ 06:25) (110/78 - 123/68)  RR: 19 (09-23-21 @ 06:25) (19 - 20)  SpO2: 100% (09-23-21 @ 06:25) (95% - 100%)    General: Well nourished in no acute distress. Alert and Oriented * 3.   Head: Normocephalic and atraumatic.   Neck: No JVD. No bruits. Supple. Does not appear to be enlarged.   Cardiovascular: + S1,S2 ; RRR Soft systolic murmur at the left lower sternal border. No rubs noted.    Lungs: CTA b/l. No rhonchi, rales or wheezes.   Abdomen: + BS, soft. Non tender. Non distended. No rebound. No guarding.   Extremities: No clubbing/cyanosis/+2+ LE pitting edema.   Neurologic: Moves all four extremities. Full range of motion.   Skin: Warm and moist. The patient's skin has normal elasticity and good skin turgor.   Psychiatric: Appropriate mood and affect.  Musculoskeletal: Normal range of motion, normal strength    DATA    tele- SR    < from: CT Angio Chest PE Protocol w/ IV Cont (09.17.21 @ 14:40) >  IMPRESSION:    Limited evaluation for segmental and subsegmental pulmonary embolism. No main, left right, or lobar pulmonary embolism.    Prominent right heart is unchanged, echo recommended for complete evaluation    < end of copied text >       < from: TTE with Doppler (w/Cont) (09.20.21 @ 19:13) >  Technically difficult study.  1. Normal mitral valve. Minimal mitral regurgitation.  2. Normal left ventricular internal dimensions and wall  thicknesses.  3. Endocardium not well visualized; grossly normal left  ventricular systolic function.  Endocardial visualization  enhanced with intravenous injection of echo contrast  (Definity).      ASSESSMENT/PLAN: 	41M with PMHx Heroin abuse, now on methadone, tobacco abuse, hx spinal OM s/p surgery, chronic back pain, seizures, ADD, mood disorder, anxiety, and HTN presenting with PAVON for 2-3 months and LE swelling 3-6 months. Last TTE 2019 with structurally normal heart.    --Continue IV diuresis with Lasix 40 QD to keep O > I   --Pt. remains volume overloaded but volume status improving  --TTE with normal LV function   --Check NST to evaluate PAVON                  
Patient is a 41y old  Male who presents with a chief complaint of PAVON 2-3 months and LE swelling 3-6 months (18 Sep 2021 15:33)    Date of servie : 09-18-21 @ 16:41  INTERVAL HPI/OVERNIGHT EVENTS:  T(C): 37.1 (09-18-21 @ 10:00), Max: 37.1 (09-18-21 @ 10:00)  HR: 70 (09-18-21 @ 10:00) (70 - 83)  BP: 127/54 (09-18-21 @ 10:00) (106/65 - 142/80)  RR: 16 (09-18-21 @ 10:00) (16 - 20)  SpO2: 98% (09-18-21 @ 10:00) (95% - 100%)  Wt(kg): --  I&O's Summary      LABS:                        13.0   9.85  )-----------( 221      ( 17 Sep 2021 14:20 )             38.3     09-17    138  |  102  |  16  ----------------------------<  111<H>  4.2   |  26  |  0.61    Ca    9.1      17 Sep 2021 14:20    TPro  7.6  /  Alb  4.2  /  TBili  0.3  /  DBili  x   /  AST  43<H>  /  ALT  43<H>  /  AlkPhos  108  09-17    PT/INR - ( 17 Sep 2021 16:38 )   PT: 12.1 sec;   INR: 1.06 ratio         PTT - ( 17 Sep 2021 16:38 )  PTT:31.1 sec    CAPILLARY BLOOD GLUCOSE                MEDICATIONS  (STANDING):  amphetamine/dextroamphetamine 30 milliGRAM(s) Oral two times a day  enoxaparin Injectable 40 milliGRAM(s) SubCutaneous every 12 hours  furosemide   Injectable 20 milliGRAM(s) IV Push once  gabapentin 400 milliGRAM(s) Oral at bedtime  gabapentin 800 milliGRAM(s) Oral <User Schedule>  influenza   Vaccine 0.5 milliLiter(s) IntraMuscular once  methadone    Tablet 145 milliGRAM(s) Oral daily  nicotine - 21 mG/24Hr(s) Patch 1 patch Transdermal daily  OLANZapine 10 milliGRAM(s) Oral at bedtime  valproic acid 750 milliGRAM(s) Oral at bedtime  valproic acid 500 milliGRAM(s) Oral daily    MEDICATIONS  (PRN):  acetaminophen   Tablet .. 650 milliGRAM(s) Oral every 6 hours PRN Temp greater or equal to 38.5C (101.3F), Mild Pain (1 - 3)  ALBUTerol    90 MICROgram(s) HFA Inhaler 2 Puff(s) Inhalation every 6 hours PRN Wheezing  ALPRAZolam 2 milliGRAM(s) Oral two times a day PRN anxiety  melatonin 3 milliGRAM(s) Oral at bedtime PRN Insomnia          PHYSICAL EXAM:  GENERAL: NAD, well-groomed, well-developed  HEAD:  Atraumatic, Normocephalic  CHEST/LUNG: Clear to percussion bilaterally; No rales, rhonchi, wheezing, or rubs  HEART: Regular rate and rhythm; No murmurs, rubs, or gallops  ABDOMEN: Soft, Nontender, Nondistended; Bowel sounds present  EXTREMITIES:  2+ Peripheral Pulses, No clubbing, cyanosis, or edema  LYMPH: No lymphadenopathy noted  SKIN: No rashes or lesions    Care Discussed with Consultants/Other Providers [ ] YES  [ ] NO
Date of service 09/21/2021    chief complaint: Le edema    extended hpi: 41M with PMHx Heroin abuse, now on methadone, tobacco abuse, hx spinal OM s/p surgery, chronic back pain, seizures, ADD, mood disorder, anxiety, and HTN presenting with PAVON for 2-3 months and LE swelling 3-6 months. Last TTE 2019 with structurally normal heart.    S: no chest pain or sob; still with LE edema; ros otherwise negative.     Review of Systems:   Constitutional: [ ] fevers, [ ] chills.   Skin: [ ] dry skin. [ ] rashes.  Psychiatric: [ ] depression, [ ] anxiety.   Gastrointestinal: [ ] BRBPR, [ ] melena.   Neurological: [ ] confusion. [ ] seizures. [ ] shuffling gait.   Ears,Nose,Mouth and Throat: [ ] ear pain [ ] sore throat.   Eyes: [ ] diplopia.   Respiratory: [ ] hemoptysis. [ ] shortness of breath  Cardiovascular: See HPI above  Hematologic/Lymphatic: [ ] anemia. [ ] painful nodes. [ ] prolonged bleeding.   Genitourinary: [ ] hematuria. [ ] flank pain.   Endocrine: [ ] significant change in weight. [ ] intolerance to heat and cold.     Review of systems [x ] otherwise negative, [ ] otherwise unable to obtain    FH: no family history of sudden cardiac death in first degree relatives    SH: [ ] tobacco, [ ] alcohol, [ ] drugs    acetaminophen   Tablet .. 650 milliGRAM(s) Oral every 6 hours PRN  ALBUTerol    90 MICROgram(s) HFA Inhaler 2 Puff(s) Inhalation every 6 hours PRN  ALPRAZolam 2 milliGRAM(s) Oral three times a day PRN  amphetamine/dextroamphetamine 30 milliGRAM(s) Oral two times a day  bisacodyl 5 milliGRAM(s) Oral at bedtime  enoxaparin Injectable 40 milliGRAM(s) SubCutaneous every 12 hours  furosemide   Injectable 40 milliGRAM(s) IV Push daily  gabapentin 400 milliGRAM(s) Oral at bedtime  gabapentin 800 milliGRAM(s) Oral <User Schedule>  influenza   Vaccine 0.5 milliLiter(s) IntraMuscular once  melatonin 3 milliGRAM(s) Oral at bedtime PRN  nicotine - 21 mG/24Hr(s) Patch 1 patch Transdermal daily  OLANZapine 10 milliGRAM(s) Oral at bedtime  polyethylene glycol 3350 17 Gram(s) Oral two times a day  senna 2 Tablet(s) Oral at bedtime  valproic acid 750 milliGRAM(s) Oral at bedtime  valproic acid 500 milliGRAM(s) Oral daily                            13.5   7.75  )-----------( 202      ( 21 Sep 2021 06:33 )             40.9       09-21    133<L>  |  96<L>  |  18  ----------------------------<  121<H>  4.0   |  26  |  0.72    Ca    9.4      21 Sep 2021 06:35  Phos  4.5     09-21  Mg     2.40     09-21    TPro  7.7  /  Alb  4.0  /  TBili  0.3  /  DBili  x   /  AST  39  /  ALT  41  /  AlkPhos  110  09-21            T(C): 36.6 (09-21-21 @ 09:40), Max: 36.9 (09-20-21 @ 21:05)  HR: 93 (09-21-21 @ 09:40) (79 - 98)  BP: 121/71 (09-21-21 @ 09:40) (105/65 - 132/79)  RR: 20 (09-21-21 @ 09:40) (17 - 20)  SpO2: 99% (09-21-21 @ 09:40) (96% - 99%)  Wt(kg): --    I&O's Summary    20 Sep 2021 07:01  -  21 Sep 2021 07:00  --------------------------------------------------------  IN: 1540 mL / OUT: 1100 mL / NET: 440 mL        General: Well nourished in no acute distress. Alert and Oriented * 3.   Head: Normocephalic and atraumatic.   Neck: No JVD. No bruits. Supple. Does not appear to be enlarged.   Cardiovascular: + S1,S2 ; RRR Soft systolic murmur at the left lower sternal border. No rubs noted.    Lungs: CTA b/l. No rhonchi, rales or wheezes.   Abdomen: + BS, soft. Non tender. Non distended. No rebound. No guarding.   Extremities: No clubbing/cyanosis/+2+ LE pitting edema.   Neurologic: Moves all four extremities. Full range of motion.   Skin: Warm and moist. The patient's skin has normal elasticity and good skin turgor.   Psychiatric: Appropriate mood and affect.  Musculoskeletal: Normal range of motion, normal strength    DATA    tele- SR    < from: CT Angio Chest PE Protocol w/ IV Cont (09.17.21 @ 14:40) >  IMPRESSION:    Limited evaluation for segmental and subsegmental pulmonary embolism. No main, left right, or lobar pulmonary embolism.    Prominent right heart is unchanged, echo recommended for complete evaluation    < end of copied text >       TTE: < from: TTE with Doppler (w/Cont) (09.20.21 @ 19:13) >  Technically difficult study.  1. Normal mitral valve. Minimal mitral regurgitation.  2. Normal left ventricular internal dimensions and wall  thicknesses.  3. Endocardium not well visualized; grossly normal left  ventricular systolic function.  Endocardial visualization  enhanced with intravenous injection of echo contrast  (Definity).        ASSESSMENT/PLAN: 	41M with PMHx Heroin abuse, now on methadone, tobacco abuse, hx spinal OM s/p surgery, chronic back pain, seizures, ADD, mood disorder, anxiety, and HTN presenting with PAVON for 2-3 months and LE swelling 3-6 months. Last TTE 2019 with structurally normal heart.    --Continue  IV diuresis with Lasix 40 QD to keep O > I   --Pt. remains volume overloaded but volume status improving  --TTE with normal LV function   --Check NST to evaluate PAVON    Consuelo Castrejon MD                     
Patient is a 41y old  Male who presents with a chief complaint of PAVON 2-3 months and LE swelling 3-6 months (20 Sep 2021 12:54)    Date of servie : 21 @ 16:23  INTERVAL HPI/OVERNIGHT EVENTS:  T(C): 36.7 (21 @ 12:00), Max: 36.9 (21 @ 04:30)  HR: 92 (21 @ 12:00) (81 - 94)  BP: 127/77 (21 @ 12:00) (102/62 - 129/78)  RR: 17 (21 @ 12:00) (16 - 18)  SpO2: 97% (21 @ 12:00) (97% - 98%)  Wt(kg): --  I&O's Summary    19 Sep 2021 07:01  -  20 Sep 2021 07:00  --------------------------------------------------------  IN: 200 mL / OUT: 0 mL / NET: 200 mL        LABS:                        13.1   7.39  )-----------( 198      ( 20 Sep 2021 06:49 )             40.0     09-20    134<L>  |  104  |  18  ----------------------------<  108<H>  4.4   |  25  |  0.69    Ca    9.1      20 Sep 2021 06:49  Phos  4.7       Mg     2.30     20        Urinalysis Basic - ( 20 Sep 2021 11:44 )    Color: Yellow / Appearance: Clear / S.017 / pH: x  Gluc: x / Ketone: Negative  / Bili: Negative / Urobili: <2 mg/dL   Blood: x / Protein: Negative / Nitrite: Negative   Leuk Esterase: Negative / RBC: x / WBC x   Sq Epi: x / Non Sq Epi: x / Bacteria: x      CAPILLARY BLOOD GLUCOSE            Urinalysis Basic - ( 20 Sep 2021 11:44 )    Color: Yellow / Appearance: Clear / S.017 / pH: x  Gluc: x / Ketone: Negative  / Bili: Negative / Urobili: <2 mg/dL   Blood: x / Protein: Negative / Nitrite: Negative   Leuk Esterase: Negative / RBC: x / WBC x   Sq Epi: x / Non Sq Epi: x / Bacteria: x        MEDICATIONS  (STANDING):  amphetamine/dextroamphetamine 30 milliGRAM(s) Oral two times a day  bisacodyl 5 milliGRAM(s) Oral at bedtime  enoxaparin Injectable 40 milliGRAM(s) SubCutaneous every 12 hours  furosemide   Injectable 40 milliGRAM(s) IV Push daily  gabapentin 400 milliGRAM(s) Oral at bedtime  gabapentin 800 milliGRAM(s) Oral <User Schedule>  influenza   Vaccine 0.5 milliLiter(s) IntraMuscular once  nicotine - 21 mG/24Hr(s) Patch 1 patch Transdermal daily  OLANZapine 10 milliGRAM(s) Oral at bedtime  polyethylene glycol 3350 17 Gram(s) Oral two times a day  senna 2 Tablet(s) Oral at bedtime  valproic acid 750 milliGRAM(s) Oral at bedtime  valproic acid 500 milliGRAM(s) Oral daily    MEDICATIONS  (PRN):  acetaminophen   Tablet .. 650 milliGRAM(s) Oral every 6 hours PRN Temp greater or equal to 38.5C (101.3F), Mild Pain (1 - 3)  ALBUTerol    90 MICROgram(s) HFA Inhaler 2 Puff(s) Inhalation every 6 hours PRN Wheezing  ALPRAZolam 2 milliGRAM(s) Oral three times a day PRN anxiety  melatonin 3 milliGRAM(s) Oral at bedtime PRN Insomnia          PHYSICAL EXAM:  GENERAL: NAD, well-groomed, well-developed  HEAD:  Atraumatic, Normocephalic  CHEST/LUNG: Clear to percussion bilaterally; No rales, rhonchi, wheezing, or rubs  HEART: Regular rate and rhythm; No murmurs, rubs, or gallops  ABDOMEN: Soft, Nontender, Nondistended; Bowel sounds present  EXTREMITIES: edema +    Care Discussed with Consultants/Other Providers [ ] YES  [ ] NO
Date of service 09/20/2021    chief complaint: Le edema    extended hpi: 41M with PMHx Heroin abuse, now on methadone, tobacco abuse, hx spinal OM s/p surgery, chronic back pain, seizures, ADD, mood disorder, anxiety, and HTN presenting with PAVON for 2-3 months and LE swelling 3-6 months. Last TTE 2019 with structurally normal heart.    Denies chest pain or SOB or palps, c/o urinary incontinence.  ROS otherwise -    Review of Systems:   Constitutional: [ ] fevers, [ ] chills.   Skin: [ ] dry skin. [ ] rashes.  Psychiatric: [ ] depression, [ ] anxiety.   Gastrointestinal: [ ] BRBPR, [ ] melena.   Neurological: [ ] confusion. [ ] seizures. [ ] shuffling gait.   Ears,Nose,Mouth and Throat: [ ] ear pain [ ] sore throat.   Eyes: [ ] diplopia.   Respiratory: [ ] hemoptysis. [ ] shortness of breath  Cardiovascular: See HPI above  Hematologic/Lymphatic: [ ] anemia. [ ] painful nodes. [ ] prolonged bleeding.   Genitourinary: [ ] hematuria. [ ] flank pain.   Endocrine: [ ] significant change in weight. [ ] intolerance to heat and cold.     Review of systems [x ] otherwise negative, [ ] otherwise unable to obtain    FH: no family history of sudden cardiac death in first degree relatives    SH: [ ] tobacco, [ ] alcohol, [ ] drugs    acetaminophen   Tablet .. 650 milliGRAM(s) Oral every 6 hours PRN  ALBUTerol    90 MICROgram(s) HFA Inhaler 2 Puff(s) Inhalation every 6 hours PRN  ALPRAZolam 2 milliGRAM(s) Oral three times a day PRN  amphetamine/dextroamphetamine 30 milliGRAM(s) Oral two times a day  enoxaparin Injectable 40 milliGRAM(s) SubCutaneous every 12 hours  furosemide   Injectable 40 milliGRAM(s) IV Push daily  gabapentin 400 milliGRAM(s) Oral at bedtime  gabapentin 800 milliGRAM(s) Oral <User Schedule>  influenza   Vaccine 0.5 milliLiter(s) IntraMuscular once  melatonin 3 milliGRAM(s) Oral at bedtime PRN  nicotine - 21 mG/24Hr(s) Patch 1 patch Transdermal daily  OLANZapine 10 milliGRAM(s) Oral at bedtime  valproic acid 750 milliGRAM(s) Oral at bedtime  valproic acid 500 milliGRAM(s) Oral daily                        13.1   7.39  )-----------( 198      ( 20 Sep 2021 06:49 )             40.0       134<L>  |  104  |  18  ----------------------------<  108<H>  4.4   |  25  |  0.69    Ca    9.1      20 Sep 2021 06:49  Phos  4.7     09-20  Mg     2.30     09-20      T(C): 36.7 (09-20-21 @ 12:00), Max: 36.9 (09-20-21 @ 04:30)  HR: 92 (09-20-21 @ 12:00) (81 - 94)  BP: 127/77 (09-20-21 @ 12:00) (102/62 - 129/78)  RR: 17 (09-20-21 @ 12:00) (16 - 19)  SpO2: 97% (09-20-21 @ 12:00) (97% - 98%)    General: Well nourished in no acute distress. Alert and Oriented * 3.   Head: Normocephalic and atraumatic.   Neck: No JVD. No bruits. Supple. Does not appear to be enlarged.   Cardiovascular: + S1,S2 ; RRR Soft systolic murmur at the left lower sternal border. No rubs noted.    Lungs: CTA b/l. No rhonchi, rales or wheezes.   Abdomen: + BS, soft. Non tender. Non distended. No rebound. No guarding.   Extremities: No clubbing/cyanosis/+2+ LE pitting edema.   Neurologic: Moves all four extremities. Full range of motion.   Skin: Warm and moist. The patient's skin has normal elasticity and good skin turgor.   Psychiatric: Appropriate mood and affect.  Musculoskeletal: Normal range of motion, normal strength    DATA    tele- SR    < from: CT Angio Chest PE Protocol w/ IV Cont (09.17.21 @ 14:40) >  IMPRESSION:    Limited evaluation for segmental and subsegmental pulmonary embolism. No main, left right, or lobar pulmonary embolism.    Prominent right heart is unchanged, echo recommended for complete evaluation    < end of copied text >         ASSESSMENT/PLAN: 	41M with PMHx Heroin abuse, now on methadone, tobacco abuse, hx spinal OM s/p surgery, chronic back pain, seizures, ADD, mood disorder, anxiety, and HTN presenting with PAVON for 2-3 months and LE swelling 3-6 months. Last TTE 2019 with structurally normal heart.    --Continue  IV diuresis with Lasix 40 QD  --await TTE  --CTPA noted  --consider urology eval?  --further reccs pending above                    
Date of service 09/22/2021    chief complaint: Le edema    extended hpi: 41M with PMHx Heroin abuse, now on methadone, tobacco abuse, hx spinal OM s/p surgery, chronic back pain, seizures, ADD, mood disorder, anxiety, and HTN presenting with PAVON for 2-3 months and LE swelling 3-6 months. Last TTE 2019 with structurally normal heart.    S: no chest pain or sob; still with LE edema; ros otherwise negative.     Review of Systems:   Constitutional: [ ] fevers, [ ] chills.   Skin: [ ] dry skin. [ ] rashes.  Psychiatric: [ ] depression, [ ] anxiety.   Gastrointestinal: [ ] BRBPR, [ ] melena.   Neurological: [ ] confusion. [ ] seizures. [ ] shuffling gait.   Ears,Nose,Mouth and Throat: [ ] ear pain [ ] sore throat.   Eyes: [ ] diplopia.   Respiratory: [ ] hemoptysis. [ ] shortness of breath  Cardiovascular: See HPI above  Hematologic/Lymphatic: [ ] anemia. [ ] painful nodes. [ ] prolonged bleeding.   Genitourinary: [ ] hematuria. [ ] flank pain.   Endocrine: [ ] significant change in weight. [ ] intolerance to heat and cold.     Review of systems [x ] otherwise negative, [ ] otherwise unable to obtain    FH: no family history of sudden cardiac death in first degree relatives    SH: [ ] tobacco, [ ] alcohol, [ ] drugs    acetaminophen   Tablet .. 650 milliGRAM(s) Oral every 6 hours PRN  ALBUTerol    90 MICROgram(s) HFA Inhaler 2 Puff(s) Inhalation every 6 hours PRN  ALPRAZolam 2 milliGRAM(s) Oral three times a day PRN  amphetamine/dextroamphetamine 30 milliGRAM(s) Oral two times a day  bisacodyl 5 milliGRAM(s) Oral at bedtime  enoxaparin Injectable 40 milliGRAM(s) SubCutaneous every 12 hours  furosemide   Injectable 40 milliGRAM(s) IV Push daily  gabapentin 400 milliGRAM(s) Oral at bedtime  gabapentin 800 milliGRAM(s) Oral <User Schedule>  hydrOXYzine hydrochloride 50 milliGRAM(s) Oral every 6 hours PRN  influenza   Vaccine 0.5 milliLiter(s) IntraMuscular once  melatonin 3 milliGRAM(s) Oral at bedtime PRN  methadone    Tablet 135 milliGRAM(s) Oral daily  nicotine - 21 mG/24Hr(s) Patch 1 patch Transdermal daily  OLANZapine 10 milliGRAM(s) Oral at bedtime  polyethylene glycol 3350 17 Gram(s) Oral two times a day  senna 2 Tablet(s) Oral at bedtime  valproic acid 750 milliGRAM(s) Oral at bedtime  valproic acid 500 milliGRAM(s) Oral daily                            13.5   6.75  )-----------( 203      ( 22 Sep 2021 06:37 )             39.9       09-22    135  |  101  |  15  ----------------------------<  120<H>  4.0   |  22  |  0.67    Ca    9.0      22 Sep 2021 06:37  Phos  3.8     09-22  Mg     2.20     09-22    TPro  7.7  /  Alb  4.0  /  TBili  0.3  /  DBili  x   /  AST  39  /  ALT  41  /  AlkPhos  110  09-21      T(C): 36.9 (09-22-21 @ 06:30), Max: 36.9 (09-21-21 @ 21:00)  HR: 89 (09-22-21 @ 06:30) (89 - 96)  BP: 109/62 (09-22-21 @ 06:30) (106/72 - 118/72)  RR: 18 (09-22-21 @ 06:30) (18 - 20)  SpO2: 99% (09-22-21 @ 06:30) (98% - 99%)  Wt(kg): --    I&O's Summary    21 Sep 2021 07:01  -  22 Sep 2021 07:00  --------------------------------------------------------  IN: 1150 mL / OUT: 1850 mL / NET: -700 mL    General: Well nourished in no acute distress. Alert and Oriented * 3.   Head: Normocephalic and atraumatic.   Neck: No JVD. No bruits. Supple. Does not appear to be enlarged.   Cardiovascular: + S1,S2 ; RRR Soft systolic murmur at the left lower sternal border. No rubs noted.    Lungs: CTA b/l. No rhonchi, rales or wheezes.   Abdomen: + BS, soft. Non tender. Non distended. No rebound. No guarding.   Extremities: No clubbing/cyanosis/+2+ LE pitting edema.   Neurologic: Moves all four extremities. Full range of motion.   Skin: Warm and moist. The patient's skin has normal elasticity and good skin turgor.   Psychiatric: Appropriate mood and affect.  Musculoskeletal: Normal range of motion, normal strength    DATA    tele- SR    < from: CT Angio Chest PE Protocol w/ IV Cont (09.17.21 @ 14:40) >  IMPRESSION:    Limited evaluation for segmental and subsegmental pulmonary embolism. No main, left right, or lobar pulmonary embolism.    Prominent right heart is unchanged, echo recommended for complete evaluation    < end of copied text >       TTE: < from: TTE with Doppler (w/Cont) (09.20.21 @ 19:13) >  Technically difficult study.  1. Normal mitral valve. Minimal mitral regurgitation.  2. Normal left ventricular internal dimensions and wall  thicknesses.  3. Endocardium not well visualized; grossly normal left  ventricular systolic function.  Endocardial visualization  enhanced with intravenous injection of echo contrast  (Definity).        ASSESSMENT/PLAN: 	41M with PMHx Heroin abuse, now on methadone, tobacco abuse, hx spinal OM s/p surgery, chronic back pain, seizures, ADD, mood disorder, anxiety, and HTN presenting with PAVON for 2-3 months and LE swelling 3-6 months. Last TTE 2019 with structurally normal heart.    --Continue  IV diuresis with Lasix 40 QD to keep O > I   --Pt. remains volume overloaded but volume status improving  --TTE with normal LV function   --Check NST to evaluate PAVON    Abdiel Mauricio M.D.  Cardiac Electrophysiology  [phone]                  
Patient is a 41y old  Male who presents with a chief complaint of PAVON 2-3 months and LE swelling 3-6 months (19 Sep 2021 08:18)    Date of servie : 09-19-21 @ 20:58  INTERVAL HPI/OVERNIGHT EVENTS:  T(C): 36.8 (09-19-21 @ 16:41), Max: 37 (09-18-21 @ 23:34)  HR: 92 (09-19-21 @ 16:41) (70 - 98)  BP: 129/77 (09-19-21 @ 16:41) (107/65 - 131/81)  RR: 18 (09-19-21 @ 16:41) (18 - 19)  SpO2: 97% (09-19-21 @ 16:41) (96% - 98%)  Wt(kg): --  I&O's Summary    18 Sep 2021 07:01  -  19 Sep 2021 07:00  --------------------------------------------------------  IN: 818 mL / OUT: 0 mL / NET: 818 mL        LABS:                        13.1   6.77  )-----------( 205      ( 19 Sep 2021 07:42 )             39.8     09-19    138  |  99  |  20  ----------------------------<  163<H>  3.7   |  28  |  0.73    Ca    9.2      19 Sep 2021 07:42  Phos  4.7     09-19  Mg     2.50     09-19          CAPILLARY BLOOD GLUCOSE                MEDICATIONS  (STANDING):  amphetamine/dextroamphetamine 30 milliGRAM(s) Oral two times a day  enoxaparin Injectable 40 milliGRAM(s) SubCutaneous every 12 hours  furosemide   Injectable 40 milliGRAM(s) IV Push daily  gabapentin 400 milliGRAM(s) Oral at bedtime  gabapentin 800 milliGRAM(s) Oral <User Schedule>  influenza   Vaccine 0.5 milliLiter(s) IntraMuscular once  nicotine - 21 mG/24Hr(s) Patch 1 patch Transdermal daily  OLANZapine 10 milliGRAM(s) Oral at bedtime  valproic acid 750 milliGRAM(s) Oral at bedtime  valproic acid 500 milliGRAM(s) Oral daily    MEDICATIONS  (PRN):  acetaminophen   Tablet .. 650 milliGRAM(s) Oral every 6 hours PRN Temp greater or equal to 38.5C (101.3F), Mild Pain (1 - 3)  ALBUTerol    90 MICROgram(s) HFA Inhaler 2 Puff(s) Inhalation every 6 hours PRN Wheezing  ALPRAZolam 2 milliGRAM(s) Oral three times a day PRN anxiety  melatonin 3 milliGRAM(s) Oral at bedtime PRN Insomnia          PHYSICAL EXAM:  GENERAL: NAD, well-groomed, well-developed  HEAD:  Atraumatic, Normocephalic  CHEST/LUNG: Clear to percussion bilaterally; No rales, rhonchi, wheezing, or rubs  HEART: Regular rate and rhythm; No murmurs, rubs, or gallops  ABDOMEN: Soft, Nontender, Nondistended; Bowel sounds present  EXTREMITIES:  2+ Peripheral Pulses, No clubbing, cyanosis, or edema  LYMPH: No lymphadenopathy noted  SKIN: No rashes or lesions    Care Discussed with Consultants/Other Providers [ ] YES  [ ] NO
Patient is a 41y old  Male who presents with a chief complaint of PAVON 2-3 months and LE swelling 3-6 months (21 Sep 2021 14:32)    Date of servie : 21 @ 17:22  INTERVAL HPI/OVERNIGHT EVENTS:  T(C): 36.6 (21 @ 13:40), Max: 36.9 (21 @ 21:05)  HR: 90 (21 @ 13:40) (79 - 98)  BP: 106/74 (21 @ 13:40) (105/65 - 126/75)  RR: 20 (21 @ 13:40) (17 - 20)  SpO2: 98% (21 @ 13:40) (96% - 99%)  Wt(kg): --  I&O's Summary    20 Sep 2021 07:01  -  21 Sep 2021 07:00  --------------------------------------------------------  IN: 1540 mL / OUT: 1100 mL / NET: 440 mL    21 Sep 2021 07:01  -  21 Sep 2021 17:22  --------------------------------------------------------  IN: 500 mL / OUT: 1400 mL / NET: -900 mL        LABS:                        13.5   7.75  )-----------( 202      ( 21 Sep 2021 06:33 )             40.9         133<L>  |  96<L>  |  18  ----------------------------<  121<H>  4.0   |  26  |  0.72    Ca    9.4      21 Sep 2021 06:35  Phos  4.5       Mg     2.40         TPro  7.7  /  Alb  4.0  /  TBili  0.3  /  DBili  x   /  AST  39  /  ALT  41  /  AlkPhos  110  21      Urinalysis Basic - ( 20 Sep 2021 11:44 )    Color: Yellow / Appearance: Clear / S.017 / pH: x  Gluc: x / Ketone: Negative  / Bili: Negative / Urobili: <2 mg/dL   Blood: x / Protein: Negative / Nitrite: Negative   Leuk Esterase: Negative / RBC: x / WBC x   Sq Epi: x / Non Sq Epi: x / Bacteria: x      CAPILLARY BLOOD GLUCOSE            Urinalysis Basic - ( 20 Sep 2021 11:44 )    Color: Yellow / Appearance: Clear / S.017 / pH: x  Gluc: x / Ketone: Negative  / Bili: Negative / Urobili: <2 mg/dL   Blood: x / Protein: Negative / Nitrite: Negative   Leuk Esterase: Negative / RBC: x / WBC x   Sq Epi: x / Non Sq Epi: x / Bacteria: x        MEDICATIONS  (STANDING):  amphetamine/dextroamphetamine 30 milliGRAM(s) Oral two times a day  bisacodyl 5 milliGRAM(s) Oral at bedtime  enoxaparin Injectable 40 milliGRAM(s) SubCutaneous every 12 hours  furosemide   Injectable 40 milliGRAM(s) IV Push daily  gabapentin 800 milliGRAM(s) Oral <User Schedule>  gabapentin 400 milliGRAM(s) Oral at bedtime  influenza   Vaccine 0.5 milliLiter(s) IntraMuscular once  nicotine - 21 mG/24Hr(s) Patch 1 patch Transdermal daily  OLANZapine 10 milliGRAM(s) Oral at bedtime  polyethylene glycol 3350 17 Gram(s) Oral two times a day  senna 2 Tablet(s) Oral at bedtime  valproic acid 750 milliGRAM(s) Oral at bedtime  valproic acid 500 milliGRAM(s) Oral daily    MEDICATIONS  (PRN):  acetaminophen   Tablet .. 650 milliGRAM(s) Oral every 6 hours PRN Temp greater or equal to 38.5C (101.3F), Mild Pain (1 - 3)  ALBUTerol    90 MICROgram(s) HFA Inhaler 2 Puff(s) Inhalation every 6 hours PRN Wheezing  ALPRAZolam 2 milliGRAM(s) Oral three times a day PRN anxiety  hydrOXYzine hydrochloride 50 milliGRAM(s) Oral every 6 hours PRN Anxiety  melatonin 3 milliGRAM(s) Oral at bedtime PRN Insomnia          PHYSICAL EXAM:  GENERAL: NAD, well-groomed, well-developed  HEAD:  Atraumatic, Normocephalic  CHEST/LUNG: Clear to percussion bilaterally; No rales, rhonchi, wheezing, or rubs  HEART: Regular rate and rhythm; No murmurs, rubs, or gallops  ABDOMEN: Soft, Nontender, Nondistended; Bowel sounds present  EXTREMITIES:  2+ Peripheral Pulses, No clubbing, cyanosis, or edema  LYMPH: No lymphadenopathy noted  SKIN: No rashes or lesions    Care Discussed with Consultants/Other Providers [ ] YES  [ ] NO
Patient is a 41y old  Male who presents with a chief complaint of PAVON 2-3 months and LE swelling 3-6 months (22 Sep 2021 13:31)    Date of servie : 09-22-21 @ 15:22  INTERVAL HPI/OVERNIGHT EVENTS:  T(C): 36.9 (09-22-21 @ 13:15), Max: 36.9 (09-21-21 @ 21:00)  HR: 97 (09-22-21 @ 13:15) (89 - 97)  BP: 119/68 (09-22-21 @ 13:15) (106/72 - 119/68)  RR: 20 (09-22-21 @ 13:15) (18 - 20)  SpO2: 99% (09-22-21 @ 13:15) (98% - 99%)  Wt(kg): --  I&O's Summary    21 Sep 2021 07:01  -  22 Sep 2021 07:00  --------------------------------------------------------  IN: 1150 mL / OUT: 1850 mL / NET: -700 mL        LABS:                        13.5   6.75  )-----------( 203      ( 22 Sep 2021 06:37 )             39.9     09-22    135  |  101  |  15  ----------------------------<  120<H>  4.0   |  22  |  0.67    Ca    9.0      22 Sep 2021 06:37  Phos  3.8     09-22  Mg     2.20     09-22    TPro  7.7  /  Alb  4.0  /  TBili  0.3  /  DBili  x   /  AST  39  /  ALT  41  /  AlkPhos  110  09-21        CAPILLARY BLOOD GLUCOSE                MEDICATIONS  (STANDING):  amphetamine/dextroamphetamine 30 milliGRAM(s) Oral two times a day  bisacodyl 5 milliGRAM(s) Oral at bedtime  enoxaparin Injectable 40 milliGRAM(s) SubCutaneous every 12 hours  furosemide   Injectable 40 milliGRAM(s) IV Push daily  gabapentin 400 milliGRAM(s) Oral at bedtime  gabapentin 800 milliGRAM(s) Oral <User Schedule>  influenza   Vaccine 0.5 milliLiter(s) IntraMuscular once  methadone    Tablet 135 milliGRAM(s) Oral daily  nicotine - 21 mG/24Hr(s) Patch 1 patch Transdermal daily  OLANZapine 10 milliGRAM(s) Oral at bedtime  polyethylene glycol 3350 17 Gram(s) Oral two times a day  senna 2 Tablet(s) Oral at bedtime  valproic acid 750 milliGRAM(s) Oral at bedtime  valproic acid 500 milliGRAM(s) Oral daily    MEDICATIONS  (PRN):  acetaminophen   Tablet .. 650 milliGRAM(s) Oral every 6 hours PRN Temp greater or equal to 38.5C (101.3F), Mild Pain (1 - 3)  ALBUTerol    90 MICROgram(s) HFA Inhaler 2 Puff(s) Inhalation every 6 hours PRN Wheezing  ALPRAZolam 2 milliGRAM(s) Oral three times a day PRN anxiety  hydrOXYzine hydrochloride 50 milliGRAM(s) Oral every 6 hours PRN Anxiety  melatonin 3 milliGRAM(s) Oral at bedtime PRN Insomnia          PHYSICAL EXAM:  GENERAL: NAD, well-groomed, well-developed  HEAD:  Atraumatic, Normocephalic  CHEST/LUNG: Clear to percussion bilaterally; No rales, rhonchi, wheezing, or rubs  HEART: Regular rate and rhythm; No murmurs, rubs, or gallops  ABDOMEN: Soft, Nontender, Nondistended; Bowel sounds present  EXTREMITIES:  2+ Peripheral Pulses, No clubbing, cyanosis, or edema  LYMPH: No lymphadenopathy noted  SKIN: No rashes or lesions    Care Discussed with Consultants/Other Providers [ ] YES  [ ] NO

## 2021-09-23 NOTE — DISCHARGE NOTE NURSING/CASE MANAGEMENT/SOCIAL WORK - NSDCFUADDAPPT_GEN_ALL_CORE_FT
Follow up with your primary care physician for further monitoring in 1-2 weeks. Please call to arrange appointment    [facility] addiction recovery services: [phone]

## 2021-09-23 NOTE — DISCHARGE NOTE PROVIDER - HOSPITAL COURSE
41M with PMHx Heroin abuse, now on methadone, tobacco abuse, hx spinal OM s/p surgery, chronic back pain, seizures, ADD, mood disorder, anxiety, and HTN presenting with PAVON for 2-3 months and LE swelling 3-6 months. Last TTE 2019 with structurally normal heart. CTA: Limited evaluation for segmental and subsegmental pulmonary embolism. No main, left right, or lobar pulmonary embolism. Prominent right heart is unchanged, echo recommended for complete evaluation. LE duplex: No evidence of deep venous thrombosis in either lower extremity. US abdomen for abdominal distension: Enlarged, fatty liver. EKG NSR without ischemic changes, no CP. TTE here with nml LV fxn. Pt was diuresed with IV lasix. NST attempted twice however pt had coffee and chocolate despite education to avoid these food items prior to testing. Pt. remains volume overloaded but volume status improving. Pt receives Methadone from [facility], dose was confirmed with clinic.      Called by nurse to evaluate patient who wishes to leave the hospital against medical advice. Stress test was not performed as pt had chocolate today and was being uncooperative and agitated in stress lab including being disrespectful to staff, pt was sent back to floor. Pt is not willing to reattempt stress test or stay in the hospital, he wants to go home today. Patient is A&O x3 and has full capacity to make his or her own medical decisions. Pt was informed of their medical conditions, benefits, and alternatives to treatment as well as the risks of refusing treatment and the seriousness of leaving against medical advice such as the risks of heart attack, stroke, seizure, and even death were fully explained to the patient.  After expressing full understanding, patient then signs out against medical advice witnessed by the nurse.  Attending and cardiology made aware. Pt is requesting refill medications including 3 weeks of Xanax to hold him over until his appt with his PCP, Dr. Erickson Orr on Oct 19th. As per Dr. Abel can give 14 day supply of Xanax 1mg TID. PCP office was called to try and move up the patient's appt, the doctor is out of the office this week but a message was sent to him and the office will call the pt to schedule an earlier appointment since he is leaving the hospital today.

## 2021-09-23 NOTE — DISCHARGE NOTE PROVIDER - NSDCFUADDAPPT_GEN_ALL_CORE_FT
Follow up with your primary care physician for further monitoring in 1-2 weeks. Please call to arrange appointment    [facility] addiction recovery services: [phone]

## 2021-09-23 NOTE — DISCHARGE NOTE PROVIDER - CARE PROVIDER_API CALL
Consuelo Castrejon)  Cardiovascular Disease; Internal Medicine; Interventional Cardiology  [address] [place] [address]  Phone: [phone]  Fax: [phone]  Follow Up Time:     Erickson Orr)  Internal Medicine; Nephrology  [address] [facility], [facility] [address]  [place] [address]  Phone: [phone]  Fax: [phone]  Follow Up Time:

## 2021-09-23 NOTE — DISCHARGE NOTE PROVIDER - NSDCMRMEDTOKEN_GEN_ALL_CORE_FT
bisacodyl 5 mg oral delayed release tablet: 1 tab(s) orally once a day (at bedtime)  dextroamphetamine-amphetamine 30 mg oral tablet: 1 tab(s) orally 2 times a day  gabapentin 400 mg oral capsule: 1 cap(s) orally once a day (at bedtime)   gabapentin 800 mg oral tablet: 1 tab(s) orally 3 times a day   hydrOXYzine hydrochloride 50 mg oral tablet: 1 tab(s) orally every 6 hours, As needed, Anxiety  Lasix 40 mg oral tablet: 1 tab(s) orally once a day   melatonin 3 mg oral tablet: 1 tab(s) orally once a day (at bedtime)  methadone: 135 milligram(s) orally once a day  nicotine 21 mg/24 hr transdermal film, extended release: 1 patch transdermal once a day   polyethylene glycol 3350 oral powder for reconstitution: 17 gram(s) orally 2 times a day  senna oral tablet: 2 tab(s) orally once a day (at bedtime)  valproic acid 250 mg oral capsule: 2 cap(s) orally once a day   valproic acid 250 mg oral capsule: 3 cap(s) orally once a day (at bedtime)   Xanax 1 mg oral tablet: 1 tab(s) orally 3 times a day as needed for anxiety MDD:3mg  ZyPREXA 10 mg oral tablet: 1 tab(s) orally once a day   bisacodyl 5 mg oral delayed release tablet: 1 tab(s) orally once a day (at bedtime)  dextroamphetamine-amphetamine 30 mg oral tablet: 1 tab(s) orally 2 times a day  gabapentin 800 mg oral tablet: 1 tab(s) orally 3 times a day   hydrOXYzine hydrochloride 50 mg oral tablet: 1 tab(s) orally every 6 hours, As needed, Anxiety  Lasix 40 mg oral tablet: 1 tab(s) orally once a day   melatonin 3 mg oral tablet: 1 tab(s) orally once a day (at bedtime)  methadone: 135 milligram(s) orally once a day  nicotine 21 mg/24 hr transdermal film, extended release: 1 patch transdermal once a day   polyethylene glycol 3350 oral powder for reconstitution: 17 gram(s) orally 2 times a day  senna oral tablet: 2 tab(s) orally once a day (at bedtime)  valproic acid 250 mg oral capsule: 2 cap(s) orally once a day   valproic acid 250 mg oral capsule: 3 cap(s) orally once a day (at bedtime)   Xanax 1 mg oral tablet: 1 tab(s) orally 3 times a day as needed for anxiety MDD:3mg  ZyPREXA 10 mg oral tablet: 1 tab(s) orally once a day

## 2021-09-23 NOTE — DISCHARGE NOTE PROVIDER - NSDCCPCAREPLAN_GEN_ALL_CORE_FT
PRINCIPAL DISCHARGE DIAGNOSIS  Diagnosis: Dyspnea  Assessment and Plan of Treatment: Echocardiogram was done which was unremarkable. You were treated with IV lasix to rid of excess water in your body. Stress test was not performed as you left the hospital ama. Follow up with cardiology as an outpatient.   You will need to follow up with your pcp/ pulmonologist as an outpatient for official Pulmonary Function Tests to rule out asthma/COPD.      SECONDARY DISCHARGE DIAGNOSES  Diagnosis: Mood disorder  Assessment and Plan of Treatment: COntinue Olanzapine. Follow up with PCP    Diagnosis: Seizure disorder  Assessment and Plan of Treatment: Resume depakote 500mg in AM and 750mg in PM.    Diagnosis: Anxiety  Assessment and Plan of Treatment: Hydroxyzine is perscribed for anxiety. A short supply of Xanax was prescribed. You will need to follow up with your PCP for further management    Diagnosis: Tobacco use disorder  Assessment and Plan of Treatment: Continue Nicotine patch    Diagnosis: H/O urinary incontinence  Assessment and Plan of Treatment: Follow up with PCP, you may need to see a urologist for evaluation

## 2021-09-23 NOTE — PROGRESS NOTE ADULT - PROVIDER SPECIALTY LIST ADULT
Cardiology
Cardiology
Hospitalist
Hospitalist
Cardiology
Hospitalist
Cardiology
Cardiology
Hospitalist
Hospitalist

## 2021-09-23 NOTE — DISCHARGE NOTE NURSING/CASE MANAGEMENT/SOCIAL WORK - PATIENT PORTAL LINK FT
You can access the FollowMyHealth Patient Portal offered by [facility] by registering at the following website: [email or web address]. By joining [employer]’s FollowMyHealth portal, you will also be able to view your health information using other applications (apps) compatible with our system.

## 2021-09-24 ENCOUNTER — EMERGENCY (EMERGENCY)
Facility: HOSPITAL | Age: 41
LOS: 0 days | Discharge: HOME | End: 2021-09-25
Attending: EMERGENCY MEDICINE
Payer: MEDICAID

## 2021-09-24 VITALS
RESPIRATION RATE: 18 BRPM | WEIGHT: 300.05 LBS | DIASTOLIC BLOOD PRESSURE: 78 MMHG | HEART RATE: 100 BPM | OXYGEN SATURATION: 96 % | HEIGHT: 68 IN | SYSTOLIC BLOOD PRESSURE: 105 MMHG | TEMPERATURE: 98 F

## 2021-09-24 DIAGNOSIS — Z76.0 ENCOUNTER FOR ISSUE OF REPEAT PRESCRIPTION: ICD-10-CM

## 2021-09-24 DIAGNOSIS — S02.40CA MAXILLARY FRACTURE, RIGHT SIDE, INITIAL ENCOUNTER FOR CLOSED FRACTURE: ICD-10-CM

## 2021-09-24 DIAGNOSIS — Z98.890 OTHER SPECIFIED POSTPROCEDURAL STATES: Chronic | ICD-10-CM
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
## 2021-09-24 DIAGNOSIS — Z88.1 ALLERGY STATUS TO OTHER ANTIBIOTIC AGENTS STATUS: ICD-10-CM

## 2021-09-24 DIAGNOSIS — S09.90XA UNSPECIFIED INJURY OF HEAD, INITIAL ENCOUNTER: ICD-10-CM

## 2021-09-24 PROCEDURE — 99285 EMERGENCY DEPT VISIT HI MDM: CPT

## 2021-09-24 RX ORDER — ALPRAZOLAM 0.25 MG
1 TABLET ORAL ONCE
Refills: 0 | Status: DISCONTINUED | OUTPATIENT
Start: 2021-09-24 | End: 2021-09-24

## 2021-09-24 RX ORDER — ALPRAZOLAM 0.25 MG
1 TABLET ORAL
Qty: 9 | Refills: 0
Start: 2021-09-24 | End: 2021-09-26

## 2021-09-24 RX ORDER — ALPRAZOLAM 0.25 MG
1 TABLET ORAL
Qty: 9 | Refills: 0
Start: 2021-09-24 | End: 2021-09-27

## 2021-09-24 RX ORDER — ACETAMINOPHEN 500 MG
975 TABLET ORAL ONCE
Refills: 0 | Status: COMPLETED | OUTPATIENT
Start: 2021-09-24 | End: 2021-09-24

## 2021-09-24 RX ADMIN — Medication 975 MILLIGRAM(S): at 23:35

## 2021-09-24 NOTE — ED PROVIDER NOTE - PATIENT PORTAL LINK FT
You can access the FollowMyHealth Patient Portal offered by [facility] by registering at the following website: [email or web address]. By joining [employer]’s FollowMyHealth portal, you will also be able to view your health information using other applications (apps) compatible with our system.

## 2021-09-24 NOTE — ED PROVIDER NOTE - PHYSICAL EXAMINATION
Vitals: WNL:  Gen: AAOx3, NAD, sitting comfortably in stretcher  Head: ncat, perrla, eomi b/l  Neck: supple, no lymphadenopathy, no midline deviation  Heart: rrr, no m/r/g  Lungs: CTA b/l, no rales/ronchi/wheezes  Abd: soft, nontender, non-distended, no rebound or guarding  Ext: no clubbing/cyanosis/edema  Neuro: sensation and muscle strength intact b/l, steady gait Vitals: WNL:  Gen: AAOx3, NAD, sitting comfortably in stretcher  Head: R zygoma is ttp, no crepitus or bony deformity, otherwise ncat, perrla, eomi b/l  Neck: supple, no lymphadenopathy, no midline deviation  Heart: rrr, no m/r/g  Lungs: CTA b/l, no rales/ronchi/wheezes  Abd: soft, nontender, non-distended, no rebound or guarding  Ext: no clubbing/cyanosis/edema  Neuro: sensation and muscle strength intact b/l, steady gait, CN2-12 intact b/l, no focal weakness
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
## 2021-09-24 NOTE — ED PROVIDER NOTE - PROGRESS NOTE DETAILS
Concerning neurological signs that would warrant return to ED were discussed with patient.  Pt. understands to return if severe headache, numbness, weakness, nausea, vomiting, or personality changes develop.  Pt. verbalizes understanding. Results reported to patient--grossly benign, labs unremarkable, CT shows maxillary spine fracture   Pt. reports feeling better after meds  pt. agrees to f/u with primary care outpt. asap  pt. understands to return to ED if symptoms worsen; will d/c with med refills
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
## 2021-09-25 ENCOUNTER — EMERGENCY (EMERGENCY)
Facility: HOSPITAL | Age: 41
LOS: 1 days | Discharge: HOME | End: 2021-09-25
Attending: EMERGENCY MEDICINE | Admitting: EMERGENCY MEDICINE
Payer: MEDICAID

## 2021-09-25 VITALS
TEMPERATURE: 98 F | HEART RATE: 78 BPM | OXYGEN SATURATION: 95 % | SYSTOLIC BLOOD PRESSURE: 101 MMHG | DIASTOLIC BLOOD PRESSURE: 63 MMHG | RESPIRATION RATE: 18 BRPM

## 2021-09-25 VITALS
DIASTOLIC BLOOD PRESSURE: 70 MMHG | SYSTOLIC BLOOD PRESSURE: 122 MMHG | OXYGEN SATURATION: 95 % | RESPIRATION RATE: 18 BRPM | TEMPERATURE: 99 F | HEIGHT: 68 IN | HEART RATE: 89 BPM

## 2021-09-25 DIAGNOSIS — Z98.890 OTHER SPECIFIED POSTPROCEDURAL STATES: Chronic | ICD-10-CM

## 2021-09-25 PROBLEM — I10 ESSENTIAL (PRIMARY) HYPERTENSION: Chronic | Status: ACTIVE | Noted: 2021-09-17

## 2021-09-25 LAB — HIV 1+2 AB+HIV1 P24 AG SERPL QL IA: SIGNIFICANT CHANGE UP

## 2021-09-25 PROCEDURE — 93010 ELECTROCARDIOGRAM REPORT: CPT

## 2021-09-25 PROCEDURE — 70486 CT MAXILLOFACIAL W/O DYE: CPT | Mod: 26,MA

## 2021-09-25 PROCEDURE — 72125 CT NECK SPINE W/O DYE: CPT | Mod: 26,MA

## 2021-09-25 PROCEDURE — 99284 EMERGENCY DEPT VISIT MOD MDM: CPT | Mod: 25

## 2021-09-25 PROCEDURE — 70450 CT HEAD/BRAIN W/O DYE: CPT | Mod: 26,MA

## 2021-09-25 RX ORDER — GABAPENTIN 400 MG/1
1 CAPSULE ORAL
Qty: 21 | Refills: 0
Start: 2021-09-25 | End: 2021-10-01

## 2021-09-25 RX ORDER — FUROSEMIDE 40 MG
40 TABLET ORAL ONCE
Refills: 0 | Status: COMPLETED | OUTPATIENT
Start: 2021-09-25 | End: 2021-09-25

## 2021-09-25 RX ORDER — ALPRAZOLAM 0.25 MG
1 TABLET ORAL ONCE
Refills: 0 | Status: DISCONTINUED | OUTPATIENT
Start: 2021-09-25 | End: 2021-09-25

## 2021-09-25 RX ORDER — ALPRAZOLAM 0.25 MG
1 TABLET ORAL ONCE
Refills: 0 | Status: COMPLETED | OUTPATIENT
Start: 2021-09-25 | End: 2021-09-25

## 2021-09-25 RX ADMIN — Medication 1 MILLIGRAM(S): at 00:15

## 2021-09-25 RX ADMIN — Medication 1 MILLIGRAM(S): at 15:14

## 2021-09-25 RX ADMIN — Medication 40 MILLIGRAM(S): at 12:22

## 2021-09-25 NOTE — ED PROVIDER NOTE - NS ED ROS FT
GENERAL: No fever, no chills  EYES: no change in vision  HEENT: no sore throat  CARDIAC: no chest pain, no palpitations  PULMONARY: see hpi, positive for sob   GI: no N/V  : no dysuria  SKIN: no new rashes  NEURO: no headache, no weakness  MSK: positive for leg swelling.
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
## 2021-09-25 NOTE — ED PROVIDER NOTE - PATIENT PORTAL LINK FT
You can access the FollowMyHealth Patient Portal offered by [facility] by registering at the following website: [email or web address]. By joining [employer]’s FollowMyHealth portal, you will also be able to view your health information using other applications (apps) compatible with our system.

## 2021-09-25 NOTE — ED PROVIDER NOTE - PHYSICAL EXAMINATION
Gen: NAD, non-toxic appearing. appears somnolent but is easily arousable and responds to questions appropriately  Head: normal appearing  HEENT: normal conjunctiva, oral mucosa moist  Lung: no respiratory distress, speaking in full sentences, CTA b/l     CV: regular rate and rhythm, no murmurs  MSK: 2+ edema over the lower extremities  Neuro: No focal deficits, alert and grossly oriented  Skin: Warm
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
## 2021-09-25 NOTE — ED PROVIDER NOTE - EKG ADDITIONAL INFORMATION FREE TEXT
NSR. Pathological Q wave and t wave inversion in T3 seen on prior. No other concerning findings. No interval changes.
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
## 2021-09-25 NOTE — ED PROVIDER NOTE - NSFOLLOWUPINSTRUCTIONS_ED_ALL_ED_FT
1) Follow up with your doctors  2) Return to the ED immediately for new or worsening symptoms  3) Please continue to take any home medications as prescribed  4) Your test results from your ED visit were discussed with you prior to discharge  5) You were provided with a copy of your test results
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
## 2021-09-25 NOTE — ED ADULT NURSE NOTE - HIV OFFER
AMARILYS CHUNG  : 1948 12:04:32  ACCOUNT:  [id number]  HOME PHONE:  [phone]  WORK PHONE:  [phone]    Pt is scheduled for knee replacement surgery on 19. Reviewed with Dr Millan, he recommends the pt have and office visit and EKG with apn. If no new symptoms pt can be cleared for surgery. Pt called and appt set for . Pt v/u and was agreeable.     Jody Fam rn     Previously Negative (within the last year)

## 2021-09-25 NOTE — PROVIDER CONTACT NOTE (OTHER) - ASSESSMENT
Medical team referred this case as pt has been medically cleared for discharge and needs metro card. Writer provided the metrocard # [id number] was given.
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
## 2021-09-25 NOTE — ED ADULT NURSE REASSESSMENT NOTE - NS ED NURSE REASSESS COMMENT FT1
attempted blood work and unable to find a line. residents made aware. pt asking for food every time this RN  goes in room/ attending made aware. po Lasix given

## 2021-09-29 ENCOUNTER — EMERGENCY (EMERGENCY)
Facility: HOSPITAL | Age: 41
LOS: 0 days | Discharge: HOME | End: 2021-09-30
Attending: STUDENT IN AN ORGANIZED HEALTH CARE EDUCATION/TRAINING PROGRAM
Payer: MEDICAID

## 2021-09-29 VITALS
TEMPERATURE: 98 F | DIASTOLIC BLOOD PRESSURE: 76 MMHG | OXYGEN SATURATION: 93 % | RESPIRATION RATE: 17 BRPM | HEART RATE: 90 BPM | SYSTOLIC BLOOD PRESSURE: 119 MMHG

## 2021-09-29 VITALS
SYSTOLIC BLOOD PRESSURE: 118 MMHG | DIASTOLIC BLOOD PRESSURE: 66 MMHG | HEART RATE: 88 BPM | HEIGHT: 68 IN | TEMPERATURE: 99 F | RESPIRATION RATE: 18 BRPM | OXYGEN SATURATION: 94 % | WEIGHT: 300.05 LBS

## 2021-09-29 DIAGNOSIS — Z87.898 PERSONAL HISTORY OF OTHER SPECIFIED CONDITIONS: ICD-10-CM

## 2021-09-29 DIAGNOSIS — I10 ESSENTIAL (PRIMARY) HYPERTENSION: ICD-10-CM

## 2021-09-29 DIAGNOSIS — R42 DIZZINESS AND GIDDINESS: ICD-10-CM

## 2021-09-29 DIAGNOSIS — Z98.890 OTHER SPECIFIED POSTPROCEDURAL STATES: Chronic | ICD-10-CM

## 2021-09-29 DIAGNOSIS — Z87.39 PERSONAL HISTORY OF OTHER DISEASES OF THE MUSCULOSKELETAL SYSTEM AND CONNECTIVE TISSUE: ICD-10-CM

## 2021-09-29 DIAGNOSIS — Z86.69 PERSONAL HISTORY OF OTHER DISEASES OF THE NERVOUS SYSTEM AND SENSE ORGANS: ICD-10-CM

## 2021-09-29 DIAGNOSIS — R19.7 DIARRHEA, UNSPECIFIED: ICD-10-CM

## 2021-09-29 DIAGNOSIS — Z88.0 ALLERGY STATUS TO PENICILLIN: ICD-10-CM

## 2021-09-29 DIAGNOSIS — R51.9 HEADACHE, UNSPECIFIED: ICD-10-CM

## 2021-09-29 DIAGNOSIS — Z98.890 OTHER SPECIFIED POSTPROCEDURAL STATES: ICD-10-CM

## 2021-09-29 LAB
ALBUMIN SERPL ELPH-MCNC: 3.1 G/DL — LOW (ref 3.3–5)
ALP SERPL-CCNC: 104 U/L — SIGNIFICANT CHANGE UP (ref 40–120)
ALT FLD-CCNC: 61 U/L — SIGNIFICANT CHANGE UP (ref 12–78)
ANION GAP SERPL CALC-SCNC: 6 MMOL/L — SIGNIFICANT CHANGE UP (ref 5–17)
AST SERPL-CCNC: 73 U/L — HIGH (ref 15–37)
BILIRUB SERPL-MCNC: 0.4 MG/DL — SIGNIFICANT CHANGE UP (ref 0.2–1.2)
BUN SERPL-MCNC: 11 MG/DL — SIGNIFICANT CHANGE UP (ref 7–23)
CALCIUM SERPL-MCNC: 8.9 MG/DL — SIGNIFICANT CHANGE UP (ref 8.5–10.1)
CHLORIDE SERPL-SCNC: 106 MMOL/L — SIGNIFICANT CHANGE UP (ref 96–108)
CO2 SERPL-SCNC: 27 MMOL/L — SIGNIFICANT CHANGE UP (ref 22–31)
CREAT SERPL-MCNC: 0.83 MG/DL — SIGNIFICANT CHANGE UP (ref 0.5–1.3)
GLUCOSE BLDC GLUCOMTR-MCNC: 122 MG/DL — HIGH (ref 70–99)
GLUCOSE SERPL-MCNC: 117 MG/DL — HIGH (ref 70–99)
POTASSIUM SERPL-MCNC: 3.9 MMOL/L — SIGNIFICANT CHANGE UP (ref 3.5–5.3)
POTASSIUM SERPL-SCNC: 3.9 MMOL/L — SIGNIFICANT CHANGE UP (ref 3.5–5.3)
PROT SERPL-MCNC: 7.7 GM/DL — SIGNIFICANT CHANGE UP (ref 6–8.3)
SODIUM SERPL-SCNC: 139 MMOL/L — SIGNIFICANT CHANGE UP (ref 135–145)
VALPROATE SERPL-MCNC: 37 UG/ML — LOW (ref 50–100)

## 2021-09-29 PROCEDURE — 70450 CT HEAD/BRAIN W/O DYE: CPT | Mod: 26,MA

## 2021-09-29 PROCEDURE — 72125 CT NECK SPINE W/O DYE: CPT | Mod: 26,MA

## 2021-09-29 PROCEDURE — 99285 EMERGENCY DEPT VISIT HI MDM: CPT

## 2021-09-29 RX ORDER — ALPRAZOLAM 0.25 MG
2 TABLET ORAL ONCE
Refills: 0 | Status: DISCONTINUED | OUTPATIENT
Start: 2021-09-29 | End: 2021-09-29

## 2021-09-29 RX ADMIN — Medication 2 MILLIGRAM(S): at 22:55

## 2021-09-29 NOTE — ED ADULT NURSE NOTE - OBJECTIVE STATEMENT
pt states "I fell down while I was walking towards the bed. it was not a seizure because I remember everything. I could not get up from the floor at all"
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
## 2021-09-30 LAB
BASOPHILS # BLD AUTO: 0.04 K/UL — SIGNIFICANT CHANGE UP (ref 0–0.2)
BASOPHILS NFR BLD AUTO: 0.3 % — SIGNIFICANT CHANGE UP (ref 0–2)
EOSINOPHIL # BLD AUTO: 0.22 K/UL — SIGNIFICANT CHANGE UP (ref 0–0.5)
EOSINOPHIL NFR BLD AUTO: 1.6 % — SIGNIFICANT CHANGE UP (ref 0–6)
HCT VFR BLD CALC: 37.5 % — LOW (ref 39–50)
HGB BLD-MCNC: 12.3 G/DL — LOW (ref 13–17)
IMM GRANULOCYTES NFR BLD AUTO: 0.3 % — SIGNIFICANT CHANGE UP (ref 0–1.5)
LYMPHOCYTES # BLD AUTO: 1.94 K/UL — SIGNIFICANT CHANGE UP (ref 1–3.3)
LYMPHOCYTES # BLD AUTO: 14 % — SIGNIFICANT CHANGE UP (ref 13–44)
MCHC RBC-ENTMCNC: 30.1 PG — SIGNIFICANT CHANGE UP (ref 27–34)
MCHC RBC-ENTMCNC: 32.8 GM/DL — SIGNIFICANT CHANGE UP (ref 32–36)
MCV RBC AUTO: 91.7 FL — SIGNIFICANT CHANGE UP (ref 80–100)
MONOCYTES # BLD AUTO: 0.84 K/UL — SIGNIFICANT CHANGE UP (ref 0–0.9)
MONOCYTES NFR BLD AUTO: 6.1 % — SIGNIFICANT CHANGE UP (ref 2–14)
NEUTROPHILS # BLD AUTO: 10.8 K/UL — HIGH (ref 1.8–7.4)
NEUTROPHILS NFR BLD AUTO: 77.7 % — HIGH (ref 43–77)
NRBC # BLD: 0 /100 WBCS — SIGNIFICANT CHANGE UP (ref 0–0)
PLATELET # BLD AUTO: 229 K/UL — SIGNIFICANT CHANGE UP (ref 150–400)
RBC # BLD: 4.09 M/UL — LOW (ref 4.2–5.8)
RBC # FLD: 13.9 % — SIGNIFICANT CHANGE UP (ref 10.3–14.5)
WBC # BLD: 13.88 K/UL — HIGH (ref 3.8–10.5)
WBC # FLD AUTO: 13.88 K/UL — HIGH (ref 3.8–10.5)

## 2021-09-30 PROCEDURE — 93010 ELECTROCARDIOGRAM REPORT: CPT

## 2021-09-30 NOTE — ED PROVIDER NOTE - OBJECTIVE STATEMENT
41M PMHx of substance abuse, seizure disorder on VPA, presenting with diarrhea x 4 days and "collapsing at home" today. Describes lightheadedness, and passing out, hit the back of his head. Denies any neck pain, weakness, chest pain, shortness of breath, abdominal pain, headaches, visual complaints, tongue laceration, urinary incontinence, fevers, chills, nausea/vomiting, coughs, LOC, other extremity injury, back pain, hip pain, subjective neurological deficits, numbness/tingling, recent travel, recent surgery, immobility, hemoptysis, hormone use, known personal cancer history, or personal/family history of blood clots.

## 2021-09-30 NOTE — ED PROVIDER NOTE - CLINICAL SUMMARY MEDICAL DECISION MAKING FREE TEXT BOX
Pt w/ hx of p/w pre-syncope and diarrhea; Suspect vasovagal etiology or orthostatic syncope given dehydration.   DDX: Low suspicion for structural heart disease (CHF, HOCM) or ACS/NSTEMI, malignant arrythmia (i.e. Brugada's sign, delta wave, epsilon wave, significantly prolonged QTc (>500msec), STEMI, ischemic changes).  - Low suspicion for GI Bleed as no melena/hematochezia, BUN not elevated, and Hb normal.  - Low suspicion for acute neurological diagnoses such as: ICH given lack of trauma and risk factors for bleeding diathesis, seizures given short time course, no post-ictal state, no seizure activity.   - Low suspicion for vascular diagnosis such as: PE, thoracic aortic dissection, AAA rupture.  PLAN:   - CBC, CMP, ECG, CXR, cardiac monitor, IVF  - CT HEAD and neck for rule out traumatic injuries.   - Disposition: Anticipate discharge home.

## 2021-09-30 NOTE — ED PROVIDER NOTE - NSFOLLOWUPINSTRUCTIONS_ED_ALL_ED_FT
Rest, drink plenty of fluids.  Advance activity as tolerated.  Continue all previously prescribed medications as directed.  Follow up with your PMD 2-3 days and bring copies of your results.  Return to the ER for worsening symptoms, fevers, weakness, abdominal pain, chest pain, shortness of breath, or new concerning symptoms.    Diarrhea    Diarrhea is frequent loose or watery bowel movements that has many causes. Diarrhea can make you feel weak and cause you to become dehydrated. Diarrhea typically lasts 2–3 days, but can last longer if it is a sign of something more serious. Drink clear fluids to prevent dehydration. Eat bland, easy-to-digest foods as tolerated.     SEEK IMMEDIATE MEDICAL CARE IF YOU HAVE ANY OF THE FOLLOWING SYMPTOMS: high fevers, lightheadedness/dizziness, chest pain, black or bloody stools, shortness of breath, severe abdominal or back pain, or any signs of dehydration.     Take acetaminophen 650 mg orally every 6-8 hours for pain control as needed. Please do not exceed 4,000 mg of acetaminophen during a 24 hours period. Acetaminophen can be found in many over-the-counter cold medications as well as opioid medications that may be given for pain.    Take ibuprofen (also known as MOTRIN or ADVIL) 400 mg orally every 6-8 hours for pain control as needed with food to avoid an upset stomach. Ibuprofen can be found in many over-the-counter medications. Please do not take ibuprofen if you have a bleeding disorder, stomach or gastrointestinal ulcer, or liver disease.    If needed, you can alternate these medications so that you can take one medication every 3 hours. For example, at noon take ibuprofen, then at 3PM take acetaminophen, then at 6PM take ibuprofen.

## 2021-09-30 NOTE — ED PROVIDER NOTE - PROGRESS NOTE DETAILS
CT HEAD and neck negative, labs unremarkable, likely leukocytosis from enteritis? diarrhea, however well appearing. I have discussed with the patient about the ED workup, lab results, diagnostics results, plan for discharge home, need for follow-up with primary care physician/specialists, and return precautions. At this time, the patient does not require further workup in the ED. The patient is subjectively feeling better and would like to be discharged home. The patient had the opportunity to ask questions and I have answered all inquiries. The patient verbalizes understanding and agreement with the plan. The patient is hemodynamically stable, clinically well-appearing, ambulatory, mentating well and ready for discharge home.

## 2021-09-30 NOTE — ED PROVIDER NOTE - PATIENT PORTAL LINK FT
You can access the FollowMyHealth Patient Portal offered by [facility] by registering at the following website: [email or web address]. By joining [employer]’s FollowMyHealth portal, you will also be able to view your health information using other applications (apps) compatible with our system.

## 2022-01-03 NOTE — ED PROCEDURE NOTE - US VERIFICATION
Patient returns to the office today for fu of the left HANANE DOS 10/12/21 pt states she is still have drainage to the left hip. Pt states that she does have drainage daily in the left hip area. She does change her dressing daily.  Pt denies any fever and chills Line Functional

## 2022-01-18 NOTE — ED ADULT TRIAGE NOTE - ESI TRIAGE ACUITY LEVEL, MLM
2 [FreeTextEntry1] : \par \par I spent the time noted on the day of this patient encounter preparing for, providing and documenting the above E/M service and counseling and educate patient on differential, workup, disease course, and treatment/management. Education was provided to the patient during this encounter. All questions and concerns were answered and addressed in detail.\par \par Trini Hollingsworth MD\par

## 2022-04-29 ENCOUNTER — EMERGENCY (EMERGENCY)
Facility: HOSPITAL | Age: 42
LOS: 0 days | Discharge: HOME | End: 2022-04-30
Attending: EMERGENCY MEDICINE
Payer: MEDICAID

## 2022-04-29 VITALS
RESPIRATION RATE: 17 BRPM | HEART RATE: 100 BPM | HEIGHT: 68 IN | OXYGEN SATURATION: 99 % | DIASTOLIC BLOOD PRESSURE: 97 MMHG | SYSTOLIC BLOOD PRESSURE: 125 MMHG | TEMPERATURE: 99 F | WEIGHT: 291.89 LBS

## 2022-04-29 DIAGNOSIS — Z98.890 OTHER SPECIFIED POSTPROCEDURAL STATES: Chronic | ICD-10-CM

## 2022-04-29 DIAGNOSIS — Z88.0 ALLERGY STATUS TO PENICILLIN: ICD-10-CM

## 2022-04-29 DIAGNOSIS — M79.672 PAIN IN LEFT FOOT: ICD-10-CM

## 2022-04-29 PROCEDURE — 99283 EMERGENCY DEPT VISIT LOW MDM: CPT

## 2022-04-29 PROCEDURE — 73630 X-RAY EXAM OF FOOT: CPT | Mod: 26,LT

## 2022-04-29 RX ORDER — KETOROLAC TROMETHAMINE 30 MG/ML
30 SYRINGE (ML) INJECTION ONCE
Refills: 0 | Status: DISCONTINUED | OUTPATIENT
Start: 2022-04-29 | End: 2022-04-29

## 2022-04-29 NOTE — ED ADULT NURSE NOTE - OBJECTIVE STATEMENT
Pt received in wheelchair c/o left foot pain "I think my foot is fractured", pt denies any previous injury, he stated that he just noticed it suddenly started hurting, left foot has moderate swelling and is tender, no deformities noted, pulses present, color is normal.

## 2022-04-30 ENCOUNTER — EMERGENCY (EMERGENCY)
Facility: HOSPITAL | Age: 42
LOS: 0 days | Discharge: HOME | End: 2022-05-01
Attending: STUDENT IN AN ORGANIZED HEALTH CARE EDUCATION/TRAINING PROGRAM
Payer: MEDICAID

## 2022-04-30 VITALS
DIASTOLIC BLOOD PRESSURE: 70 MMHG | OXYGEN SATURATION: 98 % | TEMPERATURE: 99 F | HEART RATE: 86 BPM | RESPIRATION RATE: 18 BRPM | SYSTOLIC BLOOD PRESSURE: 109 MMHG

## 2022-04-30 VITALS
OXYGEN SATURATION: 95 % | DIASTOLIC BLOOD PRESSURE: 78 MMHG | HEIGHT: 68 IN | TEMPERATURE: 98 F | HEART RATE: 89 BPM | SYSTOLIC BLOOD PRESSURE: 118 MMHG | RESPIRATION RATE: 20 BRPM | WEIGHT: 300.05 LBS

## 2022-04-30 DIAGNOSIS — I10 ESSENTIAL (PRIMARY) HYPERTENSION: ICD-10-CM

## 2022-04-30 DIAGNOSIS — M46.26 OSTEOMYELITIS OF VERTEBRA, LUMBAR REGION: ICD-10-CM

## 2022-04-30 DIAGNOSIS — G40.909 EPILEPSY, UNSPECIFIED, NOT INTRACTABLE, WITHOUT STATUS EPILEPTICUS: ICD-10-CM

## 2022-04-30 DIAGNOSIS — R56.9 UNSPECIFIED CONVULSIONS: ICD-10-CM

## 2022-04-30 DIAGNOSIS — Z98.890 OTHER SPECIFIED POSTPROCEDURAL STATES: Chronic | ICD-10-CM

## 2022-04-30 DIAGNOSIS — Z88.0 ALLERGY STATUS TO PENICILLIN: ICD-10-CM

## 2022-04-30 LAB
HCT VFR BLD CALC: 40.1 % — SIGNIFICANT CHANGE UP (ref 39–50)
HGB BLD-MCNC: 13 G/DL — SIGNIFICANT CHANGE UP (ref 13–17)
LACTATE SERPL-SCNC: 1.6 MMOL/L — SIGNIFICANT CHANGE UP (ref 0.7–2)
MCHC RBC-ENTMCNC: 29.4 PG — SIGNIFICANT CHANGE UP (ref 27–34)
MCHC RBC-ENTMCNC: 32.4 G/DL — SIGNIFICANT CHANGE UP (ref 32–36)
MCV RBC AUTO: 90.7 FL — SIGNIFICANT CHANGE UP (ref 80–100)
NRBC # BLD: 0 /100 WBCS — SIGNIFICANT CHANGE UP (ref 0–0)
PLATELET # BLD AUTO: 212 K/UL — SIGNIFICANT CHANGE UP (ref 150–400)
RBC # BLD: 4.42 M/UL — SIGNIFICANT CHANGE UP (ref 4.2–5.8)
RBC # FLD: 13.7 % — SIGNIFICANT CHANGE UP (ref 10.3–14.5)
WBC # BLD: 8.41 K/UL — SIGNIFICANT CHANGE UP (ref 3.8–10.5)
WBC # FLD AUTO: 8.41 K/UL — SIGNIFICANT CHANGE UP (ref 3.8–10.5)

## 2022-04-30 PROCEDURE — 99285 EMERGENCY DEPT VISIT HI MDM: CPT

## 2022-04-30 PROCEDURE — 93010 ELECTROCARDIOGRAM REPORT: CPT

## 2022-04-30 RX ORDER — ALPRAZOLAM 0.25 MG
0.25 TABLET ORAL ONCE
Refills: 0 | Status: DISCONTINUED | OUTPATIENT
Start: 2022-04-30 | End: 2022-04-30

## 2022-04-30 RX ORDER — GABAPENTIN 400 MG/1
800 CAPSULE ORAL ONCE
Refills: 0 | Status: COMPLETED | OUTPATIENT
Start: 2022-04-30 | End: 2022-04-30

## 2022-04-30 RX ORDER — IBUPROFEN 200 MG
1 TABLET ORAL
Qty: 20 | Refills: 0
Start: 2022-04-30 | End: 2022-05-04

## 2022-04-30 RX ORDER — DIVALPROEX SODIUM 500 MG/1
750 TABLET, DELAYED RELEASE ORAL ONCE
Refills: 0 | Status: COMPLETED | OUTPATIENT
Start: 2022-04-30 | End: 2022-04-30

## 2022-04-30 RX ADMIN — GABAPENTIN 800 MILLIGRAM(S): 400 CAPSULE ORAL at 01:36

## 2022-04-30 RX ADMIN — Medication 1 MILLIGRAM(S): at 23:11

## 2022-04-30 RX ADMIN — DIVALPROEX SODIUM 750 MILLIGRAM(S): 500 TABLET, DELAYED RELEASE ORAL at 23:11

## 2022-04-30 RX ADMIN — Medication 0.25 MILLIGRAM(S): at 01:36

## 2022-04-30 NOTE — ED ADULT TRIAGE NOTE - CHIEF COMPLAINT QUOTE
pt stated he had a  unwitnessed seizures, also c/o leg pain x today. pt nodding off in triage, states he is homeless at this time.

## 2022-04-30 NOTE — ED PROVIDER NOTE - OBJECTIVE STATEMENT
Pt is a 41 yo gentleman with a pmhx of IVDU, etoh abuse, now on methadone and AA who presents to the ED with pain to the L. foot. It started today. Says pain is whole foot, no specific area. No trauma, no fall. No swelling, no redness, and no hx of ankle or foot surgeries. Says he is anxious, did not take his xanax.

## 2022-04-30 NOTE — ED PROVIDER NOTE - CLINICAL SUMMARY MEDICAL DECISION MAKING FREE TEXT BOX
Ddx: No trauma to suggest fracture/ no redness or warmth to suggest cellulitis or infection/ has FROM of joint, low likelihood of septic joint  Plan: Pain control, xray, d/c to fu w podiatry.

## 2022-04-30 NOTE — ED PROVIDER NOTE - MUSCULOSKELETAL MINIMAL EXAM
Complains of foot pain on L. Does not localize to specific location or joint 'whole foot' hurts. No swelling, no redness, no excess warmth. No ulcers. Pulses palpable. Pt able to bear weight. No edema

## 2022-04-30 NOTE — ED ADULT NURSE REASSESSMENT NOTE - NS ED NURSE REASSESS COMMENT FT1
Pt awake from sleeping. Pt A&ox4, moving all extremities and follows commands. Pt denies dizziness and denies weakness. NAD noted.

## 2022-04-30 NOTE — ED ADULT NURSE NOTE - OBJECTIVE STATEMENT
A&Ox4, c/o seizures. As per pt, had an unwitnessed seizure yesterday. Pt appears to be sleepy during assessment.

## 2022-04-30 NOTE — ED PROVIDER NOTE - PATIENT PORTAL LINK FT
You can access the FollowMyHealth Patient Portal offered by [facility] by registering at the following website: [email or web address]. By joining [employer]’s FollowMyHealth portal, you will also be able to view your health information using other applications (apps) compatible with our system.

## 2022-05-01 ENCOUNTER — EMERGENCY (EMERGENCY)
Facility: HOSPITAL | Age: 42
LOS: 1 days | Discharge: HOME | End: 2022-05-01
Attending: STUDENT IN AN ORGANIZED HEALTH CARE EDUCATION/TRAINING PROGRAM | Admitting: EMERGENCY MEDICINE
Payer: MEDICAID

## 2022-05-01 VITALS
HEIGHT: 68 IN | HEART RATE: 92 BPM | SYSTOLIC BLOOD PRESSURE: 149 MMHG | DIASTOLIC BLOOD PRESSURE: 68 MMHG | RESPIRATION RATE: 18 BRPM | OXYGEN SATURATION: 97 % | TEMPERATURE: 99 F

## 2022-05-01 VITALS
SYSTOLIC BLOOD PRESSURE: 109 MMHG | HEART RATE: 81 BPM | OXYGEN SATURATION: 97 % | DIASTOLIC BLOOD PRESSURE: 69 MMHG | TEMPERATURE: 98 F | RESPIRATION RATE: 16 BRPM

## 2022-05-01 DIAGNOSIS — Z98.890 OTHER SPECIFIED POSTPROCEDURAL STATES: Chronic | ICD-10-CM

## 2022-05-01 LAB
ALBUMIN SERPL ELPH-MCNC: 3.5 G/DL — SIGNIFICANT CHANGE UP (ref 3.3–5)
ALBUMIN SERPL ELPH-MCNC: 3.6 G/DL — SIGNIFICANT CHANGE UP (ref 3.3–5)
ALP SERPL-CCNC: 103 U/L — SIGNIFICANT CHANGE UP (ref 40–120)
ALP SERPL-CCNC: 106 U/L — SIGNIFICANT CHANGE UP (ref 40–120)
ALT FLD-CCNC: 36 U/L — SIGNIFICANT CHANGE UP (ref 4–41)
ALT FLD-CCNC: 47 U/L — SIGNIFICANT CHANGE UP (ref 12–78)
ANION GAP SERPL CALC-SCNC: 6 MMOL/L — SIGNIFICANT CHANGE UP (ref 5–17)
ANION GAP SERPL CALC-SCNC: 9 MMOL/L — SIGNIFICANT CHANGE UP (ref 7–14)
AST SERPL-CCNC: 48 U/L — HIGH (ref 4–40)
AST SERPL-CCNC: 62 U/L — HIGH (ref 15–37)
BASOPHILS # BLD AUTO: 0.03 K/UL — SIGNIFICANT CHANGE UP (ref 0–0.2)
BASOPHILS NFR BLD AUTO: 0.4 % — SIGNIFICANT CHANGE UP (ref 0–2)
BILIRUB SERPL-MCNC: 0.3 MG/DL — SIGNIFICANT CHANGE UP (ref 0.2–1.2)
BILIRUB SERPL-MCNC: 0.4 MG/DL — SIGNIFICANT CHANGE UP (ref 0.2–1.2)
BUN SERPL-MCNC: 14 MG/DL — SIGNIFICANT CHANGE UP (ref 7–23)
BUN SERPL-MCNC: 14 MG/DL — SIGNIFICANT CHANGE UP (ref 7–23)
CALCIUM SERPL-MCNC: 8.7 MG/DL — SIGNIFICANT CHANGE UP (ref 8.4–10.5)
CALCIUM SERPL-MCNC: 8.9 MG/DL — SIGNIFICANT CHANGE UP (ref 8.5–10.1)
CHLORIDE SERPL-SCNC: 102 MMOL/L — SIGNIFICANT CHANGE UP (ref 98–107)
CHLORIDE SERPL-SCNC: 108 MMOL/L — SIGNIFICANT CHANGE UP (ref 96–108)
CO2 SERPL-SCNC: 24 MMOL/L — SIGNIFICANT CHANGE UP (ref 22–31)
CO2 SERPL-SCNC: 27 MMOL/L — SIGNIFICANT CHANGE UP (ref 22–31)
CREAT SERPL-MCNC: 0.62 MG/DL — SIGNIFICANT CHANGE UP (ref 0.5–1.3)
CREAT SERPL-MCNC: 0.78 MG/DL — SIGNIFICANT CHANGE UP (ref 0.5–1.3)
EGFR: 116 ML/MIN/1.73M2 — SIGNIFICANT CHANGE UP
EGFR: 124 ML/MIN/1.73M2 — SIGNIFICANT CHANGE UP
EOSINOPHIL # BLD AUTO: 0.29 K/UL — SIGNIFICANT CHANGE UP (ref 0–0.5)
EOSINOPHIL NFR BLD AUTO: 3.9 % — SIGNIFICANT CHANGE UP (ref 0–6)
ETHANOL SERPL-MCNC: <10 MG/DL — SIGNIFICANT CHANGE UP (ref 0–10)
GLUCOSE SERPL-MCNC: 121 MG/DL — HIGH (ref 70–99)
GLUCOSE SERPL-MCNC: 121 MG/DL — HIGH (ref 70–99)
HCT VFR BLD CALC: 38.1 % — LOW (ref 39–50)
HGB BLD-MCNC: 12.3 G/DL — LOW (ref 13–17)
IANC: 4.78 K/UL — SIGNIFICANT CHANGE UP (ref 1.8–7.4)
IMM GRANULOCYTES NFR BLD AUTO: 0.3 % — SIGNIFICANT CHANGE UP (ref 0–1.5)
LACTATE SERPL-SCNC: 1.3 MMOL/L — SIGNIFICANT CHANGE UP (ref 0.5–2)
LYMPHOCYTES # BLD AUTO: 1.74 K/UL — SIGNIFICANT CHANGE UP (ref 1–3.3)
LYMPHOCYTES # BLD AUTO: 23.5 % — SIGNIFICANT CHANGE UP (ref 13–44)
MAGNESIUM SERPL-MCNC: 2.3 MG/DL — SIGNIFICANT CHANGE UP (ref 1.6–2.6)
MCHC RBC-ENTMCNC: 30.4 PG — SIGNIFICANT CHANGE UP (ref 27–34)
MCHC RBC-ENTMCNC: 32.3 GM/DL — SIGNIFICANT CHANGE UP (ref 32–36)
MCV RBC AUTO: 94.3 FL — SIGNIFICANT CHANGE UP (ref 80–100)
MONOCYTES # BLD AUTO: 0.55 K/UL — SIGNIFICANT CHANGE UP (ref 0–0.9)
MONOCYTES NFR BLD AUTO: 7.4 % — SIGNIFICANT CHANGE UP (ref 2–14)
NEUTROPHILS # BLD AUTO: 4.78 K/UL — SIGNIFICANT CHANGE UP (ref 1.8–7.4)
NEUTROPHILS NFR BLD AUTO: 64.5 % — SIGNIFICANT CHANGE UP (ref 43–77)
NRBC # BLD: 0 /100 WBCS — SIGNIFICANT CHANGE UP
NRBC # FLD: 0 K/UL — SIGNIFICANT CHANGE UP
PHOSPHATE SERPL-MCNC: 4.4 MG/DL — SIGNIFICANT CHANGE UP (ref 2.5–4.5)
PLATELET # BLD AUTO: 190 K/UL — SIGNIFICANT CHANGE UP (ref 150–400)
POTASSIUM SERPL-MCNC: 3.7 MMOL/L — SIGNIFICANT CHANGE UP (ref 3.5–5.3)
POTASSIUM SERPL-MCNC: 3.8 MMOL/L — SIGNIFICANT CHANGE UP (ref 3.5–5.3)
POTASSIUM SERPL-SCNC: 3.7 MMOL/L — SIGNIFICANT CHANGE UP (ref 3.5–5.3)
POTASSIUM SERPL-SCNC: 3.8 MMOL/L — SIGNIFICANT CHANGE UP (ref 3.5–5.3)
PROT SERPL-MCNC: 7.3 G/DL — SIGNIFICANT CHANGE UP (ref 6–8.3)
PROT SERPL-MCNC: 7.9 GM/DL — SIGNIFICANT CHANGE UP (ref 6–8.3)
RBC # BLD: 4.04 M/UL — LOW (ref 4.2–5.8)
RBC # FLD: 13.7 % — SIGNIFICANT CHANGE UP (ref 10.3–14.5)
SODIUM SERPL-SCNC: 135 MMOL/L — SIGNIFICANT CHANGE UP (ref 135–145)
SODIUM SERPL-SCNC: 141 MMOL/L — SIGNIFICANT CHANGE UP (ref 135–145)
WBC # BLD: 7.41 K/UL — SIGNIFICANT CHANGE UP (ref 3.8–10.5)
WBC # FLD AUTO: 7.41 K/UL — SIGNIFICANT CHANGE UP (ref 3.8–10.5)

## 2022-05-01 PROCEDURE — 99284 EMERGENCY DEPT VISIT MOD MDM: CPT

## 2022-05-01 RX ORDER — LEVETIRACETAM 250 MG/1
1000 TABLET, FILM COATED ORAL ONCE
Refills: 0 | Status: COMPLETED | OUTPATIENT
Start: 2022-05-01 | End: 2022-05-01

## 2022-05-01 RX ORDER — ACETAMINOPHEN 500 MG
975 TABLET ORAL ONCE
Refills: 0 | Status: COMPLETED | OUTPATIENT
Start: 2022-05-01 | End: 2022-05-01

## 2022-05-01 RX ORDER — LIDOCAINE 4 G/100G
1 CREAM TOPICAL ONCE
Refills: 0 | Status: COMPLETED | OUTPATIENT
Start: 2022-05-01 | End: 2022-05-01

## 2022-05-01 RX ORDER — IBUPROFEN 200 MG
600 TABLET ORAL ONCE
Refills: 0 | Status: COMPLETED | OUTPATIENT
Start: 2022-05-01 | End: 2022-05-01

## 2022-05-01 RX ORDER — HALOPERIDOL DECANOATE 100 MG/ML
5 INJECTION INTRAMUSCULAR ONCE
Refills: 0 | Status: COMPLETED | OUTPATIENT
Start: 2022-05-01 | End: 2022-05-01

## 2022-05-01 RX ADMIN — Medication 600 MILLIGRAM(S): at 23:14

## 2022-05-01 RX ADMIN — LIDOCAINE 1 PATCH: 4 CREAM TOPICAL at 23:14

## 2022-05-01 RX ADMIN — LEVETIRACETAM 400 MILLIGRAM(S): 250 TABLET, FILM COATED ORAL at 23:14

## 2022-05-01 RX ADMIN — Medication 975 MILLIGRAM(S): at 23:14

## 2022-05-01 NOTE — ED ADULT NURSE NOTE - OBJECTIVE STATEMENT
Pt c/o of lower back pain got worst last night, pain described as " shocks, radiating to RLE", denies trauma/ fall. Pt. states he was putting together a BBQ grill. Pt. denies drug / alcohol use. Pt asking for water and appears somnolent at times. Pt c/o of lower back pain got worst last night, pain described as " shocks, radiating to RLE", denies trauma/ fall. Pt. states he was putting together a BBQ grill. Pt. denies drug / alcohol use. Pt asking for water and appears somnolent at times. 20g placed in left hand labs sent, pt medicated will monitor pt.

## 2022-05-01 NOTE — ED PROVIDER NOTE - OBJECTIVE STATEMENT
42M with PMHx Heroin abuse, now on methadone, tobacco abuse, hx spinal OM s/p surgery, chronic back pain, seizure disorder on depakote 500am/750pm, ADD, mood disorder, anxiety, and HTN presenting stating he had a seizure tonight. takes xanax for anxiety, states he has them at home and has been using them as usual. denies other recent illness

## 2022-05-01 NOTE — ED PROVIDER NOTE - PATIENT PORTAL LINK FT
You can access the FollowMyHealth Patient Portal offered by [facility] by registering at the following website: [email or web address]. By joining [employer]’s FollowMyHealth portal, you will also be able to view your health information using other applications (apps) compatible with our system.

## 2022-05-01 NOTE — ED PROVIDER NOTE - CLINICAL SUMMARY MEDICAL DECISION MAKING FREE TEXT BOX
patient self reports seizure. neurologically intact in ED. appears intoxicated patient self reports seizure. neurologically intact in ED. appears inebriated substance; consider drug abuse. neurologically intact. ambulatory without issue in ED. laboratory workup and ekg unremarkable. unlikely benzodiazepene withdrawal as patient with normal VS in ED, has xanax at home. will dc. patient constantly requesting additional xanax in emergency room, consider secondary gains.

## 2022-05-01 NOTE — ED PROVIDER NOTE - PHYSICAL EXAMINATION
General: Awake, alert and oriented. No acute distress. Well developed, hydrated and nourished. Appears stated age.   Skin: Skin in warm, dry and intact without rashes or lesions. Appropriate color for ethnicity  HENMT: head normocephalic and atraumatic; bilateral external ears without swelling. no nasal discharge. moist oral mucosa. supple neck, trachea midline  EYES: Conjunctiva clear. nonicteric sclera, glazed eyes. EOM intact, Eyelids are normal in appearance without swelling or lesions.  Cardiac: well perfused, s1, s2, rrr  Respiratory: breathing comfortably on room air. no audible wheezing or stridor  Abdominal: nondistended  MSK: Neck and back are without deformity, visible external skin changes, or signs of trauma. Curvature of the cervical, thoracic, and lumbar spine are within normal limits. no external signs of trauma. no apparent deficits in ROM of any extremity  Neurological: The patient is awake, alert and oriented to person, place, and time with normal speech. CN 2-12 grossly intact. no apparent deficits. Memory is normal and thought process is intact. No gait abnormalities are appreciated.   Psychiatric: Appropriate mood and affect. Good judgement and insight. No visual or auditory hallucinations.

## 2022-05-01 NOTE — ED ADULT NURSE NOTE - NSIMPLEMENTINTERV_GEN_ALL_ED
Implemented All Fall with Harm Risk Interventions:  [place] to call system. Call bell, personal items and telephone within reach. Instruct patient to call for assistance. Room bathroom lighting operational. Non-slip footwear when patient is off stretcher. Physically safe environment: no spills, clutter or unnecessary equipment. Stretcher in lowest position, wheels locked, appropriate side rails in place. Provide visual cue, wrist band, yellow gown, etc. Monitor gait and stability. Monitor for mental status changes and reorient to person, place, and time. Review medications for side effects contributing to fall risk. Reinforce activity limits and safety measures with patient and family. Provide visual clues: red socks.

## 2022-05-01 NOTE — ED ADULT TRIAGE NOTE - CHIEF COMPLAINT QUOTE
back pain    pt has hx of seizures, substance abuse, spinal surgery... has chronic back pain but worse 2 days ago after taking apart his bbq.  claims he has new numbness down right leg with occasional urinary incontinence for 2 weeks.  pt is somnolent.. not forthcoming with answers.  was seen and dc'ed yesterday for seizures.  took bus but was wheeled into triage via wheelchair by .

## 2022-05-02 VITALS
SYSTOLIC BLOOD PRESSURE: 104 MMHG | HEART RATE: 71 BPM | OXYGEN SATURATION: 97 % | DIASTOLIC BLOOD PRESSURE: 67 MMHG | RESPIRATION RATE: 17 BRPM | TEMPERATURE: 97 F

## 2022-05-02 LAB
AMPHET UR-MCNC: NEGATIVE — SIGNIFICANT CHANGE UP
APPEARANCE UR: CLEAR — SIGNIFICANT CHANGE UP
BACTERIA # UR AUTO: NEGATIVE — SIGNIFICANT CHANGE UP
BARBITURATES UR SCN-MCNC: NEGATIVE — SIGNIFICANT CHANGE UP
BENZODIAZ UR-MCNC: POSITIVE
BILIRUB UR-MCNC: NEGATIVE — SIGNIFICANT CHANGE UP
COCAINE METAB.OTHER UR-MCNC: POSITIVE
COLOR SPEC: YELLOW — SIGNIFICANT CHANGE UP
CREATININE URINE RESULT, DAU: 190 MG/DL — SIGNIFICANT CHANGE UP
DIFF PNL FLD: NEGATIVE — SIGNIFICANT CHANGE UP
EPI CELLS # UR: 1 /HPF — SIGNIFICANT CHANGE UP (ref 0–5)
GLUCOSE UR QL: NEGATIVE — SIGNIFICANT CHANGE UP
KETONES UR-MCNC: NEGATIVE — SIGNIFICANT CHANGE UP
LEUKOCYTE ESTERASE UR-ACNC: NEGATIVE — SIGNIFICANT CHANGE UP
METHADONE UR-MCNC: POSITIVE
NITRITE UR-MCNC: NEGATIVE — SIGNIFICANT CHANGE UP
OPIATES UR-MCNC: POSITIVE
OXYCODONE UR-MCNC: NEGATIVE — SIGNIFICANT CHANGE UP
PCP SPEC-MCNC: SIGNIFICANT CHANGE UP
PCP UR-MCNC: NEGATIVE — SIGNIFICANT CHANGE UP
PH UR: 6.5 — SIGNIFICANT CHANGE UP (ref 5–8)
PROT UR-MCNC: ABNORMAL
RBC CASTS # UR COMP ASSIST: 1 /HPF — SIGNIFICANT CHANGE UP (ref 0–4)
SP GR SPEC: 1.03 — SIGNIFICANT CHANGE UP (ref 1–1.05)
THC UR QL: NEGATIVE — SIGNIFICANT CHANGE UP
UROBILINOGEN FLD QL: ABNORMAL
WBC UR QL: 2 /HPF — SIGNIFICANT CHANGE UP (ref 0–5)

## 2022-05-02 RX ORDER — ALPRAZOLAM 0.25 MG
0.25 TABLET ORAL ONCE
Refills: 0 | Status: DISCONTINUED | OUTPATIENT
Start: 2022-05-02 | End: 2022-05-02

## 2022-05-02 RX ADMIN — Medication 0.25 MILLIGRAM(S): at 06:25

## 2022-05-02 NOTE — ED PROVIDER NOTE - CLINICAL SUMMARY MEDICAL DECISION MAKING FREE TEXT BOX
42M w/ a h/o heroin abuse clean 3 years on 150mg methadone per day, ETOH abuse, Seizures, HTN, who presents with right lower back pain and states that he may have had seizure earlier today. Patient had an normal MRI of his spine 4/25/22 and has the results on his mobile phone. Here stating he was in another ED yesterday requesting 2mg xanax, 800 gabapentin and wasn't given anything, and is requesting the same here today. Patient states he thinks he had a seizure because he normally gets very hungry prior to his seizures and remembers eating a lot of food after receiving his methadone and then waking up on the floor. Patient prescribed Depakote but endorses not taking his medication and self treating his seizure disorder with 2mg of xanax instead. Of note patient was sleeping in stretcher prior to initiation of H&P and appears to be sedated, states he feels this way because of the methadone and denies ETOH, illicit drug use. Denies fall, trauma, unsure of how he got bruise. Deneis fever/chills, CP, SOB, headache, back pain, abd pain, N/V. AVSS, appears sedated, sleeping when walked in to room. right lower back with old ecchymosis and mild tenderness. MSK vs possible seizure with MSK injury. Will get labs, +/- imaging, give meds, and reassess.

## 2022-05-02 NOTE — ED PROVIDER NOTE - PATIENT PORTAL LINK FT
You can access the FollowMyHealth Patient Portal offered by [facility] by registering at the following website: [email or web address]. By joining [employer]’s FollowMyHealth portal, you will also be able to view your health information using other applications (apps) compatible with our system.

## 2022-05-02 NOTE — ED PROVIDER NOTE - PHYSICAL EXAMINATION
GENERAL: well appearing in no acute distress, non-toxic appearing  HEAD: normocephalic, atraumatic  HEENT: normal conjunctiva, oral mucosa moist, uvula midline, no tonsilar exudates, no JVD  CARDIAC: regular rate and rhythm, normal S1S2, no appreciable murmurs, 2+ pulses in UE/LE b/l  PULM: normal breath sounds, clear to ascultation bilaterally, no rales, rhonchi, wheezing  GI: Abd soft, nondistended, nontender, no rebound tenderness, no guarding, no rigidity  : no CVA tenderness b/l, no suprapubic tenderness  NEURO: no focal motor or sensory deficits, CN2-12 intact, normal speech, PERRLA, EOMI, normal gait, AAOx3  MSK: ROM intact, no peripheral edema, no calf tenderness b/l, R lower back with tenderness and overlying ecchymosis that appears a few days old.   SKIN: well-perfused, extremities warm, no visible rashes  PSYCH: appropriate mood and affect

## 2022-05-02 NOTE — ED ADULT NURSE REASSESSMENT NOTE - CONDITION
patient requiring transportation to his methadone clinic from hospital.  informed he will need call on his own to schedule ride from methadone clinic to home.  spoke to stanley at [facility]([phone]) who scheduled [facility] for 8:10, cfm#[id number].

## 2022-05-02 NOTE — ED PROVIDER NOTE - PROGRESS NOTE DETAILS
Patient became acutely agitated requiring security because he did not receive 2mg xanax. Discussed w/ patient regarding refraining from giving benzos without active seizure, and re-enforced importance of adherence with AEDs. Patient calm, agreeable to receiving AED + non-opiate pain medication. Will reassess. Patient sleeping, AVSS, pain improved with medication. W/u reassuring. Patient cleared for DC, return precautions and follow up discussed.

## 2022-05-02 NOTE — ED PROVIDER NOTE - ATTENDING CONTRIBUTION TO CARE
43 yo male with PMH heroin abuse, on methadone, ETOH abuse, Seizures, HTN for evaluation of back pain, unsure if he had a seizure today, noncpmpliant with AED . PE: drowsy but arousable, CTA BL lungs, RRR A/P Labs, medicate, reassess

## 2022-05-02 NOTE — ED PROVIDER NOTE - OBJECTIVE STATEMENT
42M w/ a h/o 42M w/ a h/o heroin abuse clean 3 years on 150mg methadone per day, ETOH abuse, Seizures, HTN, who presents with right lower back pain and states that he may have had seizure earlier today. Patient had an normal MRI of his spine 4/25/22 and has the results on his mobile phone. Here stating he was in another ED yesterday requesting 2mg xanax, 800 gabapentin and wasn't given anything, and is requesting the same here today. Patient states he thinks he had a seizure because he normally gets very hungry prior to his seizures and remembers eating a lot of food after receiving his methadone and then waking up on the floor. Patient prescribed Depakote but endorses not taking his medication and self treating his seizure disorder with 2mg of xanax instead. Of note patient was sleeping in stretcher prior to initiation of H&P and appears to be sedated, states he feels this way because of the methadone and denies ETOH, illicit drug use. Denies fall, trauma, unsure of how he got bruise. Deneis fever/chills, CP, SOB, headache, back pain, abd pain, N/V.

## 2022-05-05 ENCOUNTER — EMERGENCY (EMERGENCY)
Facility: HOSPITAL | Age: 42
LOS: 1 days | Discharge: HOME | End: 2022-05-05
Attending: EMERGENCY MEDICINE
Payer: MEDICAID

## 2022-05-05 VITALS
TEMPERATURE: 98 F | SYSTOLIC BLOOD PRESSURE: 100 MMHG | HEIGHT: 68 IN | OXYGEN SATURATION: 97 % | RESPIRATION RATE: 18 BRPM | HEART RATE: 81 BPM | WEIGHT: 294.98 LBS | DIASTOLIC BLOOD PRESSURE: 64 MMHG

## 2022-05-05 DIAGNOSIS — Z98.890 OTHER SPECIFIED POSTPROCEDURAL STATES: Chronic | ICD-10-CM

## 2022-05-05 LAB
ALBUMIN SERPL ELPH-MCNC: 3.8 G/DL — SIGNIFICANT CHANGE UP (ref 3.3–5)
ALP SERPL-CCNC: 98 U/L — SIGNIFICANT CHANGE UP (ref 40–120)
ALT FLD-CCNC: 37 U/L — SIGNIFICANT CHANGE UP (ref 10–45)
ANION GAP SERPL CALC-SCNC: 12 MMOL/L — SIGNIFICANT CHANGE UP (ref 5–17)
AST SERPL-CCNC: 58 U/L — HIGH (ref 10–40)
BASOPHILS # BLD AUTO: 0.04 K/UL — SIGNIFICANT CHANGE UP (ref 0–0.2)
BASOPHILS NFR BLD AUTO: 0.5 % — SIGNIFICANT CHANGE UP (ref 0–2)
BILIRUB SERPL-MCNC: 0.3 MG/DL — SIGNIFICANT CHANGE UP (ref 0.2–1.2)
BUN SERPL-MCNC: 12 MG/DL — SIGNIFICANT CHANGE UP (ref 7–23)
CALCIUM SERPL-MCNC: 8.8 MG/DL — SIGNIFICANT CHANGE UP (ref 8.4–10.5)
CHLORIDE SERPL-SCNC: 103 MMOL/L — SIGNIFICANT CHANGE UP (ref 96–108)
CO2 SERPL-SCNC: 21 MMOL/L — LOW (ref 22–31)
CREAT SERPL-MCNC: 0.65 MG/DL — SIGNIFICANT CHANGE UP (ref 0.5–1.3)
EGFR: 122 ML/MIN/1.73M2 — SIGNIFICANT CHANGE UP
EOSINOPHIL # BLD AUTO: 0.3 K/UL — SIGNIFICANT CHANGE UP (ref 0–0.5)
EOSINOPHIL NFR BLD AUTO: 3.8 % — SIGNIFICANT CHANGE UP (ref 0–6)
GLUCOSE SERPL-MCNC: 74 MG/DL — SIGNIFICANT CHANGE UP (ref 70–99)
HCT VFR BLD CALC: 41.3 % — SIGNIFICANT CHANGE UP (ref 39–50)
HGB BLD-MCNC: 13.1 G/DL — SIGNIFICANT CHANGE UP (ref 13–17)
IMM GRANULOCYTES NFR BLD AUTO: 0.4 % — SIGNIFICANT CHANGE UP (ref 0–1.5)
LYMPHOCYTES # BLD AUTO: 1.75 K/UL — SIGNIFICANT CHANGE UP (ref 1–3.3)
LYMPHOCYTES # BLD AUTO: 22 % — SIGNIFICANT CHANGE UP (ref 13–44)
MAGNESIUM SERPL-MCNC: 2.3 MG/DL — SIGNIFICANT CHANGE UP (ref 1.6–2.6)
MCHC RBC-ENTMCNC: 29.8 PG — SIGNIFICANT CHANGE UP (ref 27–34)
MCHC RBC-ENTMCNC: 31.7 GM/DL — LOW (ref 32–36)
MCV RBC AUTO: 93.9 FL — SIGNIFICANT CHANGE UP (ref 80–100)
MONOCYTES # BLD AUTO: 0.42 K/UL — SIGNIFICANT CHANGE UP (ref 0–0.9)
MONOCYTES NFR BLD AUTO: 5.3 % — SIGNIFICANT CHANGE UP (ref 2–14)
NEUTROPHILS # BLD AUTO: 5.43 K/UL — SIGNIFICANT CHANGE UP (ref 1.8–7.4)
NEUTROPHILS NFR BLD AUTO: 68 % — SIGNIFICANT CHANGE UP (ref 43–77)
NRBC # BLD: 0 /100 WBCS — SIGNIFICANT CHANGE UP (ref 0–0)
PHOSPHATE SERPL-MCNC: 4.2 MG/DL — SIGNIFICANT CHANGE UP (ref 2.5–4.5)
PLATELET # BLD AUTO: 240 K/UL — SIGNIFICANT CHANGE UP (ref 150–400)
POTASSIUM SERPL-MCNC: 5.2 MMOL/L — SIGNIFICANT CHANGE UP (ref 3.5–5.3)
POTASSIUM SERPL-SCNC: 5.2 MMOL/L — SIGNIFICANT CHANGE UP (ref 3.5–5.3)
PROT SERPL-MCNC: 7.8 G/DL — SIGNIFICANT CHANGE UP (ref 6–8.3)
RBC # BLD: 4.4 M/UL — SIGNIFICANT CHANGE UP (ref 4.2–5.8)
RBC # FLD: 13.3 % — SIGNIFICANT CHANGE UP (ref 10.3–14.5)
SARS-COV-2 RNA SPEC QL NAA+PROBE: SIGNIFICANT CHANGE UP
SODIUM SERPL-SCNC: 136 MMOL/L — SIGNIFICANT CHANGE UP (ref 135–145)
TROPONIN T, HIGH SENSITIVITY RESULT: <6 NG/L — SIGNIFICANT CHANGE UP (ref 0–51)
WBC # BLD: 7.97 K/UL — SIGNIFICANT CHANGE UP (ref 3.8–10.5)
WBC # FLD AUTO: 7.97 K/UL — SIGNIFICANT CHANGE UP (ref 3.8–10.5)

## 2022-05-05 PROCEDURE — 84484 ASSAY OF TROPONIN QUANT: CPT

## 2022-05-05 PROCEDURE — 84100 ASSAY OF PHOSPHORUS: CPT

## 2022-05-05 PROCEDURE — 80053 COMPREHEN METABOLIC PANEL: CPT

## 2022-05-05 PROCEDURE — 99285 EMERGENCY DEPT VISIT HI MDM: CPT | Mod: 25

## 2022-05-05 PROCEDURE — 85025 COMPLETE CBC W/AUTO DIFF WBC: CPT

## 2022-05-05 PROCEDURE — 93005 ELECTROCARDIOGRAM TRACING: CPT

## 2022-05-05 PROCEDURE — 93010 ELECTROCARDIOGRAM REPORT: CPT

## 2022-05-05 PROCEDURE — U0005: CPT

## 2022-05-05 PROCEDURE — 83735 ASSAY OF MAGNESIUM: CPT

## 2022-05-05 PROCEDURE — U0003: CPT

## 2022-05-05 RX ORDER — SODIUM CHLORIDE 9 MG/ML
1000 INJECTION INTRAMUSCULAR; INTRAVENOUS; SUBCUTANEOUS ONCE
Refills: 0 | Status: DISCONTINUED | OUTPATIENT
Start: 2022-05-05 | End: 2022-05-05

## 2022-05-05 NOTE — SBIRT NOTE ADULT - NSSBIRTDRGPASSREFTXDET_GEN_A_CORE
LMSW met with patient to discuss his polysubstance abuse. Patient endorses snort heroine, using Xanax, Fentanyl, and Oxycodone.  Per patient he receives Methadone 150 mg daily but was unable to confirm prescribing clinic/ physician.  LMSW discussed with patient treatment options especially a detox program.  LMSW advised patient due his high risk behaviors to follow up with an inpatient detox program such as [facility] or [facility].  LMSW offered patient an intake interview with the SBIRT .  Patient declined all interventions and became upset requesting to be discharged.

## 2022-05-05 NOTE — ED PROVIDER NOTE - OBJECTIVE STATEMENT
The patient is a 42y Male with pmhx of heroin abuse clean 3 years on 150mg methadone per day, ETOH abuse, Seizures, HTN, who presents with right lower back pain and states that he may have had seizure earlier today. Pt reports taking Heroin and Fentanyl around 1 today. Also endorsing mid-sternal CP that is non-exertional, non-pleuritic, started after drug use. Patient had an normal MRI of his spine 04/25/22. Here stating he was in another ED yesterday requesting xanax and gabapentin and wasn't given anything, and is requesting the same here today. Patient states he thinks he had a seizure because he normally gets very hungry prior to his seizures and remembers eating a lot of food. Patient prescribed Depakote, but endorses not taking his medication and self treating his seizure disorder with xanax instead. Denies ETOH use today. Denies fall, trauma, fever/chills, SOB, headache, abd pain, N/V. Allergic to Zosyn.

## 2022-05-05 NOTE — CHART NOTE - NSCHARTNOTEFT_GEN_A_CORE
Emergency Room : LMSW received a referral from the medical team requesting assistance with community substance abuse resources and support. Medical chart was reviewed.  Per chart review PMH: Back Pain Chronic, Seizure, IV drug abuse, and HTN.  LMSW introduced herself to the patient and he verbalized understanding her role.  Patient is alert and oriented x 4 spheres.  Patient is independent in all ADLS and ambulated independently within the ED. LMSW requested to meet with patient in his room and not in the hallway. While in the room patient explained he snorts heroine, Xanax, Oxy and Fentanyl for a number of years. Per patient he is prescribed Methadone 150 mg but could not provide name of a MD or clinic.  When questioned about a substance abuse program patient was unable to provide a name.  Patient explained he was seen at [facility] but was unable to complete the detox program, but stated he was recently there.  He noted he did not like it there.  LMSW inquired about his motivation for treatment.  He explained his  advised to enroll in a treatment program prior to his upcoming court date.  LMSW suggested to patient to complete an intake interview with a SBIRT  for further assistance.  Patient became upset requesting his discharge documents.  Patient was offered a [employer]card; patient accepted.  Update provided to the medical team.  Patient was able to dress independently and ambulated to the ED waiting room without assistance.  LMSW remains available if requested.

## 2022-05-05 NOTE — ED PROVIDER NOTE - PATIENT PORTAL LINK FT
You can access the FollowMyHealth Patient Portal offered by [facility] by registering at the following website: [email or web address]. By joining [facility]’s FollowMyHealth portal, you will also be able to view your health information using other applications (apps) compatible with our system.

## 2022-05-05 NOTE — ED PROVIDER NOTE - NSFOLLOWUPCLINICS_GEN_ALL_ED_FT
[facility] General Internal Medicine  General Internal Medicine  [address] [place] [address]  Phone: [phone]  Fax:     [facility] [place]  Internal Medicine  [address] [place] [address]  Phone: [phone]  Fax: [phone]

## 2022-05-05 NOTE — ED ADULT NURSE NOTE - OBJECTIVE STATEMENT
43 yo male presented to the ED AAOX4 complaining of chest pain since this morning. PMH of IV Drug use, seizures, epilepsy. Pt reports taking Heroin and Fentanyl around 1 today. Pt reports that the chest pain started after he had the feeling he was going to have a seizure. Pt reports his aura is hunger. Today when he felt the aura he took Depakote 750mg. Pt reports a poor compliance with taking his prescribed medications. Pt denies falls, fever, chills. Pt denies SOb. Pt breathing unlabored, spontaneous, and symmetrical. Pt denies abd pain. Pt abd is soft, nondistended, nontender. Pt has full ROM in all extremities.

## 2022-05-05 NOTE — ED PROVIDER NOTE - PROGRESS NOTE DETAILS
Sky Salas MD (PGY-2): Pt was re-evaluated at bedside, VSS, ekg and labs wnl. Results were discussed with patient as well as return precautions and follow up plan with medicine clinic. Time was taken to answer any questions that the patient had before providing them with discharge paperwork.

## 2022-05-05 NOTE — ED PROVIDER NOTE - CLINICAL SUMMARY MEDICAL DECISION MAKING FREE TEXT BOX
Sky Salas MD (PGY-2): The patient is a 42y Male with pmhx of heroin abuse clean 3 years on 150mg methadone per day, ETOH abuse, Seizures, HTN, who presents with right lower back pain and states that he may have had seizure earlier today. Concern that pt is drug seeking as he only wants xanax and gabapentin. Says he still has enough Depakote at home to take. Will assess for ACS, but low suspicion. cbc, cmp, trop, ekg. If ekg/labs wnl, pt will be discharged.

## 2022-05-05 NOTE — ED ADULT NURSE REASSESSMENT NOTE - NS ED NURSE REASSESS COMMENT FT1
Per MD Salas, pt stable for d/c and no further interventions needed at this time. Pt d/c by MD Salas, RN not present

## 2022-05-05 NOTE — ED PROVIDER NOTE - NSFOLLOWUPINSTRUCTIONS_ED_ALL_ED_FT
Seizure    A seizure is abnormal electrical activity in the brain; the specific cause may or may not be found. Prior to a seizure you may experience a warning sensation (aura) that may include fear, nausea, dizziness, and visual changes such as flashing lights of spots. Common symptoms during the seizure may include an altered mental status, rhythmic jerking movements, drooling, grunting, loss of bladder or bowel control, or tongue biting. After a seizure, you may feel confused and sleepy.     Do not swim, drive, operate machinery, or engage in any risky activity during which a seizure could cause further injury to you or others. Teach friends and family what to do if you HAVE a seizure which includes laying you on the ground with your head on a cushion and turning you to the side to keep your breathing passages clear in case of vomiting.    SEEK IMMEDIATE MEDICAL CARE IF YOU HAVE ANY OF THE FOLLOWING SYMPTOMS: seizure lasting over 5 minutes, not waking up or persistent altered mental status after the seizure, or more frequent or worsening seizures.    Chronic Pain    Chronic pain is a complex condition and the Emergency Department is not the ideal place to manage this condition. Prescription painkillers must be written by your primary care provider or a pain management physician. Avoid activities or triggers that exacerbate your pain.    Please continue to take your anti-seizure medication.  Please follow up with your neurologist or primary care physician as soon as possible    SEEK IMMEDIATE MEDICAL CARE IF YOU HAVE ANY OF THE FOLLOWING SYMPTOMS: severe chest pain, fainting, vomiting blood, dark or bloody stools, or pain different from your chronic pain. Seizure lasting over 5 minutes, not waking up or persistent altered mental status after the seizure, or more frequent or worsening seizures.

## 2022-05-05 NOTE — ED PROVIDER NOTE - NS ED ROS FT
GENERAL: No fever or chills  EYES: No change in vision  HEENT: No trouble swallowing or speaking  CARDIAC: +chest pain  PULMONARY: No cough or SOB  GI: No abdominal pain, no nausea or no vomiting, no diarrhea or constipation  : No changes in urination  SKIN: No rashes  NEURO: No headache, no numbness  MSK: +back pain  Otherwise as HPI or negative.

## 2022-05-05 NOTE — ED PROVIDER NOTE - PHYSICAL EXAMINATION
Gen: In mild distress. A&Ox4. Appears intoxicated.  HEENT: Normocephalic and atraumatic. PERRL, EOMI, no nasal discharge, mucous membranes dry, no scleral icterus.  CV: Regular rate and rhythm, +S1/S2, no M/R/G. No significant lower extremity edema. Radial and DP pulses present and symmetrical. Capillary refill less than 2 seconds.  Resp: Normal effort and rate. CTAB, no rales, rhonchi, or wheezes.  GI: Abdomen soft, non-distended, non tender to palpation. No masses appreciated. Bowel sounds present.  MSK: R lower back TTP. No CVAT bilaterally.  Neuro: Following commands, speaking in full sentences, moving extremities spontaneously. CN II-XII intact. Strength and sensation symmetric and intact throughout. Reflexes 2+ throughout. Cerebellar testing normal.  Psych: Appropriate mood, cooperative

## 2022-05-05 NOTE — ED PROVIDER NOTE - ATTENDING CONTRIBUTION TO CARE
Patient is a patient of Dr. Pearson of [facility]; [address]. Patient states he is coming via ambulette from [facility].   Patient with history of epilepsy Today he felt like he was going to pass out and have a seizure around 1300 or 1400 and took a Depakote 750mg (usually supposed to take 500 am and 750 pm) in his bag but then starting sweating and eating. Patient admits to taking/snorting heroin and fentanyl today. Patient states he then had chest pain today no fall. He states chest pain started after his sensation of near seizure.   Patient is normally on Depakote for which he is NON-COMPLIANT, ALAPRAZLAM, and GABAPENTIN.  patient was up attempting to change the station on the bedside [employer]-sens[employer] communication device.   eyes open spontaneously  patient is drowsy, slurring his words  trachea midline  ncat  wearing glasses that are taped on the right arm  no abdominal pain  obese by bmi  darkening of skin to hands over mcp and pip  non-tachypneic, non-bradypneic   cooperative  no gross deformity of extremities, no asymmetry  no edema  no rash/vesicles/petechiae  no injection marks  no streaking to arms or legs  no rash/vesicles/petechiae   Poncho Martines MD, FACEP: In this physician's medical judgement based on clinical history and physical exam: patient with history of seizure and opioid abuse  will get iv, cbc, cmp, ekg, depakote level, ct head if needed, capnography, cxr and reassess  Will follow up on labs, analgesia, imaging, reassess and disposition as clinically indicated.  *The above represents an initial assessment/impression. Please refer to my progress notes below for potential changes in patient clinical course* Patient is a patient of Dr. Pearson of [facility]; [address]. Patient states he is coming via ambulate from [facility]; no record of this from triage. Patient ISTOP ref #: [id number]  patient is not on xanax, not on Depakote, not on oxycodone.     Patient with history of epilepsy Today he felt like he was going to pass out and have a seizure around 1300 or 1400 and took a Depakote 750mg (usually supposed to take 500 am and 750 pm) in his bag but then starting sweating and eating. Patient admits to taking/snorting heroin and fentanyl today. Patient states he then had chest pain today no fall. He states chest pain started after his sensation of near seizure.   Patient is normally on Depakote for which he is NON-COMPLIANT, ALAPRAZLAM, and GABAPENTIN.  patient was up attempting to change the station on the bedside [employer] communication device.   eyes open spontaneously  patient is drowsy, slurring his words  trachea midline  ncat  wearing glasses that are taped on the right arm  no abdominal pain  obese by bmi  darkening of skin to hands over mcp and pip  non-tachypneic, non-bradypneic   cooperative  no gross deformity of extremities, no asymmetry  no edema  no rash/vesicles/petechiae  no injection marks  no streaking to arms or legs  no rash/vesicles/petechiae   Poncho Martines MD, FACEP: In this physician's medical judgement based on clinical history and physical exam: patient with history of seizure and opioid abuse  will get iv, cbc, cmp, ekg, depakote level, ct head if needed, capnography, cxr and reassess  Will follow up on labs, analgesia, imaging, reassess and disposition as clinically indicated.  *The above represents an initial assessment/impression. Please refer to my progress notes below for potential changes in patient clinical course*  patient without signs and symptoms of emergent condition, ce within normal limits   will discharge   patient requesting transportation and will provide metro card  will offer patient clinic resources to follow up   The patient was serially evaluated throughout emergency department course by the team. There was no acute deterioration up to this time in the emergency department. The patient has demonstrated clinical improvement and/or stability, feels better at this time according to emergency department team. Agree with goals/plan of emergency department care as described in this physician's electronic medical record, including diagnostics, therapeutics and consultation recommendation as clinically warranted. Will discharge home with close outpatient follow up with primary care physician/provider and specialist if necessary. The patient and/or family was educated on concerning signs and features to return to the emergency department, in layman terms, including but not limited to: nausea, vomiting, fever, chills, the inability to eat, take medications, or drink, persistent/worsening symptoms or any concerns at all. There are no acute or immediate life threatening issues present on history, clinical exam, or any diagnostic evaluation. The patient is a safe disposition home, has capacity and insight into their condition, is ambulatory in the Emergency Department with no further questions and will follow up with their doctor(s) this week. Diagnosis, prognosis, natural history and treatment was discussed with patient and/or family. The patient and/or family were given the opportunity to ask questions and have them answered in full. The patient and/or family are with capacity and insight into the situation, treatment, risks, benefits, alternative therapies, and understand that they can ask any further questions if needed. Patient and/or family/guardian understands anticipatory guidance and was given strict return and follow up precautions. The patient and/or family/guardian has been informed of the necessity to follow up with the PMD/Clinic/follow up as provided within 2-3 days, and the patient and/or family/guardian reports understanding of above with capacity and insight. The patient and/or family/guardian were informed of any results of their tests and are were encouraged to follow up on the findings with their doctor as well as the need to inform their doctor of any results. The patient and/or family/guardian are aware of the need to follow up with repeat testing as applicable and report understanding of the above with capacity and insight. The patient and/or family/guardian was made aware of any pending test results at the time of discharge and of the need to call back for the final results a well as the need to inform their doctor of the results. Patient is a patient of Dr. Pearson of [facility]; [address]. Patient states he is coming via ambulate from [facility]; no record of this from triage. Patient ISTOP ref #: [id number]  patient is not on xanax, not on oxycodone.     Patient with history of epilepsy Today he felt like he was going to pass out and have a seizure around 1300 or 1400 and took a Depakote 750mg (usually supposed to take 500 am and 750 pm) in his bag but then starting sweating and eating. Patient admits to taking/snorting heroin and fentanyl today. Patient states he then had chest pain today no fall. He states chest pain started after his sensation of near seizure.   Patient is normally on Depakote for which he is NON-COMPLIANT, ALAPRAZLAM, and GABAPENTIN.  patient was up attempting to change the station on the bedside [employer] communication device.   eyes open spontaneously  patient is drowsy, slurring his words  trachea midline  ncat  wearing glasses that are taped on the right arm  no abdominal pain  obese by bmi  darkening of skin to hands over mcp and pip  non-tachypneic, non-bradypneic   cooperative  no gross deformity of extremities, no asymmetry  no edema  no rash/vesicles/petechiae  no injection marks  no streaking to arms or legs  no rash/vesicles/petechiae   Poncho Martines MD, FACEP: In this physician's medical judgement based on clinical history and physical exam: patient with history of seizure and opioid abuse  will get iv, cbc, cmp, ekg, depakote level, ct head if needed, capnography, cxr and reassess  Will follow up on labs, analgesia, imaging, reassess and disposition as clinically indicated.  *The above represents an initial assessment/impression. Please refer to my progress notes below for potential changes in patient clinical course*  patient without signs and symptoms of emergent condition, ce within normal limits   will discharge   patient requesting transportation and will provide metro card  will offer patient clinic resources to follow up   The patient was serially evaluated throughout emergency department course by the team. There was no acute deterioration up to this time in the emergency department. The patient has demonstrated clinical improvement and/or stability, feels better at this time according to emergency department team. Agree with goals/plan of emergency department care as described in this physician's electronic medical record, including diagnostics, therapeutics and consultation recommendation as clinically warranted. Will discharge home with close outpatient follow up with primary care physician/provider and specialist if necessary. The patient and/or family was educated on concerning signs and features to return to the emergency department, in layman terms, including but not limited to: nausea, vomiting, fever, chills, the inability to eat, take medications, or drink, persistent/worsening symptoms or any concerns at all. There are no acute or immediate life threatening issues present on history, clinical exam, or any diagnostic evaluation. The patient is a safe disposition home, has capacity and insight into their condition, is ambulatory in the Emergency Department with no further questions and will follow up with their doctor(s) this week. Diagnosis, prognosis, natural history and treatment was discussed with patient and/or family. The patient and/or family were given the opportunity to ask questions and have them answered in full. The patient and/or family are with capacity and insight into the situation, treatment, risks, benefits, alternative therapies, and understand that they can ask any further questions if needed. Patient and/or family/guardian understands anticipatory guidance and was given strict return and follow up precautions. The patient and/or family/guardian has been informed of the necessity to follow up with the PMD/Clinic/follow up as provided within 2-3 days, and the patient and/or family/guardian reports understanding of above with capacity and insight. The patient and/or family/guardian were informed of any results of their tests and are were encouraged to follow up on the findings with their doctor as well as the need to inform their doctor of any results. The patient and/or family/guardian are aware of the need to follow up with repeat testing as applicable and report understanding of the above with capacity and insight. The patient and/or family/guardian was made aware of any pending test results at the time of discharge and of the need to call back for the final results a well as the need to inform their doctor of the results.

## 2022-05-05 NOTE — ED ADULT NURSE NOTE - NSIMPLEMENTINTERV_GEN_ALL_ED
Implemented All Fall with Harm Risk Interventions:  [facility] to call system. Call bell, personal items and telephone within reach. Instruct patient to call for assistance. Room bathroom lighting operational. Non-slip footwear when patient is off stretcher. Physically safe environment: no spills, clutter or unnecessary equipment. Stretcher in lowest position, wheels locked, appropriate side rails in place. Provide visual cue, wrist band, yellow gown, etc. Monitor gait and stability. Monitor for mental status changes and reorient to person, place, and time. Review medications for side effects contributing to fall risk. Reinforce activity limits and safety measures with patient and family. Provide visual clues: red socks.
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
## 2022-08-24 NOTE — PROGRESS NOTE ADULT - ASSESSMENT
Problem/Plan - 1:  ·  Problem: Osteomyelitis of lumbar spine.  Plan: fu cultures  ID fu  management as per ID     Problem/Plan - 2:  ·  Problem: Chronic back pain.  Plan: 1. Continue home regimen of oxycodone; will increase frequency to 30 mg PO q4h PRN severe pain.  2. There is no plan to administer IV narcotics for chronic pain management - this was discussed at length with patient.  3. Ambulation as tolerated.  4. Continue neurontin.     Problem/Plan - 3:  ·  Problem: Heroin abuse.  Plan: Starting to manifest signs of withdrawal.  1. Start clonidine 0.1 mg PO q4h PRN for persistent symptoms.  2. Anticipate loose BMs as well - loperamide PRN ordered.     Problem/Plan - 4:  ·  Problem: Anxiety.  Plan: 1. Resume home dose of xanax, 2 mg PO q8h PRN  2. Continue adderall, dose confirmed as well.    asked the nurse manager to put pt on 1 to 1 to prevent drug abuse in the hospital and limit visitors 24-Aug-2022 03:42

## 2022-11-17 NOTE — ED ADULT TRIAGE NOTE - WEIGHT IN LBS
300 Ivermectin Counseling:  Patient instructed to take medication on an empty stomach with a full glass of water.  Patient informed of potential adverse effects including but not limited to nausea, diarrhea, dizziness, itching, and swelling of the extremities or lymph nodes.  The patient verbalized understanding of the proper use and possible adverse effects of ivermectin.  All of the patient's questions and concerns were addressed.

## 2023-02-01 NOTE — ED ADULT TRIAGE NOTE - HEIGHT IN INCHES
Dionne Saab was seen and treated in our emergency department on 2/1/2023. She may return to work on 02/02/2023. Patient seen and evaluated on 2/1/2023. Patient safe to return to work on 2/2/2023. If you have any questions or concerns, please don't hesitate to call.       Isa Cramer, DO
[phone]

## 2023-02-03 NOTE — ED ADULT NURSE NOTE - NS PRO PASSIVE SMOKE EXP
I confirm that I have reviewed the available information in the medical record prior to the scheduled surgery.   No

## 2023-02-07 ENCOUNTER — OUTPATIENT (OUTPATIENT)
Dept: OUTPATIENT SERVICES | Facility: HOSPITAL | Age: 43
LOS: 1 days | End: 2023-02-07
Payer: MEDICAID

## 2023-02-07 DIAGNOSIS — Z98.890 OTHER SPECIFIED POSTPROCEDURAL STATES: Chronic | ICD-10-CM

## 2023-02-07 DIAGNOSIS — Z00.8 ENCOUNTER FOR OTHER GENERAL EXAMINATION: ICD-10-CM

## 2023-02-07 LAB
A1C WITH ESTIMATED AVERAGE GLUCOSE RESULT: 5.6 % — SIGNIFICANT CHANGE UP (ref 4–5.6)
ALBUMIN SERPL ELPH-MCNC: 4.1 G/DL — SIGNIFICANT CHANGE UP (ref 3.5–5.2)
ALP SERPL-CCNC: 85 U/L — SIGNIFICANT CHANGE UP (ref 30–115)
ALT FLD-CCNC: 51 U/L — HIGH (ref 0–41)
ANION GAP SERPL CALC-SCNC: 12 MMOL/L — SIGNIFICANT CHANGE UP (ref 7–14)
AST SERPL-CCNC: 88 U/L — HIGH (ref 0–41)
BILIRUB SERPL-MCNC: 0.3 MG/DL — SIGNIFICANT CHANGE UP (ref 0.2–1.2)
BUN SERPL-MCNC: 19 MG/DL — SIGNIFICANT CHANGE UP (ref 10–20)
CALCIUM SERPL-MCNC: 9.2 MG/DL — SIGNIFICANT CHANGE UP (ref 8.4–10.5)
CHLORIDE SERPL-SCNC: 104 MMOL/L — SIGNIFICANT CHANGE UP (ref 98–110)
CHOLEST SERPL-MCNC: 161 MG/DL — SIGNIFICANT CHANGE UP
CO2 SERPL-SCNC: 25 MMOL/L — SIGNIFICANT CHANGE UP (ref 17–32)
CREAT SERPL-MCNC: 0.9 MG/DL — SIGNIFICANT CHANGE UP (ref 0.7–1.5)
EGFR: 111 ML/MIN/1.73M2 — SIGNIFICANT CHANGE UP
ESTIMATED AVERAGE GLUCOSE: 114 MG/DL — SIGNIFICANT CHANGE UP (ref 68–114)
GLUCOSE SERPL-MCNC: 100 MG/DL — HIGH (ref 70–99)
HCT VFR BLD CALC: 41.5 % — LOW (ref 42–52)
HDLC SERPL-MCNC: 41 MG/DL — SIGNIFICANT CHANGE UP
HGB BLD-MCNC: 13.3 G/DL — LOW (ref 14–18)
LIPID PNL WITH DIRECT LDL SERPL: 89 MG/DL — SIGNIFICANT CHANGE UP
MAGNESIUM SERPL-MCNC: 2 MG/DL — SIGNIFICANT CHANGE UP (ref 1.8–2.4)
MCHC RBC-ENTMCNC: 31.1 PG — HIGH (ref 27–31)
MCHC RBC-ENTMCNC: 32 G/DL — SIGNIFICANT CHANGE UP (ref 32–37)
MCV RBC AUTO: 97 FL — HIGH (ref 80–94)
NON HDL CHOLESTEROL: 120 MG/DL — SIGNIFICANT CHANGE UP
NRBC # BLD: 0 /100 WBCS — SIGNIFICANT CHANGE UP (ref 0–0)
PLATELET # BLD AUTO: 96 K/UL — LOW (ref 130–400)
POTASSIUM SERPL-MCNC: 4 MMOL/L — SIGNIFICANT CHANGE UP (ref 3.5–5)
POTASSIUM SERPL-SCNC: 4 MMOL/L — SIGNIFICANT CHANGE UP (ref 3.5–5)
PROT SERPL-MCNC: 7.2 G/DL — SIGNIFICANT CHANGE UP (ref 6–8)
RBC # BLD: 4.28 M/UL — LOW (ref 4.7–6.1)
RBC # FLD: 13.9 % — SIGNIFICANT CHANGE UP (ref 11.5–14.5)
SODIUM SERPL-SCNC: 141 MMOL/L — SIGNIFICANT CHANGE UP (ref 135–146)
TRIGL SERPL-MCNC: 155 MG/DL — HIGH
WBC # BLD: 6.08 K/UL — SIGNIFICANT CHANGE UP (ref 4.8–10.8)
WBC # FLD AUTO: 6.08 K/UL — SIGNIFICANT CHANGE UP (ref 4.8–10.8)

## 2023-02-07 PROCEDURE — 80074 ACUTE HEPATITIS PANEL: CPT

## 2023-02-07 PROCEDURE — 86480 TB TEST CELL IMMUN MEASURE: CPT

## 2023-02-07 PROCEDURE — 86780 TREPONEMA PALLIDUM: CPT

## 2023-02-07 PROCEDURE — 83036 HEMOGLOBIN GLYCOSYLATED A1C: CPT

## 2023-02-07 PROCEDURE — 85027 COMPLETE CBC AUTOMATED: CPT

## 2023-02-07 PROCEDURE — 80053 COMPREHEN METABOLIC PANEL: CPT

## 2023-02-07 PROCEDURE — 80061 LIPID PANEL: CPT

## 2023-02-07 PROCEDURE — 81001 URINALYSIS AUTO W/SCOPE: CPT

## 2023-02-07 PROCEDURE — 83735 ASSAY OF MAGNESIUM: CPT

## 2023-02-08 DIAGNOSIS — Z00.8 ENCOUNTER FOR OTHER GENERAL EXAMINATION: ICD-10-CM

## 2023-02-08 LAB
APPEARANCE UR: CLEAR — SIGNIFICANT CHANGE UP
BACTERIA # UR AUTO: NEGATIVE — SIGNIFICANT CHANGE UP
BILIRUB UR-MCNC: NEGATIVE — SIGNIFICANT CHANGE UP
COLOR SPEC: ABNORMAL
DIFF PNL FLD: NEGATIVE — SIGNIFICANT CHANGE UP
EPI CELLS # UR: 4 /HPF — SIGNIFICANT CHANGE UP (ref 0–5)
GLUCOSE UR QL: NEGATIVE — SIGNIFICANT CHANGE UP
HAV IGM SER-ACNC: SIGNIFICANT CHANGE UP
HBV CORE IGM SER-ACNC: SIGNIFICANT CHANGE UP
HBV SURFACE AG SER-ACNC: SIGNIFICANT CHANGE UP
HCV AB S/CO SERPL IA: 0.13 S/CO — SIGNIFICANT CHANGE UP (ref 0–0.99)
HCV AB SERPL-IMP: SIGNIFICANT CHANGE UP
HYALINE CASTS # UR AUTO: 2 /LPF — SIGNIFICANT CHANGE UP (ref 0–7)
KETONES UR-MCNC: NEGATIVE — SIGNIFICANT CHANGE UP
LEUKOCYTE ESTERASE UR-ACNC: NEGATIVE — SIGNIFICANT CHANGE UP
NITRITE UR-MCNC: NEGATIVE — SIGNIFICANT CHANGE UP
PH UR: 6.5 — SIGNIFICANT CHANGE UP (ref 5–8)
PROT UR-MCNC: ABNORMAL
RBC CASTS # UR COMP ASSIST: 3 /HPF — SIGNIFICANT CHANGE UP (ref 0–4)
SP GR SPEC: 1.03 — HIGH (ref 1.01–1.02)
T PALLIDUM AB TITR SER: NEGATIVE — SIGNIFICANT CHANGE UP
UROBILINOGEN FLD QL: ABNORMAL
WBC UR QL: 1 /HPF — SIGNIFICANT CHANGE UP (ref 0–5)

## 2023-02-09 LAB
GAMMA INTERFERON BACKGROUND BLD IA-ACNC: 0.04 IU/ML — SIGNIFICANT CHANGE UP
M TB IFN-G BLD-IMP: NEGATIVE — SIGNIFICANT CHANGE UP
M TB IFN-G CD4+ BCKGRND COR BLD-ACNC: 0.01 IU/ML — SIGNIFICANT CHANGE UP
M TB IFN-G CD4+CD8+ BCKGRND COR BLD-ACNC: 0.01 IU/ML — SIGNIFICANT CHANGE UP
QUANT TB PLUS MITOGEN MINUS NIL: 7.52 IU/ML — SIGNIFICANT CHANGE UP

## 2023-02-27 ENCOUNTER — OUTPATIENT (OUTPATIENT)
Dept: OUTPATIENT SERVICES | Facility: HOSPITAL | Age: 43
LOS: 1 days | End: 2023-02-27
Payer: MEDICAID

## 2023-02-27 DIAGNOSIS — Z98.890 OTHER SPECIFIED POSTPROCEDURAL STATES: Chronic | ICD-10-CM

## 2023-02-27 DIAGNOSIS — I49.9 CARDIAC ARRHYTHMIA, UNSPECIFIED: ICD-10-CM

## 2023-02-27 PROCEDURE — 93005 ELECTROCARDIOGRAM TRACING: CPT

## 2023-02-27 PROCEDURE — 93010 ELECTROCARDIOGRAM REPORT: CPT

## 2023-02-28 DIAGNOSIS — I49.9 CARDIAC ARRHYTHMIA, UNSPECIFIED: ICD-10-CM

## 2023-04-28 NOTE — DISCHARGE NOTE ADULT - LAUNCH MEDICATION RECONCILIATION
<<-----Click here for Discharge Medication Review
67 y/o M PMH HTN, OA (s/p R TKR Jan 2023) , brain mass (s/p biopsy 4/7/23 at [facility] w/ Dr. Tobar) presents for pre-op for L crani tumor resection 4/24/23. Now s/p L crani for tumor resection, frozen HGG (4/25).

## 2023-05-10 NOTE — ED PROVIDER NOTE - WEIGHT BEARING
Consent: Written consent obtained, risks reviewed including but not limited to crusting, scabbing, blistering, scarring, darker or lighter pigmentary change, bruising, and/or incomplete response. Spot Size (Optional): 6 mm Post-Care Instructions: I reviewed with the patient in detail post-care instructions. Patient should stay away from the sun and wear sun protection until treated areas are fully healed. Post-Procedure: Following the procedure the post care instructions were reviewed with the patient. Post handout instructions were given to pt. Anesthesia Type: 1% lidocaine with epinephrine Number Of Passes (Will Not Render If Set To 0): 1 Repetition Rate (Optional): 2 Hz Pre-Procedure: The treatment areas identified by the patient were cleansed and treatment was performed with the parameters mentioned above. Detail Level: Zone Fluence In J/Cm2 (Will Not Render If Blank): 7.6 able

## 2023-06-28 NOTE — BEHAVIORAL HEALTH ASSESSMENT NOTE - NSBHPSYCHMEDSHX_PSY_A_CORE
[facility]  Gastroenterology  [address] [place] [address]  Phone: [phone]  Fax: [phone]  Follow Up Time: 1 week    Ronald Shepard  Cardiovascular Disease  [address] [place] [address]  Phone: [phone]  Fax: [phone]  Follow Up Time: 2 weeks  
yes...

## 2023-07-17 ENCOUNTER — OUTPATIENT (OUTPATIENT)
Dept: OUTPATIENT SERVICES | Facility: HOSPITAL | Age: 43
LOS: 1 days | End: 2023-07-17
Payer: MEDICAID

## 2023-07-17 ENCOUNTER — APPOINTMENT (OUTPATIENT)
Dept: PODIATRY | Facility: CLINIC | Age: 43
End: 2023-07-17
Payer: MEDICAID

## 2023-07-17 VITALS
HEIGHT: 68 IN | SYSTOLIC BLOOD PRESSURE: 120 MMHG | HEART RATE: 85 BPM | DIASTOLIC BLOOD PRESSURE: 82 MMHG | WEIGHT: 275 LBS | BODY MASS INDEX: 41.68 KG/M2

## 2023-07-17 DIAGNOSIS — Z00.00 ENCOUNTER FOR GENERAL ADULT MEDICAL EXAMINATION WITHOUT ABNORMAL FINDINGS: ICD-10-CM
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
## 2023-07-17 DIAGNOSIS — E11.9 TYPE 2 DIABETES MELLITUS W/OUT COMPLICATIONS: ICD-10-CM

## 2023-07-17 DIAGNOSIS — S90.829A BLISTER (NONTHERMAL), UNSPECIFIED FOOT, INITIAL ENCOUNTER: ICD-10-CM

## 2023-07-17 DIAGNOSIS — Z98.890 OTHER SPECIFIED POSTPROCEDURAL STATES: Chronic | ICD-10-CM

## 2023-07-17 PROCEDURE — 99203 OFFICE O/P NEW LOW 30 MIN: CPT

## 2023-07-17 RX ORDER — SILVER SULFADIAZINE 10 MG/G
1 CREAM TOPICAL DAILY
Qty: 1 | Refills: 1 | Status: ACTIVE | COMMUNITY
Start: 2023-07-17 | End: 1900-01-01

## 2023-07-17 NOTE — HISTORY OF PRESENT ILLNESS
[FreeTextEntry1] : 43 year old M  presents for a diabetic foot evaluation. Mr. ROLON denies any tingling burning in his feet. Last A1c was 5.6% 2/2023. Here today w/ c/c of bilateral painful blister formation. Patient attributes blisters from poor shoe wear. Originally described presence of hemorrhaging into the blister \par

## 2023-07-17 NOTE — ASSESSMENT
[FreeTextEntry1] : Modified ADA Diabetic Foot Risk Classification 0\par A1c reviewed \par -Loss of protective sensation:  no\par -Presence of foot deformity:   no \par -presence of pre-ulcerative lesion:yes \par   -hemorrhage into callus:no\par -atrophy of heel or metatarsal fat pads:  no\par \par -Diabetic neurovascular foot assessment  performed. \par -Discussed with patient diabetic foot hygiene.Patient instructed to regularly check the bottom of the feet\par -rx silvadene. apply on b/l foot wounds bid\par -limit standing /walking \par -return 6 months (earlier if wounds fail to progress to healing)\par \par \par

## 2023-07-17 NOTE — PHYSICAL EXAM
[General Appearance - Alert] : alert [General Appearance - In No Acute Distress] : in no acute distress [2+] : left foot dorsalis pedis 2+ [Oriented To Time, Place, And Person] : oriented to person, place, and time [Impaired Insight] : insight and judgment were intact [FreeTextEntry1] : superficial wound submet head 2 right foot and submet head 4 left foot .\par no acute signs of infection \par \par  [Vibration Dec.] : normal vibratory sensation at the level of the toes [Diminished Throughout Right Foot] : normal sensation with monofilament testing throughout right foot [Diminished Throughout Left Foot] : normal sensation with monofilament testing throughout left foot

## 2023-07-18 DIAGNOSIS — S90.829A BLISTER (NONTHERMAL), UNSPECIFIED FOOT, INITIAL ENCOUNTER: ICD-10-CM

## 2023-07-18 DIAGNOSIS — E11.9 TYPE 2 DIABETES MELLITUS WITHOUT COMPLICATIONS: ICD-10-CM

## 2023-07-20 ENCOUNTER — APPOINTMENT (OUTPATIENT)
Dept: ENDOCRINOLOGY | Facility: CLINIC | Age: 43
End: 2023-07-20

## 2023-07-29 ENCOUNTER — OUTPATIENT (OUTPATIENT)
Dept: OUTPATIENT SERVICES | Facility: HOSPITAL | Age: 43
LOS: 1 days | End: 2023-07-29
Payer: MEDICAID

## 2023-07-29 ENCOUNTER — RESULT REVIEW (OUTPATIENT)
Age: 43
End: 2023-07-29

## 2023-07-29 DIAGNOSIS — Z00.8 ENCOUNTER FOR OTHER GENERAL EXAMINATION: ICD-10-CM

## 2023-07-29 DIAGNOSIS — E29.1 TESTICULAR HYPOFUNCTION: ICD-10-CM

## 2023-07-29 DIAGNOSIS — Z98.890 OTHER SPECIFIED POSTPROCEDURAL STATES: Chronic | ICD-10-CM

## 2023-07-29 PROCEDURE — 70553 MRI BRAIN STEM W/O & W/DYE: CPT | Mod: 26

## 2023-07-29 PROCEDURE — A9579: CPT

## 2023-07-29 PROCEDURE — 70553 MRI BRAIN STEM W/O & W/DYE: CPT

## 2023-07-30 DIAGNOSIS — E29.1 TESTICULAR HYPOFUNCTION: ICD-10-CM

## 2023-07-31 ENCOUNTER — APPOINTMENT (OUTPATIENT)
Dept: ENDOCRINOLOGY | Facility: CLINIC | Age: 43
End: 2023-07-31

## 2023-08-10 NOTE — ED PROVIDER NOTE - OBJECTIVE STATEMENT
40 y/o M with PMHx of Osteomyelitis, Seizure (Gabapentin), IV Drug Abuse and is in methadone maintenance therapy presents to the ED due to his need for medication refills. As per pt, he "thinks" he went to his methadone program today, and is repeating that he is "out of it." Pt state he is going through a separation with his wife, and has other problems at home, after which he left his medications with her. Endorses FamHx of DM and Alzheimer's but denies fever/chills, SOB, CP, abdominal pain or any other complaints of pain. Pt states he is here for a refill of his medications of Adderall 30 mg, Xanax 2mg and Percocet, however the medications he is on are all controlled substances. Explained to the pt that this is the ED, for the refills he is looking for, he would need to go to the provider who prescribed them, after which pt understood. [age] y/o M with PMHx of Osteomyelitis, Seizure (Gabapentin), IV Drug Abuse and is in methadone maintenance therapy presents to the ED due to his need for medication refills. As per pt, he "thinks" he went to his methadone program today, and is repeating that he is "out of it." Pt state he is going through a separation with his wife, and has other problems at home, after which he left his medications with her and is unable to get it. Endorses FamHx of DM and Alzheimer's but denies fever/chills, SOB, CP, abdominal pain or any other complaints of pain. Pt states he is here for a refill of his medications of Adderall 30 mg, Xanax 2mg and Percocet, however the medications he is on are all controlled substances. Explained to the pt that this is the ED, for the refills he is looking for, he would need to go to the provider who prescribed them, after which pt understood.

## 2023-10-19 NOTE — ED PROVIDER NOTE - NEURO NEGATIVE STATEMENT, MLM
Daily Note     Today's date: 10/19/2023  Patient name: Paloma Varela  : 1955  MRN: [id number]  Referring provider: Dai Cummings  Dx:   Encounter Diagnosis     ICD-10-CM    1. Acute pain of right knee  M25.561       2. Primary osteoarthritis of right knee  M17.11                      Subjective: pt notes that he is doing okay and his left knee has actually felt tighter recently      Objective: See treatment diary below      Assessment: pt did well with progression of all exercises. Did struggle with balance during ball toss though. Will work to progress pt's single leg balance ability next visit. Plan: Continue per plan of care.       Precautions: previous left knee menisectomy      Manuals 9/28 10/5 10/12 10/19         Right hamstring stm DB DL DB DB                                                Neuro Re-Ed             Ball bridge   2x10 2x10         Bridge ham iso     5''x2 X10  5''x2x10         Ball toss 3 way    Blue 2x10 ea                                                             Ther Ex             Bike for strengthening and endurance nv lv1 5' Lv 2 8' Lv 2 10'         Quad stretch  30"x3 30''x4 30''x4         Elevated HR nv 2x10 2x10 3x10         Side stepping band nv Blue 10ft x4 Blue 10ft x6 Blk 10ft x6 a         Standing knee flex 5# nv 5#  2x10 5# 2x10 5# 3x10         Standing hip abd    5# 2x10         Bosu squat    2x10                      Ther Activity                                       Gait Training                                       Modalities no loss of consciousness, no gait abnormality, no headache, no sensory deficits, and no weakness.

## 2023-10-27 NOTE — ED PROVIDER NOTE - CROS ED NEURO ALL NEG
emphasis/focus on exercise progression and improving soft tissue extensibility. Next visit plan to progress weights and add new exercises   Isometric biodex test NPV    Electronically Signed by Tanner Sheehan PT              Date: 10/27/2023     Note: If patient does not return for scheduled/recommended follow up visits, this note will serve as a discharge from care along with the most recent update on progress. negative...

## 2023-11-21 NOTE — DISCHARGE NOTE NURSING/CASE MANAGEMENT/SOCIAL WORK - CONTRAINDICATIONS & PRECAUTIONS (SELECT ALL THAT APPLY)
Chief Complaint- joint pain     Subjective:   Lisbeth Hernández is a 30 y.o. female here today for follow up of rheumatological issues    This is a follow-up visit, second visit with us for this patient who we see in this clinic for serologically positive rheumatoid arthritis.  Patient states her symptoms started about 6 months prior to her first visit with pain starting in bilateral knees and then progressing to shoulders fingers and ankles.  At last visit patient was started on Cimzia 200 mg subcu every 2 weeks, patient states that initially Cimzia seem to help and now states that she is getting random pains again in shoulders and hands.  Complicating factors that patient is also trying to get pregnant and is unable to take medications that may cause teratogenicity.    Addendum 12/28/2023  Angeles King, PharmD  Felisha Penn M.D.  Hi Dr. Penn,    I'm a clinical pharmacist working with [facility]. Patient spoke with our liaison today and mentioned that she had liver inflammation. See that she has autoimmune hepatitis and was referred to GI.  She started Cimzia 9/14/23 and there have been post-marketing case reports of autoimmune hepatitis in patients taking Cimzia. It's difficult to know if Cimzia is the culprit or related but here's a case study with patients on Anti-TNF agents and hepatotoxicity; Hepatotoxicity Associated with the Use of Anti-TNF-a Agents - [facility] (nih.gov)  Notified patient, cimzia stopped        PMHx  Iron deficient anemia  Rhinoplasty at 18 years old  Hx of saliva glands removed at 18 years old in [place]     Fam Hx   M-bipolar  F-hypercholesterol  Bx3 A/W  Aunt-DGm-RA     Soc Hx  Pos tob quit 2016  Positive vaping  No MJ  ETOH q month  Positive tattoos  No blood tx     S/p plaquenil-N/V diarrhea  S/p sulfasalazine-strong family hx of sulfa allergy, patient hesitant     Addendum 12/4/2023  F-Actin 26 11/2023  Smooth muscle neg 11/2023  AMA neg 11/2023  HAV neg  11/2023  Notified patient, refer to GI/liver for evaluation     G6PD 17.6 adequate 8/2023  SSA neg 8/2023  SSB neg 8/2023  HBsAg/HBcAb neg 8/2023  HCV neg 8/2023  Quantiferon Gold neg 8/2023      5/2023  CCP neg 5/2023  HCV neg 3/2022  Hand x-rays 5/2023-IMPRESSION:  No evidence of fracture or arthropathy.     Current Outpatient Medications   Medication Sig Dispense Refill    methylPREDNISolone (MEDROL) 4 MG Tab 6 tabs po one day then 5 tabs po one day then 4 tabs po one day then 3 tabs po one day then 2 tabs po one day then 1 tab po for one day 21 Tablet 1    ferrous sulfate 325 (65 Fe) MG tablet Take 1 Tablet by mouth every Monday, Wednesday, and Friday. 12 Tablet 5    certolizumab pegol (CIMZIA) 2 X 200 MG/ML Prefilled Syringe Kit Inject 200 mg under the skin every 14 days. 3 Each 1    meloxicam (MOBIC) 15 MG tablet TAKE 1 TABLET BY MOUTH 1 TIME A DAY AS NEEDED FOR SEVERE PAIN OR MODERATE PAIN. 30 Tablet 3     No current facility-administered medications for this visit.     She  has a past medical history of Cyclothymia and Iron deficiency anemia due to chronic blood loss (6/18/2020).    ROS   Other than what is mentioned in HPI or physical exam, there is no history of headaches, double vision or blurred vision.  No trouble swallowing difficulties .  No chest complaints including chest pain, cough or sputum production. No GI complaints including nausea, vomiting, change in bowel habits, or past peptic ulcer disease. No history of blood in the stools. No urinary complaints including dysuria or frequency. No history of alopecia, photosensitivity     Objective:     /70   Pulse 88   Temp 36.7 °C (98.1 °F) (Temporal)   Resp 14   Wt 93 kg (205 lb)   SpO2 97%  Body mass index is 32.11 kg/m².   Physical Exam:    Constitutional: Alert and oriented X3, patient is talkative with good eye contact.Skin: Warm, dry, good turgor, no rashes in visible areas, positive tattoos.Eye: Equal, round and reactive,  Patient/surrogate refused vaccine... conjunctiva clear, lids normal EOM intactENMT: Lips without lesions,  pinna without deformityNeck: Trachea midline, no masses, no thyromegaly.Lymph:  No cervical lymphadenopathy, no axillary lymphadenopathy, no supraclavicular lymphadenopathyRespiratory: Unlabored respiratory effort, lungs clear to auscultation, no wheezes, no ronchi.Cardiovascular: Normal S1, S2, Regular rate and rhythm, no murmurs rubs or gallops  .Abdomen: Soft, non-distended, overweight.Psych: Alert and oriented x3, normal affect and mood.Neuro: Cranial nerves 2-12 are grossly intact Ext:no joint laxity noted in bilateral arms, no joint laxity noted in bilateral legs, joints actually look quite good, no swan-neck or boutonniere deformities, no sausage digits, no dactylitis, shoulders good range of motion, elbows without flexion contractures, toes without crossover toes without splay toes, minimal crepitus in knees but no synovitis    Lab Results   Component Value Date/Time    HEPBCORIGM Non-Reactive 08/18/2023 07:02 AM    HEPBSAG Non-Reactive 08/18/2023 07:02 AM     Lab Results   Component Value Date/Time    HEPCAB Non-Reactive 08/18/2023 07:02 AM     Lab Results   Component Value Date/Time    SODIUM 139 08/01/2023 08:19 PM    POTASSIUM 3.9 08/01/2023 08:19 PM    CHLORIDE 104 08/01/2023 08:19 PM    CO2 23 08/01/2023 08:19 PM    GLUCOSE 104 (H) 08/01/2023 08:19 PM    BUN 13 08/01/2023 08:19 PM    CREATININE 0.54 08/01/2023 08:19 PM      Lab Results   Component Value Date/Time    WBC 5.1 10/27/2023 09:23 AM    RBC 4.98 10/27/2023 09:23 AM    HEMOGLOBIN 12.2 10/27/2023 09:23 AM    HEMATOCRIT 38.5 10/27/2023 09:23 AM    MCV 77.3 (L) 10/27/2023 09:23 AM    MCH 24.5 (L) 10/27/2023 09:23 AM    MCHC 31.7 (L) 10/27/2023 09:23 AM    MPV 11.3 10/27/2023 09:23 AM    NEUTSPOLYS 50.70 08/01/2023 08:19 PM    LYMPHOCYTES 37.80 08/01/2023 08:19 PM    MONOCYTES 7.70 08/01/2023 08:19 PM    EOSINOPHILS 2.70 08/01/2023 08:19 PM    BASOPHILS 0.90 08/01/2023 08:19 PM       Lab Results   Component Value Date/Time    CALCIUM 9.3 08/01/2023 08:19 PM    ASTSGOT 19 08/01/2023 08:19 PM    ALTSGPT 20 08/01/2023 08:19 PM    ALKPHOSPHAT 85 08/01/2023 08:19 PM    TBILIRUBIN 0.2 08/01/2023 08:19 PM    ALBUMIN 4.1 08/01/2023 08:19 PM    TOTPROTEIN 8.0 08/01/2023 08:19 PM     Lab Results   Component Value Date/Time    URICACID 4.2 05/19/2023 07:19 AM    RHEUMFACTN 100 (H) 05/19/2023 07:19 AM    CCPANTIBODY 10 05/19/2023 07:19 AM    ANTINUCAB None Detected 05/19/2023 07:19 AM     Lab Results   Component Value Date/Time    ANTISSBSJ 1 08/18/2023 07:02 AM     Lab Results   Component Value Date/Time    SEDRATEWES 16 05/19/2023 07:19 AM     Lab Results   Component Value Date/Time    HBA1C 5.5 03/17/2022 07:35 AM     Lab Results   Component Value Date/Time    G6PD 17.6 (H) 08/18/2023 07:02 AM     Assessment and Plan:     1. Rheumatoid arthritis involving multiple sites with positive rheumatoid factor (HCC)  Long discussion with patient, patient still interested in getting pregnant, is not interested in going on birth control, we opted to stick with the Cimzia 200 mg subcu every 2 weeks, add NSAID as needed, we also prescribed a Medrol Dosepak to be used in emergencies.  Patient is going to be discussing with her partner regarding the pregnancy issues.  - methylPREDNISolone (MEDROL) 4 MG Tab; 6 tabs po one day then 5 tabs po one day then 4 tabs po one day then 3 tabs po one day then 2 tabs po one day then 1 tab po for one day  Dispense: 21 Tablet; Refill: 1  - Comp Metabolic Panel; Future  - Sed Rate; Future    2. Medication monitoring encounter  Currently on Cimzia 200 mg subcu every 2 weeks, screening labs are up-to-date, neck screening labs due August 2025, patient needs monitoring labs every 6 months, next labs due now, labs ordered for patient  We reviewed risks of biological medications with patient including hematological pathology, cancer risks, neurological and infection issues especially  in the Covid-19 pandemic environment, patient states understanding.  - Comp Metabolic Panel; Future  - Sed Rate; Future    3. Iron deficiency anemia due to chronic blood loss  Followed by patient's PCP    Followup: Return in about 3 months (around 2/21/2024). or sooner quinn    Lisbethadolfo Vogtso Hernández  was seen 30 minutes face-to-face of which more than 50% of the time was spent counseling the patient (excluding time for procedures)  regarding  rheumatological condition and care. Therapy was discussed in detail.      Please note that this dictation was created using voice recognition software. I have made every reasonable attempt to correct obvious errors, but I expect that there are errors of grammar and possibly content that I did not discover before finalizing the note.

## 2023-12-05 NOTE — ED PROVIDER NOTE - NS ED MD DISPO DISCHARGE
Letter reviewed and sent by TRAN on 12/06/2023.  ----- Message from Maribell Keenan MA sent at 12/5/2023 10:25 AM CST -----  [facility] office called needing a clearance to pull a tooth and a filling this Friday. He has not missed an appointment and tooth being pulled is infected.    [phone] Thanks     Home

## 2023-12-24 ENCOUNTER — EMERGENCY (EMERGENCY)
Facility: HOSPITAL | Age: 43
LOS: 0 days | Discharge: HOME | End: 2023-12-24
Attending: STUDENT IN AN ORGANIZED HEALTH CARE EDUCATION/TRAINING PROGRAM
Payer: MEDICAID

## 2023-12-24 VITALS
DIASTOLIC BLOOD PRESSURE: 84 MMHG | HEART RATE: 78 BPM | OXYGEN SATURATION: 99 % | TEMPERATURE: 98 F | RESPIRATION RATE: 19 BRPM | SYSTOLIC BLOOD PRESSURE: 126 MMHG

## 2023-12-24 VITALS
HEIGHT: 68 IN | HEART RATE: 76 BPM | DIASTOLIC BLOOD PRESSURE: 81 MMHG | OXYGEN SATURATION: 99 % | RESPIRATION RATE: 18 BRPM | TEMPERATURE: 98 F | SYSTOLIC BLOOD PRESSURE: 123 MMHG | WEIGHT: 250 LBS

## 2023-12-24 DIAGNOSIS — M54.50 LOW BACK PAIN, UNSPECIFIED: ICD-10-CM

## 2023-12-24 DIAGNOSIS — G89.29 OTHER CHRONIC PAIN: ICD-10-CM

## 2023-12-24 DIAGNOSIS — F41.9 ANXIETY DISORDER, UNSPECIFIED: ICD-10-CM

## 2023-12-24 DIAGNOSIS — I10 ESSENTIAL (PRIMARY) HYPERTENSION: ICD-10-CM

## 2023-12-24 DIAGNOSIS — Z88.0 ALLERGY STATUS TO PENICILLIN: ICD-10-CM

## 2023-12-24 DIAGNOSIS — Z98.890 OTHER SPECIFIED POSTPROCEDURAL STATES: Chronic | ICD-10-CM

## 2023-12-24 DIAGNOSIS — F98.8 OTHER SPECIFIED BEHAVIORAL AND EMOTIONAL DISORDERS WITH ONSET USUALLY OCCURRING IN CHILDHOOD AND ADOLESCENCE: ICD-10-CM

## 2023-12-24 DIAGNOSIS — F39 UNSPECIFIED MOOD [AFFECTIVE] DISORDER: ICD-10-CM

## 2023-12-24 DIAGNOSIS — E11.9 TYPE 2 DIABETES MELLITUS WITHOUT COMPLICATIONS: ICD-10-CM

## 2023-12-24 LAB
APPEARANCE UR: ABNORMAL
APPEARANCE UR: ABNORMAL
BACTERIA # UR AUTO: ABNORMAL /HPF
BACTERIA # UR AUTO: ABNORMAL /HPF
BILIRUB UR-MCNC: NEGATIVE — SIGNIFICANT CHANGE UP
BILIRUB UR-MCNC: NEGATIVE — SIGNIFICANT CHANGE UP
COLOR SPEC: SIGNIFICANT CHANGE UP
COLOR SPEC: SIGNIFICANT CHANGE UP
COMMENT - URINE: SIGNIFICANT CHANGE UP
COMMENT - URINE: SIGNIFICANT CHANGE UP
DIFF PNL FLD: NEGATIVE — SIGNIFICANT CHANGE UP
DIFF PNL FLD: NEGATIVE — SIGNIFICANT CHANGE UP
GLUCOSE UR QL: NEGATIVE MG/DL — SIGNIFICANT CHANGE UP
GLUCOSE UR QL: NEGATIVE MG/DL — SIGNIFICANT CHANGE UP
KETONES UR-MCNC: NEGATIVE MG/DL — SIGNIFICANT CHANGE UP
KETONES UR-MCNC: NEGATIVE MG/DL — SIGNIFICANT CHANGE UP
LEUKOCYTE ESTERASE UR-ACNC: NEGATIVE — SIGNIFICANT CHANGE UP
LEUKOCYTE ESTERASE UR-ACNC: NEGATIVE — SIGNIFICANT CHANGE UP
NITRITE UR-MCNC: NEGATIVE — SIGNIFICANT CHANGE UP
NITRITE UR-MCNC: NEGATIVE — SIGNIFICANT CHANGE UP
PH UR: 6 — SIGNIFICANT CHANGE UP (ref 5–8)
PH UR: 6 — SIGNIFICANT CHANGE UP (ref 5–8)
PROT UR-MCNC: SIGNIFICANT CHANGE UP MG/DL
PROT UR-MCNC: SIGNIFICANT CHANGE UP MG/DL
RBC CASTS # UR COMP ASSIST: NEGATIVE /HPF — SIGNIFICANT CHANGE UP (ref 0–4)
RBC CASTS # UR COMP ASSIST: NEGATIVE /HPF — SIGNIFICANT CHANGE UP (ref 0–4)
SP GR SPEC: 1.03 — SIGNIFICANT CHANGE UP (ref 1–1.03)
SP GR SPEC: 1.03 — SIGNIFICANT CHANGE UP (ref 1–1.03)
UROBILINOGEN FLD QL: 2 MG/DL (ref 0.2–1)
UROBILINOGEN FLD QL: 2 MG/DL (ref 0.2–1)
WBC UR QL: SIGNIFICANT CHANGE UP /HPF (ref 0–5)
WBC UR QL: SIGNIFICANT CHANGE UP /HPF (ref 0–5)

## 2023-12-24 PROCEDURE — 99285 EMERGENCY DEPT VISIT HI MDM: CPT

## 2023-12-24 PROCEDURE — 72100 X-RAY EXAM L-S SPINE 2/3 VWS: CPT | Mod: 26

## 2023-12-24 PROCEDURE — 72148 MRI LUMBAR SPINE W/O DYE: CPT | Mod: 26,MA

## 2023-12-24 RX ORDER — METHOCARBAMOL 500 MG/1
1000 TABLET, FILM COATED ORAL ONCE
Refills: 0 | Status: COMPLETED | OUTPATIENT
Start: 2023-12-24 | End: 2023-12-24

## 2023-12-24 RX ORDER — LIDOCAINE 4 G/100G
2 CREAM TOPICAL ONCE
Refills: 0 | Status: COMPLETED | OUTPATIENT
Start: 2023-12-24 | End: 2023-12-24

## 2023-12-24 RX ORDER — KETOROLAC TROMETHAMINE 30 MG/ML
60 SYRINGE (ML) INJECTION ONCE
Refills: 0 | Status: DISCONTINUED | OUTPATIENT
Start: 2023-12-24 | End: 2023-12-24

## 2023-12-24 RX ADMIN — METHOCARBAMOL 1000 MILLIGRAM(S): 500 TABLET, FILM COATED ORAL at 08:25

## 2023-12-24 RX ADMIN — LIDOCAINE 2 PATCH: 4 CREAM TOPICAL at 08:31

## 2023-12-24 RX ADMIN — Medication 1 MILLIGRAM(S): at 10:55

## 2023-12-24 RX ADMIN — Medication 60 MILLIGRAM(S): at 08:27

## 2023-12-24 NOTE — ED PROVIDER NOTE - PATIENT PORTAL LINK FT
You can access the FollowMyHealth Patient Portal offered by [facility] by registering at the following website: [email or web address]. By joining [email or web address]’s FollowMyHealth portal, you will also be able to view your health information using other applications (apps) compatible with our system. You can access the FollowMyHealth Patient Portal offered by [facility] by registering at the following website: [email or web address]. By joining [facility]’s FollowMyHealth portal, you will also be able to view your health information using other applications (apps) compatible with our system.

## 2023-12-24 NOTE — ED PROVIDER NOTE - CLINICAL SUMMARY MEDICAL DECISION MAKING FREE TEXT BOX
43 year old male with h/o ivda, seizures, ADD, mood disorder, osteomyelitis, anxiety, HTN and DM presents today c/o acute on chronic back pain, pt states that his pain is chronic for over one year, pt does report a h/o osteomyelitis in 2014 in T1-T8, s/p spinal fusion and in 2016 L4-L5 osteomyelitis which resolved with iv antibiotics, pt denies ivda x 2 years or recent trauma, states that he has been procrastinating to get evaluated, at one time was in pain management x 6 months, ended in august in 2022, on exam pt has mildline lumbar spine and paraspinal tenderness, (-) straight leg raise test (-) paresthesias +normal pulses, xray spine and pain meds ordered, will reassess and dispo\    xray negative for acute fracture, mri shows degenerative changes  follow up with pmd and spine

## 2023-12-24 NOTE — ED PROVIDER NOTE - MUSCULOSKELETAL, MLM
Spine appears normal, range of motion is not limited, +LS spinal tenderness (-) straight leg raise test (-) paresthesia +normal pedal pulse

## 2023-12-24 NOTE — ED ADULT NURSE NOTE - NSFALLUNIVINTERV_ED_ALL_ED
Bed/Stretcher in lowest position, wheels locked, appropriate side rails in place/Call bell, personal items and telephone in reach/Instruct patient to call for assistance before getting out of bed/chair/stretcher/Non-slip footwear applied when patient is off stretcher/[place] to call system/Physically safe environment - no spills, clutter or unnecessary equipment/Purposeful proactive rounding/Room/bathroom lighting operational, light cord in reach Bed/Stretcher in lowest position, wheels locked, appropriate side rails in place/Call bell, personal items and telephone in reach/Instruct patient to call for assistance before getting out of bed/chair/stretcher/Non-slip footwear applied when patient is off stretcher/[place] to call system/Physically safe environment - no spills, clutter or unnecessary equipment/Purposeful proactive rounding/Room/bathroom lighting operational, light cord in reach

## 2023-12-24 NOTE — ED ADULT NURSE NOTE - NS ED PATIENT SAFETY CONCERN
Impression:  Presence of intraocular lens  Z96.1. Excellent post op course   Condition is improving - Plan: pt doing great. very pleased with visual outcome. pt defers srx at this time and will continue with readers.  
rtc Feb' 2022 for complete exam or prn No

## 2023-12-24 NOTE — ED ADULT NURSE NOTE - NURSING NEURO ORIENTATION
Feels like something is catching in her stomach on the side of her belly.
PT calling in with update following her episode from over the weekend. PT is doing better from over the weekend. She is not having any pain but states that she is still feeling weird from her episode last Saturday.
oriented to person, place and time

## 2023-12-24 NOTE — ED ADULT NURSE NOTE - OBJECTIVE STATEMENT
43yM A&Ox3 presenting to ED with back pain, more painful to the lower left side of back. pt reports he has had back SX in the past for osteomyelitis and on another occasion was prescribed abx for another case of osteomyelitis. pt reports the pain he is feeling today has been present for the past year but has worsened recently to the point where is unable to tolerate the pain. pt denies trauma to area, recent falls. pt reports that he graduated from methadone/txt program in 08/23 and is currently taking methadone as prescribed. pt cannot recall the name of the provider who performed his spinal SX in 2014. pt denies chest pain, pain on urination, h/a, N+V+D, weakness, blurred vision.

## 2024-03-07 NOTE — ED ADULT TRIAGE NOTE - ESI TRIAGE ACUITY LEVEL, MLM
Called patient. Informed her of 3/4/24 brain MRI results and recommendations. She verbalized understanding.     Patient would like neurology referral to be to [facility] as she prefers to stay in [place] for appointments. Will forward this message to Dr. Durdevic to place the order. A nurse will fax orders once signed.       Patient is asking if she is to still continue potassium chloride supplement since hydrochlorothiazide was discontinued. Informed patient this message will be sent to Dr. Durdevic to review and address.    2

## 2024-05-06 NOTE — ED ADULT NURSE NOTE - NSFALLRSKHARMRISK_ED_ALL_ED
Initial Post Discharge Follow Up   Discharge Date: 5/5/24  Contact Date: 5/6/2024    Consent Verification:  Assessment Completed With: Spouse: Lori Permission received per patient?  written  HIPAA Verified?  Yes    Discharge Dx:   Sepsis due to urinary tract infection     General:   How have you been since your discharge from the hospital?He's doing fine. He's better. He's tired but he just got out of the hospital.  He slept very well last night.   Do you have any pain since discharge?  No    When you were leaving the hospital were your discharge instructions reviewed with you? Yes  How well were your discharge instructions explained to you?   On a scale of 1-5   1- Very Poor and 5- Very well   Very Well  Do you have any questions about your discharge instructions?  No  Before leaving the hospital was your diagnoses explained to you? Yes  Do you have any questions about your diagnoses? No  Are you able to perform normal daily activities of living as you have prior to your hospital stay (dressing, bathing, ambulating to the bathroom, etc)? yes  (NCM) Was patient given a different diet per AVS? no      Medications:   Current Outpatient Medications   Medication Sig Dispense Refill    levoFLOXacin 750 MG Oral Tab Take 1 tablet (750 mg total) by mouth daily for 4 days. 4 tablet 0    HYDRALAZINE 100 MG Oral Tab TAKE 1 TABLET BY MOUTH TWICE A  tablet 1    ATORVASTATIN 10 MG Oral Tab TAKE 1 TABLET BY MOUTH EVERY DAY (Patient taking differently: Take 1 tablet (10 mg total) by mouth nightly.) 90 tablet 1    furosemide 20 MG Oral Tab Take 1 tablet (20 mg total) by mouth daily as needed (leg swelling).      metoprolol succinate ER 25 MG Oral Tablet 24 Hr Take 1 tablet (25 mg total) by mouth 2 (two) times daily. 60 tablet 1    acetaminophen 325 MG Oral Tab Take 1 tablet (325 mg total) by mouth in the morning and 1 tablet (325 mg total) before bedtime. 1 in the morning and 1 at night.      rivaroxaban (XARELTO) 20 MG Oral  Tab TAKE 1 TABLET BY MOUTH EVERY DAY WITH FOOD (Patient taking differently: at bedtime. TAKE 1 TABLET BY MOUTH EVERY DAY WITH FOOD) 90 tablet 3    famotidine 10 MG Oral Tab Take 1 tablet (10 mg total) by mouth at bedtime.       Were there any changes to your current medication(s) noted on the AVS? Yes  If so, were these medication changes discussed with you prior to leaving the hospital? Yes  If a new medication was prescribed:    Was the new medication's purpose & side effects reviewed? Yes  Do you have any questions about your new medication? No  Did you  your discharge medications when you left the hospital? Yes  Let's go over your medications together to make sure we are not missing anything. Medications Reviewed  Are there any reasons that keep you from taking your medication as prescribed? No  Are you having any concerns with constipation? No      Discharge medications reviewed/discussed/and reconciled against outpatient medications with patient.  Any changes or updates to medications sent to PCP.  Patient Acknowledged     Referrals/orders at D/C:  Referrals/orders placed at D/C? no    DME ordered at D/C? No      Discharge orders, AVS reviewed and discussed with patient. Any changes or updates to orders sent to PCP.  Patient Acknowledged      SDOH:   Transportation Needs: No Transportation Needs (5/3/2024)    Transportation Needs     Lack of Transportation: No     Financial Resource Strain: Low Risk  (5/6/2024)    Financial Resource Strain     Difficulty of Paying Living Expenses: Not hard at all     Med Affordability: No       Follow up appointments:      Your appointments       Date & Time Appointment Department (Maria Stein)    May 08, 2024 4:15 PM CDT Exam - Established with Apolinar Covarrubias DPM [facility] ([facility] 3)        May 23, 2024 4:00 PM CDT Follow Up Visit with Toby Pantoja MD [address]  ([facility] [address])              [facility], [address] [place] [address] [address]  [address] [place] [address] [address]  [facility] [id number] [phone] [facility] 3  [address] [facility] [address]  [facility] [id number] [phone]            TCC  Was TCC ordered: No    PCP (If no TCC appointment)  Does patient already have a PCP appointment scheduled? Yes, pt has an appt on 5/23/24 and wife declined a sooner one, NCM sent TE to PCP office.       Specialist    Does the patient have any other follow up appointment(s) needing to be scheduled? No      Is there any reason as to why you cannot make your appointment(s)?  No     Needs post D/C:   Now that you are home, are there any needs or concerns you need addressed before your next visit with your PCP?  (DME, meds, questions, etc.): No    Interventions by [facility]:   [facility] reviewed discharge instructions and when to seek medical attention with the patient's wife Lori. She states that pt is doing well. He's just a little tired but states that he just got out of the hospital and that would tire anyone out. She states that he did get a good night sleep last night. She has not checked his bp but states that he doesn't have a problem with it. She denied that he has had any further issues urinating. She denied that he has had any fever, n/v/c/d, sob, pain, lightheadedness, HA Or any new or worsening symptoms. Med review completed. He denied having any questions or concerns at this time.       CCM referral placed:    No    BOOK BY DATE: 5/19/24     no

## 2024-05-11 ENCOUNTER — EMERGENCY (EMERGENCY)
Facility: HOSPITAL | Age: 44
LOS: 0 days | Discharge: LEFT BEFORE DISCHARGE | End: 2024-05-11
Attending: EMERGENCY MEDICINE
Payer: MEDICAID

## 2024-05-11 VITALS
OXYGEN SATURATION: 95 % | DIASTOLIC BLOOD PRESSURE: 70 MMHG | HEART RATE: 79 BPM | TEMPERATURE: 98 F | RESPIRATION RATE: 18 BRPM | SYSTOLIC BLOOD PRESSURE: 109 MMHG

## 2024-05-11 VITALS
RESPIRATION RATE: 18 BRPM | HEART RATE: 107 BPM | WEIGHT: 218.92 LBS | HEIGHT: 68 IN | DIASTOLIC BLOOD PRESSURE: 85 MMHG | OXYGEN SATURATION: 100 % | TEMPERATURE: 98 F | SYSTOLIC BLOOD PRESSURE: 127 MMHG

## 2024-05-11 DIAGNOSIS — R11.0 NAUSEA: ICD-10-CM

## 2024-05-11 DIAGNOSIS — G43.909 MIGRAINE, UNSPECIFIED, NOT INTRACTABLE, WITHOUT STATUS MIGRAINOSUS: ICD-10-CM

## 2024-05-11 DIAGNOSIS — Z20.822 CONTACT WITH AND (SUSPECTED) EXPOSURE TO COVID-19: ICD-10-CM

## 2024-05-11 DIAGNOSIS — Z88.0 ALLERGY STATUS TO PENICILLIN: ICD-10-CM

## 2024-05-11 DIAGNOSIS — F17.200 NICOTINE DEPENDENCE, UNSPECIFIED, UNCOMPLICATED: ICD-10-CM

## 2024-05-11 DIAGNOSIS — J41.0 SIMPLE CHRONIC BRONCHITIS: ICD-10-CM

## 2024-05-11 DIAGNOSIS — Z53.29 PROCEDURE AND TREATMENT NOT CARRIED OUT BECAUSE OF PATIENT'S DECISION FOR OTHER REASONS: ICD-10-CM

## 2024-05-11 DIAGNOSIS — H53.149 VISUAL DISCOMFORT, UNSPECIFIED: ICD-10-CM

## 2024-05-11 DIAGNOSIS — R51.9 HEADACHE, UNSPECIFIED: ICD-10-CM

## 2024-05-11 DIAGNOSIS — Z98.890 OTHER SPECIFIED POSTPROCEDURAL STATES: Chronic | ICD-10-CM

## 2024-05-11 LAB
ALBUMIN SERPL ELPH-MCNC: 3.1 G/DL — LOW (ref 3.3–5)
ALP SERPL-CCNC: 87 U/L — SIGNIFICANT CHANGE UP (ref 40–120)
ALT FLD-CCNC: 29 U/L — SIGNIFICANT CHANGE UP (ref 12–78)
ANION GAP SERPL CALC-SCNC: 5 MMOL/L — SIGNIFICANT CHANGE UP (ref 5–17)
AST SERPL-CCNC: 38 U/L — HIGH (ref 15–37)
BASOPHILS # BLD AUTO: 0.03 K/UL — SIGNIFICANT CHANGE UP (ref 0–0.2)
BASOPHILS NFR BLD AUTO: 0.3 % — SIGNIFICANT CHANGE UP (ref 0–2)
BILIRUB SERPL-MCNC: 0.4 MG/DL — SIGNIFICANT CHANGE UP (ref 0.2–1.2)
BUN SERPL-MCNC: 13 MG/DL — SIGNIFICANT CHANGE UP (ref 7–23)
CALCIUM SERPL-MCNC: 8.9 MG/DL — SIGNIFICANT CHANGE UP (ref 8.5–10.1)
CHLORIDE SERPL-SCNC: 104 MMOL/L — SIGNIFICANT CHANGE UP (ref 96–108)
CO2 SERPL-SCNC: 28 MMOL/L — SIGNIFICANT CHANGE UP (ref 22–31)
CREAT SERPL-MCNC: 0.75 MG/DL — SIGNIFICANT CHANGE UP (ref 0.5–1.3)
EGFR: 116 ML/MIN/1.73M2 — SIGNIFICANT CHANGE UP
EOSINOPHIL # BLD AUTO: 0.28 K/UL — SIGNIFICANT CHANGE UP (ref 0–0.5)
EOSINOPHIL NFR BLD AUTO: 2.5 % — SIGNIFICANT CHANGE UP (ref 0–6)
FLUAV AG NPH QL: SIGNIFICANT CHANGE UP
FLUBV AG NPH QL: SIGNIFICANT CHANGE UP
GAS PNL BLDV: SIGNIFICANT CHANGE UP
GLUCOSE SERPL-MCNC: 120 MG/DL — HIGH (ref 70–99)
HCT VFR BLD CALC: 45.8 % — SIGNIFICANT CHANGE UP (ref 39–50)
HGB BLD-MCNC: 15.2 G/DL — SIGNIFICANT CHANGE UP (ref 13–17)
IMM GRANULOCYTES NFR BLD AUTO: 0.3 % — SIGNIFICANT CHANGE UP (ref 0–0.9)
LYMPHOCYTES # BLD AUTO: 2.44 K/UL — SIGNIFICANT CHANGE UP (ref 1–3.3)
LYMPHOCYTES # BLD AUTO: 21.8 % — SIGNIFICANT CHANGE UP (ref 13–44)
MCHC RBC-ENTMCNC: 28.3 PG — SIGNIFICANT CHANGE UP (ref 27–34)
MCHC RBC-ENTMCNC: 33.2 G/DL — SIGNIFICANT CHANGE UP (ref 32–36)
MCV RBC AUTO: 85.3 FL — SIGNIFICANT CHANGE UP (ref 80–100)
MONOCYTES # BLD AUTO: 0.46 K/UL — SIGNIFICANT CHANGE UP (ref 0–0.9)
MONOCYTES NFR BLD AUTO: 4.1 % — SIGNIFICANT CHANGE UP (ref 2–14)
NEUTROPHILS # BLD AUTO: 7.96 K/UL — HIGH (ref 1.8–7.4)
NEUTROPHILS NFR BLD AUTO: 71 % — SIGNIFICANT CHANGE UP (ref 43–77)
NRBC # BLD: 0 /100 WBCS — SIGNIFICANT CHANGE UP (ref 0–0)
PLATELET # BLD AUTO: 245 K/UL — SIGNIFICANT CHANGE UP (ref 150–400)
POTASSIUM SERPL-MCNC: 4.2 MMOL/L — SIGNIFICANT CHANGE UP (ref 3.5–5.3)
POTASSIUM SERPL-SCNC: 4.2 MMOL/L — SIGNIFICANT CHANGE UP (ref 3.5–5.3)
PROT SERPL-MCNC: 7.1 GM/DL — SIGNIFICANT CHANGE UP (ref 6–8.3)
RBC # BLD: 5.37 M/UL — SIGNIFICANT CHANGE UP (ref 4.2–5.8)
RBC # FLD: 14.2 % — SIGNIFICANT CHANGE UP (ref 10.3–14.5)
SARS-COV-2 RNA SPEC QL NAA+PROBE: SIGNIFICANT CHANGE UP
SODIUM SERPL-SCNC: 137 MMOL/L — SIGNIFICANT CHANGE UP (ref 135–145)
VALPROATE SERPL-MCNC: <3 UG/ML — LOW (ref 50–100)
WBC # BLD: 11.2 K/UL — HIGH (ref 3.8–10.5)
WBC # FLD AUTO: 11.2 K/UL — HIGH (ref 3.8–10.5)

## 2024-05-11 PROCEDURE — 71046 X-RAY EXAM CHEST 2 VIEWS: CPT | Mod: 26

## 2024-05-11 PROCEDURE — 99284 EMERGENCY DEPT VISIT MOD MDM: CPT

## 2024-05-11 RX ORDER — ACETAMINOPHEN 500 MG
1000 TABLET ORAL ONCE
Refills: 0 | Status: COMPLETED | OUTPATIENT
Start: 2024-05-11 | End: 2024-05-11

## 2024-05-11 RX ORDER — KETOROLAC TROMETHAMINE 30 MG/ML
30 SYRINGE (ML) INJECTION ONCE
Refills: 0 | Status: DISCONTINUED | OUTPATIENT
Start: 2024-05-11 | End: 2024-05-11

## 2024-05-11 RX ORDER — GABAPENTIN 400 MG/1
800 CAPSULE ORAL ONCE
Refills: 0 | Status: COMPLETED | OUTPATIENT
Start: 2024-05-11 | End: 2024-05-11

## 2024-05-11 RX ORDER — SODIUM CHLORIDE 9 MG/ML
1000 INJECTION INTRAMUSCULAR; INTRAVENOUS; SUBCUTANEOUS ONCE
Refills: 0 | Status: COMPLETED | OUTPATIENT
Start: 2024-05-11 | End: 2024-05-11

## 2024-05-11 RX ORDER — METOCLOPRAMIDE HCL 10 MG
10 TABLET ORAL ONCE
Refills: 0 | Status: COMPLETED | OUTPATIENT
Start: 2024-05-11 | End: 2024-05-11

## 2024-05-11 RX ADMIN — GABAPENTIN 800 MILLIGRAM(S): 400 CAPSULE ORAL at 05:26

## 2024-05-11 RX ADMIN — SODIUM CHLORIDE 1000 MILLILITER(S): 9 INJECTION INTRAMUSCULAR; INTRAVENOUS; SUBCUTANEOUS at 05:02

## 2024-05-11 RX ADMIN — Medication 10 MILLIGRAM(S): at 05:02

## 2024-05-11 RX ADMIN — Medication 400 MILLIGRAM(S): at 05:02

## 2024-05-11 RX ADMIN — Medication 30 MILLIGRAM(S): at 05:02

## 2024-05-11 NOTE — ED ADULT NURSE NOTE - OBJECTIVE STATEMENT
44Y M presenting to ED c/o "throbbing" frontal and temporal headache, nausea, and photophobia x 5 days. reports most recent naproxen use yesterday at 11am. denies vomiting, blurry vision, dizziness, weakness, LOC.

## 2024-05-11 NOTE — ED PROVIDER NOTE - REFUSAL OF SERVICE, MDM
the patient displays adequate decision making capacity and he declines to wait for his lab results or obtain a cxr

## 2024-05-11 NOTE — ED PROVIDER NOTE - CONSTITUTIONAL, MLM
normal... Awake, alert, oriented to person, place, time/situation and appears to be in moderate pain.

## 2024-05-11 NOTE — ED PROVIDER NOTE - PATIENT PORTAL LINK FT
You can access the FollowMyHealth Patient Portal offered by [facility] by registering at the following website: [email or web address]. By joining [facility]’s FollowMyHealth portal, you will also be able to view your health information using other applications (apps) compatible with our system.

## 2024-05-11 NOTE — ED ADULT NURSE NOTE - NSFALLUNIVINTERV_ED_ALL_ED
Bed/Stretcher in lowest position, wheels locked, appropriate side rails in place/Call bell, personal items and telephone in reach/Instruct patient to call for assistance before getting out of bed/chair/stretcher/Non-slip footwear applied when patient is off stretcher/Deal to call system/Physically safe environment - no spills, clutter or unnecessary equipment/Purposeful proactive rounding/Room/bathroom lighting operational, light cord in reach

## 2024-05-11 NOTE — ED ADULT NURSE NOTE - NS ED NURSE IV DC DT
Department of Anesthesiology  Preprocedure Note       Name:  Cecy Lowe   Age:  52 y.o.  :  1971                                          MRN:  [id number]         Date:  2021      Surgeon: Tha Shepard):  Sania Crystal MD    Procedure: Procedure(s):  HOLMIUM, CYSTOSCOPY, URETEROSCOPY, STENT PLACEMENT    Medications prior to admission:   Prior to Admission medications    Medication Sig Start Date End Date Taking? Authorizing Provider   cephALEXin (KEFLEX) 500 MG capsule Take 1 capsule by mouth 4 times daily for 7 days 21 Yes Fouzia Ferrera DO   tamsulosin [facility]) 0.4 MG capsule Take 1 capsule by mouth daily for 7 doses 21 Yes Fouzia Ferrera DO   citalopram (CELEXA) 40 MG tablet TAKE 1 TABLET DAILY 5/3/21  Yes Mal Moritz, MD   HYDROcodone-acetaminophen (NORCO) 5-325 MG per tablet Take 1 tablet by mouth every 6 hours as needed for Pain for up to 7 days. 21  Fouzia Ferrera DO   ibuprofen (ADVIL;MOTRIN) 800 MG tablet Take 1 tablet by mouth every 8 hours as needed for Pain 21   Fouzia Ferrera DO       Current medications:    No current facility-administered medications for this encounter. Allergies:     Allergies   Allergen Reactions    Bactrim [Sulfamethoxazole-Trimethoprim] Itching and Swelling       Problem List:    Patient Active Problem List   Diagnosis Code    Prostatitis N41.9    Anxiety F41.9    Primary insomnia F51.01    Panic attacks F41.0       Past Medical History:        Diagnosis Date    Anxiety     Depression     Left flank pain     Ureteral calculus, left     Wellness examination 2021    Dr. Leslie Gill       Past Surgical History:        Procedure Laterality Date    WISDOM TOOTH EXTRACTION         Social History:    Social History     Tobacco Use    Smoking status: Never Smoker    Smokeless tobacco: Never Used   Substance Use Topics    Alcohol use: Not Currently     Alcohol/week: 0.0 standard drinks                                Counseling given: Not Answered      Vital Signs (Current):   Vitals:    08/25/21 1251   Weight: 250 lb (113.4 kg)   Height: 6' (1.829 m)                                              BP Readings from Last 3 Encounters:   08/23/21 (!) 132/90   07/02/21 120/80   02/26/21 122/80       NPO Status:                                                                                 BMI:   Wt Readings from Last 3 Encounters:   08/25/21 250 lb (113.4 kg)   08/23/21 250 lb (113.4 kg)   02/26/21 250 lb 6.4 oz (113.6 kg)     Body mass index is 33.91 kg/m². CBC:   Lab Results   Component Value Date    WBC 12.4 08/23/2021    RBC 5.00 08/23/2021    HGB 14.7 08/23/2021    HCT 44.6 08/23/2021    MCV 89.2 08/23/2021    RDW 13.9 08/23/2021     08/23/2021       CMP:   Lab Results   Component Value Date     08/23/2021    K 3.7 08/23/2021     08/23/2021    CO2 25 08/23/2021    BUN 14 08/23/2021    CREATININE 1.44 08/23/2021    GFRAA >60 08/23/2021    LABGLOM 52 08/23/2021    GLUCOSE 109 08/23/2021    PROT 7.5 08/23/2021    CALCIUM 10.2 08/23/2021    BILITOT 0.55 08/23/2021    ALKPHOS 73 08/23/2021    AST 23 08/23/2021    ALT 30 08/23/2021       POC Tests: No results for input(s): POCGLU, POCNA, POCK, POCCL, POCBUN, POCHEMO, POCHCT in the last 72 hours.     Coags: No results found for: PROTIME, INR, APTT    HCG (If Applicable): No results found for: PREGTESTUR, PREGSERUM, HCG, HCGQUANT     ABGs: No results found for: PHART, PO2ART, QPV8RFN, JYF2WLU, BEART, O7CRPOMY     Type & Screen (If Applicable):  No results found for: LABABO, LABRH    Drug/Infectious Status (If Applicable):  No results found for: HIV, HEPCAB    COVID-19 Screening (If Applicable): No results found for: COVID19        Anesthesia Evaluation    Airway: Mallampati: III  TM distance: >3 FB   Neck ROM: full  Mouth opening: > = 3 FB Dental: normal exam         Pulmonary:Negative Pulmonary ROS and normal exam Cardiovascular:Negative CV ROS                      Neuro/Psych:   (+) psychiatric history:depression/anxiety             GI/Hepatic/Renal: Neg GI/Hepatic/Renal ROS            Endo/Other: Negative Endo/Other ROS                    Abdominal:   (+) obese,           Vascular: negative vascular ROS. Other Findings:           Anesthesia Plan      general     ASA 2       Induction: intravenous. MIPS: Postoperative opioids intended. Anesthetic plan and risks discussed with patient. Plan discussed with CRNA.                 Cornel Horan MD   8/27/2021 11-May-2024 06:14

## 2024-05-11 NOTE — ED PROVIDER NOTE - ENMT, MLM
Airway patent, Nasal mucosa clear. Mouth with normal mucosa. Throat has no vesicles, no oropharyngeal exudates and uvula is midline. Upper dentures present.

## 2024-05-11 NOTE — ED ADULT NURSE REASSESSMENT NOTE - NS ED NURSE REASSESS COMMENT FT1
pt A&Ox4, ambulatory with steady gait, IV taken out, pt educated on benefits of staying and waiting for results/risks of leaving, AMA formed signed. VSS, NAD noted. safety maintained.

## 2024-05-11 NOTE — ED PROVIDER NOTE - PROGRESS NOTE DETAILS
the headache is gone but the patients labs arent back. he states he needs to leave to go to work and understands risk of abnormal labs without ability to quickly intervene and obtain cxr. - Kenroy PICHARDO

## 2024-05-11 NOTE — ED PROVIDER NOTE - OBJECTIVE STATEMENT
For the last 5 days the patient has had a throbbing frontal nonradiating headache associated with photophobia and nausea that feels like a migraine. He tried naproxen last dose 11am yesterday with minimal relief. No trauma, no fevers.

## 2024-05-11 NOTE — ED PROVIDER NOTE - CLINICAL SUMMARY MEDICAL DECISION MAKING FREE TEXT BOX
44M c/o headache and cough  -high clinical concern for migraine so will order analgesia, antiemetics   -given that hes an active smoker will check labs cxr flu/covid swab  -routine gabapentin

## 2024-05-12 ENCOUNTER — EMERGENCY (EMERGENCY)
Facility: HOSPITAL | Age: 44
LOS: 0 days | Discharge: LEFT BEFORE DISCHARGE | End: 2024-05-13
Attending: STUDENT IN AN ORGANIZED HEALTH CARE EDUCATION/TRAINING PROGRAM
Payer: MEDICAID

## 2024-05-12 ENCOUNTER — EMERGENCY (EMERGENCY)
Facility: HOSPITAL | Age: 44
LOS: 0 days | Discharge: HOME | End: 2024-05-12
Attending: EMERGENCY MEDICINE
Payer: MEDICAID

## 2024-05-12 VITALS
WEIGHT: 220.02 LBS | HEART RATE: 69 BPM | OXYGEN SATURATION: 98 % | SYSTOLIC BLOOD PRESSURE: 122 MMHG | RESPIRATION RATE: 18 BRPM | DIASTOLIC BLOOD PRESSURE: 74 MMHG | TEMPERATURE: 98 F | HEIGHT: 68 IN

## 2024-05-12 VITALS
SYSTOLIC BLOOD PRESSURE: 104 MMHG | HEART RATE: 69 BPM | DIASTOLIC BLOOD PRESSURE: 69 MMHG | OXYGEN SATURATION: 96 % | RESPIRATION RATE: 18 BRPM | TEMPERATURE: 98 F

## 2024-05-12 DIAGNOSIS — I10 ESSENTIAL (PRIMARY) HYPERTENSION: ICD-10-CM

## 2024-05-12 DIAGNOSIS — Z04.71 ENCOUNTER FOR EXAMINATION AND OBSERVATION FOLLOWING ALLEGED ADULT PHYSICAL ABUSE: ICD-10-CM

## 2024-05-12 DIAGNOSIS — R51.9 HEADACHE, UNSPECIFIED: ICD-10-CM

## 2024-05-12 DIAGNOSIS — F17.210 NICOTINE DEPENDENCE, CIGARETTES, UNCOMPLICATED: ICD-10-CM

## 2024-05-12 DIAGNOSIS — S09.90XA UNSPECIFIED INJURY OF HEAD, INITIAL ENCOUNTER: ICD-10-CM

## 2024-05-12 DIAGNOSIS — R11.2 NAUSEA WITH VOMITING, UNSPECIFIED: ICD-10-CM

## 2024-05-12 DIAGNOSIS — Z98.890 OTHER SPECIFIED POSTPROCEDURAL STATES: Chronic | ICD-10-CM

## 2024-05-12 DIAGNOSIS — M46.26 OSTEOMYELITIS OF VERTEBRA, LUMBAR REGION: ICD-10-CM

## 2024-05-12 DIAGNOSIS — Z88.8 ALLERGY STATUS TO OTHER DRUGS, MEDICAMENTS AND BIOLOGICAL SUBSTANCES: ICD-10-CM

## 2024-05-12 DIAGNOSIS — F11.99 OPIOID USE, UNSPECIFIED WITH UNSPECIFIED OPIOID-INDUCED DISORDER: ICD-10-CM

## 2024-05-12 DIAGNOSIS — Z79.01 LONG TERM (CURRENT) USE OF ANTICOAGULANTS: ICD-10-CM

## 2024-05-12 LAB
ALBUMIN SERPL ELPH-MCNC: 3 G/DL — LOW (ref 3.3–5)
ALP SERPL-CCNC: 103 U/L — SIGNIFICANT CHANGE UP (ref 40–120)
ALT FLD-CCNC: 28 U/L — SIGNIFICANT CHANGE UP (ref 12–78)
ANION GAP SERPL CALC-SCNC: 5 MMOL/L — SIGNIFICANT CHANGE UP (ref 5–17)
AST SERPL-CCNC: 36 U/L — SIGNIFICANT CHANGE UP (ref 15–37)
BASOPHILS # BLD AUTO: 0.03 K/UL — SIGNIFICANT CHANGE UP (ref 0–0.2)
BASOPHILS NFR BLD AUTO: 0.3 % — SIGNIFICANT CHANGE UP (ref 0–2)
BILIRUB SERPL-MCNC: 0.4 MG/DL — SIGNIFICANT CHANGE UP (ref 0.2–1.2)
BUN SERPL-MCNC: 12 MG/DL — SIGNIFICANT CHANGE UP (ref 7–23)
CALCIUM SERPL-MCNC: 8.6 MG/DL — SIGNIFICANT CHANGE UP (ref 8.5–10.1)
CHLORIDE SERPL-SCNC: 105 MMOL/L — SIGNIFICANT CHANGE UP (ref 96–108)
CO2 SERPL-SCNC: 28 MMOL/L — SIGNIFICANT CHANGE UP (ref 22–31)
CREAT SERPL-MCNC: 0.62 MG/DL — SIGNIFICANT CHANGE UP (ref 0.5–1.3)
EGFR: 122 ML/MIN/1.73M2 — SIGNIFICANT CHANGE UP
EOSINOPHIL # BLD AUTO: 0.2 K/UL — SIGNIFICANT CHANGE UP (ref 0–0.5)
EOSINOPHIL NFR BLD AUTO: 1.7 % — SIGNIFICANT CHANGE UP (ref 0–6)
GLUCOSE SERPL-MCNC: 120 MG/DL — HIGH (ref 70–99)
HCT VFR BLD CALC: 39.4 % — SIGNIFICANT CHANGE UP (ref 39–50)
HGB BLD-MCNC: 13 G/DL — SIGNIFICANT CHANGE UP (ref 13–17)
IMM GRANULOCYTES NFR BLD AUTO: 0.4 % — SIGNIFICANT CHANGE UP (ref 0–0.9)
LYMPHOCYTES # BLD AUTO: 1.36 K/UL — SIGNIFICANT CHANGE UP (ref 1–3.3)
LYMPHOCYTES # BLD AUTO: 11.4 % — LOW (ref 13–44)
MCHC RBC-ENTMCNC: 28.4 PG — SIGNIFICANT CHANGE UP (ref 27–34)
MCHC RBC-ENTMCNC: 33 G/DL — SIGNIFICANT CHANGE UP (ref 32–36)
MCV RBC AUTO: 86 FL — SIGNIFICANT CHANGE UP (ref 80–100)
MONOCYTES # BLD AUTO: 0.44 K/UL — SIGNIFICANT CHANGE UP (ref 0–0.9)
MONOCYTES NFR BLD AUTO: 3.7 % — SIGNIFICANT CHANGE UP (ref 2–14)
NEUTROPHILS # BLD AUTO: 9.87 K/UL — HIGH (ref 1.8–7.4)
NEUTROPHILS NFR BLD AUTO: 82.5 % — HIGH (ref 43–77)
NRBC # BLD: 0 /100 WBCS — SIGNIFICANT CHANGE UP (ref 0–0)
PLATELET # BLD AUTO: 178 K/UL — SIGNIFICANT CHANGE UP (ref 150–400)
POTASSIUM SERPL-MCNC: 3.4 MMOL/L — LOW (ref 3.5–5.3)
POTASSIUM SERPL-SCNC: 3.4 MMOL/L — LOW (ref 3.5–5.3)
PROT SERPL-MCNC: 6.9 GM/DL — SIGNIFICANT CHANGE UP (ref 6–8.3)
RBC # BLD: 4.58 M/UL — SIGNIFICANT CHANGE UP (ref 4.2–5.8)
RBC # FLD: 14.4 % — SIGNIFICANT CHANGE UP (ref 10.3–14.5)
SODIUM SERPL-SCNC: 138 MMOL/L — SIGNIFICANT CHANGE UP (ref 135–145)
WBC # BLD: 11.95 K/UL — HIGH (ref 3.8–10.5)
WBC # FLD AUTO: 11.95 K/UL — HIGH (ref 3.8–10.5)

## 2024-05-12 PROCEDURE — 70450 CT HEAD/BRAIN W/O DYE: CPT | Mod: 26,MC

## 2024-05-12 PROCEDURE — 99284 EMERGENCY DEPT VISIT MOD MDM: CPT

## 2024-05-12 RX ORDER — SODIUM CHLORIDE 9 MG/ML
1000 INJECTION INTRAMUSCULAR; INTRAVENOUS; SUBCUTANEOUS ONCE
Refills: 0 | Status: COMPLETED | OUTPATIENT
Start: 2024-05-12 | End: 2024-05-12

## 2024-05-12 RX ORDER — KETOROLAC TROMETHAMINE 30 MG/ML
15 SYRINGE (ML) INJECTION ONCE
Refills: 0 | Status: DISCONTINUED | OUTPATIENT
Start: 2024-05-12 | End: 2024-05-12

## 2024-05-12 RX ORDER — ACETAMINOPHEN 500 MG
1000 TABLET ORAL ONCE
Refills: 0 | Status: COMPLETED | OUTPATIENT
Start: 2024-05-12 | End: 2024-05-12

## 2024-05-12 RX ORDER — METOCLOPRAMIDE HCL 10 MG
10 TABLET ORAL ONCE
Refills: 0 | Status: COMPLETED | OUTPATIENT
Start: 2024-05-12 | End: 2024-05-12

## 2024-05-12 RX ADMIN — Medication 1000 MILLIGRAM(S): at 08:23

## 2024-05-12 RX ADMIN — Medication 10 MILLIGRAM(S): at 08:18

## 2024-05-12 RX ADMIN — Medication 15 MILLIGRAM(S): at 08:19

## 2024-05-12 RX ADMIN — Medication 15 MILLIGRAM(S): at 09:18

## 2024-05-12 RX ADMIN — SODIUM CHLORIDE 1000 MILLILITER(S): 9 INJECTION INTRAMUSCULAR; INTRAVENOUS; SUBCUTANEOUS at 09:18

## 2024-05-12 RX ADMIN — Medication 400 MILLIGRAM(S): at 08:18

## 2024-05-12 RX ADMIN — SODIUM CHLORIDE 1000 MILLILITER(S): 9 INJECTION INTRAMUSCULAR; INTRAVENOUS; SUBCUTANEOUS at 08:18

## 2024-05-12 RX ADMIN — Medication 1000 MILLIGRAM(S): at 09:18

## 2024-05-12 NOTE — ED PROVIDER NOTE - PHYSICAL EXAMINATION
On Physical Exam:  General: well appearing, in NAD, speaking clearly in full sentences and without difficulty; cooperative with exam  HEENT: PERRL, MMM, normal TM bilaterally, oropharynx clear, no erythema/exudates/vesicles, no tonsillar/uvula enlargement or deviation  Neck: no neck tenderness, no nuchal rigidity  Cardiac: normal s1, s2; RRR; no MGR  Lungs: CTABL  Abdomen: soft nontender/nondistended  : no bladder tenderness or distension  Skin: intact, no rash  Extremities: no peripheral edema, no gross deformities  Neuro Exam:  -CN II-XII grossly intact  -Overall muscular tone: Normal bulk and tone throughout.  No gross rigidity/clonus/tremors.  -No pronator drift.  -Gait steady  -Motor:                      Deltoid    Biceps    Triceps      Wrist Ex   Wrist Flex          Left:          5/5           5/5           5/5            5/5           5/5               5/5    Right:       5/5           5/5           5/5            5/5           5/5               5/5                          Hip Flex    Hip Ext   Knee Flex   Knee Ex   Ankle Dorsiflex   Ankle Plantarflex      Left:          5/5           5/5           5/5             5/5           5/5                      5/5    Right:       5/5           5/5           5/5             5/5           5/5                      5/5        Sensation:                   Upper Arm     Lower Arm    Palm      Lat Hip   Inner Thigh    Shin    Plantar Foot    Left:          +                    +                 +           +                +             +         +    Right:        +                    +                 +           +                +             +         +

## 2024-05-12 NOTE — ED PROVIDER NOTE - PROGRESS NOTE DETAILS
Attending note (Ej): patinet remains afebrile, hemodynamically stable; CT head with no acute actionable findings; labs with no acute actionable findings.  Patient sleeping in stretcher, awakens to light touch and reports HA improved; then goes back to sleep. Will monitor, plan for discharge when more awake. No fever or nuchal rigidity to suggest meningitis. Upon awakening, is not confused or appearing encephalopathic.

## 2024-05-12 NOTE — ED PROVIDER NOTE - CLINICAL SUMMARY MEDICAL DECISION MAKING FREE TEXT BOX
Attending note (Ej): 44-year-old male history of opiate use disorder on maintenance methadone (per  mg daily), prior history of osteomyelitis of lumbar spine, HTN, and migraine headaches (seen here in this ED yesterday for headache/migraine that resolved with medication), presenting with persistent headache for the past 1 week states that it was not maximal in onset has been worsening since onset and similar but more long-lasting than prior migraines. No gross neurologic deficits on exam.  Concern for most likely migraine headache/status migrainous however given history of IVDA consider CT imaging to rule out mass.  Does not appear on presentation to be consistent with sentinel bleed from aneurysmal rupture/SAH.  There is no nuchal rigidity or fever to suggest meningitis.  If improving with medications no actionable findings on screening labs (CBC CMP), and CT head with no actionable findings, can likely DC.  On methadone for maintenance therapy if still in ED may require dosing of maintenance therapy here.

## 2024-05-12 NOTE — ED PROVIDER NOTE - PATIENT PORTAL LINK FT
You can access the FollowMyHealth Patient Portal offered by [facility] by registering at the following website: [email or web address]. By joining [employer]’s FollowMyHealth portal, you will also be able to view your health information using other applications (apps) compatible with our system.

## 2024-05-12 NOTE — ED PROVIDER NOTE - OBJECTIVE STATEMENT
Attending note (Ej): 44-year-old male history of opiate use disorder on maintenance methadone (per  mg daily), prior history of osteomyelitis of lumbar spine, HTN, and migraine headaches (seen here in this ED yesterday for headache/migraine that resolved with medication), presenting with persistent headache for the past 1 week states that it was not maximal in onset has been worsening since onset and similar but more long-lasting than prior migraines.  Indicates bitemporal to area around sides of head.  Some nausea some vomiting no vision changes no weakness or numbness in extremities no fever no neck stiffness.  Denies fall or trauma.  Is not on anticoagulation or aspirin.  Patient with unclear allergy to Zosyn.  Patient denies any recent substance abuse.

## 2024-05-12 NOTE — ED ADULT NURSE NOTE - OBJECTIVE STATEMENT
Pt presents to ED c/o headache for the past 5 days. Pt was here for the same reason yesterday and was d/c. Pt seems to be in distress. Denies PMH. Safety maintained. Pt presents to ED c/o headache for the past 5 days. Pt was here for the same reason yesterday and was d/c. Pt seems to be in distress. PMH HTN, IV Drug use & seizures . Safety maintained.

## 2024-05-13 VITALS
SYSTOLIC BLOOD PRESSURE: 125 MMHG | DIASTOLIC BLOOD PRESSURE: 88 MMHG | TEMPERATURE: 99 F | RESPIRATION RATE: 18 BRPM | HEIGHT: 68 IN | WEIGHT: 220.02 LBS | OXYGEN SATURATION: 94 % | HEART RATE: 120 BPM

## 2024-05-13 PROCEDURE — 71045 X-RAY EXAM CHEST 1 VIEW: CPT | Mod: 26

## 2024-05-13 PROCEDURE — 70450 CT HEAD/BRAIN W/O DYE: CPT | Mod: 26,MC

## 2024-05-13 PROCEDURE — 72125 CT NECK SPINE W/O DYE: CPT | Mod: 26,MC
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
## 2024-05-13 NOTE — ED ADULT NURSE NOTE - PRIMARY CARE PROVIDER
unk
Pt with closed toe injury of L foot will obtain XR, XR with +fx, will collins tape, place in surgical shoe, give crutches and d/c home with outpt f/u with podiatry

## 2024-05-13 NOTE — ED PROVIDER NOTE - PHYSICAL EXAMINATION
General: Well appearing male in no acute distress  HEENT: Normocephalic, atraumatic. Moist mucous membranes. Oropharynx clear. No lymphadenopathy.  Eyes: No scleral icterus. EOMI. GIGI.  Neck:. Soft and supple. Full ROM without pain. No midline tenderness  Cardiac: Regular rate and regular rhythm. No murmurs, rubs, gallops. Peripheral pulses 2+ and symmetric. No LE edema.  Resp: Lungs CTAB. Speaking in full sentences. No wheezes, rales or rhonchi.  Abd: Soft, non-tender, non-distended. No guarding or rebound. No scars, masses, or lesions.  Back: Spine midline and non-tender. No CVA tenderness.    Skin: No rashes, abrasions, or lacerations.  Neuro: AO x 3. Moves all extremities symmetrically. Motor strength and sensation grossly intact. ambulatory w/ steady gait

## 2024-05-13 NOTE — ED PROVIDER NOTE - PATIENT PORTAL LINK FT
You can access the FollowMyHealth Patient Portal offered by [facility] by registering at the following website: [email or web address]. By joining [employer]’s FollowMyHealth portal, you will also be able to view your health information using other applications (apps) compatible with our system.
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
## 2024-05-13 NOTE — ED ADULT NURSE NOTE - ALCOHOL PRE SCREEN (AUDIT - C)
History of Present Illness


H&P Date: 04/14/22


Chief Complaint: Shortness of breath and lower extremity swelling


86-year-old male with past medical history of coronary artery disease status 

post stents 2 in 2007 and hypertension presented with progressively worsening 

exertional shortness of breath and swelling of his upper and lower extremities. 

He states this started 10 days ago and has been worsening.  He reports that he 

cannot walk around the room without getting short of breath now.  He also 

reports dry cough.  He denies any chest pain.  He reports that he has a 

cardiologist who follows up with but has not had any recent echocardiograms.  He

denies palpitations.  Denies fever and chills.  He also complains of lower 

extremity pain that started with the swelling.  Denies being aware of any heart 

failure diagnosis in the past.  No nausea, vomiting, decreased appetite, 

abdominal pain, blurry vision or headaches.


In the emergency department, workup showed chest x-ray imaging consistent with 

right lower lobe pneumonia/infiltrates.  Patient received 40 mg of IV Lasix 

along with one-time dose of IV ceftriaxone and azithromycin.  He has 

leukocytosis with WBC of 22.  Metabolic panel is essentially unremarkable except

mildly elevated creatinine which could be expected at his age.  Troponin 0.028 

and BNP 2670.  Viral panel negative including COVID-19.





Review of Systems


Constitutional:  Patient reports no fever, no chills, no weight changes, no 

change in appetite


Eyes: Patient reports no double vision, no visual changes


ENT:  Patient reports no rhinorrhea,  no post nasal drip, no sore throat


Cardiovascular: Patient reports edema, orthopnea.


Respiratory:  Patient reports dyspnea and cough 


Gastrointestinal:  Patient reports no nausea, no vomiting, no constipation, no 

diarrhea


Genitourinary:   Patient reports no dysuria, no urinary frequency, no hematuria.


Musculoskeletal:  Patient reports no unusual joint pain, no joint swelling or 

weakness.  Patient reports no muscular pain.


Psychiatric:  Patient reports no changes in mood, no sleeping problems.   

Patient reports no changes in memory.


Endocrine:  Patient reports no thirst, no polyuria, no cold intolerance, no heat

intolerance.


Neurological:  Patient reports no unusual paresthesias, no seizures, no paresis,

no paralysis, no facila droop, no headache.


Heme/Lymphatic:   Patient reports no easy bruising, no bleeding tendency, no 

lymphadenopathy.


Allergic/ Immunologic:  Patient reports no recent allergic reactions or 

immunologic history.


Skin:  Patient reports no rashes or unusual lesions.











Past Medical History


Past Medical History: Coronary Artery Disease (CAD), Cancer, Hyperlipidemia, 

Hypertension


Additional Past Medical History / Comment(s): pleurisy


History of Any Multi-Drug Resistant Organisms: None Reported


Past Surgical History: Heart Catheterization With Stent


Past Psychological History: No Psychological Hx Reported


Smoking Status: Never smoker


Past Alcohol Use History: None Reported


Past Drug Use History: None Reported





Medications and Allergies


                                Home Medications











 Medication  Instructions  Recorded  Confirmed  Type


 


Imatinib Mesylate [Gleevec] 400 mg PO DAILY 09/01/14 04/14/22 History


 


Aspirin EC [Ecotrin Low Dose] 81 mg PO DAILY 04/14/22 04/14/22 History


 


Carvedilol [Coreg] 12.5 mg PO BID 04/14/22 04/14/22 History


 


Diclofenac Sodium Gel [Voltaren 2 gm TOPICAL QID 04/14/22 04/14/22 History





Gel]    


 


Ferrous Sulfate [Feosol] 325 mg PO DAILY 04/14/22 04/14/22 History


 


amLODIPine/ATORVASTATIN [Caduet 10 1 tab PO DAILY 04/14/22 04/14/22 History





mg-20 mg Tablet]    








                                    Allergies











Allergy/AdvReac Type Severity Reaction Status Date / Time


 


No Known Allergies Allergy   Verified 04/14/22 10:33














Physical Exam


Vitals: 


                                   Vital Signs











  Temp Pulse Resp BP Pulse Ox


 


 04/14/22 09:06    20  


 


 04/14/22 08:59  97.9 F  86  20  161/80  96








                                Intake and Output











 04/13/22 04/14/22 04/14/22





 22:59 06:59 14:59


 


Other:   


 


  Weight   104.326 kg











Constitutional: No acute distress, conversant, pleasant


Eyes: Anicteric sclerae, moist conjunctiva, no lid-lag PERRLA


HENMT: Normocephalic / Atraumatic oropharynx clear, no erythema, exudates


Neck: Supple, full range of motion, nontender, no masses, or JVD no carotid 

bruits no thyromegaly no lymphadenopathy


Lungs: Diminished air entry on right base, diffuse crackles throughout, no use 

of accessory muscles  


Cardiovascular: Heart regular in rate and rhythm, no murmur, 2+ peripheral edema


Abdominal: Soft, Nontender, no guarding, rebound or rigidity abdomen moving with

respiration normoactive bowel sounds no hepatomegaly, No splenomegaly no 

palpable mass no abdominal wall hernia noted


Skin: Normal temperature, tone, texture, turgor no induration no subcutaneous 

nodules no rash,lesions no ulcers


Extremities: No digital cyanosis no clubbing pedal pulses intact and symmetrical

radial pulses intact and symmetrical normal gait and station, + calf Tenderness


Psychiatric: Alert and oriented to person, place and time appropriate affect 

intact judgement


Neuro: Muscle Strength 5/5 in all 4 extremities sensation to light touch grossly

present throughout cranial nerves II-XII grossly intact no focal sensory 

deficits








Results


CBC & Chem 7: 


                                 04/14/22 09:11





                                 04/14/22 09:11


Labs: 


                  Abnormal Lab Results - Last 24 Hours (Table)











  04/14/22 04/14/22 Range/Units





  09:11 09:11 


 


WBC  22.0 H   (3.8-10.6)  k/uL


 


RBC  3.64 L   (4.30-5.90)  m/uL


 


Hgb  11.5 L   (13.0-17.5)  gm/dL


 


Hct  35.2 L   (39.0-53.0)  %


 


Neutrophils #  18.1 H   (1.3-7.7)  k/uL


 


Monocytes #  1.4 H   (0-1.0)  k/uL


 


Carbon Dioxide   31 H  (22-30)  mmol/L


 


BUN   36 H  (9-20)  mg/dL


 


Creatinine   1.33 H  (0.66-1.25)  mg/dL


 


Glucose   101 H  (74-99)  mg/dL


 


Total Protein   6.2 L  (6.3-8.2)  g/dL


 


Albumin   3.1 L  (3.5-5.0)  g/dL











Chest x-ray: report reviewed, image reviewed





Thrombosis Risk Factor Assmnt





- DVT/VTE Prophylaxis


DVT/VTE Prophylaxis: Pharmacologic Prophylaxis ordered, Mechanical Prophylaxis 

ordered





- Choose All That Apply


Each Factor Represents 1 point: Heart failure (<1month), Obesity (BMI >25), 

Swollen legs (current)


Each Risk Factor Represents 3 Points: Age 75 years or older


Thrombosis Risk Factor Assessment Total Risk Factor Score: 6


Thrombosis Risk Factor Assessment Level: High Risk





Assessment and Plan


Assessment: 


1.  Acute decompensated heart failure


- Unknown ejection fraction


- Lasix 40 mg IV given in the ED


- Lasix 40 mg IV twice a day to be continued


- Monitor intake and output, BMP, daily weights


- Fluid restriction to less than 1500 mL


- Echocardiogram has been ordered to assess EF and cardiac function


- Cardiology consult





2.  Community acquired pneumonia


- IV ceftriaxone and IV azithromycin to cover empirically


- Sputum culture, Legionella antigen





3.  Coronary artery disease


- Continue home medications aspirin and atorvastatin


- Continue home medication carvedilol


- We will check lipid profile and A1c.





4.  Hypertension


- Carvedilol and Lasix as above


- Monitor blood pressure, if persistently high, we can resume home medication 

amlodipine





5.  Obesity


- Lifestyle modification and dietary changes as able





Discussed CODE STATUS and patient wants to be full code at this time.


DVT prophylaxis with Lovenox and SCDs.





Time with Patient: Greater than 30 (I have spent 55 minutes admitting this 

patient.) Statement Selected
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
## 2024-05-13 NOTE — ED PROVIDER NOTE - NS ED ROS FT
General: Denies fever, chills  HEENT: Denies sensory changes, sore throat  Neck: + neck pain  Resp: Denies coughing, SOB  Cardiovascular: Denies CP, palpitations, LE edema  GI: Denies nausea, vomiting, abdominal pain, diarrhea, constipation, blood in stool  : Denies dysuria, hematuria, frequency, incontinence  MSK: Denies back pain  Neuro: + headache  Skin: Denies rashes.

## 2024-05-14 ENCOUNTER — EMERGENCY (EMERGENCY)
Facility: HOSPITAL | Age: 44
LOS: 1 days | Discharge: HOME | End: 2024-05-14
Attending: EMERGENCY MEDICINE | Admitting: EMERGENCY MEDICINE
Payer: MEDICAID

## 2024-05-14 ENCOUNTER — EMERGENCY (EMERGENCY)
Facility: HOSPITAL | Age: 44
LOS: 0 days | Discharge: HOME | End: 2024-05-14
Attending: EMERGENCY MEDICINE
Payer: MEDICAID

## 2024-05-14 VITALS
HEART RATE: 72 BPM | TEMPERATURE: 98 F | DIASTOLIC BLOOD PRESSURE: 69 MMHG | RESPIRATION RATE: 20 BRPM | HEIGHT: 68 IN | SYSTOLIC BLOOD PRESSURE: 106 MMHG | OXYGEN SATURATION: 97 %

## 2024-05-14 VITALS
WEIGHT: 218.92 LBS | SYSTOLIC BLOOD PRESSURE: 120 MMHG | HEIGHT: 68 IN | RESPIRATION RATE: 18 BRPM | DIASTOLIC BLOOD PRESSURE: 68 MMHG | TEMPERATURE: 98 F | HEART RATE: 108 BPM | OXYGEN SATURATION: 100 %

## 2024-05-14 DIAGNOSIS — Z88.1 ALLERGY STATUS TO OTHER ANTIBIOTIC AGENTS STATUS: ICD-10-CM

## 2024-05-14 DIAGNOSIS — Z98.890 OTHER SPECIFIED POSTPROCEDURAL STATES: Chronic | ICD-10-CM

## 2024-05-14 DIAGNOSIS — R51.9 HEADACHE, UNSPECIFIED: ICD-10-CM

## 2024-05-14 DIAGNOSIS — F11.90 OPIOID USE, UNSPECIFIED, UNCOMPLICATED: ICD-10-CM

## 2024-05-14 DIAGNOSIS — Z04.89 ENCOUNTER FOR EXAMINATION AND OBSERVATION FOR OTHER SPECIFIED REASONS: ICD-10-CM

## 2024-05-14 PROCEDURE — 99284 EMERGENCY DEPT VISIT MOD MDM: CPT

## 2024-05-14 PROCEDURE — 93010 ELECTROCARDIOGRAM REPORT: CPT

## 2024-05-14 RX ORDER — METHOCARBAMOL 500 MG/1
1000 TABLET, FILM COATED ORAL ONCE
Refills: 0 | Status: COMPLETED | OUTPATIENT
Start: 2024-05-14 | End: 2024-05-14

## 2024-05-14 RX ORDER — LIDOCAINE 4 G/100G
1 CREAM TOPICAL ONCE
Refills: 0 | Status: COMPLETED | OUTPATIENT
Start: 2024-05-14 | End: 2024-05-14

## 2024-05-14 RX ORDER — IBUPROFEN 200 MG
600 TABLET ORAL ONCE
Refills: 0 | Status: COMPLETED | OUTPATIENT
Start: 2024-05-14 | End: 2024-05-14

## 2024-05-14 RX ORDER — GABAPENTIN 400 MG/1
800 CAPSULE ORAL ONCE
Refills: 0 | Status: COMPLETED | OUTPATIENT
Start: 2024-05-14 | End: 2024-05-14

## 2024-05-14 RX ADMIN — Medication 600 MILLIGRAM(S): at 01:07

## 2024-05-14 RX ADMIN — LIDOCAINE 1 PATCH: 4 CREAM TOPICAL at 01:08

## 2024-05-14 RX ADMIN — METHOCARBAMOL 1000 MILLIGRAM(S): 500 TABLET, FILM COATED ORAL at 01:07

## 2024-05-14 RX ADMIN — GABAPENTIN 800 MILLIGRAM(S): 400 CAPSULE ORAL at 01:18

## 2024-05-14 NOTE — ED ADULT NURSE REASSESSMENT NOTE - NS ED NURSE REASSESS COMMENT FT1
pt now ambulatory w steady gait, requested and ate a tray provided by the author of this note. pt ambulated to the restroom. belongings returned to patient. no distress noted. upon giving the patient dc paperwork pt sts i'm not leaving without methadone. [employer] at bedside now for assistance. attending at bedside. will cont to assist and provide therapeutic communicaiton

## 2024-05-14 NOTE — ED PROVIDER NOTE - PATIENT PORTAL LINK FT
You can access the FollowMyHealth Patient Portal offered by [facility] by registering at the following website: [email or web address]. By joining [employer]’s FollowMyHealth portal, you will also be able to view your health information using other applications (apps) compatible with our system.
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
## 2024-05-14 NOTE — ED PROVIDER NOTE - PROGRESS NOTE DETAILS
Awake and alert, eating, ambulating with steady gait.  Requesting his methadone, explained to him that we would not be giving him methadone here as he was brought here for lethargy in the setting of heroin use.  Patient demanding methadone refusing to leave, [facility] liaison at bedside to help escort patient out.
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
## 2024-05-14 NOTE — SBIRT NOTE ADULT - NSSBIRTDRGPASSREFTXDET_GEN_A_CORE
Provided SBIRT services: Full screen positive. Referral to Treatment attempted. Screening results were reviewed with the patient and patient was provided information about healthy guidelines and potential negative consequences associated with level of risk. Motivation and readiness to reduce or stop use was discussed and goals and activities to make changes were suggested/offered. Options discussed for further evaluation and treatment, but referral to treatment was not completed because:  Patient already going to detox/referral handled by site staff

## 2024-05-14 NOTE — ED ADULT NURSE NOTE - NSFALLHARMRISKINTERV_ED_ALL_ED

## 2024-05-14 NOTE — ED PROVIDER NOTE - PHYSICAL EXAMINATION
GEN - NAD; lethargic but arousable and answering questions; A+O x3   HEAD - NC/AT   ENT: Airway patent, mmm  NECK: Neck supple  PULMONARY - Non labored breathing  EXTREMITIES - moving all four extremities  NEUROLOGIC - alert, speech clear, normal gait  PSYCH -coherent, no SI HI
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
## 2024-05-14 NOTE — ED PROVIDER NOTE - PATIENT PORTAL LINK FT
You can access the FollowMyHealth Patient Portal offered by [facility] by registering at the following website: [email or web address]. By joining [employer]’s FollowMyHealth portal, you will also be able to view your health information using other applications (apps) compatible with our system.

## 2024-05-14 NOTE — ED PROVIDER NOTE - PHYSICAL EXAMINATION
On Physical Exam:  General: well appearing, in NAD, speaking clearly in full sentences and without difficulty; cooperative with exam  HEENT: PERRL, head is NCAT, airway patent, MMM  Neck: no neck tenderness   Cardiac: normal s1, s2; RRR; no MGR  Lungs: CTABL  Abdomen: soft nontender/nondistended  Skin: intact, no rash  Extremities: no peripheral edema, no gross deformities  Neuro: no gross neurologic deficits; steady gait, moving all extremities equally, no rigidity/clonus/tremors

## 2024-05-14 NOTE — ED ADULT TRIAGE NOTE - PAIN RATING/NUMBER SCALE (0-10): ACTIVITY
Refills on amlodipine and toprol.. said she is only seen once a year.. [facility]   7 (severe pain)

## 2024-05-14 NOTE — ED ADULT NURSE NOTE - SUICIDE SCREENING QUESTION 1
[de-identified] : Procedure: Transnasal flexible laryngoscopy \par Description: Informed consent was obtained from the patient prior to the procedure. The patient was seated in the clinic chair. Topical anesthesia was achieved by first spraying the nasal cavities with 4% lidocaine and 1% phenylephrine. \par \par Exam: This demonstrates a clear vallecula and crisp epiglottis. The aryepiglottic folds are intact and symmetric bilaterally. The hypopharynx does not demonstrate pooling. The interarytenoid space demonstrates no lesions. It does not demonstrate pachydermia. The false vocal folds are symmetric and without lesions or masses. False fold voicing (plica ventricularis) is not noted today. The true vocal folds show normal and symmetric motion bilaterally. There is no paradoxical motion. The right medial edge is crisp and shows no lesions or masses. The left medial edge is crisp and shows no lesions or masses. The mucosal covering is minimally edematous. There is not erythema present today. There are no obvious vascular ectasias present. The vocal processes do not demonstrate granulomas. The subglottis and proximal trachea is clear and unobstructed to the limits of the examination today.\par \par Stroboscopic Laryngoscopy Procedure Note: \par Indication:	Assess laryngeal biomechanics and vocal fold oscillation. \par Description of Procedure:	Informed consent was verbally obtained from the patient prior to the procedure. The patient was seated in the clinic chair. Topical anesthesia was achieved by first spraying the nasal cavities with 4% lidocaine and nasal decongestant. \par \par Findings: \par Supraglottis: no masses or lesions \par Glottis:    Structure: \par                       Right: crisp and shows no lesions or masses \par                       Left:  crisp and shows no lesions or masses \par                Mobility: \par                       Right:  normal \par                       Left:  normal\par                Amplitude: \par                       Right:  normal\par                       Left:  normal\par                Closure: complete \par                Wave symmetry:  symmetric \par Subglottis: no masses or lesions within the visualized subglottis Visualized airway is widely patent.\par Other: Severe supraglottic squeeze on phonation. 
No

## 2024-05-14 NOTE — ED PROVIDER NOTE - CLINICAL SUMMARY MEDICAL DECISION MAKING FREE TEXT BOX
Pt w/ h/o opiate use disorder on maintenance methadone (per  mg daily), prior history of osteomyelitis of lumbar spine, HTN, and migraine headaches  p/w lethargy i/s/o snorting heroin earlier. Then went to his methadone clinic was found to be lethargic they sent him here.  Patient offers no complaints at this time, placed on end-tidal CO2 monitor, allow to metabolize.  At this point does not require Narcan as he is arousable and normal respiratory rate.

## 2024-05-14 NOTE — ED PROVIDER NOTE - NSFOLLOWUPINSTRUCTIONS_ED_ALL_ED_FT
- Take your methadone daily at the clinic as prescribed. Abstain from any drug use.    - Lab and imaging results, if performed, were discussed with you along with your discharge diagnosis    - Follow up with your doctor in 1 week - bring copies of your results if you were given. If you do not have a primary doctor, please call [phone] to find one convenient for you    - Return to the ED for any new, worsening, or concerning symptoms to you    - Continue all prescribed medications

## 2024-05-14 NOTE — ED ADULT NURSE NOTE - NSFALLUNIVINTERV_ED_ALL_ED
Bed/Stretcher in lowest position, wheels locked, appropriate side rails in place/Call bell, personal items and telephone in reach/Instruct patient to call for assistance before getting out of bed/chair/stretcher/Non-slip footwear applied when patient is off stretcher/[place] to call system/Physically safe environment - no spills, clutter or unnecessary equipment/Purposeful proactive rounding/Room/bathroom lighting operational, light cord in reach
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
## 2024-05-14 NOTE — ED ADULT TRIAGE NOTE - CHIEF COMPLAINT QUOTE
pt coming with EMS from Methadone clinic for possible drug use, and methadone was not given as per EMs, pt with Hx. Opoid, heroin use, marijuana use, Benzo,   Methadone 190mg

## 2024-05-15 ENCOUNTER — EMERGENCY (EMERGENCY)
Facility: HOSPITAL | Age: 44
LOS: 0 days | Discharge: HOME | End: 2024-05-15
Attending: EMERGENCY MEDICINE
Payer: MEDICAID

## 2024-05-15 VITALS
HEART RATE: 65 BPM | OXYGEN SATURATION: 98 % | TEMPERATURE: 98 F | SYSTOLIC BLOOD PRESSURE: 115 MMHG | DIASTOLIC BLOOD PRESSURE: 73 MMHG | RESPIRATION RATE: 17 BRPM

## 2024-05-15 VITALS
HEART RATE: 100 BPM | RESPIRATION RATE: 17 BRPM | OXYGEN SATURATION: 100 % | DIASTOLIC BLOOD PRESSURE: 72 MMHG | SYSTOLIC BLOOD PRESSURE: 118 MMHG | TEMPERATURE: 99 F | HEIGHT: 68 IN | WEIGHT: 218.92 LBS

## 2024-05-15 DIAGNOSIS — R68.84 JAW PAIN: ICD-10-CM

## 2024-05-15 DIAGNOSIS — Z98.890 OTHER SPECIFIED POSTPROCEDURAL STATES: Chronic | ICD-10-CM

## 2024-05-15 DIAGNOSIS — Z88.1 ALLERGY STATUS TO OTHER ANTIBIOTIC AGENTS STATUS: ICD-10-CM

## 2024-05-15 PROCEDURE — 99284 EMERGENCY DEPT VISIT MOD MDM: CPT

## 2024-05-15 RX ORDER — IBUPROFEN 200 MG
600 TABLET ORAL ONCE
Refills: 0 | Status: COMPLETED | OUTPATIENT
Start: 2024-05-15 | End: 2024-05-15

## 2024-05-15 RX ADMIN — Medication 600 MILLIGRAM(S): at 04:48

## 2024-05-15 NOTE — ED ADULT NURSE REASSESSMENT NOTE - NS ED NURSE REASSESS COMMENT FT1
pt received laying comfortable in stretcher, patient alert and oriented x3, denies pain or discomfort. VS stable, in no acute distress, safety precautions maintained. fall precautions maintained. pt discharged, waiting for social work to set up medicaid cab.
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
## 2024-05-15 NOTE — ED PROVIDER NOTE - CARE PLAN
Case Management Discharge Note    Final Note: Discharged home with no needs.  Transported home by family    Destination      No service has been selected for the patient.      Durable Medical Equipment      No service has been selected for the patient.      Dialysis/Infusion      No service has been selected for the patient.      Home Medical Care      No service has been selected for the patient.      Therapy      No service has been selected for the patient.      Community Resources      No service has been selected for the patient.        Transportation Services  Private: Car    Final Discharge Disposition Code: 01 - home or self-care   1 Principal Discharge DX:	Mandible pain

## 2024-05-15 NOTE — ED POST DISCHARGE NOTE - ADDITIONAL DOCUMENTATION
Spoke with Rosa Bass calling from [facility] (RN) methadone clinic, discussed per chart review pt did not receive methadone at [facility] ED on 5/14.
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
## 2024-05-15 NOTE — ED PROVIDER NOTE - PATIENT PORTAL LINK FT
You can access the FollowMyHealth Patient Portal offered by [facility] by registering at the following website: [email or web address]. By joining [employer]’s FollowMyHealth portal, you will also be able to view your health information using other applications (apps) compatible with our system.
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
## 2024-05-17 ENCOUNTER — EMERGENCY (EMERGENCY)
Facility: HOSPITAL | Age: 44
LOS: 0 days | Discharge: HOME | End: 2024-05-17
Attending: STUDENT IN AN ORGANIZED HEALTH CARE EDUCATION/TRAINING PROGRAM
Payer: MEDICAID

## 2024-05-17 VITALS
HEIGHT: 68 IN | OXYGEN SATURATION: 98 % | HEART RATE: 74 BPM | TEMPERATURE: 99 F | WEIGHT: 220.9 LBS | DIASTOLIC BLOOD PRESSURE: 77 MMHG | RESPIRATION RATE: 19 BRPM | SYSTOLIC BLOOD PRESSURE: 130 MMHG

## 2024-05-17 DIAGNOSIS — Z98.890 OTHER SPECIFIED POSTPROCEDURAL STATES: Chronic | ICD-10-CM

## 2024-05-17 DIAGNOSIS — Z88.0 ALLERGY STATUS TO PENICILLIN: ICD-10-CM

## 2024-05-17 DIAGNOSIS — F19.10 OTHER PSYCHOACTIVE SUBSTANCE ABUSE, UNCOMPLICATED: ICD-10-CM

## 2024-05-17 DIAGNOSIS — G43.909 MIGRAINE, UNSPECIFIED, NOT INTRACTABLE, WITHOUT STATUS MIGRAINOSUS: ICD-10-CM

## 2024-05-17 DIAGNOSIS — R51.9 HEADACHE, UNSPECIFIED: ICD-10-CM

## 2024-05-17 DIAGNOSIS — F11.20 OPIOID DEPENDENCE, UNCOMPLICATED: ICD-10-CM

## 2024-05-17 PROCEDURE — 99284 EMERGENCY DEPT VISIT MOD MDM: CPT

## 2024-05-17 RX ORDER — KETOROLAC TROMETHAMINE 30 MG/ML
60 SYRINGE (ML) INJECTION ONCE
Refills: 0 | Status: DISCONTINUED | OUTPATIENT
Start: 2024-05-17 | End: 2024-05-17

## 2024-05-17 RX ADMIN — Medication 60 MILLIGRAM(S): at 19:27

## 2024-05-17 RX ADMIN — Medication 60 MILLIGRAM(S): at 23:02
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
## 2024-05-17 NOTE — ED ADULT NURSE NOTE - OBJECTIVE STATEMENT
Pt presents to ED A&Ox3 c/o headache and jaw pain. Pt was seen in the ED on May 11, 12, twice on 14 and 15. Pt was given a referral but was not able to do so. PMHx of anxiety and osteomyelitis. Pt requesting a place to sleep. Pt speaking in clear complete sentences, ambulating with equal steady gait.

## 2024-05-17 NOTE — ED PROVIDER NOTE - CLINICAL SUMMARY MEDICAL DECISION MAKING FREE TEXT BOX
43 y/o M with PMH drug abuse, multiple ED visits presenting to the ED w/ complaints of headache, pain in his teeth, "body aches".  Vitals stable.  Likely malingering.  Patient in ED multiple times this past week.  Will dose toradol for pain.  Requesting place to sleep.  Plan for discharge.
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
## 2024-05-17 NOTE — ED PROVIDER NOTE - PROGRESS NOTE DETAILS
Attending note (Ej): Patient discharged by prior attending.  Have been sleeping in the room has remained hemodynamically stable.  Patient is stable for discharge.  Ambulating steadily.  Patient well-known to me multiple visits in the past week, similar presentations.  No acute medical issues identified on workup.

## 2024-05-17 NOTE — ED PROVIDER NOTE - OBJECTIVE STATEMENT
45 y/o M with PMH drug abuse, multiple ED visits presenting to the ED w/ complaints of headache, pain in his teeth, "body aches". Patient has been seen frequently for similar symptoms over the past few days. States he needs a place to rest and pain medication. States OTC medication has not been helping his pain. He denies CP, dyspnea, N/V, fever, chills, or other acute symptoms.
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
## 2024-05-17 NOTE — ED ADULT NURSE REASSESSMENT NOTE - NS ED NURSE REASSESS COMMENT FT1
Pt requested to go outside to smoke joint, MD Manuel made aware. Pt ambulating with equal steady gait A&Ox3 in no acute distress.

## 2024-05-18 ENCOUNTER — EMERGENCY (EMERGENCY)
Facility: HOSPITAL | Age: 44
LOS: 0 days | Discharge: HOME | End: 2024-05-19
Attending: STUDENT IN AN ORGANIZED HEALTH CARE EDUCATION/TRAINING PROGRAM
Payer: MEDICAID

## 2024-05-18 VITALS
RESPIRATION RATE: 18 BRPM | HEART RATE: 52 BPM | TEMPERATURE: 98 F | SYSTOLIC BLOOD PRESSURE: 100 MMHG | OXYGEN SATURATION: 97 % | DIASTOLIC BLOOD PRESSURE: 65 MMHG

## 2024-05-18 VITALS
RESPIRATION RATE: 18 BRPM | SYSTOLIC BLOOD PRESSURE: 124 MMHG | HEART RATE: 83 BPM | WEIGHT: 220.9 LBS | TEMPERATURE: 98 F | DIASTOLIC BLOOD PRESSURE: 69 MMHG | HEIGHT: 68 IN | OXYGEN SATURATION: 96 %

## 2024-05-18 DIAGNOSIS — Z98.890 OTHER SPECIFIED POSTPROCEDURAL STATES: Chronic | ICD-10-CM

## 2024-05-18 DIAGNOSIS — F10.10 ALCOHOL ABUSE, UNCOMPLICATED: ICD-10-CM

## 2024-05-18 DIAGNOSIS — F19.19 OTHER PSYCHOACTIVE SUBSTANCE ABUSE WITH UNSPECIFIED PSYCHOACTIVE SUBSTANCE-INDUCED DISORDER: ICD-10-CM

## 2024-05-18 DIAGNOSIS — F15.10 OTHER STIMULANT ABUSE, UNCOMPLICATED: ICD-10-CM

## 2024-05-18 DIAGNOSIS — F11.10 OPIOID ABUSE, UNCOMPLICATED: ICD-10-CM

## 2024-05-18 DIAGNOSIS — F14.19 COCAINE ABUSE WITH UNSPECIFIED COCAINE-INDUCED DISORDER: ICD-10-CM

## 2024-05-18 PROCEDURE — 99283 EMERGENCY DEPT VISIT LOW MDM: CPT

## 2024-05-18 NOTE — ED ADULT NURSE NOTE - OBJECTIVE STATEMENT
AAOx3 pt presents to ED requesting transport to [facility] for detoxification from Benzos, Alcohol, and Amphetamines. Pt daily drinker and uses methadone. Pt last drink earlier today, last benzo at 8PM, last Methadone 8:30AM. AAOx3 pt presents to ED requesting transport to [facility] for detoxification from Benzos, Alcohol, and Amphetamines. Pt daily drinker and uses methadone. Pt last drink earlier today, last benzo at 8PM, last Methadone 8:30AM, last Adderall yesterday. Pt ambulates with steady gait.

## 2024-05-18 NOTE — ED ADULT TRIAGE NOTE - BMI (KG/M2)
Problem: Physical Therapy Goal  Goal: Physical Therapy Goal  Description  Goals to be met by: 19     Patient will increase functional independence with mobility by performin. Supine to sit with Moderate Assistance.  2. Sit to supine with Moderate Assistance.  3. Rolling to Left and Right with Moderate Assistance.  4. Sit to stand transfer with Contact Guard Assistance.  5. Bed to chair transfer with Contact Guard Assistance using Rolling Walker.  6. Gait  x 20 feet with Minimal Assistance using Rolling Walker.   7. Ascend/Descend 6 inch curb step with Minimal Assistance using Rolling Walker.  8. Lower extremity exercise program x 20 reps per handout, with assistance as needed.     Outcome: Ongoing, Progressing    PT goals established.       33.6

## 2024-05-18 NOTE — ED ADULT TRIAGE NOTE - CHIEF COMPLAINT QUOTE
Pt AAO X 3 ambulatory with steady gait c/o wanting to go to [place] for detox from benzo last use today 2000 Adderall yesterday and pt states he is on a 190 mg methadone dose last use 0830
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
## 2024-05-19 VITALS
DIASTOLIC BLOOD PRESSURE: 76 MMHG | RESPIRATION RATE: 18 BRPM | OXYGEN SATURATION: 97 % | HEART RATE: 62 BPM | SYSTOLIC BLOOD PRESSURE: 119 MMHG | TEMPERATURE: 98 F

## 2024-05-19 NOTE — ED PROVIDER NOTE - CLINICAL SUMMARY MEDICAL DECISION MAKING FREE TEXT BOX
male w/ polysubstance abuse presenting to the ED requesting detox from cocaine, amphetamines, alcohol, opioids.    high ED utilizer with multiple visits this week  patient is not currently in acute withdrawal and he is not requiring medications currently  will d/c to detox in AM
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
## 2024-05-19 NOTE — ED PROVIDER NOTE - PATIENT PORTAL LINK FT
You can access the FollowMyHealth Patient Portal offered by [facility] by registering at the following website: [email or web address]. By joining [facility]’s FollowMyHealth portal, you will also be able to view your health information using other applications (apps) compatible with our system.

## 2024-05-25 ENCOUNTER — INPATIENT (INPATIENT)
Facility: HOSPITAL | Age: 44
LOS: 3 days | Discharge: HOME HEALTH | End: 2024-05-29
Attending: STUDENT IN AN ORGANIZED HEALTH CARE EDUCATION/TRAINING PROGRAM | Admitting: STUDENT IN AN ORGANIZED HEALTH CARE EDUCATION/TRAINING PROGRAM
Payer: MEDICAID

## 2024-05-25 VITALS
TEMPERATURE: 98 F | HEART RATE: 77 BPM | HEIGHT: 68 IN | DIASTOLIC BLOOD PRESSURE: 75 MMHG | RESPIRATION RATE: 18 BRPM | SYSTOLIC BLOOD PRESSURE: 133 MMHG | OXYGEN SATURATION: 99 %

## 2024-05-25 DIAGNOSIS — Z98.890 OTHER SPECIFIED POSTPROCEDURAL STATES: Chronic | ICD-10-CM

## 2024-05-25 LAB
ALBUMIN SERPL ELPH-MCNC: 3.8 G/DL — SIGNIFICANT CHANGE UP (ref 3.3–5)
ALP SERPL-CCNC: 106 U/L — SIGNIFICANT CHANGE UP (ref 40–120)
ALT FLD-CCNC: 28 U/L — SIGNIFICANT CHANGE UP (ref 12–78)
ANION GAP SERPL CALC-SCNC: 6 MMOL/L — SIGNIFICANT CHANGE UP (ref 5–17)
APTT BLD: 34.8 SEC — SIGNIFICANT CHANGE UP (ref 24.5–35.6)
AST SERPL-CCNC: 26 U/L — SIGNIFICANT CHANGE UP (ref 15–37)
BASOPHILS # BLD AUTO: 0.02 K/UL — SIGNIFICANT CHANGE UP (ref 0–0.2)
BASOPHILS NFR BLD AUTO: 0.2 % — SIGNIFICANT CHANGE UP (ref 0–2)
BILIRUB SERPL-MCNC: 0.9 MG/DL — SIGNIFICANT CHANGE UP (ref 0.2–1.2)
BUN SERPL-MCNC: 14 MG/DL — SIGNIFICANT CHANGE UP (ref 7–23)
CALCIUM SERPL-MCNC: 9.6 MG/DL — SIGNIFICANT CHANGE UP (ref 8.5–10.1)
CHLORIDE SERPL-SCNC: 108 MMOL/L — SIGNIFICANT CHANGE UP (ref 96–108)
CO2 SERPL-SCNC: 27 MMOL/L — SIGNIFICANT CHANGE UP (ref 22–31)
CREAT SERPL-MCNC: 0.75 MG/DL — SIGNIFICANT CHANGE UP (ref 0.5–1.3)
EGFR: 116 ML/MIN/1.73M2 — SIGNIFICANT CHANGE UP
EOSINOPHIL # BLD AUTO: 0.02 K/UL — SIGNIFICANT CHANGE UP (ref 0–0.5)
EOSINOPHIL NFR BLD AUTO: 0.2 % — SIGNIFICANT CHANGE UP (ref 0–6)
GLUCOSE SERPL-MCNC: 96 MG/DL — SIGNIFICANT CHANGE UP (ref 70–99)
HCT VFR BLD CALC: 46.4 % — SIGNIFICANT CHANGE UP (ref 39–50)
HGB BLD-MCNC: 15.4 G/DL — SIGNIFICANT CHANGE UP (ref 13–17)
IMM GRANULOCYTES NFR BLD AUTO: 0.2 % — SIGNIFICANT CHANGE UP (ref 0–0.9)
INR BLD: 1.13 RATIO — SIGNIFICANT CHANGE UP (ref 0.85–1.18)
LYMPHOCYTES # BLD AUTO: 1.01 K/UL — SIGNIFICANT CHANGE UP (ref 1–3.3)
LYMPHOCYTES # BLD AUTO: 9.5 % — LOW (ref 13–44)
MCHC RBC-ENTMCNC: 28.5 PG — SIGNIFICANT CHANGE UP (ref 27–34)
MCHC RBC-ENTMCNC: 33.2 G/DL — SIGNIFICANT CHANGE UP (ref 32–36)
MCV RBC AUTO: 85.9 FL — SIGNIFICANT CHANGE UP (ref 80–100)
MONOCYTES # BLD AUTO: 0.46 K/UL — SIGNIFICANT CHANGE UP (ref 0–0.9)
MONOCYTES NFR BLD AUTO: 4.3 % — SIGNIFICANT CHANGE UP (ref 2–14)
NEUTROPHILS # BLD AUTO: 9.12 K/UL — HIGH (ref 1.8–7.4)
NEUTROPHILS NFR BLD AUTO: 85.6 % — HIGH (ref 43–77)
NRBC # BLD: 0 /100 WBCS — SIGNIFICANT CHANGE UP (ref 0–0)
PLATELET # BLD AUTO: 284 K/UL — SIGNIFICANT CHANGE UP (ref 150–400)
POTASSIUM SERPL-MCNC: 3.8 MMOL/L — SIGNIFICANT CHANGE UP (ref 3.5–5.3)
POTASSIUM SERPL-SCNC: 3.8 MMOL/L — SIGNIFICANT CHANGE UP (ref 3.5–5.3)
PROT SERPL-MCNC: 8.6 GM/DL — HIGH (ref 6–8.3)
PROTHROM AB SERPL-ACNC: 13.5 SEC — HIGH (ref 9.5–13)
RBC # BLD: 5.4 M/UL — SIGNIFICANT CHANGE UP (ref 4.2–5.8)
RBC # FLD: 15.4 % — HIGH (ref 10.3–14.5)
SODIUM SERPL-SCNC: 141 MMOL/L — SIGNIFICANT CHANGE UP (ref 135–145)
WBC # BLD: 10.65 K/UL — HIGH (ref 3.8–10.5)
WBC # FLD AUTO: 10.65 K/UL — HIGH (ref 3.8–10.5)

## 2024-05-25 PROCEDURE — 99223 1ST HOSP IP/OBS HIGH 75: CPT

## 2024-05-25 PROCEDURE — 99285 EMERGENCY DEPT VISIT HI MDM: CPT

## 2024-05-25 PROCEDURE — 73562 X-RAY EXAM OF KNEE 3: CPT | Mod: 26,RT

## 2024-05-25 RX ORDER — GABAPENTIN 400 MG/1
800 CAPSULE ORAL THREE TIMES A DAY
Refills: 0 | Status: DISCONTINUED | OUTPATIENT
Start: 2024-05-25 | End: 2024-05-29

## 2024-05-25 RX ORDER — BUPROPION HYDROCHLORIDE 150 MG/1
300 TABLET, EXTENDED RELEASE ORAL ONCE
Refills: 0 | Status: COMPLETED | OUTPATIENT
Start: 2024-05-25 | End: 2024-05-25

## 2024-05-25 RX ORDER — HYDROMORPHONE HYDROCHLORIDE 2 MG/ML
2 INJECTION INTRAMUSCULAR; INTRAVENOUS; SUBCUTANEOUS ONCE
Refills: 0 | Status: DISCONTINUED | OUTPATIENT
Start: 2024-05-25 | End: 2024-05-25

## 2024-05-25 RX ORDER — GABAPENTIN 400 MG/1
800 CAPSULE ORAL ONCE
Refills: 0 | Status: COMPLETED | OUTPATIENT
Start: 2024-05-25 | End: 2024-05-25

## 2024-05-25 RX ORDER — ACETAMINOPHEN 500 MG
1000 TABLET ORAL ONCE
Refills: 0 | Status: COMPLETED | OUTPATIENT
Start: 2024-05-25 | End: 2024-05-25

## 2024-05-25 RX ORDER — NICOTINE POLACRILEX 2 MG
1 GUM BUCCAL DAILY
Refills: 0 | Status: DISCONTINUED | OUTPATIENT
Start: 2024-05-25 | End: 2024-05-29

## 2024-05-25 RX ORDER — LANOLIN ALCOHOL/MO/W.PET/CERES
3 CREAM (GRAM) TOPICAL AT BEDTIME
Refills: 0 | Status: DISCONTINUED | OUTPATIENT
Start: 2024-05-25 | End: 2024-05-29

## 2024-05-25 RX ADMIN — Medication 3 MILLIGRAM(S): at 21:29

## 2024-05-25 RX ADMIN — Medication 100 MILLIGRAM(S): at 12:22

## 2024-05-25 RX ADMIN — GABAPENTIN 800 MILLIGRAM(S): 400 CAPSULE ORAL at 12:33

## 2024-05-25 RX ADMIN — HYDROMORPHONE HYDROCHLORIDE 2 MILLIGRAM(S): 2 INJECTION INTRAMUSCULAR; INTRAVENOUS; SUBCUTANEOUS at 19:50

## 2024-05-25 RX ADMIN — GABAPENTIN 800 MILLIGRAM(S): 400 CAPSULE ORAL at 21:29

## 2024-05-25 RX ADMIN — Medication 100 MILLIGRAM(S): at 22:15

## 2024-05-25 RX ADMIN — HYDROMORPHONE HYDROCHLORIDE 2 MILLIGRAM(S): 2 INJECTION INTRAMUSCULAR; INTRAVENOUS; SUBCUTANEOUS at 17:26

## 2024-05-25 RX ADMIN — BUPROPION HYDROCHLORIDE 300 MILLIGRAM(S): 150 TABLET, EXTENDED RELEASE ORAL at 12:33

## 2024-05-25 RX ADMIN — Medication 400 MILLIGRAM(S): at 12:22

## 2024-05-25 RX ADMIN — Medication 1 PATCH: at 21:29

## 2024-05-25 NOTE — H&P ADULT - NSHPADDITIONALINFOADULT_GEN_ALL_CORE
Time spent discussing with ED attending, taking history, reviewing labs/medications, assessment/plan.

## 2024-05-25 NOTE — H&P ADULT - NSHPPHYSICALEXAM_GEN_ALL_CORE
GENERAL: NAD, lying in bed comfortably  HEAD:  Atraumatic, normocephalic  EYES: EOMI, PERRLA, conjunctiva and sclera clear  NECK: Supple, trachea midline, no JVD  HEART: Regular rate and rhythm, no murmurs, rubs, or gallops  LUNGS: Unlabored respirations.  Clear to auscultation bilaterally, no crackles, wheezing, or rhonchi  ABDOMEN: Soft, nontender, nondistended, +BS  EXTREMITIES: 2+ peripheral pulses bilaterally. No clubbing, cyanosis, or edema  NERVOUS SYSTEM:  A&Ox3, moving all extremities, no focal deficits   SKIN: R knee - 10x 7 cm open wound with purulence and surrounding erythema - marked   L knee - small abrasion

## 2024-05-25 NOTE — ED PROVIDER NOTE - ATTENDING CONTRIBUTION TO CARE
Patient evaluated and seen with PGY 3  Dr Chery as a shared visit - agree with above history and physical - pt examined and seen by me personally - findings as seen: Pt with R knee redness/swelling and abscess that is now draining with surrounding erythematous change  congruent with cellulitis - will admit for IV abx until wound improves.

## 2024-05-25 NOTE — ED ADULT NURSE NOTE - OBJECTIVE STATEMENT
Pt discharged home with parent/guardian. Pt acting age appropriately, respirations regular and unlabored, cap refill less than two seconds. Skin pink, dry and warm. Lungs clear bilaterally. No further complaints at this time. Parent/guardian verbalized understanding of discharge paperwork and has no further questions at this time. Education provided about continuation of care, follow up care with GI and medication administration: prescription provided. Parent/guardian able to provided teach back about discharge instructions.        Rosibel Montes RN  09/13/23 2475 pt AOx4, responsive, painful ambulating and bearing weight to RLE. Pt c/o serosanguineous draining wound to R knee, swelling, redness and pain s/p injury after fall 1 week ago. Denies fever/chills, n/v/d, CP/SOB/difficulty breathing. Hx currently on methadone program w hx of iv drug use

## 2024-05-25 NOTE — PATIENT PROFILE ADULT - FALL HARM RISK - HARM RISK INTERVENTIONS
Assistance with ambulation/Assistance OOB with selected safe patient handling equipment/Communicate Risk of Fall with Harm to all staff/Discuss with provider need for PT consult/Monitor gait and stability/Reinforce activity limits and safety measures with patient and family/Tailored Fall Risk Interventions/Visual Cue: Yellow wristband and red socks/Bed in lowest position, wheels locked, appropriate side rails in place/Call bell, personal items and telephone in reach/Instruct patient to call for assistance before getting out of bed or chair/Non-slip footwear when patient is out of bed/[place] to call system/Physically safe environment - no spills, clutter or unnecessary equipment/Purposeful Proactive Rounding/Room/bathroom lighting operational, light cord in reach

## 2024-05-25 NOTE — PATIENT PROFILE ADULT - FUNCTIONAL ASSESSMENT - BASIC MOBILITY 3.
"Subjective:      Ravi Barroso is a 40 y.o. male who presents with Fall (Fell down yesterday hurt right heel.)    Patient presents to urgent care with acute right heel pain. He was involved in a 5K with an obstacle course yesterday which she normally ran but one. He states when he was climbing down from some height and jumped he may have caught his right foot and landed somehow oddly on and noticed some pain and was able to finish the race but has had progressive pain since then. Denies any history of stress fractures occasional plantar fasciitis had some Achilles tendon issues of the left lower extremity more recently. Denies any fevers or chills no numbness tingling or weakness distally.      HPI  ROS  PMH:  has no past medical history on file.  MEDS: No current outpatient prescriptions on file.  ALLERGIES: No Known Allergies  SURGHX: No past surgical history on file.  SOCHX:  reports that he has never smoked. He has never used smokeless tobacco. He reports that he does not drink alcohol or use drugs.  FH: Family history was reviewed, no pertinent findings to report     Objective:     /70   Pulse (!) 54   Temp 36.9 °C (98.4 °F)   Resp 20   Ht 1.854 m (6' 1\")   Wt 72.6 kg (160 lb)   SpO2 99%   BMI 21.11 kg/m²      Physical Exam   Constitutional: He is oriented to person, place, and time. He appears well-developed and well-nourished. No distress.   HENT:   Mouth/Throat: Oropharynx is clear and moist.   Cardiovascular: Normal rate.    Pulmonary/Chest: Effort normal.   Musculoskeletal: Normal range of motion. He exhibits no edema, tenderness or deformity.        Feet:    Nontender to palpation this is the area of tenderness with weightbearing no swelling or bruising is noted no erythema of the skin.   Neurological: He is alert and oriented to person, place, and time.   Skin: Skin is warm and dry. He is not diaphoretic. No erythema.   Psychiatric: He has a normal mood and affect. His behavior is normal. " Judgment and thought content normal.       Diagnostics: X-ray per my review no acute fx or dislocation     No radiographic evidence of acute displaced fracture    Mild spurring    Broad anterior process calcaneus likely abuts the navicular without visualized bony fusion of coalition   Reading Provider Reading Date   Perico Ramirez M.D. Oct 15, 2017       Assessment/Plan:     1. Pain of right heel  DX-OS CALCIS (HEEL) 2+ RIGHT   2. Contusion of right heel, initial encounter       Crutches  Ace wrap  RICE  Consider ortho f/u with mri if cont problems     4 = No assist / stand by assistance

## 2024-05-25 NOTE — ED ADULT TRIAGE NOTE - CHIEF COMPLAINT QUOTE
pt states he fell and injured his right knee a week ago. noted with wound on right knee, c/o pain swelling, redness, yellow drainage. no history.

## 2024-05-25 NOTE — ED PROVIDER NOTE - OBJECTIVE STATEMENT
44-year-old male with a past medical history of polysubstance abuse presenting for wound check. Last week patient scraped his right knee. Over the last week patient has had worsening pain and redness around the wound. Patient states he was putting bandages on the wound. Presented today for worsening pain. Denies fevers, difficulty breathing, nausea, vomiting.

## 2024-05-25 NOTE — ED ADULT NURSE NOTE - ED STAT RN HANDOFF DETAILS
Report given to ed hold MONIK Kim. pt AOx3, responsive, ambulatory, denies pain at this time, resting in stretcher.

## 2024-05-25 NOTE — ED PROVIDER NOTE - CLINICAL SUMMARY MEDICAL DECISION MAKING FREE TEXT BOX
44-year-old male with a past medical history of polysubstance abuse presenting for wound check. Last week patient scraped his right knee. Over the last week patient has had worsening pain and redness around the wound. Patient states he was putting bandages on the wound. Presented today for worsening pain. Denies fevers, difficulty breathing, nausea, vomiting. Physical exam reveals 10 x 7 cm area of erythema consistent with cellulitis. At the center of the wound there is mild fluctuance with purulence. Patient having difficulty ambulating due to pain. The right knee joint itself is without swelling and there is low suspicion for septic joint. Will obtain culture of purulence, blood cultures, CBC, CMP, right knee x-ray, IV antibiotics.

## 2024-05-25 NOTE — ED ADULT NURSE NOTE - NS_SISCREENINGSR_GEN_ALL_ED
Has Your Skin Lesion Been Treated?: not been treated Is This A New Presentation, Or A Follow-Up?: Skin Lesions Negative

## 2024-05-25 NOTE — ED PROVIDER NOTE - NS ED ATTENDING STATEMENT MOD
I have seen and examined this patient and fully participated in the care of this patient as the teaching attending.  The service was shared with the [facility].  I reviewed and verified the documentation.

## 2024-05-25 NOTE — H&P ADULT - HISTORY OF PRESENT ILLNESS
43 yo M with PMH of previous spinal surgery (osteomyelitis), polysubstance abuse (on methadone for 2 years), depression presents for right knee wound check. Patient fell last week on pavement. He had scrapes on both knees. He states he cleaned it with hydrogen peroxide and put a bandage. The last two days, he found it difficult to ambulate and noticed increased redness of the wound. Denies any current fever, chills, n/v/d, abdominal pain    In the ED, patient had mild leukocytosis, swelling noted on xray.   He was started on clindamycin and given tylenol for pain.

## 2024-05-25 NOTE — ED PROVIDER NOTE - PHYSICAL EXAMINATION
General: Appears well and nontoxic  Mentation: AAO x 3  psych: mood appropriate  HEENT: airway patent, conjunctivae clear bilaterally  Resp: symmetric chest rise, no resp distress  Neuro: sensation and motor function grossly intact  Skin: no cyanosis, no jaundice, 10 x 7 cm area of erythema with central area of fluctuance that is superficial.  MSK: normal movement of all extremities, Patient having pain with ambulation, right knee with full range of motion.  Lymph/Vasc: no LE edema

## 2024-05-25 NOTE — ED PROVIDER NOTE - NS ED MD DISPO SPECIAL CONSIDERATION1
Take Tylenol ES 2 tabs 3 times a day./Prescriptions electronically submitted to pharmacy from [facility]
None

## 2024-05-25 NOTE — H&P ADULT - ASSESSMENT
MONSERRAT ROLON is a 44y Male presenting with presenting with cellulitis     Problem/Plan #1  - Problem: Cellulitis of right knee  - Plan: admit to medicine  - patient started on clindamycin in ED  - f/u on wound culture  - monitor for fever       Problem/Plan #2  - Problem: polysubstance abuse  - Plan: methadone   - nicotine patch     Problem/Plan #3  - Problem: anxiety/depression   - continue wellbutrin  ROLONMONSERRAT is a 44y Male presenting with presenting with cellulitis     Problem/Plan #1  - Problem: Cellulitis of right knee  - Plan: admit to medicine  - patient started on clindamycin in ED  - f/u on wound culture  - monitor for fever   - patient states when he had osteomyelitis, it wasn't responding to vancomycin  - can continue clindamycin, however consider changing if not responsive  - skin marker placed in ED    Problem/Plan #2  - Problem: polysubstance abuse  - Plan: methadone   - nicotine patch     Problem/Plan #3  - Problem: anxiety/depression   - continue wellbutrin     regular diet   dispo: pending response to abx MONSERRAT ROLON is a 44y Male presenting with presenting with cellulitis     Problem/Plan #1  - Problem: Cellulitis of right knee  - Plan: admit to medicine  - patient started on clindamycin in ED  - f/u on wound culture  - monitor for fever   - patient states when he had osteomyelitis, it wasn't responding to vancomycin  - can continue clindamycin, however consider changing if not responsive  - skin marker placed in ED    Problem/Plan #2  - Problem: polysubstance abuse  - Plan: methadone - patient goes to clinic [facility]  - closed today, patient states he's on 190 mg/day   - call to verify on business day  - will do 10 mg for now  - nicotine patch     Problem/Plan #3  - Problem: anxiety/depression   - continue wellbutrin     regular diet   dispo: pending response to abx MONSERRAT ROLON is a 44y Male presenting with presenting with cellulitis     Problem/Plan #1  - Problem: Cellulitis of right knee  - Plan: admit to medicine  - patient started on clindamycin in ED  - f/u on wound culture  - monitor for fever   - patient states when he had osteomyelitis, it wasn't responding to vancomycin?  - can continue clindamycin, however consider changing if not responsive/c diff  - dilaudid 2 mg for severe pain   - wound care  - skin marker placed in ED    Problem/Plan #2  - Problem: polysubstance abuse  - Plan: methadone - patient goes to clinic [facility] [address]  - closed today, patient states he's on 190 mg/day   - call to verify on business day  - patient will try to have wife bring it for tomorrow  - if needed, start at max 10 mg   - nicotine patch     Problem/Plan #3  - Problem: anxiety/depression   - continue wellbutrin     regular diet   dispo: pending response to abx

## 2024-05-25 NOTE — H&P ADULT - NSHPLABSRESULTS_GEN_ALL_CORE
GENERAL: NAD, lying in bed comfortably  HEAD:  Atraumatic, normocephalic  EYES: EOMI, PERRLA, conjunctiva and sclera clear  NECK: Supple, trachea midline, no JVD  HEART: Regular rate and rhythm, no murmurs, rubs, or gallops  LUNGS: Unlabored respirations.  Clear to auscultation bilaterally, no crackles, wheezing, or rhonchi  ABDOMEN: Soft, nontender, nondistended, +BS  EXTREMITIES: 2+ peripheral pulses bilaterally. No clubbing, cyanosis, or edema  NERVOUS SYSTEM:  A&Ox3, moving all extremities, no focal deficits   SKIN: No rashes or lesions 15.4   10.65 )-----------( 284      ( 25 May 2024 12:00 )             46.4     05-25    141  |  108  |  14  ----------------------------<  96  3.8   |  27  |  0.75    Ca    9.6      25 May 2024 12:00    TPro  8.6<H>  /  Alb  3.8  /  TBili  0.9  /  DBili  x   /  AST  26  /  ALT  28  /  AlkPhos  106  05-25    PT/INR - ( 25 May 2024 12:00 )   PT: 13.5 sec;   INR: 1.13 ratio         PTT - ( 25 May 2024 12:00 )  PTT:34.8 sec  Urinalysis Basic - ( 25 May 2024 12:00 )    Color: x / Appearance: x / SG: x / pH: x  Gluc: 96 mg/dL / Ketone: x  / Bili: x / Urobili: x   Blood: x / Protein: x / Nitrite: x   Leuk Esterase: x / RBC: x / WBC x   Sq Epi: x / Non Sq Epi: x / Bacteria: x

## 2024-05-26 PROCEDURE — 99232 SBSQ HOSP IP/OBS MODERATE 35: CPT

## 2024-05-26 RX ORDER — POLYETHYLENE GLYCOL 3350 17 G/17G
17 POWDER, FOR SOLUTION ORAL ONCE
Refills: 0 | Status: COMPLETED | OUTPATIENT
Start: 2024-05-26 | End: 2024-05-26

## 2024-05-26 RX ORDER — METHADONE HYDROCHLORIDE 40 MG/1
50 TABLET ORAL DAILY
Refills: 0 | Status: DISCONTINUED | OUTPATIENT
Start: 2024-05-26 | End: 2024-05-26

## 2024-05-26 RX ORDER — METHADONE HYDROCHLORIDE 40 MG/1
10 TABLET ORAL ONCE
Refills: 0 | Status: DISCONTINUED | OUTPATIENT
Start: 2024-05-26 | End: 2024-05-26

## 2024-05-26 RX ORDER — BUPROPION HYDROCHLORIDE 150 MG/1
300 TABLET, EXTENDED RELEASE ORAL DAILY
Refills: 0 | Status: COMPLETED | OUTPATIENT
Start: 2024-05-26 | End: 2024-05-26

## 2024-05-26 RX ORDER — HYDROMORPHONE HYDROCHLORIDE 2 MG/ML
1 INJECTION INTRAMUSCULAR; INTRAVENOUS; SUBCUTANEOUS ONCE
Refills: 0 | Status: DISCONTINUED | OUTPATIENT
Start: 2024-05-26 | End: 2024-05-26

## 2024-05-26 RX ORDER — METHADONE HYDROCHLORIDE 40 MG/1
10 TABLET ORAL
Refills: 0 | Status: DISCONTINUED | OUTPATIENT
Start: 2024-05-26 | End: 2024-05-26

## 2024-05-26 RX ORDER — SENNA PLUS 8.6 MG/1
1 TABLET ORAL ONCE
Refills: 0 | Status: COMPLETED | OUTPATIENT
Start: 2024-05-26 | End: 2024-05-26

## 2024-05-26 RX ORDER — AMPICILLIN SODIUM AND SULBACTAM SODIUM 250; 125 MG/ML; MG/ML
3 INJECTION, POWDER, FOR SUSPENSION INTRAMUSCULAR; INTRAVENOUS EVERY 6 HOURS
Refills: 0 | Status: DISCONTINUED | OUTPATIENT
Start: 2024-05-26 | End: 2024-05-28

## 2024-05-26 RX ORDER — VANCOMYCIN HCL 1 G
1500 VIAL (EA) INTRAVENOUS EVERY 12 HOURS
Refills: 0 | Status: DISCONTINUED | OUTPATIENT
Start: 2024-05-26 | End: 2024-05-29

## 2024-05-26 RX ORDER — HYDROMORPHONE HYDROCHLORIDE 2 MG/ML
2 INJECTION INTRAMUSCULAR; INTRAVENOUS; SUBCUTANEOUS
Refills: 0 | Status: DISCONTINUED | OUTPATIENT
Start: 2024-05-26 | End: 2024-05-27

## 2024-05-26 RX ADMIN — Medication 1 PATCH: at 18:02

## 2024-05-26 RX ADMIN — Medication 300 MILLIGRAM(S): at 20:01

## 2024-05-26 RX ADMIN — AMPICILLIN SODIUM AND SULBACTAM SODIUM 200 GRAM(S): 250; 125 INJECTION, POWDER, FOR SUSPENSION INTRAMUSCULAR; INTRAVENOUS at 21:50

## 2024-05-26 RX ADMIN — GABAPENTIN 800 MILLIGRAM(S): 400 CAPSULE ORAL at 21:49

## 2024-05-26 RX ADMIN — HYDROMORPHONE HYDROCHLORIDE 1 MILLIGRAM(S): 2 INJECTION INTRAMUSCULAR; INTRAVENOUS; SUBCUTANEOUS at 10:27

## 2024-05-26 RX ADMIN — Medication 1 PATCH: at 07:57

## 2024-05-26 RX ADMIN — METHADONE HYDROCHLORIDE 10 MILLIGRAM(S): 40 TABLET ORAL at 10:24

## 2024-05-26 RX ADMIN — POLYETHYLENE GLYCOL 3350 17 GRAM(S): 17 POWDER, FOR SOLUTION ORAL at 11:36

## 2024-05-26 RX ADMIN — HYDROMORPHONE HYDROCHLORIDE 2 MILLIGRAM(S): 2 INJECTION INTRAMUSCULAR; INTRAVENOUS; SUBCUTANEOUS at 15:35

## 2024-05-26 RX ADMIN — SENNA PLUS 1 TABLET(S): 8.6 TABLET ORAL at 11:36

## 2024-05-26 RX ADMIN — AMPICILLIN SODIUM AND SULBACTAM SODIUM 200 GRAM(S): 250; 125 INJECTION, POWDER, FOR SUSPENSION INTRAMUSCULAR; INTRAVENOUS at 15:35

## 2024-05-26 RX ADMIN — GABAPENTIN 800 MILLIGRAM(S): 400 CAPSULE ORAL at 13:06

## 2024-05-26 RX ADMIN — Medication 1 PATCH: at 11:17

## 2024-05-26 RX ADMIN — BUPROPION HYDROCHLORIDE 300 MILLIGRAM(S): 150 TABLET, EXTENDED RELEASE ORAL at 12:25

## 2024-05-26 RX ADMIN — HYDROMORPHONE HYDROCHLORIDE 2 MILLIGRAM(S): 2 INJECTION INTRAMUSCULAR; INTRAVENOUS; SUBCUTANEOUS at 20:57

## 2024-05-26 RX ADMIN — HYDROMORPHONE HYDROCHLORIDE 2 MILLIGRAM(S): 2 INJECTION INTRAMUSCULAR; INTRAVENOUS; SUBCUTANEOUS at 16:26

## 2024-05-26 RX ADMIN — Medication 3 MILLIGRAM(S): at 21:43

## 2024-05-26 RX ADMIN — Medication 100 MILLIGRAM(S): at 05:39

## 2024-05-26 RX ADMIN — HYDROMORPHONE HYDROCHLORIDE 2 MILLIGRAM(S): 2 INJECTION INTRAMUSCULAR; INTRAVENOUS; SUBCUTANEOUS at 23:16

## 2024-05-26 RX ADMIN — METHADONE HYDROCHLORIDE 10 MILLIGRAM(S): 40 TABLET ORAL at 16:49

## 2024-05-26 RX ADMIN — GABAPENTIN 800 MILLIGRAM(S): 400 CAPSULE ORAL at 05:39

## 2024-05-26 RX ADMIN — HYDROMORPHONE HYDROCHLORIDE 1 MILLIGRAM(S): 2 INJECTION INTRAMUSCULAR; INTRAVENOUS; SUBCUTANEOUS at 09:57

## 2024-05-26 RX ADMIN — HYDROMORPHONE HYDROCHLORIDE 2 MILLIGRAM(S): 2 INJECTION INTRAMUSCULAR; INTRAVENOUS; SUBCUTANEOUS at 20:07

## 2024-05-26 NOTE — PROGRESS NOTE ADULT - SUBJECTIVE AND OBJECTIVE BOX
HPI:  45 yo M with PMH of previous spinal surgery (osteomyelitis), polysubstance abuse (on methadone for 2 years), depression presents for right knee wound check. Patient fell last week on pavement. He had scrapes on both knees. He states he cleaned it with hydrogen peroxide and put a bandage. The last two days, he found it difficult to ambulate and noticed increased redness of the wound. Denies any current fever, chills, n/v/d, abdominal pain    In the ED, patient had mild leukocytosis, swelling noted on xray.   He was started on clindamycin and given tylenol for pain. (25 May 2024 15:30)  Patient is a 44y old  Male who presents with a chief complaint of cellulitis (25 May 2024 15:30)      INTERVAL HPI/OVERNIGHT EVENTS:  no acute events new admission     MEDICATIONS  (STANDING):  ampicillin/sulbactam  IVPB 3 Gram(s) IV Intermittent every 6 hours  gabapentin 800 milliGRAM(s) Oral three times a day  methadone    Tablet 10 milliGRAM(s) Oral once  nicotine - 21 mG/24Hr(s) Patch 1 Patch Transdermal daily  vancomycin  IVPB 1500 milliGRAM(s) IV Intermittent every 12 hours    MEDICATIONS  (PRN):  HYDROmorphone  Injectable 2 milliGRAM(s) IV Push every 3 hours PRN Severe Pain (7 - 10)  melatonin 3 milliGRAM(s) Oral at bedtime PRN Sleep      Allergies    Zosyn (Hives)    Intolerances        REVIEW OF SYSTEMS:  CONSTITUTIONAL: No fever, weight loss, or fatigue  EYES: No eye pain, visual disturbances, or discharge  ENMT:  No difficulty hearing, tinnitus, vertigo; No sinus or throat pain  NECK: No pain or stiffness  BREASTS: No pain, masses, or nipple discharge  RESPIRATORY: No cough, wheezing, chills or hemoptysis; No shortness of breath  CARDIOVASCULAR: No chest pain, palpitations, dizziness, or leg swelling  GASTROINTESTINAL: No abdominal or epigastric pain. No nausea, vomiting, or hematemesis; No diarrhea or constipation. No melena or hematochezia.  GENITOURINARY: No dysuria, frequency, hematuria, or incontinence  NEUROLOGICAL: No headaches, memory loss, loss of strength, numbness, or tremors  SKIN: No itching, burning, rashes, or lesions   LYMPH NODES: No enlarged glands  ENDOCRINE: No heat or cold intolerance; No hair loss  MUSCULOSKELETAL: chronic pain has used drugs in the past   PSYCHIATRIC: No depression, anxiety, mood swings, or difficulty sleeping  HEME/LYMPH: No easy bruising, or bleeding gums  ALLERGY AND IMMUNOLOGIC: No hives or eczema    Vital Signs Last 24 Hrs  T(C): 36.6 (27 May 2024 05:00), Max: 37.1 (26 May 2024 10:26)  T(F): 97.8 (27 May 2024 05:00), Max: 98.7 (26 May 2024 10:26)  HR: 60 (27 May 2024 05:00) (51 - 69)  BP: 102/62 (27 May 2024 05:00) (102/62 - 124/84)  BP(mean): --  RR: 19 (27 May 2024 05:00) (16 - 19)  SpO2: 99% (27 May 2024 05:00) (98% - 99%)    Parameters below as of 27 May 2024 05:00  Patient On (Oxygen Delivery Method): room air        PHYSICAL EXAM:  GENERAL: NAD, well-groomed, well-developed  HEAD:  Atraumatic, Normocephalic  EYES: EOMI, PERRLA, conjunctiva and sclera clear  ENMT: No tonsillar erythema, exudates, or enlargement; Moist mucous membranes, Good dentition, No lesions  NECK: Supple, No JVD, Normal thyroid  NERVOUS SYSTEM:  Alert & Oriented X3, Good concentration; Motor Strength 5/5 B/L upper and lower extremities; DTRs 2+ intact and symmetric  CHEST/LUNG: Clear to ascultation  bilaterally; No rales, rhonchi, wheezing, or rubs  HEART: Regular rate and rhythm; No murmurs, rubs, or gallops  ABDOMEN: Soft, Nontender, Nondistended; Bowel sounds present  EXTREMITIES right thigh wound LYMPH: No lymphadenopathy noted  SKIN: No rashes or lesions    LABS:                        14.3   6.43  )-----------( 264      ( 27 May 2024 05:15 )             43.8     05-27    137  |  103  |  12  ----------------------------<  83  4.0   |  27  |  0.59    Ca    9.3      27 May 2024 05:15  Phos  4.1     05-27  Mg     2.3     05-27    TPro  8.6<H>  /  Alb  3.8  /  TBili  0.9  /  DBili  x   /  AST  26  /  ALT  28  /  AlkPhos  106  05-25    PT/INR - ( 25 May 2024 12:00 )   PT: 13.5 sec;   INR: 1.13 ratio         PTT - ( 25 May 2024 12:00 )  PTT:34.8 sec  Urinalysis Basic - ( 27 May 2024 05:15 )    Color: x / Appearance: x / SG: x / pH: x  Gluc: 83 mg/dL / Ketone: x  / Bili: x / Urobili: x   Blood: x / Protein: x / Nitrite: x   Leuk Esterase: x / RBC: x / WBC x   Sq Epi: x / Non Sq Epi: x / Bacteria: x      CAPILLARY BLOOD GLUCOSE          RADIOLOGY & ADDITIONAL TESTS:    Imaging Personally Reviewed:  [ ] YES  [ ] NO  < from: Xray Knee 3 Views, Right (05.25.24 @ 12:22) >  ACC: [id number] EXAM:  XR KNEE 3 VIEWS RT   ORDERED BY: Fernando Chery     PROCEDURE DATE:  05/25/2024          INTERPRETATION:  Right knee. 3 views. Patient has local wound.    There is soft tissue swelling of the distal anterior thigh. There is no   knee effusion bony structures unremarkable.    IMPRESSION: Swelling of the distal anterior thigh. No visible foreign body    --- End of Report ---    < end of copied text >  Specimen Source: Wound Wound (05.25.24 @ 12:05)      Consultant(s) Notes Reviewed:  [ ] YES  [ ] NO    Care Discussed with Consultants/Other Providers [ ] YES  [ ] NO

## 2024-05-26 NOTE — PROGRESS NOTE ADULT - ASSESSMENT
MONSERRAT ROLON is a 44y Male presenting with presenting with cellulitis     Problem/Plan #1  - Problem: Cellulitis of right knee  - Plan: admit to medicine  - patient started on clindamycin in ED  - f/u on wound culture  - monitor for fever   - patient states when he had osteomyelitis, it wasn't responding to vancomycin?  - unasyn vanco   - dilaudid 2 mg for severe pain   - wound care  - skin marker placed in ED    Problem/Plan #2  - Problem: polysubstance abuse  - Plan: methadone - patient goes to clinic [facility]  - closed today, patient states he's on 190 mg/day   - call to verify on business day  - patient will try to have wife bring it for tomorrow  - if needed, start at max 10 mg   - nicotine patch     Problem/Plan #3  - Problem: anxiety/depression   - continue wellbutrin     regular diet   dispo: pending response to abx

## 2024-05-26 NOTE — PHYSICAL THERAPY INITIAL EVALUATION ADULT - ADDITIONAL COMMENTS
Patient reports +stairs to negotiate in home, independent prior and able to ambulate post fall without issue.

## 2024-05-26 NOTE — PHYSICAL THERAPY INITIAL EVALUATION ADULT - NSPTDISCHREC_GEN_A_CORE
Per patient and RN able to independently ambulate with [facility] noted of 24/No skilled PT needs

## 2024-05-26 NOTE — PHYSICAL THERAPY INITIAL EVALUATION ADULT - PERTINENT HX OF CURRENT PROBLEM, REHAB EVAL
Patient admitted after fall with last week and now noted to have difficulty ambulating and increased redness to wound. X-ray shows swelling, negative for fracture.

## 2024-05-27 LAB
-  CLINDAMYCIN: SIGNIFICANT CHANGE UP
-  ERYTHROMYCIN: SIGNIFICANT CHANGE UP
-  GENTAMICIN: SIGNIFICANT CHANGE UP
-  OXACILLIN: SIGNIFICANT CHANGE UP
-  PENICILLIN: SIGNIFICANT CHANGE UP
-  RIFAMPIN: SIGNIFICANT CHANGE UP
-  TETRACYCLINE: SIGNIFICANT CHANGE UP
-  TRIMETHOPRIM/SULFAMETHOXAZOLE: SIGNIFICANT CHANGE UP
-  VANCOMYCIN: SIGNIFICANT CHANGE UP
ANION GAP SERPL CALC-SCNC: 7 MMOL/L — SIGNIFICANT CHANGE UP (ref 5–17)
BUN SERPL-MCNC: 12 MG/DL — SIGNIFICANT CHANGE UP (ref 7–23)
CALCIUM SERPL-MCNC: 9.3 MG/DL — SIGNIFICANT CHANGE UP (ref 8.5–10.1)
CHLORIDE SERPL-SCNC: 103 MMOL/L — SIGNIFICANT CHANGE UP (ref 96–108)
CO2 SERPL-SCNC: 27 MMOL/L — SIGNIFICANT CHANGE UP (ref 22–31)
CREAT SERPL-MCNC: 0.59 MG/DL — SIGNIFICANT CHANGE UP (ref 0.5–1.3)
CULTURE RESULTS: ABNORMAL
EGFR: 124 ML/MIN/1.73M2 — SIGNIFICANT CHANGE UP
GLUCOSE SERPL-MCNC: 83 MG/DL — SIGNIFICANT CHANGE UP (ref 70–99)
HCT VFR BLD CALC: 43.8 % — SIGNIFICANT CHANGE UP (ref 39–50)
HGB BLD-MCNC: 14.3 G/DL — SIGNIFICANT CHANGE UP (ref 13–17)
MAGNESIUM SERPL-MCNC: 2.3 MG/DL — SIGNIFICANT CHANGE UP (ref 1.6–2.6)
MCHC RBC-ENTMCNC: 28.5 PG — SIGNIFICANT CHANGE UP (ref 27–34)
MCHC RBC-ENTMCNC: 32.6 G/DL — SIGNIFICANT CHANGE UP (ref 32–36)
MCV RBC AUTO: 87.3 FL — SIGNIFICANT CHANGE UP (ref 80–100)
METHOD TYPE: SIGNIFICANT CHANGE UP
NRBC # BLD: 0 /100 WBCS — SIGNIFICANT CHANGE UP (ref 0–0)
ORGANISM # SPEC MICROSCOPIC CNT: ABNORMAL
ORGANISM # SPEC MICROSCOPIC CNT: SIGNIFICANT CHANGE UP
PHOSPHATE SERPL-MCNC: 4.1 MG/DL — SIGNIFICANT CHANGE UP (ref 2.5–4.5)
PLATELET # BLD AUTO: 264 K/UL — SIGNIFICANT CHANGE UP (ref 150–400)
POTASSIUM SERPL-MCNC: 4 MMOL/L — SIGNIFICANT CHANGE UP (ref 3.5–5.3)
POTASSIUM SERPL-SCNC: 4 MMOL/L — SIGNIFICANT CHANGE UP (ref 3.5–5.3)
RBC # BLD: 5.02 M/UL — SIGNIFICANT CHANGE UP (ref 4.2–5.8)
RBC # FLD: 15.4 % — HIGH (ref 10.3–14.5)
SODIUM SERPL-SCNC: 137 MMOL/L — SIGNIFICANT CHANGE UP (ref 135–145)
SPECIMEN SOURCE: SIGNIFICANT CHANGE UP
WBC # BLD: 6.43 K/UL — SIGNIFICANT CHANGE UP (ref 3.8–10.5)
WBC # FLD AUTO: 6.43 K/UL — SIGNIFICANT CHANGE UP (ref 3.8–10.5)

## 2024-05-27 PROCEDURE — 99222 1ST HOSP IP/OBS MODERATE 55: CPT

## 2024-05-27 PROCEDURE — 73701 CT LOWER EXTREMITY W/DYE: CPT | Mod: 26,RT

## 2024-05-27 PROCEDURE — 99232 SBSQ HOSP IP/OBS MODERATE 35: CPT

## 2024-05-27 RX ORDER — BUPROPION HYDROCHLORIDE 150 MG/1
300 TABLET, EXTENDED RELEASE ORAL DAILY
Refills: 0 | Status: COMPLETED | OUTPATIENT
Start: 2024-05-27 | End: 2024-05-27

## 2024-05-27 RX ORDER — METHADONE HYDROCHLORIDE 40 MG/1
10 TABLET ORAL ONCE
Refills: 0 | Status: DISCONTINUED | OUTPATIENT
Start: 2024-05-27 | End: 2024-05-27

## 2024-05-27 RX ORDER — ACETAMINOPHEN 500 MG
650 TABLET ORAL EVERY 6 HOURS
Refills: 0 | Status: DISCONTINUED | OUTPATIENT
Start: 2024-05-27 | End: 2024-05-29

## 2024-05-27 RX ORDER — HYDROMORPHONE HYDROCHLORIDE 2 MG/ML
4 INJECTION INTRAMUSCULAR; INTRAVENOUS; SUBCUTANEOUS EVERY 4 HOURS
Refills: 0 | Status: DISCONTINUED | OUTPATIENT
Start: 2024-05-27 | End: 2024-05-28

## 2024-05-27 RX ADMIN — HYDROMORPHONE HYDROCHLORIDE 2 MILLIGRAM(S): 2 INJECTION INTRAMUSCULAR; INTRAVENOUS; SUBCUTANEOUS at 06:32

## 2024-05-27 RX ADMIN — GABAPENTIN 800 MILLIGRAM(S): 400 CAPSULE ORAL at 22:59

## 2024-05-27 RX ADMIN — HYDROMORPHONE HYDROCHLORIDE 2 MILLIGRAM(S): 2 INJECTION INTRAMUSCULAR; INTRAVENOUS; SUBCUTANEOUS at 08:54

## 2024-05-27 RX ADMIN — HYDROMORPHONE HYDROCHLORIDE 4 MILLIGRAM(S): 2 INJECTION INTRAMUSCULAR; INTRAVENOUS; SUBCUTANEOUS at 17:06

## 2024-05-27 RX ADMIN — METHADONE HYDROCHLORIDE 10 MILLIGRAM(S): 40 TABLET ORAL at 13:08

## 2024-05-27 RX ADMIN — Medication 1 PATCH: at 12:03

## 2024-05-27 RX ADMIN — AMPICILLIN SODIUM AND SULBACTAM SODIUM 200 GRAM(S): 250; 125 INJECTION, POWDER, FOR SUSPENSION INTRAMUSCULAR; INTRAVENOUS at 16:06

## 2024-05-27 RX ADMIN — GABAPENTIN 800 MILLIGRAM(S): 400 CAPSULE ORAL at 05:43

## 2024-05-27 RX ADMIN — HYDROMORPHONE HYDROCHLORIDE 2 MILLIGRAM(S): 2 INJECTION INTRAMUSCULAR; INTRAVENOUS; SUBCUTANEOUS at 00:35

## 2024-05-27 RX ADMIN — METHADONE HYDROCHLORIDE 10 MILLIGRAM(S): 40 TABLET ORAL at 17:54

## 2024-05-27 RX ADMIN — Medication 300 MILLIGRAM(S): at 08:14

## 2024-05-27 RX ADMIN — Medication 1 PATCH: at 11:59

## 2024-05-27 RX ADMIN — HYDROMORPHONE HYDROCHLORIDE 4 MILLIGRAM(S): 2 INJECTION INTRAMUSCULAR; INTRAVENOUS; SUBCUTANEOUS at 20:28

## 2024-05-27 RX ADMIN — HYDROMORPHONE HYDROCHLORIDE 4 MILLIGRAM(S): 2 INJECTION INTRAMUSCULAR; INTRAVENOUS; SUBCUTANEOUS at 16:06

## 2024-05-27 RX ADMIN — HYDROMORPHONE HYDROCHLORIDE 2 MILLIGRAM(S): 2 INJECTION INTRAMUSCULAR; INTRAVENOUS; SUBCUTANEOUS at 13:08

## 2024-05-27 RX ADMIN — Medication 1 PATCH: at 07:02

## 2024-05-27 RX ADMIN — Medication 1 PATCH: at 19:00

## 2024-05-27 RX ADMIN — GABAPENTIN 800 MILLIGRAM(S): 400 CAPSULE ORAL at 15:52

## 2024-05-27 RX ADMIN — AMPICILLIN SODIUM AND SULBACTAM SODIUM 200 GRAM(S): 250; 125 INJECTION, POWDER, FOR SUSPENSION INTRAMUSCULAR; INTRAVENOUS at 10:22

## 2024-05-27 RX ADMIN — HYDROMORPHONE HYDROCHLORIDE 4 MILLIGRAM(S): 2 INJECTION INTRAMUSCULAR; INTRAVENOUS; SUBCUTANEOUS at 21:28

## 2024-05-27 RX ADMIN — AMPICILLIN SODIUM AND SULBACTAM SODIUM 200 GRAM(S): 250; 125 INJECTION, POWDER, FOR SUSPENSION INTRAMUSCULAR; INTRAVENOUS at 22:58

## 2024-05-27 RX ADMIN — AMPICILLIN SODIUM AND SULBACTAM SODIUM 200 GRAM(S): 250; 125 INJECTION, POWDER, FOR SUSPENSION INTRAMUSCULAR; INTRAVENOUS at 04:34

## 2024-05-27 RX ADMIN — HYDROMORPHONE HYDROCHLORIDE 2 MILLIGRAM(S): 2 INJECTION INTRAMUSCULAR; INTRAVENOUS; SUBCUTANEOUS at 02:14

## 2024-05-27 RX ADMIN — Medication 250 MILLIGRAM(S): at 20:28

## 2024-05-27 RX ADMIN — HYDROMORPHONE HYDROCHLORIDE 2 MILLIGRAM(S): 2 INJECTION INTRAMUSCULAR; INTRAVENOUS; SUBCUTANEOUS at 05:44

## 2024-05-27 RX ADMIN — BUPROPION HYDROCHLORIDE 300 MILLIGRAM(S): 150 TABLET, EXTENDED RELEASE ORAL at 11:59

## 2024-05-27 RX ADMIN — METHADONE HYDROCHLORIDE 10 MILLIGRAM(S): 40 TABLET ORAL at 10:06

## 2024-05-27 RX ADMIN — Medication 650 MILLIGRAM(S): at 18:45

## 2024-05-27 NOTE — CONSULT NOTE ADULT - ASSESSMENT
43 y/o male with Right knee cellulitis s/p fall     Right knee with circumferential area of induration and erythema with central opening with some slough.+ TTP  Minimal purulent fluid expressed    CT re: no subcutaneous fluid collection identified    Continue with IV antibiotics  Local wound care per Nursing, irrigate with NS, pack lightly with sterile gauze and cover with DSD  No surgical intervention  Discussed with Dr Christensen Patient should follow up as outpatient with Dr Pires or Amarilys once infection resolves
45 yo M with PMH of previous spinal surgery (osteomyelitis), polysubstance abuse (on methadone for 2 years), depression presents for right knee wound check. Patient fell last week on pavement. He had scrapes on both knees. He states he cleaned it with hydrogen peroxide and put a bandage. The last two days, he found it difficult to ambulate and noticed increased redness of the wound. Denies any current fever, chills, n/v/d, abdominal pain  In the ED, patient had mild leukocytosis, swelling noted on xray.   He was started on clindamycin and given tylenol for pain  abscess and cellulitis of right knee  culture growing staph aureas   needs an I&D   does nto seem like knee itself is involved     Plan:  continue vancomycin   send vancomycin trough with target AUC/[place] 400-600   (therapeutic drug monitoring required with IV vancomycin)  continue Unasyn 3g q6hrs   follow culture result and if MRSA just use vancomycin and if MSSA (methicillin-susceptible Staphylococcus aureus) can change to ancef 2g q8hrs   I&D and please send for culture  CT being performed if knee at all involved please consult orthopaedics   Blood cultures negative to date, continue to follow until final   send HIV   can give TDAP if not up to date   call methadone program and confirm correct dose   pain control     Discussed plan with Dr. Jean Claude Augustin, DO  Chief, Infectious Disease at [facility]  Reachable via Microsoft Teams or [place] office: [phone]  Weekdays: After 5pm, please call [phone] for all inquiries and new consults  Weekends: Message on-call infectious disease physician via teams (kelly Singh)

## 2024-05-27 NOTE — CONSULT NOTE ADULT - SUBJECTIVE AND OBJECTIVE BOX
Chief Complaint:  Patient is a 44y old  Male who presents with a chief complaint of cellulitis (27 May 2024 14:34)      HPI:  43 yo M with PMH of previous spinal surgery (osteomyelitis), polysubstance abuse (on methadone for 2 years), depression presents for right knee wound check. Patient fell last week on pavement. He had scrapes on both knees. He states he cleaned it with hydrogen peroxide and put a bandage. The last two days, he found it difficult to ambulate and noticed increased redness of the wound. Denies any current fever, chills, n/v/d, abdominal pain    In the ED, patient had mild leukocytosis, swelling noted on xray.   He was started on clindamycin and given tylenol for pain. (25 May 2024 15:30)      PMH/PSH:PAST MEDICAL & SURGICAL HISTORY:  IV drug abuse  Osteomyelitis of lumbar spine  Seizure  Methadone maintenance therapy patient  HTN (hypertension)  H/O Spinal surgery          Allergies:  Zosyn (Hives)      Medications:  ampicillin/sulbactam  IVPB 3 Gram(s) IV Intermittent every 6 hours  gabapentin 800 milliGRAM(s) Oral three times a day  HYDROmorphone  Injectable 4 milliGRAM(s) IV Push every 4 hours PRN  melatonin 3 milliGRAM(s) Oral at bedtime PRN  nicotine - 21 mG/24Hr(s) Patch 1 Patch Transdermal daily  vancomycin  IVPB 1500 milliGRAM(s) IV Intermittent every 12 hours      Review of Systems:  Negative except for HPI    Relevant Family History:   FAMILY HISTORY:  FH: dementia    FH: diabetes mellitus (Mother)        Relevant Social History: Alcohol ( -) , Tobacco ( -) , Illicit drugs (- )     Physical Exam:    Vital Signs:  Vital Signs Last 24 Hrs  T(C): 37.1 (27 May 2024 16:10), Max: 37.2 (27 May 2024 10:50)  T(F): 98.8 (27 May 2024 16:10), Max: 99 (27 May 2024 10:50)  HR: 61 (27 May 2024 16:10) (60 - 69)  BP: 114/69 (27 May 2024 16:10) (99/62 - 118/76)  BP(mean): --  RR: 18 (27 May 2024 16:10) (18 - 19)  SpO2: 99% (27 May 2024 16:10) (95% - 99%)    Parameters below as of 27 May 2024 11:37  Patient On (Oxygen Delivery Method): room air      Daily     Daily     General:  Appears stated age, well-groomed, well-nourished, no distress  HEENT:  NC/AT,  conjunctivae clear and pink, no thyromegaly, nodules, adenopathy, no JVD, anicteric sclera  Chest:  Full & symmetric excursion, no increased effort, breath sounds clear  Cardiovascular:  Regular rhythm, S1, S2, no murmur/rub/S3/S4, no abdominal bruit, no edema  Abdomen:  Soft, non tender, non distended, normoactive bowel sounds,  no masses , no hepatosplenomegaly, no signs of chronic liver disease  Extremities:  no cyanosis, clubbing or edema  Skin:  Right knee with circumferential area of induration and erythema with central opening with some slough.+ TTP  Neuro/Psych:  Alert, oriented, no asterixis, no tremor, no encephalopathy    Laboratory:                          14.3   6.43  )-----------( 264      ( 27 May 2024 05:15 )             43.8     05-27    137  |  103  |  12  ----------------------------<  83  4.0   |  27  |  0.59    Ca    9.3      27 May 2024 05:15  Phos  4.1     05-27  Mg     2.3     05-27          Urinalysis Basic - ( 27 May 2024 05:15 )    Color: x / Appearance: x / SG: x / pH: x  Gluc: 83 mg/dL / Ketone: x  / Bili: x / Urobili: x   Blood: x / Protein: x / Nitrite: x   Leuk Esterase: x / RBC: x / WBC x   Sq Epi: x / Non Sq Epi: x / Bacteria: x          Intake and Output    05-26-24 @ 07:01  -  05-27-24 @ 07:00  --------------------------------------------------------  IN: 600 mL / OUT: 0 mL / NET: 600 mL        Imaging:    < from: CT Lower Extremity w/ IV Cont, Right (05.27.24 @ 14:43) >    ACC: [id number] EXAM:  CT LWR EXT IC RT   ORDERED BY: Asim Silverio     PROCEDURE DATE:  05/27/2024          INTERPRETATION:  CT OF THE RIGHT LOWER EXTREMITY WITH CONTRAST.    CLINICAL INFORMATION: Right leg cellulitis.  TECHNIQUE: Axial CT images were obtained of the right leg with coronal   and sagittal reconstructions. 90 cc Omnipaque 350 was administered   intravenously with 10 cc discarded.    FINDINGS:    The bones are normal in appearance. The knee joint is normal.    There is no subcutaneous fluid collection identified. There is no   intramuscular attenuation abnormality.    Mild prepatellar soft tissue swelling and skin thickening possibly from   focal cellulitis.    IMPRESSION:    No abnormal fluid collection. No bony abnormality.    Mild prepatellar soft tissue swelling.  
[facility]  INFECTIOUS DISEASES   [address] [place] [address]  Tel: [phone]     Fax: [phone]  ==============================================================================  DO Catrachito Garcia MD Alexandra Gutman, NP   ==============================================================================    MONSERRAT ROLON  MRN-[id number]  Male  44y ([birth year])        Patient is a 44y old  Male who presents with a chief complaint of cellulitis (27 May 2024 09:43)      HPI:  43 yo M with PMH of previous spinal surgery (osteomyelitis), polysubstance abuse (on methadone for 2 years), depression presents for right knee wound check. Patient fell last week on pavement. He had scrapes on both knees. He states he cleaned it with hydrogen peroxide and put a bandage. The last two days, he found it difficult to ambulate and noticed increased redness of the wound. Denies any current fever, chills, n/v/d, abdominal pain    In the ED, patient had mild leukocytosis, swelling noted on xray.   He was started on clindamycin and given tylenol for pain. (25 May 2024 15:30)    Endorses chills. says there has been discharge and pus coming from the site. He describes the pain to be directly on the anterior knee where the abscess is and reports good ROM of the knee itself. Reports being in methadone program getting 190 mg daily   ID consulted for workup and antibiotic management     PAST MEDICAL & SURGICAL HISTORY:  IV drug abuse      Osteomyelitis of lumbar spine      Seizure      Methadone maintenance therapy patient      HTN (hypertension)      H/O Spinal surgery          Allergies  Zosyn (Hives)        ANTIMICROBIALS:  ampicillin/sulbactam  IVPB 3 every 6 hours  vancomycin  IVPB 1500 every 12 hours      MEDICATIONS  (STANDING):  ampicillin/sulbactam  IVPB   200 mL/Hr IV Intermittent (05-27-24 @ 10:22)   200 mL/Hr IV Intermittent (05-27-24 @ 04:34)   200 mL/Hr IV Intermittent (05-26-24 @ 21:50)   200 mL/Hr IV Intermittent (05-26-24 @ 15:35)    clindamycin IVPB   100 mL/Hr IV Intermittent (05-25-24 @ 12:22)    clindamycin IVPB   100 mL/Hr IV Intermittent (05-26-24 @ 05:39)   100 mL/Hr IV Intermittent (05-25-24 @ 22:15)    vancomycin  IVPB   300 mL/Hr IV Intermittent (05-27-24 @ 08:14)   300 mL/Hr IV Intermittent (05-26-24 @ 20:01)        OTHER MEDS: MEDICATIONS  (STANDING):  gabapentin 800 three times a day  HYDROmorphone  Injectable 4 every 4 hours PRN  melatonin 3 at bedtime PRN      SOCIAL HISTORY:     Smoking Cigarettes [ ]Active [ ] Former [ ]Denies   ETOH [ ]denies [ ]Former [ ]Current Use denies   Drug Use [ ]Never [x ] Former [ ] Active     FAMILY HISTORY:  FH: dementia    FH: diabetes mellitus (Mother)        REVIEW OF SYSTEMS  [  ] ROS unobtainable because:    [ x ] All other systems negative except as noted below:	    Constitutional:  [ ] fever [x ] chills  [ ] weight loss  [ ] weakness  Skin:  [x ] rash [x ] erythema 	  Eyes: [ ] icterus [ ] pain  [ ] discharge	  ENMT: [ ] sore throat  [ ] thrush [ ] ulcers [ ] exudates  Respiratory: [ ] dyspnea [ ] hemoptysis [ ] cough [ ] sputum	  Cardiovascular:  [ ] chest pain [ ] palpitations [ ] edema	  Gastrointestinal:  [ ] nausea [ ] vomiting [ ] diarrhea [ ] constipation [ ] pain	  Genitourinary:  [ ] dysuria [ ] frequency [ ] hematuria [ ] discharge [ ] flank pain  [ ] incontinence  Musculoskeletal:  [ ] myalgias [ ] arthralgias [ ] arthritis  [ ] back pain  Neurological:  [ ] headache [ ] seizures  [ ] confusion/altered mental status  Psychiatric:  [ ] anxiety [ ] depression	  Hematology/Lymphatics:  [ ] lymphadenopathy  Endocrine:  [ ] adrenal [ ] thyroid  Allergic/Immunologic:	 [ ] transplant [ ] seasonal    Vital Signs Last 24 Hrs  T(F): 97.9 (05-27-24 @ 11:37), Max: 99 (05-27-24 @ 10:50)    Vital Signs Last 24 Hrs  HR: 60 (05-27-24 @ 11:37) (51 - 69)  BP: 99/62 (05-27-24 @ 11:37) (99/62 - 118/76)  RR: 18 (05-27-24 @ 11:37)  SpO2: 95% (05-27-24 @ 11:37) (95% - 99%)  Wt(kg): --    PHYSICAL EXAM:  Constitutional: non-toxic, no distress  HEAD/EYES: anicteric, no conjunctival injection  ENT:  supple, no thrush  Cardiovascular:   normal S1, S2, no murmur, no edema  Respiratory:  clear BS bilaterally, no wheezes, no rales  GI:  soft, non-tender, normal bowel sounds  :  no mosley, no CVA tenderness  Musculoskeletal:  no synovitis, normal ROM  Neurologic: awake and alert, normal strength, no focal findings  Skin: right knee with partially opened wound with fluctuance, induration, pus coming from site with surrounding cellulitis,. There is a midline surgical scar of the spine well healed with no concern for infection  Heme/Onc: no lymphadenopathy   Psychiatric:  awake, alert, appropriate mood        WBC  WBC Count: 6.43 K/uL (05-27 @ 05:15)  WBC Count: 10.65 K/uL (05-25 @ 12:00)      Auto Neutrophil %: 85.6 % *H* (05-25-24 @ 12:00)  Auto Neutrophil #: 9.12 K/uL *H* (05-25-24 @ 12:00)    CBC                        14.3   6.43  )-----------( 264      ( 27 May 2024 05:15 )             43.8     BMP/CMP  05-27    137  |  103  |  12  ----------------------------<  83  4.0   |  27  |  0.59    Ca    9.3      27 May 2024 05:15  Phos  4.1     05-27  Mg     2.3     05-27      Creatinine Trend  Creatinine Trend: 0.59<--, 0.75<--, 0.62<--, 0.75<--        MICROBIOLOGY:  Wound Wound  05-25-24   Moderate Staphylococcus aureus  --  --      .Blood Blood-Peripheral  05-25-24   No growth at 24 hours  --  --        RADIOLOGY:  < from: Xray Knee 3 Views, Right (05.25.24 @ 12:22) >    IMPRESSION: Swelling of the distal anterior thigh. No visible foreign body        I have personally reviewed the above imaging

## 2024-05-27 NOTE — PROGRESS NOTE ADULT - ASSESSMENT
MONSERRAT ROLON is a 44y Male presenting with presenting with cellulitis     Problem/Plan #1  - Problem: Cellulitis of right knee  - Plan: admit to medicine  - patient started on clindamycin in ED  - f/u on wound culture  - monitor for fever   - patient states when he had osteomyelitis, it wasn't responding to vancomycin?  - unasyn vanco   - dilaudid 2 mg for severe pain   - wound care  - skin marker placed in ED    Problem/Plan #2  - Problem: polysubstance abuse  - Plan: methadone - patient goes to clinic [facility]  - closed today, patient states he's on 190 mg/day   - call to verify on business day  - patient will try to have wife bring it for tomorrow  - if needed, start at max 10 mg   - nicotine patch     Problem/Plan #3  - Problem: anxiety/depression   - continue wellbutrin     regular diet   dispo: pending response to abx MONSERRAT ROLON is a 44y Male presenting with presenting with cellulitis   doing ok will need to confirm methadone seen by ID  and surgery CT scan reviewed   mathadone 10mg oral q2h untill dose verified   dispo is home     Problem/Plan #1  - Problem: Cellulitis of right knee  - Plan: admit to medicine  - patient started on clindamycin in ED now on vanco and   - f/u on wound culture  - monitor for fever   - patient states when he had osteomyelitis, it wasn't responding to vancomycin?  - unasyn vanco   - dilaudid 4 mg for severe pain   - wound care  - skin marker placed in ED    Problem/Plan #2  - Problem: polysubstance abuse  - Plan: methadone - patient goes to clinic [facility]  - closed today, patient states he's on 190 mg/day   - call to verify on business day  - patient will try to have wife bring it for tomorrow  - if needed, start at max 10 mg   - nicotine patch     Problem/Plan #3  - Problem: anxiety/depression   - continue wellbutrin     regular diet   dispo: pending response to abx

## 2024-05-28 LAB
ALBUMIN SERPL ELPH-MCNC: 3.4 G/DL — SIGNIFICANT CHANGE UP (ref 3.3–5)
ALP SERPL-CCNC: 100 U/L — SIGNIFICANT CHANGE UP (ref 40–120)
ALT FLD-CCNC: 26 U/L — SIGNIFICANT CHANGE UP (ref 12–78)
ANION GAP SERPL CALC-SCNC: 7 MMOL/L — SIGNIFICANT CHANGE UP (ref 5–17)
AST SERPL-CCNC: 20 U/L — SIGNIFICANT CHANGE UP (ref 15–37)
BILIRUB SERPL-MCNC: 0.4 MG/DL — SIGNIFICANT CHANGE UP (ref 0.2–1.2)
BUN SERPL-MCNC: 14 MG/DL — SIGNIFICANT CHANGE UP (ref 7–23)
CALCIUM SERPL-MCNC: 9.4 MG/DL — SIGNIFICANT CHANGE UP (ref 8.5–10.1)
CHLORIDE SERPL-SCNC: 101 MMOL/L — SIGNIFICANT CHANGE UP (ref 96–108)
CO2 SERPL-SCNC: 29 MMOL/L — SIGNIFICANT CHANGE UP (ref 22–31)
CREAT SERPL-MCNC: 0.68 MG/DL — SIGNIFICANT CHANGE UP (ref 0.5–1.3)
EGFR: 119 ML/MIN/1.73M2 — SIGNIFICANT CHANGE UP
GLUCOSE SERPL-MCNC: 82 MG/DL — SIGNIFICANT CHANGE UP (ref 70–99)
HCT VFR BLD CALC: 48 % — SIGNIFICANT CHANGE UP (ref 39–50)
HGB BLD-MCNC: 15.7 G/DL — SIGNIFICANT CHANGE UP (ref 13–17)
MAGNESIUM SERPL-MCNC: 2.2 MG/DL — SIGNIFICANT CHANGE UP (ref 1.6–2.6)
MCHC RBC-ENTMCNC: 28.5 PG — SIGNIFICANT CHANGE UP (ref 27–34)
MCHC RBC-ENTMCNC: 32.7 G/DL — SIGNIFICANT CHANGE UP (ref 32–36)
MCV RBC AUTO: 87.3 FL — SIGNIFICANT CHANGE UP (ref 80–100)
NRBC # BLD: 0 /100 WBCS — SIGNIFICANT CHANGE UP (ref 0–0)
PHOSPHATE SERPL-MCNC: 4.4 MG/DL — SIGNIFICANT CHANGE UP (ref 2.5–4.5)
PLATELET # BLD AUTO: 289 K/UL — SIGNIFICANT CHANGE UP (ref 150–400)
POTASSIUM SERPL-MCNC: 3.9 MMOL/L — SIGNIFICANT CHANGE UP (ref 3.5–5.3)
POTASSIUM SERPL-SCNC: 3.9 MMOL/L — SIGNIFICANT CHANGE UP (ref 3.5–5.3)
PROT SERPL-MCNC: 8.1 GM/DL — SIGNIFICANT CHANGE UP (ref 6–8.3)
RBC # BLD: 5.5 M/UL — SIGNIFICANT CHANGE UP (ref 4.2–5.8)
RBC # FLD: 15.5 % — HIGH (ref 10.3–14.5)
SODIUM SERPL-SCNC: 137 MMOL/L — SIGNIFICANT CHANGE UP (ref 135–145)
VANCOMYCIN TROUGH SERPL-MCNC: 6.7 UG/ML — LOW (ref 10–20)
WBC # BLD: 6.18 K/UL — SIGNIFICANT CHANGE UP (ref 3.8–10.5)
WBC # FLD AUTO: 6.18 K/UL — SIGNIFICANT CHANGE UP (ref 3.8–10.5)

## 2024-05-28 PROCEDURE — 99232 SBSQ HOSP IP/OBS MODERATE 35: CPT

## 2024-05-28 PROCEDURE — 99223 1ST HOSP IP/OBS HIGH 75: CPT

## 2024-05-28 RX ORDER — HYDROMORPHONE HYDROCHLORIDE 2 MG/ML
4 INJECTION INTRAMUSCULAR; INTRAVENOUS; SUBCUTANEOUS EVERY 6 HOURS
Refills: 0 | Status: DISCONTINUED | OUTPATIENT
Start: 2024-05-28 | End: 2024-05-29

## 2024-05-28 RX ORDER — BUPROPION HYDROCHLORIDE 150 MG/1
300 TABLET, EXTENDED RELEASE ORAL DAILY
Refills: 0 | Status: DISCONTINUED | OUTPATIENT
Start: 2024-05-28 | End: 2024-05-29

## 2024-05-28 RX ORDER — METHADONE HYDROCHLORIDE 40 MG/1
190 TABLET ORAL DAILY
Refills: 0 | Status: DISCONTINUED | OUTPATIENT
Start: 2024-05-28 | End: 2024-05-29

## 2024-05-28 RX ADMIN — Medication 1 PATCH: at 12:01

## 2024-05-28 RX ADMIN — Medication 1 PATCH: at 07:00

## 2024-05-28 RX ADMIN — AMPICILLIN SODIUM AND SULBACTAM SODIUM 200 GRAM(S): 250; 125 INJECTION, POWDER, FOR SUSPENSION INTRAMUSCULAR; INTRAVENOUS at 11:36

## 2024-05-28 RX ADMIN — HYDROMORPHONE HYDROCHLORIDE 4 MILLIGRAM(S): 2 INJECTION INTRAMUSCULAR; INTRAVENOUS; SUBCUTANEOUS at 09:27

## 2024-05-28 RX ADMIN — HYDROMORPHONE HYDROCHLORIDE 4 MILLIGRAM(S): 2 INJECTION INTRAMUSCULAR; INTRAVENOUS; SUBCUTANEOUS at 01:51

## 2024-05-28 RX ADMIN — Medication 1 PATCH: at 19:00

## 2024-05-28 RX ADMIN — GABAPENTIN 800 MILLIGRAM(S): 400 CAPSULE ORAL at 14:20

## 2024-05-28 RX ADMIN — HYDROMORPHONE HYDROCHLORIDE 2 MILLIGRAM(S): 2 INJECTION INTRAMUSCULAR; INTRAVENOUS; SUBCUTANEOUS at 09:54

## 2024-05-28 RX ADMIN — Medication 250 MILLIGRAM(S): at 09:54

## 2024-05-28 RX ADMIN — Medication 250 MILLIGRAM(S): at 21:02

## 2024-05-28 RX ADMIN — Medication 300 MILLIGRAM(S): at 14:21

## 2024-05-28 RX ADMIN — HYDROMORPHONE HYDROCHLORIDE 4 MILLIGRAM(S): 2 INJECTION INTRAMUSCULAR; INTRAVENOUS; SUBCUTANEOUS at 05:24

## 2024-05-28 RX ADMIN — METHADONE HYDROCHLORIDE 190 MILLIGRAM(S): 40 TABLET ORAL at 12:00

## 2024-05-28 RX ADMIN — Medication 300 MILLIGRAM(S): at 22:50

## 2024-05-28 RX ADMIN — HYDROMORPHONE HYDROCHLORIDE 4 MILLIGRAM(S): 2 INJECTION INTRAMUSCULAR; INTRAVENOUS; SUBCUTANEOUS at 16:40

## 2024-05-28 RX ADMIN — GABAPENTIN 800 MILLIGRAM(S): 400 CAPSULE ORAL at 05:13

## 2024-05-28 RX ADMIN — BUPROPION HYDROCHLORIDE 300 MILLIGRAM(S): 150 TABLET, EXTENDED RELEASE ORAL at 14:21

## 2024-05-28 RX ADMIN — HYDROMORPHONE HYDROCHLORIDE 4 MILLIGRAM(S): 2 INJECTION INTRAMUSCULAR; INTRAVENOUS; SUBCUTANEOUS at 23:50

## 2024-05-28 RX ADMIN — HYDROMORPHONE HYDROCHLORIDE 4 MILLIGRAM(S): 2 INJECTION INTRAMUSCULAR; INTRAVENOUS; SUBCUTANEOUS at 22:50

## 2024-05-28 RX ADMIN — Medication 3 MILLIGRAM(S): at 00:51

## 2024-05-28 RX ADMIN — HYDROMORPHONE HYDROCHLORIDE 4 MILLIGRAM(S): 2 INJECTION INTRAMUSCULAR; INTRAVENOUS; SUBCUTANEOUS at 15:40

## 2024-05-28 RX ADMIN — HYDROMORPHONE HYDROCHLORIDE 4 MILLIGRAM(S): 2 INJECTION INTRAMUSCULAR; INTRAVENOUS; SUBCUTANEOUS at 10:27

## 2024-05-28 RX ADMIN — Medication 1 PATCH: at 11:24

## 2024-05-28 RX ADMIN — HYDROMORPHONE HYDROCHLORIDE 2 MILLIGRAM(S): 2 INJECTION INTRAMUSCULAR; INTRAVENOUS; SUBCUTANEOUS at 14:09

## 2024-05-28 RX ADMIN — HYDROMORPHONE HYDROCHLORIDE 4 MILLIGRAM(S): 2 INJECTION INTRAMUSCULAR; INTRAVENOUS; SUBCUTANEOUS at 06:24

## 2024-05-28 RX ADMIN — AMPICILLIN SODIUM AND SULBACTAM SODIUM 200 GRAM(S): 250; 125 INJECTION, POWDER, FOR SUSPENSION INTRAMUSCULAR; INTRAVENOUS at 05:13

## 2024-05-28 RX ADMIN — GABAPENTIN 800 MILLIGRAM(S): 400 CAPSULE ORAL at 22:51

## 2024-05-28 RX ADMIN — HYDROMORPHONE HYDROCHLORIDE 4 MILLIGRAM(S): 2 INJECTION INTRAMUSCULAR; INTRAVENOUS; SUBCUTANEOUS at 00:51

## 2024-05-28 NOTE — PROGRESS NOTE ADULT - SUBJECTIVE AND OBJECTIVE BOX
HPI:  45 yo M with PMH of previous spinal surgery (osteomyelitis), polysubstance abuse (on methadone for 2 years), depression presents for right knee wound check. Patient fell last week on pavement. He had scrapes on both knees. He states he cleaned it with hydrogen peroxide and put a bandage. The last two days, he found it difficult to ambulate and noticed increased redness of the wound. Denies any current fever, chills, n/v/d, abdominal pain    In the ED, patient had mild leukocytosis, swelling noted on xray.   He was started on clindamycin and given tylenol for pain. (25 May 2024 15:30)  Patient is a 44y old  Male who presents with a chief complaint of cellulitis (28 May 2024 08:40)      INTERVAL HPI/OVERNIGHT EVENTS: no acute events overnight     MEDICATIONS  (STANDING):  buPROPion XL (24-Hour) . 300 milliGRAM(s) Oral daily  clindamycin   Capsule 300 milliGRAM(s) Oral every 8 hours  gabapentin 800 milliGRAM(s) Oral three times a day  methadone    Tablet 190 milliGRAM(s) Oral daily  nicotine - 21 mG/24Hr(s) Patch 1 Patch Transdermal daily  vancomycin  IVPB 1500 milliGRAM(s) IV Intermittent every 12 hours    MEDICATIONS  (PRN):  acetaminophen     Tablet .. 650 milliGRAM(s) Oral every 6 hours PRN Temp greater or equal to 38C (100.4F), Moderate Pain (4 - 6)  HYDROmorphone  Injectable 4 milliGRAM(s) IV Push every 6 hours PRN Severe Pain (7 - 10)  melatonin 3 milliGRAM(s) Oral at bedtime PRN Sleep      Allergies    Zosyn (Hives)    Intolerances        REVIEW OF SYSTEMS:  CONSTITUTIONAL: No fever, weight loss, or fatigue  EYES: No eye pain, visual disturbances, or discharge  ENMT:  No difficulty hearing, tinnitus, vertigo; No sinus or throat pain  NECK: No pain or stiffness  BREASTS: No pain, masses, or nipple discharge  RESPIRATORY: No cough, wheezing, chills or hemoptysis; No shortness of breath  CARDIOVASCULAR: No chest pain, palpitations, dizziness, or leg swelling  GASTROINTESTINAL: No abdominal or epigastric pain. No nausea, vomiting, or hematemesis; No diarrhea or constipation. No melena or hematochezia.  GENITOURINARY: No dysuria, frequency, hematuria, or incontinence  NEUROLOGICAL: No headaches, memory loss, loss of strength, numbness, or tremors  SKIN: No itching, burning, rashes, or lesions   LYMPH NODES: No enlarged glands  ENDOCRINE: No heat or cold intolerance; No hair loss  MUSCULOSKELETAL: body pain and right thigh pain   PSYCHIATRIC: No depression, anxiety, mood swings, or difficulty sleeping  HEME/LYMPH: No easy bruising, or bleeding gums  ALLERGY AND IMMUNOLOGIC: No hives or eczema    Vital Signs Last 24 Hrs  T(C): 37.1 (28 May 2024 11:05), Max: 37.1 (27 May 2024 16:10)  T(F): 98.7 (28 May 2024 11:05), Max: 98.8 (27 May 2024 16:10)  HR: 75 (28 May 2024 11:05) (58 - 75)  BP: 143/87 (28 May 2024 11:05) (102/69 - 143/87)  RR: 18 (28 May 2024 11:05) (16 - 18)  SpO2: 99% (28 May 2024 11:05) (97% - 99%)    Parameters below as of 28 May 2024 11:05  Patient On (Oxygen Delivery Method): room air        PHYSICAL EXAM:  GENERAL: NAD, well-groomed, well-developed  HEAD:  Atraumatic, Normocephalic  EYES: EOMI, PERRLA, conjunctiva and sclera clear  ENMT: No tonsillar erythema, exudates, or enlargement; Moist mucous membranes, Good dentition, No lesions  NECK: Supple, No JVD, Normal thyroid  NERVOUS SYSTEM:  Alert & Oriented X3, Good concentration; Motor Strength 5/5 B/L upper and lower extremities; DTRs 2+ intact and symmetric  CHEST/LUNG: Clear to ascultation  bilaterally; No rales, rhonchi, wheezing, or rubs  HEART: Regular rate and rhythm; No murmurs, rubs, or gallops  ABDOMEN: Soft, Nontender, Nondistended; Bowel sounds present  EXTREMITIES:  right above knee draining abscess   LYMPH: No lymphadenopathy noted  SKIN: No rashes or lesions tattoos     LABS:                        15.7   6.18  )-----------( 289      ( 28 May 2024 06:20 )             48.0     05-28    137  |  101  |  14  ----------------------------<  82  3.9   |  29  |  0.68    Ca    9.4      28 May 2024 06:20  Phos  4.4     05-28  Mg     2.2     05-28    TPro  8.1  /  Alb  3.4  /  TBili  0.4  /  DBili  x   /  AST  20  /  ALT  26  /  AlkPhos  100  05-28      Urinalysis Basic - ( 28 May 2024 06:20 )    Color: x / Appearance: x / SG: x / pH: x  Gluc: 82 mg/dL / Ketone: x  / Bili: x / Urobili: x   Blood: x / Protein: x / Nitrite: x   Leuk Esterase: x / RBC: x / WBC x   Sq Epi: x / Non Sq Epi: x / Bacteria: x      Culture - Other (05.25.24 @ 12:05)    -  Clindamycin: S <=0.25   -  Erythromycin: S <=0.25   -  Gentamicin: S <=1 Should not be used as monotherapy   -  Oxacillin: S 0.5 Oxacillin predicts susceptibility for dicloxacillin, methicillin, and nafcillin   -  Penicillin: R >8   -  Rifampin: S <=1 Should not be used as monotherapy   -  Tetracycline: S <=1   -  Trimethoprim/Sulfamethoxazole: S 1/19   -  Vancomycin: S 2   Specimen Source: Wound Wound   Culture Results:   Moderate Staphylococcus aureus   Organism Identification: Staphylococcus aureus   Organism: Staphylococcus aureus   Method Type: [place]      CAPILLARY BLOOD GLUCOSE          RADIOLOGY & ADDITIONAL TESTS:    Imaging Personally Reviewed:  [ ] YES  [ ] NO    Consultant(s) Notes Reviewed:  [ ] YES  [ ] NO    Care Discussed with Consultants/Other Providers [ ] YES  [ ] NO

## 2024-05-28 NOTE — PROGRESS NOTE ADULT - ASSESSMENT
MONSERRAT ROLON is a 44y Male presenting with presenting with cellulitis       Doing well no drainage per surgery   190mg methadone confirmed   Wellbutrin for depression   MSSA  on wound culture on Vancomycin Clindamycin PO (please see ID note may change)  DC planning is home may need ome care for dressing       Problem/Plan #1  - Problem: Cellulitis of right knee    - patient started on clindamycin in ED now on vanco   -  wound culture with MSSA   - monitor for fever   - patient states when he had osteomyelitis, it wasn't responding to vancomycin?  - dilaudid 4 mg for severe pain   - wound care  - skin marker placed in ED    Problem/Plan #2  - Problem: polysubstance abuse  - Plan: methadone - patient goes to clinic [facility]  - on 190 mg/day   - nicotine patch     Problem/Plan #3  - Problem: anxiety/depression   - continue Wellbutrin     regular diet   dispo: Home

## 2024-05-28 NOTE — PROGRESS NOTE ADULT - SUBJECTIVE AND OBJECTIVE BOX
Patient seen and examined bedside resting comfortably.  No complaints offered.   Denies chest pain, dyspnea, cough.    T(F): 98.5 (05-28-24 @ 05:15), Max: 99 (05-27-24 @ 10:50)  HR: 60 (05-28-24 @ 05:15) (58 - 63)  BP: 111/70 (05-28-24 @ 05:15) (99/62 - 114/69)  RR: 16 (05-28-24 @ 05:15) (16 - 18)  SpO2: 98% (05-28-24 @ 05:15) (95% - 99%)  Wt(kg): --  CAPILLARY BLOOD GLUCOSE          PHYSICAL EXAM:  General: NAD  Neuro:  Alert & oriented x 3  HEENT: NCAT, EOMI, conjunctiva clear  CV: +S1+S2 regular rate and rhythm  Lung: respirations nonlabored, good inspiratory effort  Abdomen: soft, NTND.   Extremities: RLE wound present with area of induration and central opening, no drainage. +TTP. no pedal edema or calf tenderness noted     LABS:                        15.7   6.18  )-----------( 289      ( 28 May 2024 06:20 )             48.0     05-28    137  |  101  |  14  ----------------------------<  82  3.9   |  29  |  0.68    Ca    9.4      28 May 2024 06:20  Phos  4.4     05-28  Mg     2.2     05-28    TPro  8.1  /  Alb  3.4  /  TBili  0.4  /  DBili  x   /  AST  20  /  ALT  26  /  AlkPhos  100  05-28        I&O:     Culture Results:   Moderate Staphylococcus aureus *!* (05-25 @ 12:05)  Culture Results:   No growth at 48 Hours (05-25 @ 12:00)  Culture Results:   No growth at 48 Hours (05-25 @ 12:00)      Impression: 44y Male admitted with cellulitis s/p fall  Rt knee area of induration with central opening, no purulence, +TTP  CT re: no subcutaneous fluid collection identified  Dressing changed with nonadherent gauze and coban    Plan:  - No surgical intervention  - Continue daily dressing changes with nonadherent and coban  - Continue IV antibiotics  - Pt will need outpatient wound care follow-up  - Rest of management per medicine team   - Will sign off   - Discussed with Dr. Lyman

## 2024-05-29 ENCOUNTER — TRANSCRIPTION ENCOUNTER (OUTPATIENT)
Age: 44
End: 2024-05-29

## 2024-05-29 VITALS
TEMPERATURE: 98 F | SYSTOLIC BLOOD PRESSURE: 115 MMHG | RESPIRATION RATE: 17 BRPM | DIASTOLIC BLOOD PRESSURE: 77 MMHG | HEART RATE: 63 BPM | OXYGEN SATURATION: 99 %

## 2024-05-29 LAB
ANION GAP SERPL CALC-SCNC: 6 MMOL/L — SIGNIFICANT CHANGE UP (ref 5–17)
BUN SERPL-MCNC: 14 MG/DL — SIGNIFICANT CHANGE UP (ref 7–23)
CALCIUM SERPL-MCNC: 9.3 MG/DL — SIGNIFICANT CHANGE UP (ref 8.5–10.1)
CHLORIDE SERPL-SCNC: 104 MMOL/L — SIGNIFICANT CHANGE UP (ref 96–108)
CO2 SERPL-SCNC: 28 MMOL/L — SIGNIFICANT CHANGE UP (ref 22–31)
CREAT SERPL-MCNC: 0.71 MG/DL — SIGNIFICANT CHANGE UP (ref 0.5–1.3)
EGFR: 117 ML/MIN/1.73M2 — SIGNIFICANT CHANGE UP
GLUCOSE SERPL-MCNC: 97 MG/DL — SIGNIFICANT CHANGE UP (ref 70–99)
HCT VFR BLD CALC: 47.3 % — SIGNIFICANT CHANGE UP (ref 39–50)
HGB BLD-MCNC: 15.4 G/DL — SIGNIFICANT CHANGE UP (ref 13–17)
HIV 1+2 AB+HIV1 P24 AG SERPL QL IA: SIGNIFICANT CHANGE UP
MAGNESIUM SERPL-MCNC: 2.2 MG/DL — SIGNIFICANT CHANGE UP (ref 1.6–2.6)
MCHC RBC-ENTMCNC: 28.4 PG — SIGNIFICANT CHANGE UP (ref 27–34)
MCHC RBC-ENTMCNC: 32.6 G/DL — SIGNIFICANT CHANGE UP (ref 32–36)
MCV RBC AUTO: 87.3 FL — SIGNIFICANT CHANGE UP (ref 80–100)
NRBC # BLD: 0 /100 WBCS — SIGNIFICANT CHANGE UP (ref 0–0)
PHOSPHATE SERPL-MCNC: 4.4 MG/DL — SIGNIFICANT CHANGE UP (ref 2.5–4.5)
PLATELET # BLD AUTO: 291 K/UL — SIGNIFICANT CHANGE UP (ref 150–400)
POTASSIUM SERPL-MCNC: 4 MMOL/L — SIGNIFICANT CHANGE UP (ref 3.5–5.3)
POTASSIUM SERPL-SCNC: 4 MMOL/L — SIGNIFICANT CHANGE UP (ref 3.5–5.3)
RBC # BLD: 5.42 M/UL — SIGNIFICANT CHANGE UP (ref 4.2–5.8)
RBC # FLD: 15.5 % — HIGH (ref 10.3–14.5)
SODIUM SERPL-SCNC: 138 MMOL/L — SIGNIFICANT CHANGE UP (ref 135–145)
WBC # BLD: 6.49 K/UL — SIGNIFICANT CHANGE UP (ref 3.8–10.5)
WBC # FLD AUTO: 6.49 K/UL — SIGNIFICANT CHANGE UP (ref 3.8–10.5)

## 2024-05-29 PROCEDURE — 99231 SBSQ HOSP IP/OBS SF/LOW 25: CPT

## 2024-05-29 PROCEDURE — 99239 HOSP IP/OBS DSCHRG MGMT >30: CPT

## 2024-05-29 RX ORDER — BUPROPION HYDROCHLORIDE 150 MG/1
1 TABLET, EXTENDED RELEASE ORAL
Refills: 0 | DISCHARGE

## 2024-05-29 RX ORDER — BUPROPION HYDROCHLORIDE 150 MG/1
1 TABLET, EXTENDED RELEASE ORAL
Qty: 30 | Refills: 0
Start: 2024-05-29 | End: 2024-06-27

## 2024-05-29 RX ORDER — CEPHALEXIN 500 MG
500 CAPSULE ORAL
Refills: 0 | Status: DISCONTINUED | OUTPATIENT
Start: 2024-05-29 | End: 2024-05-29

## 2024-05-29 RX ORDER — CEPHALEXIN 500 MG
1 CAPSULE ORAL
Qty: 40 | Refills: 0
Start: 2024-05-29 | End: 2024-06-07

## 2024-05-29 RX ORDER — ACETAMINOPHEN 500 MG
2 TABLET ORAL
Qty: 0 | Refills: 0 | DISCHARGE
Start: 2024-05-29

## 2024-05-29 RX ORDER — GABAPENTIN 400 MG/1
2 CAPSULE ORAL
Qty: 0 | Refills: 0 | DISCHARGE
Start: 2024-05-29

## 2024-05-29 RX ADMIN — GABAPENTIN 800 MILLIGRAM(S): 400 CAPSULE ORAL at 06:53

## 2024-05-29 RX ADMIN — HYDROMORPHONE HYDROCHLORIDE 4 MILLIGRAM(S): 2 INJECTION INTRAMUSCULAR; INTRAVENOUS; SUBCUTANEOUS at 07:43

## 2024-05-29 RX ADMIN — BUPROPION HYDROCHLORIDE 300 MILLIGRAM(S): 150 TABLET, EXTENDED RELEASE ORAL at 11:36

## 2024-05-29 RX ADMIN — Medication 300 MILLIGRAM(S): at 06:53

## 2024-05-29 RX ADMIN — Medication 1 PATCH: at 11:38

## 2024-05-29 RX ADMIN — Medication 500 MILLIGRAM(S): at 13:59

## 2024-05-29 RX ADMIN — GABAPENTIN 800 MILLIGRAM(S): 400 CAPSULE ORAL at 14:07

## 2024-05-29 RX ADMIN — HYDROMORPHONE HYDROCHLORIDE 4 MILLIGRAM(S): 2 INJECTION INTRAMUSCULAR; INTRAVENOUS; SUBCUTANEOUS at 13:58

## 2024-05-29 RX ADMIN — HYDROMORPHONE HYDROCHLORIDE 4 MILLIGRAM(S): 2 INJECTION INTRAMUSCULAR; INTRAVENOUS; SUBCUTANEOUS at 14:58

## 2024-05-29 RX ADMIN — METHADONE HYDROCHLORIDE 190 MILLIGRAM(S): 40 TABLET ORAL at 11:37

## 2024-05-29 RX ADMIN — HYDROMORPHONE HYDROCHLORIDE 4 MILLIGRAM(S): 2 INJECTION INTRAMUSCULAR; INTRAVENOUS; SUBCUTANEOUS at 08:43

## 2024-05-29 RX ADMIN — Medication 250 MILLIGRAM(S): at 09:06

## 2024-05-29 RX ADMIN — Medication 1 PATCH: at 11:35

## 2024-05-29 RX ADMIN — Medication 1 PATCH: at 07:00

## 2024-05-29 NOTE — DISCHARGE NOTE PROVIDER - ATTENDING DISCHARGE PHYSICAL EXAMINATION:
GENERAL: NAD, well-groomed, well-developed  HEAD:  Atraumatic, Normocephalic  EYES: EOMI, PERRLA, conjunctiva and sclera clear  ENMT: No tonsillar erythema, exudates, or enlargement; Moist mucous membranes, Good dentition, No lesions  NECK: Supple, No JVD, Normal thyroid  NERVOUS SYSTEM:  Alert & Oriented X3, Good concentration; Motor Strength 5/5 B/L upper and lower extremities; DTRs 2+ intact and symmetric  CHEST/LUNG: Clear to ascultation  bilaterally; No rales, rhonchi, wheezing, or rubs  HEART: Regular rate and rhythm; No murmurs, rubs, or gallops  ABDOMEN: Soft, Nontender, Nondistended; Bowel sounds present  EXTREMITIES:  right above knee draining abscess   LYMPH: No lymphadenopathy noted  SKIN: No rashes or lesions tattoos

## 2024-05-29 NOTE — PROGRESS NOTE ADULT - NS ATTEND AMEND GEN_ALL_CORE FT
I have reviewed all pertinent clinical information and agree with the NP's note.  Any new labs, recent cultures, new imaging (if applicable) and vitals have been reviewed today.  All necessary adjustments to management have been made.  Agree with the above assessment and plan.    ok to send home on PO antibiotics   give first dose of keflex inpatient and if tolerates send home on keflex 500mg 4 times a day to complete 10 days       Discussed plan with patients primary team.    David Augustin DO  Chief, Infectious Disease at [facility]  Reachable via Microsoft Teams or ID office: [phone]  Weekdays: After 5pm, please call [phone] for all inquiries and new consults  Weekends: Message on-call infectious disease physician via teams (kelly Singh)

## 2024-05-29 NOTE — DISCHARGE NOTE NURSING/CASE MANAGEMENT/SOCIAL WORK - NSCORESITESY/N_GEN_A_CORE_RD
Pt with R 2nd digit avulsion on August 23rd and just finished her abx. Pt states she has been feeling anxious about it since. Wednesday night pt states she had an episode where she states "it felt like I was moving my legs underwater". Pt then states it subsided and she again had another episode of anxiety on Thursday and Friday experiencing similar symptoms. Pt now states today around 15:45 pt states "my legs felt rubbery and I had a weird full body sensation". Pt with no focal deficits and is noted to have clear speech and able to follow commands.
Yes

## 2024-05-29 NOTE — DISCHARGE NOTE NURSING/CASE MANAGEMENT/SOCIAL WORK - NSDCPEFALRISK_GEN_ALL_CORE
For information on Fall & Injury Prevention, visit: [email or web address] OR  [email or web address] OR  https://www.cdc.gov/steadi/patient.html

## 2024-05-29 NOTE — DISCHARGE NOTE PROVIDER - NSDCMRMEDTOKEN_GEN_ALL_CORE_FT
acetaminophen 325 mg oral tablet: 2 tab(s) orally every 6 hours As needed Temp greater or equal to 38C (100.4F), Moderate Pain (4 - 6)  bisacodyl 5 mg oral delayed release tablet: 1 tab(s) orally once a day (at bedtime)  buPROPion 300 mg/24 hours (XL) oral tablet, extended release: 1 tab(s) orally once a day  cephalexin 500 mg oral capsule: 1 cap(s) orally 4 times a day  dextroamphetamine-amphetamine 30 mg oral tablet: 1 tab(s) orally 2 times a day  gabapentin 400 mg oral capsule: 2 cap(s) orally 3 times a day  hydrOXYzine hydrochloride 50 mg oral tablet: 1 tab(s) orally every 6 hours, As needed, Anxiety   mg oral tablet: 1 tab(s) orally every 6 hours   melatonin 3 mg oral tablet: 1 tab(s) orally once a day (at bedtime)  methadone: 135 milligram(s) orally once a day  nicotine 21 mg/24 hr transdermal film, extended release: 1 patch transdermal once a day   polyethylene glycol 3350 oral powder for reconstitution: 17 gram(s) orally 2 times a day  senna oral tablet: 2 tab(s) orally once a day (at bedtime)  Xanax 1 mg oral tablet: 1 tab(s) orally 3 times a day prn anxiety MDD:3 tabs  ZyPREXA 10 mg oral tablet: 1 tab(s) orally once a day

## 2024-05-29 NOTE — DISCHARGE NOTE NURSING/CASE MANAGEMENT/SOCIAL WORK - PATIENT PORTAL LINK FT
DISCHARGE SUMMARY    Name: Rebecca Stanton  Age: 70 year old  Sex: female  MRN: [id number]    GENERAL INFORMATION  - Admission Date/Time: 10/3/2023  6:38 PM  - Discharge Date/Time: 10/5/2023  -Disposition: home   -Code Status: full code       - PCP: Dwain Hurd MD.  Notified via Epic     IP Consult Orders (From admission, onward)     Start     Ordered    10/03/23 2121  Inpatient consult to Cardiology  ONE TIME        Provider:  Michi Babcock MD    10/03/23 2120                HOSPITAL COURSE AND DISCHARGE DIAGNOSIS   70 years old female with past medical history CAD status post stents, hyperlipidemia, GERD who presented to ED with mid sternal chest pain at rest the last 2 to 3 days associated with some shortness of breath     #CAD status post stents  #Chest pain likely musculoskeletal given positive chest wall tenderness  Troponins negative  EKG sinus bradycardia with ST-T wave abnormality  -cardiac cath 6/9/2023: EF 50%, Widely patent LAD stent with markedly tortuous vessels but no significant disease otherwise the patient had markedly tortuous aorta   - seen by Cardiology   -- Stress test today negative for ischemia   -- Resume aspirin, Plavix, statin  -- d/w cardiology, cleared the patient for discharge      Sinus bradycardia  -denies dizziness   - HR in 50s  -seen by Cardiology      Hypertension uncontrolled  - Restarted on home medications amlodipine 10 mg daily and metoprolol      Hypokalemia improved   - repleted potassium   -K 3.6 on the day of discharge      Hyperlipidemia:  - Resume home medications     History of GERD:  - Continue home Protonix Carafate     Chronic normocytic anemia  -hb 8.0 on the day of discharge   - denies hematochezia or melena  -continue po iron   -monitor hb as outpatient     Hx of GI bleed   GI w/u showed Diverticulosis and AVM   -EGD 6/2023; The esophagus appeared normal.Small type I hiatal hernia, Mild, localized erythematous mucosa in the antrum  Single  angioectasia in the 3rd part of the duodenum; tissue ablated with argon plasma coagulation  -Enteroscopy 6/2023: Moderate diverticular disease was seen predominantly on the left side no evidence of active bleeding or stigmata of recent hemorrhage.  Grade 2 internal hemorrhoids  -Colonoscopy 6/2023; Moderate diverticular disease was seen predominantly on the left side no evidence of active bleeding or stigmata of recent hemorrhage.  Grade 2 internal hemorrhoids  -capsule endoscopy 7/2023 Nonbleeding AVM in ileum   -no melena or hematochezia during this admission     #Nicotine Dependence  3-5 cigs / d  - smoking cessation counseling provided x4 minutes  - Discussed with patient on the effects of smoking on health; negative aspects of smoking  - nicotine patch/gum available should patient request            Discharge Medications     Summary of your Discharge Medications      Take these Medications      Details   amLODIPine 10 MG tablet  Commonly known as: NORVASC   Take 1 tablet by mouth daily. Do not start before August 4, 2023.     aspirin 81 MG chewable tablet   Chew 1 tablet by mouth daily.     atorvastatin 40 MG tablet  Commonly known as: LIPITOR   Take 40 mg by mouth daily.     clopidogrel 75 MG tablet  Commonly known as: PLAVIX   Take 75 mg by mouth daily.     colchicine 0.6 MG tablet  Commonly known as: COLCRYS   Take 1 tablet by mouth daily.     cyclobenzaprine 10 MG tablet  Commonly known as: FLEXERIL   Take 10 mg by mouth 3 times daily as needed for Muscle spasms.     EXCEDRIN EXTRA STRENGTH PO   Take 2 tablets by mouth daily as needed (for headache/pain).     Farxiga 10 MG tablet   Generic drug: dapagliflozin  Take 1 tablet by mouth daily.     Iron 325 (65 Fe) MG Tab   Take 1 tablet by mouth daily.     metoPROLOL succinate 25 MG 24 hr tablet  Commonly known as: TOPROL-XL   Take 25 mg by mouth daily.     Multivitamin Women 50+ Tab   Take 1 tablet by mouth daily.     pantoprazole 40 MG tablet  Commonly known  as: PROTONIX   Take 1 tablet by mouth daily (before breakfast). Do not start before February 3, 2023.     polyethylene glycol 17 g packet  Commonly known as: MIRALAX   Take 17 g by mouth daily as needed. Indications: Constipation Stir and dissolve powder in any 4 to 8 ounces of beverage, then drink.     sucralfate 1 g tablet  Commonly known as: CARAFATE   Take 1 g by mouth 4 times daily.     VITAMIN C ADULT GUMMIES PO   Take 1 each by mouth daily.          Denies chest pain or sob   Vitals  Visit Vitals  BP (!) 157/83 (BP Location: RUE - Right upper extremity, Patient Position: Sitting)   Pulse (!) 55   Temp 98.2 °F (36.8 °C) (Oral)   Resp 18   Ht 5' 6\" (1.676 m)   Wt 73.2 kg (161 lb 6 oz)   SpO2 100%   BMI 26.05 kg/m²        Physical Exam  General:  Alert and oriented.  Not in acute distress.    Head:  Normocephalic-atraumatic.   Neck:  Trachea is midline. No adenopathy.  Normal thyroid without mass or tenderness..  Eyes:  Normal conjunctivae and sclerae.  Pupils equal, round, reactive to light.  Extraocular movements intact.    ENT:   Mucous membranes are moist.    Cardiovascular:  Symmetrical pulses.  Regular rate and rhythm without murmur.  Respiratory:   Normal respiratory effort.  Clear to auscultation.  No wheezes, rales or rhonchi.  Gastrointestinal:  Soft and nontender.  Normal bowel sounds.    Skin:  Warm and dry without rash.  Musculoskeletal:  No deformity or edema.  No tenderness to palpation.    Extremities: no edema le   Back:   Normal alignment.  No costovertebral angle tenderness or midline bony tenderness.  Neurologic:   Oriented times 4.   No focal deficits.  Psychiatric:   Cooperative.  Appropriate mood and affect.  Normal judgment      LAB RESULTS  Recent Results (from the past 24 hour(s))   Basic Metabolic Panel    Collection Time: 10/05/23  4:14 AM   Result Value Ref Range    Fasting Status      Sodium 138 135 - 145 mmol/L    Potassium 3.6 3.4 - 5.1 mmol/L    Chloride 108 97 - 110 mmol/L     Carbon Dioxide 25 21 - 32 mmol/L    Anion Gap 9 7 - 19 mmol/L    Glucose 87 70 - 99 mg/dL    BUN 14 6 - 20 mg/dL    Creatinine 0.64 0.51 - 0.95 mg/dL    Glomerular Filtration Rate >90 >=60    BUN/Cr 22 7 - 25    Calcium 8.4 8.4 - 10.2 mg/dL   Magnesium    Collection Time: 10/05/23  4:14 AM   Result Value Ref Range    Magnesium 2.1 1.7 - 2.4 mg/dL   Phosphorus    Collection Time: 10/05/23  4:14 AM   Result Value Ref Range    Phosphorus 3.5 2.4 - 4.7 mg/dL   CBC No Differential    Collection Time: 10/05/23  4:14 AM   Result Value Ref Range    WBC 2.3 (L) 4.2 - 11.0 K/mcL    RBC 3.01 (L) 4.00 - 5.20 mil/mcL    HGB 8.0 (L) 12.0 - 15.5 g/dL    HCT 27.0 (L) 36.0 - 46.5 %    MCV 89.7 78.0 - 100.0 fl    MCH 26.6 26.0 - 34.0 pg    MCHC 29.6 (L) 32.0 - 36.5 g/dL     140 - 450 K/mcL    RDW-CV 17.0 (H) 11.0 - 15.0 %    RDW-SD 55.7 (H) 39.0 - 50.0 fL    NRBC 0 <=0 /100 WBC   Stress Test with Myocardial Perfusion    Collection Time: 10/05/23 10:33 AM   Result Value Ref Range    Predicted HR Max 152 BPM       Imaging  XR CHEST AP OR PA   Final Result   Impression:    No acute cardiopulmonary abnormality.      Electronically Signed by: OBINNA BANGURA MD    Signed on: 10/3/2023 8:39 PM    Workstation ID: [id number]          DISCHARGE INSTRUCTIONS  Conditions of patient on discharge: Stable  Activity: As tolerated    What's next          Follow up with Dwain Hurd MD in 1 week(s)  Specialty: Internal Medicine [address]   [address]   [facility] [address] [phone]          Follow up with Michi Babcock MD in 1 week(s)  Specialty: Cardiovascular Disease [address] [place] [id number] [phone]           Time spent on discharge  more than 35 minutes        Mercedez Elmore MD   Internal Medicine  10/5/2023 5:49 PM   You can access the FollowMyHealth Patient Portal offered by [facility] by registering at the following website: [email or web address]. By joining [facility]’s FollowMyHealth portal, you will also be able to view your health information using other applications (apps) compatible with our system.

## 2024-05-29 NOTE — PROGRESS NOTE ADULT - ASSESSMENT
45 yo M with PMH of previous spinal surgery (osteomyelitis), polysubstance abuse (on methadone for 2 years), depression presents for right knee wound check. Patient fell last week on pavement. He had scrapes on both knees. He states he cleaned it with hydrogen peroxide and put a bandage. The last two days, he found it difficult to ambulate and noticed increased redness of the wound. Denies any current fever, chills, n/v/d, abdominal pain  In the ED, patient had mild leukocytosis, swelling noted on xray.   He was started on clindamycin and given tylenol for pain  abscess and cellulitis of right knee  culture growing staph aureas   needs an I&D   does nto seem like knee itself is involved     5/29: no fever, RA, no wbc, Cr ok, BCs NGTD, wound culture grew MSSA, CT R LE - no fluid collection, no surgical intervention planned, on Vancomycin and Clindamycin this morning, ok to change abx to Keflex, pt needs 10 more days of abx.     Plan:  stop vancomycin   send vancomycin trough with target AUC/[place] 400-600   (therapeutic drug monitoring required with IV vancomycin)  stop Unasyn 3g q6hrs   start Keflex 500  mg QID x 10 days       Blood cultures negative to date, continue to follow until final   send HIV   can give TDAP if not up to date   call methadone program and confirm correct dose   pain control     Discussed plan with Dr. Augustin  Discussed with Dr. Singh  43 yo M with PMH of previous spinal surgery (osteomyelitis), polysubstance abuse (on methadone for 2 years), depression presents for right knee wound check. Patient fell last week on pavement. He had scrapes on both knees. He states he cleaned it with hydrogen peroxide and put a bandage. The last two days, he found it difficult to ambulate and noticed increased redness of the wound. Denies any current fever, chills, n/v/d, abdominal pain  In the ED, patient had mild leukocytosis, swelling noted on xray.   He was started on clindamycin and given tylenol for pain  abscess and cellulitis of right knee  culture growing staph aureas   needs an I&D   does nto seem like knee itself is involved     5/29: no fever, RA, no wbc, Cr ok, BCs NGTD, wound culture grew MSSA, CT R LE - no fluid collection, no surgical intervention planned, on Vancomycin and Clindamycin this morning, ok to change abx to Keflex, pt needs 10 more days of abx.     Plan:  stop vancomycin   stop Unasyn 3g q6hrs   start Keflex 500 mg 4 times a day x 10 days   Blood cultures negative to date, continue to follow until final   send HIV   can give TDAP if not up to date   call methadone program and confirm correct dose   pain control     Discussed with Dr. Singh

## 2024-05-29 NOTE — DISCHARGE NOTE PROVIDER - HOSPITAL COURSE
GONZALES MONSERRAT is a 44y Male presenting with presenting with cellulitis now stable for discharge with outpatient follow up.    Doing well no drainage per surgery   190mg methadone confirmed   Wellbutrin for depression   MSSA on wound culture    Problem/Plan #1  - Problem: Cellulitis of right knee  -  wound culture with MSSA changed to keflex to complete course.  - wound care  - skin marker placed    Problem/Plan #2  - Problem: polysubstance abuse  - Plan: methadone - patient goes to clinic [facility]  - on 190 mg/day   - nicotine patch     Problem/Plan #3  - Problem: anxiety/depression   - continue Wellbutrin     regular diet   dispo: Home

## 2024-05-29 NOTE — PROGRESS NOTE ADULT - PROVIDER SPECIALTY LIST ADULT
----- Message from Salomón Amin MD sent at 9/10/2018  7:21 AM CDT -----  Blood cultures no growth for 5 days and this is final. Previous positive blood culture was likely contaminant  
Zeke for call back.  Result letter.    
Hospitalist
Surgery
Hospitalist
Hospitalist
Infectious Disease

## 2024-05-29 NOTE — PROGRESS NOTE ADULT - SUBJECTIVE AND OBJECTIVE BOX
GONZALES MONSERRAT  MRN-[id number]    Follow Up:  Right knee wound     Interval History: the pt was seen and examined earlier, not in acute distress, pt is awake and alert, able to get up, stand and ambulate, appropriate. Pt is afebrile, RA, no wbc.     PAST MEDICAL & SURGICAL HISTORY:  IV drug abuse      Osteomyelitis of lumbar spine      Seizure      Methadone maintenance therapy patient      HTN (hypertension)      H/O Spinal surgery          ROS:    [ ] Unobtainable because:  [x ] All other systems negative    Constitutional: no fever, no chills  Head: no trauma  Eyes: no vision changes, no eye pain  ENT:  no sore throat, no rhinorrhea  Cardiovascular:  no chest pain, no palpitation  Respiratory:  no SOB, no cough  GI:  no abd pain, no vomiting, no diarrhea  urinary: no dysuria, no hematuria, no flank pain  musculoskeletal:  no joint pain, no joint swelling  skin:  no rash, right knee wound still a little tender   neurology:  no headache, no seizure, no change in mental status  psych: no anxiety, no depression         Allergies  Zosyn (Hives)        ANTIMICROBIALS:  cephalexin 500 four times a day      OTHER MEDS:  acetaminophen     Tablet .. 650 milliGRAM(s) Oral every 6 hours PRN  buPROPion XL (24-Hour) . 300 milliGRAM(s) Oral daily  gabapentin 800 milliGRAM(s) Oral three times a day  HYDROmorphone  Injectable 4 milliGRAM(s) IV Push every 6 hours PRN  melatonin 3 milliGRAM(s) Oral at bedtime PRN  methadone    Tablet 190 milliGRAM(s) Oral daily  nicotine - 21 mG/24Hr(s) Patch 1 Patch Transdermal daily      Vital Signs Last 24 Hrs  T(C): 36.9 (29 May 2024 10:58), Max: 36.9 (29 May 2024 10:58)  T(F): 98.5 (29 May 2024 10:58), Max: 98.5 (29 May 2024 10:58)  HR: 66 (29 May 2024 10:58) (61 - 66)  BP: 122/76 (29 May 2024 10:58) (103/62 - 122/81)  BP(mean): --  RR: 18 (29 May 2024 10:58) (16 - 18)  SpO2: 97% (29 May 2024 10:58) (95% - 98%)    Parameters below as of 29 May 2024 10:58  Patient On (Oxygen Delivery Method): room air        Physical Exam:  Constitutional: non-toxic, no distress, RA  HEAD/EYES: anicteric, no conjunctival injection  ENT:  supple, no thrush  Cardiovascular:   normal S1, S2, no murmur, no edema  Respiratory:  clear BS bilaterally, no wheezes, no rales  GI:  soft, non-tender, normal bowel sounds  :  no mosley, no CVA tenderness  Musculoskeletal:  no synovitis, normal ROM  Neurologic: awake and alert, normal strength, no focal findings  Skin: right knee with partially opened wound with fluctuance, induration, there is drainage on the dressing, unable to expel any drainage from the wound during my exam, surrounding area mildly tender, not hot to touch.  There is a midline surgical scar of the spine well healed with no concern for infection  Heme/Onc: no lymphadenopathy   Psychiatric:  awake, alert, appropriate mood    WBC Count: 6.49 K/uL (05-29 @ 07:25)  WBC Count: 6.18 K/uL (05-28 @ 06:20)  WBC Count: 6.43 K/uL (05-27 @ 05:15)  WBC Count: 10.65 K/uL (05-25 @ 12:00)                            15.4   6.49  )-----------( 291      ( 29 May 2024 07:25 )             47.3       05-29    138  |  104  |  14  ----------------------------<  97  4.0   |  28  |  0.71    Ca    9.3      29 May 2024 07:25  Phos  4.4     05-29  Mg     2.2     05-29    TPro  8.1  /  Alb  3.4  /  TBili  0.4  /  DBili  x   /  AST  20  /  ALT  26  /  AlkPhos  100  05-28      Urinalysis Basic - ( 29 May 2024 07:25 )    Color: x / Appearance: x / SG: x / pH: x  Gluc: 97 mg/dL / Ketone: x  / Bili: x / Urobili: x   Blood: x / Protein: x / Nitrite: x   Leuk Esterase: x / RBC: x / WBC x   Sq Epi: x / Non Sq Epi: x / Bacteria: x        Creatinine Trend: 0.71<--, 0.68<--, 0.59<--, 0.75<--, 0.62<--, 0.75<--      MICROBIOLOGY:  v  Wound Wound  05-25-24   Moderate Staphylococcus aureus  --  Staphylococcus aureus      .Blood Blood-Peripheral  05-25-24   No growth at 72 Hours  --  --    RADIOLOGY:    < from: CT Lower Extremity w/ IV Cont, Right (05.27.24 @ 14:43) >    ACC: [id number] EXAM:  CT LWR EXT IC RT   ORDERED BY: Asim Silverio     PROCEDURE DATE:  05/27/2024          INTERPRETATION:  CT OF THE RIGHT LOWER EXTREMITY WITH CONTRAST.    CLINICAL INFORMATION: Right leg cellulitis.  TECHNIQUE: Axial CT images were obtained of the right leg with coronal   and sagittal reconstructions. 90 cc Omnipaque 350 was administered   intravenously with 10 cc discarded.    FINDINGS:    The bones are normal in appearance. The knee joint is normal.    There is no subcutaneous fluid collection identified. There is no   intramuscular attenuation abnormality.    Mild prepatellar soft tissue swelling and skin thickening possibly from   focal cellulitis.    IMPRESSION:    No abnormal fluid collection. No bony abnormality.    Mild prepatellar soft tissue swelling.    --- End of Report ---            ROBERTO KRUSE MD; Attending Radiologist  This document has been electronically signed. May 27 2024  2:52PM    < end of copied text >

## 2024-06-05 DIAGNOSIS — F41.9 ANXIETY DISORDER, UNSPECIFIED: ICD-10-CM

## 2024-06-05 DIAGNOSIS — Z88.1 ALLERGY STATUS TO OTHER ANTIBIOTIC AGENTS STATUS: ICD-10-CM

## 2024-06-05 DIAGNOSIS — L02.415 CUTANEOUS ABSCESS OF RIGHT LOWER LIMB: ICD-10-CM

## 2024-06-05 DIAGNOSIS — F19.19 OTHER PSYCHOACTIVE SUBSTANCE ABUSE WITH UNSPECIFIED PSYCHOACTIVE SUBSTANCE-INDUCED DISORDER: ICD-10-CM

## 2024-06-05 DIAGNOSIS — F17.210 NICOTINE DEPENDENCE, CIGARETTES, UNCOMPLICATED: ICD-10-CM

## 2024-06-05 DIAGNOSIS — B95.61 METHICILLIN SUSCEPTIBLE STAPHYLOCOCCUS AUREUS INFECTION AS THE CAUSE OF DISEASES CLASSIFIED ELSEWHERE: ICD-10-CM

## 2024-06-05 DIAGNOSIS — I10 ESSENTIAL (PRIMARY) HYPERTENSION: ICD-10-CM

## 2024-06-05 DIAGNOSIS — M25.561 PAIN IN RIGHT KNEE: ICD-10-CM

## 2024-06-05 DIAGNOSIS — L03.115 CELLULITIS OF RIGHT LOWER LIMB: ICD-10-CM

## 2024-06-05 DIAGNOSIS — F32.A DEPRESSION, UNSPECIFIED: ICD-10-CM

## 2024-06-09 NOTE — ED ADULT NURSE NOTE - NS ED NURSE RECORD ANOTHER VITAL SIGN
Refill Routing Note   Medication(s) are not appropriate for processing by [facility] for the following reason(s):        Outside of protocol    [facility] action(s):  Route  Approve             Appointments  past 12m or future 3m with PCP    Date Provider   Last Visit   12/19/2023 Amrik Roberts MD   Next Visit   Visit date not found Amrik Roberts MD   ED visits in past 90 days: 0        Note composed:6:40 PM 06/09/2024                   Yes

## 2024-06-17 ENCOUNTER — EMERGENCY (EMERGENCY)
Facility: HOSPITAL | Age: 44
LOS: 1 days | Discharge: LEFT BEFORE DISCHARGE | End: 2024-06-17
Attending: EMERGENCY MEDICINE
Payer: MEDICAID

## 2024-06-17 VITALS
SYSTOLIC BLOOD PRESSURE: 101 MMHG | RESPIRATION RATE: 18 BRPM | DIASTOLIC BLOOD PRESSURE: 67 MMHG | OXYGEN SATURATION: 97 % | HEART RATE: 73 BPM | TEMPERATURE: 98 F | HEIGHT: 68 IN | WEIGHT: 209 LBS

## 2024-06-17 DIAGNOSIS — Z98.890 OTHER SPECIFIED POSTPROCEDURAL STATES: Chronic | ICD-10-CM

## 2024-06-17 PROCEDURE — 99285 EMERGENCY DEPT VISIT HI MDM: CPT

## 2024-06-17 NOTE — ED ADULT TRIAGE NOTE - CHIEF COMPLAINT QUOTE
pt endorsing 1 month of mid back pain, reports hx of osteomyelitis in the spine in 2017 and feels it may be coming back  denies any open wounds or fevers

## 2024-06-18 ENCOUNTER — INPATIENT (INPATIENT)
Facility: HOSPITAL | Age: 44
LOS: 5 days | Discharge: HOME | DRG: 539 | End: 2024-06-24
Attending: HOSPITALIST | Admitting: STUDENT IN AN ORGANIZED HEALTH CARE EDUCATION/TRAINING PROGRAM
Payer: MEDICAID

## 2024-06-18 VITALS
HEART RATE: 58 BPM | TEMPERATURE: 98 F | RESPIRATION RATE: 18 BRPM | OXYGEN SATURATION: 98 % | DIASTOLIC BLOOD PRESSURE: 69 MMHG | SYSTOLIC BLOOD PRESSURE: 101 MMHG

## 2024-06-18 VITALS
SYSTOLIC BLOOD PRESSURE: 117 MMHG | OXYGEN SATURATION: 97 % | DIASTOLIC BLOOD PRESSURE: 78 MMHG | HEART RATE: 79 BPM | TEMPERATURE: 98 F | RESPIRATION RATE: 20 BRPM | HEIGHT: 68 IN

## 2024-06-18 DIAGNOSIS — Z98.890 OTHER SPECIFIED POSTPROCEDURAL STATES: Chronic | ICD-10-CM

## 2024-06-18 LAB
ALBUMIN SERPL ELPH-MCNC: 4.1 G/DL — SIGNIFICANT CHANGE UP (ref 3.3–5)
ALP SERPL-CCNC: 95 U/L — SIGNIFICANT CHANGE UP (ref 40–120)
ALT FLD-CCNC: 24 U/L — SIGNIFICANT CHANGE UP (ref 10–45)
ANION GAP SERPL CALC-SCNC: 9 MMOL/L — SIGNIFICANT CHANGE UP (ref 5–17)
APTT BLD: 32 SEC — SIGNIFICANT CHANGE UP (ref 24.5–35.6)
AST SERPL-CCNC: 27 U/L — SIGNIFICANT CHANGE UP (ref 10–40)
BASE EXCESS BLDV CALC-SCNC: 3 MMOL/L — SIGNIFICANT CHANGE UP (ref -2–3)
BASOPHILS # BLD AUTO: 0.02 K/UL — SIGNIFICANT CHANGE UP (ref 0–0.2)
BASOPHILS NFR BLD AUTO: 0.4 % — SIGNIFICANT CHANGE UP (ref 0–2)
BILIRUB SERPL-MCNC: 0.4 MG/DL — SIGNIFICANT CHANGE UP (ref 0.2–1.2)
BLD GP AB SCN SERPL QL: NEGATIVE — SIGNIFICANT CHANGE UP
BUN SERPL-MCNC: 16 MG/DL — SIGNIFICANT CHANGE UP (ref 7–23)
CA-I SERPL-SCNC: 1.29 MMOL/L — SIGNIFICANT CHANGE UP (ref 1.15–1.33)
CALCIUM SERPL-MCNC: 9.6 MG/DL — SIGNIFICANT CHANGE UP (ref 8.4–10.5)
CHLORIDE BLDV-SCNC: 101 MMOL/L — SIGNIFICANT CHANGE UP (ref 96–108)
CHLORIDE SERPL-SCNC: 100 MMOL/L — SIGNIFICANT CHANGE UP (ref 96–108)
CO2 BLDV-SCNC: 33 MMOL/L — HIGH (ref 22–26)
CO2 SERPL-SCNC: 27 MMOL/L — SIGNIFICANT CHANGE UP (ref 22–31)
CREAT SERPL-MCNC: 0.71 MG/DL — SIGNIFICANT CHANGE UP (ref 0.5–1.3)
CRP SERPL-MCNC: 11 MG/L — HIGH (ref 0–4)
EGFR: 117 ML/MIN/1.73M2 — SIGNIFICANT CHANGE UP
EOSINOPHIL # BLD AUTO: 0.2 K/UL — SIGNIFICANT CHANGE UP (ref 0–0.5)
EOSINOPHIL NFR BLD AUTO: 3.5 % — SIGNIFICANT CHANGE UP (ref 0–6)
ERYTHROCYTE [SEDIMENTATION RATE] IN BLOOD: 33 MM/HR — HIGH (ref 0–15)
GAS PNL BLDV: 135 MMOL/L — LOW (ref 136–145)
GAS PNL BLDV: SIGNIFICANT CHANGE UP
GAS PNL BLDV: SIGNIFICANT CHANGE UP
GLUCOSE BLDV-MCNC: 84 MG/DL — SIGNIFICANT CHANGE UP (ref 70–99)
GLUCOSE SERPL-MCNC: 90 MG/DL — SIGNIFICANT CHANGE UP (ref 70–99)
HCO3 BLDV-SCNC: 31 MMOL/L — HIGH (ref 22–29)
HCT VFR BLD CALC: 38 % — LOW (ref 39–50)
HCT VFR BLDA CALC: 44 % — SIGNIFICANT CHANGE UP (ref 39–51)
HGB BLD CALC-MCNC: 14.6 G/DL — SIGNIFICANT CHANGE UP (ref 12.6–17.4)
HGB BLD-MCNC: 12.6 G/DL — LOW (ref 13–17)
IMM GRANULOCYTES NFR BLD AUTO: 0.4 % — SIGNIFICANT CHANGE UP (ref 0–0.9)
INR BLD: 1.03 RATIO — SIGNIFICANT CHANGE UP (ref 0.85–1.18)
LACTATE BLDV-MCNC: 0.9 MMOL/L — SIGNIFICANT CHANGE UP (ref 0.5–2)
LYMPHOCYTES # BLD AUTO: 1.51 K/UL — SIGNIFICANT CHANGE UP (ref 1–3.3)
LYMPHOCYTES # BLD AUTO: 26.5 % — SIGNIFICANT CHANGE UP (ref 13–44)
MCHC RBC-ENTMCNC: 29 PG — SIGNIFICANT CHANGE UP (ref 27–34)
MCHC RBC-ENTMCNC: 33.2 GM/DL — SIGNIFICANT CHANGE UP (ref 32–36)
MCV RBC AUTO: 87.4 FL — SIGNIFICANT CHANGE UP (ref 80–100)
MONOCYTES # BLD AUTO: 0.35 K/UL — SIGNIFICANT CHANGE UP (ref 0–0.9)
MONOCYTES NFR BLD AUTO: 6.1 % — SIGNIFICANT CHANGE UP (ref 2–14)
NEUTROPHILS # BLD AUTO: 3.6 K/UL — SIGNIFICANT CHANGE UP (ref 1.8–7.4)
NEUTROPHILS NFR BLD AUTO: 63.1 % — SIGNIFICANT CHANGE UP (ref 43–77)
NRBC # BLD: 0 /100 WBCS — SIGNIFICANT CHANGE UP (ref 0–0)
PCO2 BLDV: 60 MMHG — HIGH (ref 42–55)
PH BLDV: 7.32 — SIGNIFICANT CHANGE UP (ref 7.32–7.43)
PLATELET # BLD AUTO: 189 K/UL — SIGNIFICANT CHANGE UP (ref 150–400)
PO2 BLDV: 45 MMHG — SIGNIFICANT CHANGE UP (ref 25–45)
POTASSIUM BLDV-SCNC: 3.9 MMOL/L — SIGNIFICANT CHANGE UP (ref 3.5–5.1)
POTASSIUM SERPL-MCNC: 3.7 MMOL/L — SIGNIFICANT CHANGE UP (ref 3.5–5.3)
POTASSIUM SERPL-SCNC: 3.7 MMOL/L — SIGNIFICANT CHANGE UP (ref 3.5–5.3)
PROT SERPL-MCNC: 7.9 G/DL — SIGNIFICANT CHANGE UP (ref 6–8.3)
PROTHROM AB SERPL-ACNC: 11.3 SEC — SIGNIFICANT CHANGE UP (ref 9.5–13)
RBC # BLD: 4.35 M/UL — SIGNIFICANT CHANGE UP (ref 4.2–5.8)
RBC # FLD: 14.7 % — HIGH (ref 10.3–14.5)
RH IG SCN BLD-IMP: POSITIVE — SIGNIFICANT CHANGE UP
SAO2 % BLDV: 73 % — SIGNIFICANT CHANGE UP (ref 67–88)
SODIUM SERPL-SCNC: 136 MMOL/L — SIGNIFICANT CHANGE UP (ref 135–145)
WBC # BLD: 5.7 K/UL — SIGNIFICANT CHANGE UP (ref 3.8–10.5)
WBC # FLD AUTO: 5.7 K/UL — SIGNIFICANT CHANGE UP (ref 3.8–10.5)

## 2024-06-18 PROCEDURE — 99285 EMERGENCY DEPT VISIT HI MDM: CPT

## 2024-06-18 PROCEDURE — 72129 CT CHEST SPINE W/DYE: CPT | Mod: 26,MC

## 2024-06-18 PROCEDURE — 86850 RBC ANTIBODY SCREEN: CPT

## 2024-06-18 PROCEDURE — 99284 EMERGENCY DEPT VISIT MOD MDM: CPT | Mod: 25

## 2024-06-18 PROCEDURE — 85652 RBC SED RATE AUTOMATED: CPT

## 2024-06-18 PROCEDURE — 84295 ASSAY OF SERUM SODIUM: CPT

## 2024-06-18 PROCEDURE — 85014 HEMATOCRIT: CPT

## 2024-06-18 PROCEDURE — 96375 TX/PRO/DX INJ NEW DRUG ADDON: CPT | Mod: XU

## 2024-06-18 PROCEDURE — 72129 CT CHEST SPINE W/DYE: CPT | Mod: MC

## 2024-06-18 PROCEDURE — 85025 COMPLETE CBC W/AUTO DIFF WBC: CPT

## 2024-06-18 PROCEDURE — 86140 C-REACTIVE PROTEIN: CPT

## 2024-06-18 PROCEDURE — 84132 ASSAY OF SERUM POTASSIUM: CPT

## 2024-06-18 PROCEDURE — 82330 ASSAY OF CALCIUM: CPT

## 2024-06-18 PROCEDURE — 85018 HEMOGLOBIN: CPT

## 2024-06-18 PROCEDURE — 86900 BLOOD TYPING SEROLOGIC ABO: CPT

## 2024-06-18 PROCEDURE — 82947 ASSAY GLUCOSE BLOOD QUANT: CPT

## 2024-06-18 PROCEDURE — 87040 BLOOD CULTURE FOR BACTERIA: CPT

## 2024-06-18 PROCEDURE — 85610 PROTHROMBIN TIME: CPT

## 2024-06-18 PROCEDURE — 82435 ASSAY OF BLOOD CHLORIDE: CPT

## 2024-06-18 PROCEDURE — 72132 CT LUMBAR SPINE W/DYE: CPT | Mod: 26,MC

## 2024-06-18 PROCEDURE — 85730 THROMBOPLASTIN TIME PARTIAL: CPT

## 2024-06-18 PROCEDURE — 80053 COMPREHEN METABOLIC PANEL: CPT

## 2024-06-18 PROCEDURE — 80307 DRUG TEST PRSMV CHEM ANLYZR: CPT

## 2024-06-18 PROCEDURE — 96374 THER/PROPH/DIAG INJ IV PUSH: CPT | Mod: XU

## 2024-06-18 PROCEDURE — 82803 BLOOD GASES ANY COMBINATION: CPT

## 2024-06-18 PROCEDURE — 72132 CT LUMBAR SPINE W/DYE: CPT | Mod: MC

## 2024-06-18 PROCEDURE — 83605 ASSAY OF LACTIC ACID: CPT

## 2024-06-18 PROCEDURE — 86901 BLOOD TYPING SEROLOGIC RH(D): CPT

## 2024-06-18 RX ORDER — ACETAMINOPHEN 500 MG
1000 TABLET ORAL ONCE
Refills: 0 | Status: COMPLETED | OUTPATIENT
Start: 2024-06-18 | End: 2024-06-18

## 2024-06-18 RX ORDER — KETOROLAC TROMETHAMINE 30 MG/ML
15 SYRINGE (ML) INJECTION ONCE
Refills: 0 | Status: DISCONTINUED | OUTPATIENT
Start: 2024-06-18 | End: 2024-06-18

## 2024-06-18 RX ADMIN — Medication 15 MILLIGRAM(S): at 07:43

## 2024-06-18 RX ADMIN — Medication 15 MILLIGRAM(S): at 05:45

## 2024-06-18 RX ADMIN — Medication 400 MILLIGRAM(S): at 05:45

## 2024-06-18 RX ADMIN — Medication 1000 MILLIGRAM(S): at 07:43

## 2024-06-18 NOTE — ED ADULT NURSE NOTE - CHPI ED NUR SYMPTOMS NEG
no bladder dysfunction/no bowel dysfunction/no constipation/no difficulty bearing weight/no fatigue/no motor function loss/no neck tenderness

## 2024-06-18 NOTE — ED PROVIDER NOTE - PATIENT PORTAL LINK FT
You can access the FollowMyHealth Patient Portal offered by [facility] by registering at the following website: [email or web address]. By joining [email or web address] FollowMyHealth portal, you will also be able to view your health information using other applications (apps) compatible with our system.

## 2024-06-18 NOTE — ED ADULT NURSE NOTE - EXPLANATION OF PATIENT'S REASON FOR LEAVING
"Has to get to methadone clinic." They close early and aer closed tomorrow for the holiday." "Has to get to methadone clinic." They close early and are closed tomorrow for the holiday."

## 2024-06-18 NOTE — ED PROVIDER NOTE - NS ED ROS FT
Constitutional: no fevers, chills  HEENT: no HA, vision changes, rhinorrhea, sore throat  Cardiac: no chest pain, palpitations  Respiratory: no SOB, cough or hemoptysis  GI: no n/v/d/c, abd pain, bloody or dark stools  : no dysuria, frequency, or hematuria  MSK: no joint pain, neck pain +back pain  Skin: no rashes, jaundice, pruritis  Neuro: no numbness/tingling, weakness, unsteady gait  ROS otherwise neg except per MDM

## 2024-06-18 NOTE — ED ADULT NURSE NOTE - OBJECTIVE STATEMENT
08/13/22 0700   Inhalation Therapy Treatment   $ MDI/DPI Given MDI/DPI x 1      43 y/o M presents to ED with c/o medial back pain. Pt endorses back pain progressively worsening over past weeks. Pt stated he has a hx of osteomyelitis which required surgery. Pt denies chest pain, n/v/d, HA, recent trauma, blurred vision. PMH HTN, seizures, IV drug abuse, and on methadone. Pt a&ox4, VS see flow sheet. Pt placed in position of comfort. Bed in lowest position, wheels locked, appropriate side rails raised. Pt denies needs at this time.

## 2024-06-18 NOTE — ED PROVIDER NOTE - CLINICAL SUMMARY MEDICAL DECISION MAKING FREE TEXT BOX
44y male hx IVDU, last reported 3y ago presents with 4 weeks of back pain, increased in intensity over the last few days, reminiscent of his prior bout of spinal osteomyelitis which required surgical intervention. no trauma or heavy lifting. denies fever chills urinary symptoms chest pain vomiting imbalance weakness paresthesias. no saddle anesthesia, incontinence of bowel or bladder or urinary hesitancy/pelvic pressure. on arrival. VS nonactionable on arrival, appears fatigued. motor and sensory exam intact. sunburns to bilateral forearms. CVAT and thoracolumbar ttp. obtain CTs as screening, as well as basics/preops/inflammatory markers. likely admit for nonemergent MRI.

## 2024-06-18 NOTE — ED ADULT NURSE REASSESSMENT NOTE - NS ED NURSE REASSESS COMMENT FT1
0732 Pt returned from CT. A&Ox4. IVL intact Right forearm. Pt voided in bathroom earlier. states he was not told to give sample. States no pain relief from meds earlier. c/o medial back 8/10. States he wants to leave because he has to get to the Methadone clinic. Dr Palumbo notified and will speak to pt. Pt will sign out AMA. Risks explained by

## 2024-06-18 NOTE — ED PROVIDER NOTE - PHYSICAL EXAMINATION
General: non-toxic, NAD  HEENT: NCAT, PERRL  Cardiac: RRR, no murmurs, 2+ peripheral pulses  Resp: CTAB  Abdomen: soft, non-distended, bowel sounds present, no ttp, no rebound or guarding. no organomegaly  Extremities: no peripheral edema, calf tenderness, or leg size discrepancies  Skin: no rashes. sunburnt forearms bilaterally.   Neuro: AAOx4, 5+motor, sensation grossly intact  Psych: mood and affect appropriate

## 2024-06-18 NOTE — ED PROVIDER NOTE - PROGRESS NOTE DETAILS
Sarahy Calvin MD (PGY1) Pt signed out to me. Hx of spinal osteomyelitis which required surgical intervention presents to the ED w/ back pain for 4 months worsening over the past few days. Pt states he is still in pain. Sarahy Calvin MD (PGY1) Pt signed out to me. Hx of spinal osteomyelitis which required surgical intervention presents to the ED w/ back pain for 4 months worsening over the past few days. Sarahy Calvin MD (PGY1) Pt states he does not want to stay for imaging. The patient wishes to be discharged against medical advice. I have assessed the patient's mental status and  the patient has capacity to make this decision. I have explained the risks of leaving without full treatment, including severe disability and death, which the patient understands and is willing to accept. I have answered all of the patient's questions. I reiterated my medical opinion and advised the patient to return at any time. We discussed the further workup outside of the current visit and return precautions. pt signed out 7AM, returned from CT and asking to leave -- multiple attempts to discuss / explain high risks of leaving [facility] -- he understands and is leaving AMS -- Blas Palumbo MD

## 2024-06-18 NOTE — ED PROVIDER NOTE - NSFOLLOWUPINSTRUCTIONS_ED_ALL_ED_FT
It was a pleasure caring for you today!    You were seen in the ER today for back pain. You decided to leave against medical advice. Please return to the ED if you change your mind.     Please follow up with your primary care doctor within 1 - 3 days. Call and let them know you were seen in the ER today.   Bring the results of your blood work and imaging with you to your appointment, if applicable.    For pain, please take acetaminophen 650 mg every 6 hours for pain. Additionally, you can also take ibuprofen 400 mg every 6-8 hours for pain.    Return to the ER for any worsening symptoms or concerns, including chest pain, shortness of breath, lightheadedness, weakness, or any other concerns.

## 2024-06-18 NOTE — ED PROVIDER NOTE - ATTENDING CONTRIBUTION TO CARE
MD Gordon:  patient seen and evaluated personally.   I agree with the History & Physical,  Impression & Plan other than what was detailed in my note.  MD Gordon  43 y/o m hx of former IVDA (last reported three years ago), hx of osteomyelitis in thoracic spine requiring surgery, presenting w/ lower back pain,non radiating, x last month worse today, now developing chills, vomiting, no saddle anesthesia, no loss contr bladder, afebrile vitals stable  non toxic well appearing, NC/AT,  conjunctiva non conjected, sclera anicteric, moist mucous membranes, neck supple, heart sounds, normal, no mrg, lungs cta b/l no wrr, abd soft non distended w/ no tenderness, no visual deformities of extremities, axox3, power 5/5x 4, symmetrical, normal reflexes, nv intact, normal mood and affect, concern for potential repeat in osteo, vs less likeyl frx, possible arthritis, pain control, ct spine iv dye, cbc, cmp, crp, sed rate, pain control, re rufina. likely admit for mri

## 2024-06-19 ENCOUNTER — TRANSCRIPTION ENCOUNTER (OUTPATIENT)
Age: 44
End: 2024-06-19

## 2024-06-19 DIAGNOSIS — F19.10 OTHER PSYCHOACTIVE SUBSTANCE ABUSE, UNCOMPLICATED: ICD-10-CM

## 2024-06-19 DIAGNOSIS — M54.9 DORSALGIA, UNSPECIFIED: ICD-10-CM

## 2024-06-19 DIAGNOSIS — Z79.899 OTHER LONG TERM (CURRENT) DRUG THERAPY: ICD-10-CM

## 2024-06-19 DIAGNOSIS — Z29.9 ENCOUNTER FOR PROPHYLACTIC MEASURES, UNSPECIFIED: ICD-10-CM

## 2024-06-19 DIAGNOSIS — R41.0 DISORIENTATION, UNSPECIFIED: ICD-10-CM

## 2024-06-19 DIAGNOSIS — M86.60 OTHER CHRONIC OSTEOMYELITIS, UNSPECIFIED SITE: ICD-10-CM

## 2024-06-19 LAB
ALBUMIN SERPL ELPH-MCNC: 3.7 G/DL — SIGNIFICANT CHANGE UP (ref 3.3–5)
ALP SERPL-CCNC: 83 U/L — SIGNIFICANT CHANGE UP (ref 40–120)
ALT FLD-CCNC: 22 U/L — SIGNIFICANT CHANGE UP (ref 10–45)
AMPHET UR-MCNC: NEGATIVE — SIGNIFICANT CHANGE UP
ANION GAP SERPL CALC-SCNC: 12 MMOL/L — SIGNIFICANT CHANGE UP (ref 5–17)
AST SERPL-CCNC: 25 U/L — SIGNIFICANT CHANGE UP (ref 10–40)
BARBITURATES UR SCN-MCNC: NEGATIVE — SIGNIFICANT CHANGE UP
BASOPHILS # BLD AUTO: 0.02 K/UL — SIGNIFICANT CHANGE UP (ref 0–0.2)
BASOPHILS NFR BLD AUTO: 0.4 % — SIGNIFICANT CHANGE UP (ref 0–2)
BENZODIAZ UR-MCNC: POSITIVE
BILIRUB SERPL-MCNC: 0.5 MG/DL — SIGNIFICANT CHANGE UP (ref 0.2–1.2)
BUN SERPL-MCNC: 17 MG/DL — SIGNIFICANT CHANGE UP (ref 7–23)
CALCIUM SERPL-MCNC: 9.4 MG/DL — SIGNIFICANT CHANGE UP (ref 8.4–10.5)
CHLORIDE SERPL-SCNC: 102 MMOL/L — SIGNIFICANT CHANGE UP (ref 96–108)
CO2 SERPL-SCNC: 25 MMOL/L — SIGNIFICANT CHANGE UP (ref 22–31)
COCAINE METAB.OTHER UR-MCNC: POSITIVE
CREAT SERPL-MCNC: 0.68 MG/DL — SIGNIFICANT CHANGE UP (ref 0.5–1.3)
EGFR: 119 ML/MIN/1.73M2 — SIGNIFICANT CHANGE UP
EOSINOPHIL # BLD AUTO: 0.19 K/UL — SIGNIFICANT CHANGE UP (ref 0–0.5)
EOSINOPHIL NFR BLD AUTO: 3.7 % — SIGNIFICANT CHANGE UP (ref 0–6)
GLUCOSE BLDC GLUCOMTR-MCNC: 81 MG/DL — SIGNIFICANT CHANGE UP (ref 70–99)
GLUCOSE BLDC GLUCOMTR-MCNC: 92 MG/DL — SIGNIFICANT CHANGE UP (ref 70–99)
GLUCOSE SERPL-MCNC: 78 MG/DL — SIGNIFICANT CHANGE UP (ref 70–99)
HCT VFR BLD CALC: 41.6 % — SIGNIFICANT CHANGE UP (ref 39–50)
HGB BLD-MCNC: 13.7 G/DL — SIGNIFICANT CHANGE UP (ref 13–17)
IMM GRANULOCYTES NFR BLD AUTO: 0.4 % — SIGNIFICANT CHANGE UP (ref 0–0.9)
LYMPHOCYTES # BLD AUTO: 1.15 K/UL — SIGNIFICANT CHANGE UP (ref 1–3.3)
LYMPHOCYTES # BLD AUTO: 22.6 % — SIGNIFICANT CHANGE UP (ref 13–44)
MCHC RBC-ENTMCNC: 29.2 PG — SIGNIFICANT CHANGE UP (ref 27–34)
MCHC RBC-ENTMCNC: 32.9 GM/DL — SIGNIFICANT CHANGE UP (ref 32–36)
MCV RBC AUTO: 88.7 FL — SIGNIFICANT CHANGE UP (ref 80–100)
METHADONE UR-MCNC: POSITIVE
MONOCYTES # BLD AUTO: 0.27 K/UL — SIGNIFICANT CHANGE UP (ref 0–0.9)
MONOCYTES NFR BLD AUTO: 5.3 % — SIGNIFICANT CHANGE UP (ref 2–14)
NEUTROPHILS # BLD AUTO: 3.44 K/UL — SIGNIFICANT CHANGE UP (ref 1.8–7.4)
NEUTROPHILS NFR BLD AUTO: 67.6 % — SIGNIFICANT CHANGE UP (ref 43–77)
NRBC # BLD: 0 /100 WBCS — SIGNIFICANT CHANGE UP (ref 0–0)
OPIATES UR-MCNC: POSITIVE
OXYCODONE UR-MCNC: NEGATIVE — SIGNIFICANT CHANGE UP
PCP SPEC-MCNC: SIGNIFICANT CHANGE UP
PCP UR-MCNC: NEGATIVE — SIGNIFICANT CHANGE UP
PLATELET # BLD AUTO: 191 K/UL — SIGNIFICANT CHANGE UP (ref 150–400)
POTASSIUM SERPL-MCNC: 4.1 MMOL/L — SIGNIFICANT CHANGE UP (ref 3.5–5.3)
POTASSIUM SERPL-SCNC: 4.1 MMOL/L — SIGNIFICANT CHANGE UP (ref 3.5–5.3)
PROT SERPL-MCNC: 7.1 G/DL — SIGNIFICANT CHANGE UP (ref 6–8.3)
RBC # BLD: 4.69 M/UL — SIGNIFICANT CHANGE UP (ref 4.2–5.8)
RBC # FLD: 15 % — HIGH (ref 10.3–14.5)
SODIUM SERPL-SCNC: 139 MMOL/L — SIGNIFICANT CHANGE UP (ref 135–145)
THC UR QL: POSITIVE
WBC # BLD: 5.09 K/UL — SIGNIFICANT CHANGE UP (ref 3.8–10.5)
WBC # FLD AUTO: 5.09 K/UL — SIGNIFICANT CHANGE UP (ref 3.8–10.5)

## 2024-06-19 PROCEDURE — 70450 CT HEAD/BRAIN W/O DYE: CPT | Mod: 26

## 2024-06-19 PROCEDURE — 71045 X-RAY EXAM CHEST 1 VIEW: CPT | Mod: 26

## 2024-06-19 PROCEDURE — 99223 1ST HOSP IP/OBS HIGH 75: CPT

## 2024-06-19 PROCEDURE — 99222 1ST HOSP IP/OBS MODERATE 55: CPT

## 2024-06-19 RX ORDER — CEFTRIAXONE SODIUM 500 MG
2000 VIAL (EA) INJECTION EVERY 24 HOURS
Refills: 0 | Status: DISCONTINUED | OUTPATIENT
Start: 2024-06-19 | End: 2024-06-23

## 2024-06-19 RX ORDER — GLUCAGON HYDROCHLORIDE 1 MG/ML
1 INJECTION, POWDER, FOR SOLUTION INTRAMUSCULAR; INTRAVENOUS; SUBCUTANEOUS ONCE
Refills: 0 | Status: DISCONTINUED | OUTPATIENT
Start: 2024-06-19 | End: 2024-06-24

## 2024-06-19 RX ORDER — SEMAGLUTIDE 1.34 MG/ML
1 INJECTION, SOLUTION SUBCUTANEOUS
Refills: 0 | DISCHARGE

## 2024-06-19 RX ORDER — DEXTROAMPHETAMINE SACCHARATE, AMPHETAMINE ASPARTATE, DEXTROAMPHETAMINE SULFATE AND AMPHETAMINE SULFATE 1.875; 1.875; 1.875; 1.875 MG/1; MG/1; MG/1; MG/1
1 TABLET ORAL
Qty: 0 | Refills: 0 | DISCHARGE

## 2024-06-19 RX ORDER — LANOLIN ALCOHOL/MO/W.PET/CERES
1 CREAM (GRAM) TOPICAL
Qty: 0 | Refills: 0 | DISCHARGE

## 2024-06-19 RX ORDER — NICOTINE POLACRILEX 2 MG/1
1 LOZENGE ORAL DAILY
Refills: 0 | Status: DISCONTINUED | OUTPATIENT
Start: 2024-06-19 | End: 2024-06-24

## 2024-06-19 RX ORDER — NALOXONE HYDROCHLORIDE 1 MG/ML
0.04 INJECTION PARENTERAL ONCE
Refills: 0 | Status: DISCONTINUED | OUTPATIENT
Start: 2024-06-19 | End: 2024-06-19

## 2024-06-19 RX ORDER — DEXTROSE MONOHYDRATE 100 MG/ML
125 INJECTION, SOLUTION INTRAVENOUS ONCE
Refills: 0 | Status: DISCONTINUED | OUTPATIENT
Start: 2024-06-19 | End: 2024-06-24

## 2024-06-19 RX ORDER — HEPARIN SODIUM 50 [USP'U]/ML
5000 INJECTION, SOLUTION INTRAVENOUS EVERY 8 HOURS
Refills: 0 | Status: DISCONTINUED | OUTPATIENT
Start: 2024-06-19 | End: 2024-06-24

## 2024-06-19 RX ORDER — DEXTROSE 30 % IN WATER 30 %
25 VIAL (ML) INTRAVENOUS ONCE
Refills: 0 | Status: DISCONTINUED | OUTPATIENT
Start: 2024-06-19 | End: 2024-06-24

## 2024-06-19 RX ORDER — DEXTROSE 30 % IN WATER 30 %
15 VIAL (ML) INTRAVENOUS ONCE
Refills: 0 | Status: DISCONTINUED | OUTPATIENT
Start: 2024-06-19 | End: 2024-06-24

## 2024-06-19 RX ORDER — VANCOMYCIN HYDROCHLORIDE 50 MG/ML
1000 KIT ORAL EVERY 12 HOURS
Refills: 0 | Status: DISCONTINUED | OUTPATIENT
Start: 2024-06-19 | End: 2024-06-21

## 2024-06-19 RX ORDER — DEXTROSE MONOHYDRATE AND SODIUM CHLORIDE 5; .3 G/100ML; G/100ML
1000 INJECTION, SOLUTION INTRAVENOUS
Refills: 0 | Status: DISCONTINUED | OUTPATIENT
Start: 2024-06-19 | End: 2024-06-24

## 2024-06-19 RX ORDER — DEXTROSE 30 % IN WATER 30 %
12.5 VIAL (ML) INTRAVENOUS ONCE
Refills: 0 | Status: DISCONTINUED | OUTPATIENT
Start: 2024-06-19 | End: 2024-06-24

## 2024-06-19 RX ORDER — ACETAMINOPHEN 325 MG
1000 TABLET ORAL ONCE
Refills: 0 | Status: COMPLETED | OUTPATIENT
Start: 2024-06-19 | End: 2024-06-19

## 2024-06-19 RX ORDER — VANCOMYCIN HYDROCHLORIDE 50 MG/ML
1000 KIT ORAL ONCE
Refills: 0 | Status: COMPLETED | OUTPATIENT
Start: 2024-06-19 | End: 2024-06-19

## 2024-06-19 RX ORDER — GABAPENTIN
800 POWDER (GRAM) MISCELLANEOUS THREE TIMES A DAY
Refills: 0 | Status: DISCONTINUED | OUTPATIENT
Start: 2024-06-19 | End: 2024-06-24

## 2024-06-19 RX ORDER — KETOROLAC TROMETHAMINE 30 MG/ML
15 INJECTION, SOLUTION INTRAMUSCULAR ONCE
Refills: 0 | Status: DISCONTINUED | OUTPATIENT
Start: 2024-06-19 | End: 2024-06-19

## 2024-06-19 RX ORDER — BUPROPION HYDROCHLORIDE 150 MG/1
1 TABLET, EXTENDED RELEASE ORAL
Refills: 0 | DISCHARGE

## 2024-06-19 RX ORDER — METHADONE HYDROCHLORIDE 40 MG/1
190 TABLET ORAL
Qty: 0 | Refills: 0 | DISCHARGE

## 2024-06-19 RX ORDER — BUPROPION HYDROCHLORIDE 150 MG/1
300 TABLET, EXTENDED RELEASE ORAL DAILY
Refills: 0 | Status: DISCONTINUED | OUTPATIENT
Start: 2024-06-19 | End: 2024-06-24

## 2024-06-19 RX ORDER — SODIUM CHLORIDE 0.9 % (FLUSH) 0.9 %
1000 SYRINGE (ML) INJECTION
Refills: 0 | Status: DISCONTINUED | OUTPATIENT
Start: 2024-06-19 | End: 2024-06-24

## 2024-06-19 RX ADMIN — KETOROLAC TROMETHAMINE 15 MILLIGRAM(S): 30 INJECTION, SOLUTION INTRAMUSCULAR at 01:22

## 2024-06-19 RX ADMIN — VANCOMYCIN HYDROCHLORIDE 250 MILLIGRAM(S): KIT at 02:04

## 2024-06-19 RX ADMIN — HEPARIN SODIUM 5000 UNIT(S): 50 INJECTION, SOLUTION INTRAVENOUS at 06:06

## 2024-06-19 RX ADMIN — HEPARIN SODIUM 5000 UNIT(S): 50 INJECTION, SOLUTION INTRAVENOUS at 13:57

## 2024-06-19 RX ADMIN — Medication 800 MILLIGRAM(S): at 21:13

## 2024-06-19 RX ADMIN — Medication 400 MILLIGRAM(S): at 21:51

## 2024-06-19 RX ADMIN — BUPROPION HYDROCHLORIDE 300 MILLIGRAM(S): 150 TABLET, EXTENDED RELEASE ORAL at 13:59

## 2024-06-19 RX ADMIN — Medication 190 MILLIGRAM(S): at 15:02

## 2024-06-19 RX ADMIN — NICOTINE POLACRILEX 1 PATCH: 2 LOZENGE ORAL at 16:21

## 2024-06-19 RX ADMIN — HEPARIN SODIUM 5000 UNIT(S): 50 INJECTION, SOLUTION INTRAVENOUS at 21:13

## 2024-06-19 RX ADMIN — Medication 100 MILLIGRAM(S): at 01:22

## 2024-06-19 RX ADMIN — VANCOMYCIN HYDROCHLORIDE 250 MILLIGRAM(S): KIT at 06:06

## 2024-06-19 RX ADMIN — Medication 800 MILLIGRAM(S): at 14:00

## 2024-06-19 RX ADMIN — VANCOMYCIN HYDROCHLORIDE 250 MILLIGRAM(S): KIT at 22:11

## 2024-06-20 LAB
A1C WITH ESTIMATED AVERAGE GLUCOSE RESULT: 5.5 % — SIGNIFICANT CHANGE UP (ref 4–5.6)
ALBUMIN SERPL ELPH-MCNC: 4 G/DL — SIGNIFICANT CHANGE UP (ref 3.3–5)
ALP SERPL-CCNC: 90 U/L — SIGNIFICANT CHANGE UP (ref 40–120)
ALT FLD-CCNC: 21 U/L — SIGNIFICANT CHANGE UP (ref 10–45)
ANION GAP SERPL CALC-SCNC: 11 MMOL/L — SIGNIFICANT CHANGE UP (ref 5–17)
AST SERPL-CCNC: 22 U/L — SIGNIFICANT CHANGE UP (ref 10–40)
BILIRUB SERPL-MCNC: 0.2 MG/DL — SIGNIFICANT CHANGE UP (ref 0.2–1.2)
BUN SERPL-MCNC: 10 MG/DL — SIGNIFICANT CHANGE UP (ref 7–23)
CALCIUM SERPL-MCNC: 9.5 MG/DL — SIGNIFICANT CHANGE UP (ref 8.4–10.5)
CHLORIDE SERPL-SCNC: 100 MMOL/L — SIGNIFICANT CHANGE UP (ref 96–108)
CO2 SERPL-SCNC: 27 MMOL/L — SIGNIFICANT CHANGE UP (ref 22–31)
CREAT SERPL-MCNC: 0.61 MG/DL — SIGNIFICANT CHANGE UP (ref 0.5–1.3)
EGFR: 123 ML/MIN/1.73M2 — SIGNIFICANT CHANGE UP
ESTIMATED AVERAGE GLUCOSE: 111 MG/DL — SIGNIFICANT CHANGE UP (ref 68–114)
GLUCOSE SERPL-MCNC: 86 MG/DL — SIGNIFICANT CHANGE UP (ref 70–99)
HAV IGM SER-ACNC: SIGNIFICANT CHANGE UP
HBV CORE IGM SER-ACNC: SIGNIFICANT CHANGE UP
HBV SURFACE AG SER-ACNC: SIGNIFICANT CHANGE UP
HCT VFR BLD CALC: 43.9 % — SIGNIFICANT CHANGE UP (ref 39–50)
HCV AB S/CO SERPL IA: 0.11 S/CO — SIGNIFICANT CHANGE UP (ref 0–0.99)
HCV AB SERPL-IMP: SIGNIFICANT CHANGE UP
HGB BLD-MCNC: 14 G/DL — SIGNIFICANT CHANGE UP (ref 13–17)
MCHC RBC-ENTMCNC: 28.6 PG — SIGNIFICANT CHANGE UP (ref 27–34)
MCHC RBC-ENTMCNC: 31.9 GM/DL — LOW (ref 32–36)
MCV RBC AUTO: 89.6 FL — SIGNIFICANT CHANGE UP (ref 80–100)
NRBC # BLD: 0 /100 WBCS — SIGNIFICANT CHANGE UP (ref 0–0)
PLATELET # BLD AUTO: 201 K/UL — SIGNIFICANT CHANGE UP (ref 150–400)
POTASSIUM SERPL-MCNC: 4.1 MMOL/L — SIGNIFICANT CHANGE UP (ref 3.5–5.3)
POTASSIUM SERPL-SCNC: 4.1 MMOL/L — SIGNIFICANT CHANGE UP (ref 3.5–5.3)
PROT SERPL-MCNC: 7.5 G/DL — SIGNIFICANT CHANGE UP (ref 6–8.3)
RBC # BLD: 4.9 M/UL — SIGNIFICANT CHANGE UP (ref 4.2–5.8)
RBC # FLD: 14.8 % — HIGH (ref 10.3–14.5)
SODIUM SERPL-SCNC: 138 MMOL/L — SIGNIFICANT CHANGE UP (ref 135–145)
VANCOMYCIN TROUGH SERPL-MCNC: 4.3 UG/ML — LOW (ref 10–20)
WBC # BLD: 5.51 K/UL — SIGNIFICANT CHANGE UP (ref 3.8–10.5)
WBC # FLD AUTO: 5.51 K/UL — SIGNIFICANT CHANGE UP (ref 3.8–10.5)

## 2024-06-20 PROCEDURE — 99233 SBSQ HOSP IP/OBS HIGH 50: CPT

## 2024-06-20 PROCEDURE — 99232 SBSQ HOSP IP/OBS MODERATE 35: CPT

## 2024-06-20 RX ORDER — LIDOCAINE HCL 28 MG/G
1 GEL TOPICAL DAILY
Refills: 0 | Status: DISCONTINUED | OUTPATIENT
Start: 2024-06-20 | End: 2024-06-24

## 2024-06-20 RX ADMIN — Medication 100 MILLIGRAM(S): at 00:33

## 2024-06-20 RX ADMIN — NICOTINE POLACRILEX 1 PATCH: 2 LOZENGE ORAL at 11:14

## 2024-06-20 RX ADMIN — HEPARIN SODIUM 5000 UNIT(S): 50 INJECTION, SOLUTION INTRAVENOUS at 06:36

## 2024-06-20 RX ADMIN — BUPROPION HYDROCHLORIDE 300 MILLIGRAM(S): 150 TABLET, EXTENDED RELEASE ORAL at 11:13

## 2024-06-20 RX ADMIN — Medication 800 MILLIGRAM(S): at 06:36

## 2024-06-20 RX ADMIN — VANCOMYCIN HYDROCHLORIDE 250 MILLIGRAM(S): KIT at 18:24

## 2024-06-20 RX ADMIN — VANCOMYCIN HYDROCHLORIDE 250 MILLIGRAM(S): KIT at 06:36

## 2024-06-20 RX ADMIN — Medication 190 MILLIGRAM(S): at 11:14

## 2024-06-20 RX ADMIN — LIDOCAINE HCL 1 PATCH: 28 GEL TOPICAL at 19:29

## 2024-06-20 RX ADMIN — LIDOCAINE HCL 1 PATCH: 28 GEL TOPICAL at 14:59

## 2024-06-20 RX ADMIN — Medication 800 MILLIGRAM(S): at 23:23

## 2024-06-20 RX ADMIN — Medication 800 MILLIGRAM(S): at 13:07

## 2024-06-20 RX ADMIN — Medication 5 MILLIGRAM(S): at 14:59

## 2024-06-20 RX ADMIN — NICOTINE POLACRILEX 1 PATCH: 2 LOZENGE ORAL at 23:59

## 2024-06-21 LAB — GLUCOSE BLDC GLUCOMTR-MCNC: 100 MG/DL — HIGH (ref 70–99)

## 2024-06-21 PROCEDURE — 72158 MRI LUMBAR SPINE W/O & W/DYE: CPT | Mod: 26

## 2024-06-21 PROCEDURE — 72156 MRI NECK SPINE W/O & W/DYE: CPT | Mod: 26

## 2024-06-21 PROCEDURE — 99232 SBSQ HOSP IP/OBS MODERATE 35: CPT

## 2024-06-21 PROCEDURE — 72157 MRI CHEST SPINE W/O & W/DYE: CPT | Mod: 26

## 2024-06-21 PROCEDURE — 99233 SBSQ HOSP IP/OBS HIGH 50: CPT

## 2024-06-21 RX ORDER — VANCOMYCIN HYDROCHLORIDE 50 MG/ML
1500 KIT ORAL EVERY 12 HOURS
Refills: 0 | Status: DISCONTINUED | OUTPATIENT
Start: 2024-06-21 | End: 2024-06-23

## 2024-06-21 RX ORDER — ONDANSETRON HYDROCHLORIDE 2 MG/ML
4 INJECTION INTRAMUSCULAR; INTRAVENOUS ONCE
Refills: 0 | Status: DISCONTINUED | OUTPATIENT
Start: 2024-06-21 | End: 2024-06-24

## 2024-06-21 RX ADMIN — Medication 800 MILLIGRAM(S): at 21:10

## 2024-06-21 RX ADMIN — NICOTINE POLACRILEX 1 PATCH: 2 LOZENGE ORAL at 15:47

## 2024-06-21 RX ADMIN — Medication 100 MILLIGRAM(S): at 02:18

## 2024-06-21 RX ADMIN — Medication 5 MILLIGRAM(S): at 15:47

## 2024-06-21 RX ADMIN — Medication 800 MILLIGRAM(S): at 15:47

## 2024-06-21 RX ADMIN — Medication 190 MILLIGRAM(S): at 11:36

## 2024-06-21 RX ADMIN — LIDOCAINE HCL 1 PATCH: 28 GEL TOPICAL at 19:48

## 2024-06-21 RX ADMIN — VANCOMYCIN HYDROCHLORIDE 250 MILLIGRAM(S): KIT at 05:11

## 2024-06-21 RX ADMIN — Medication 5 MILLIGRAM(S): at 21:09

## 2024-06-21 RX ADMIN — VANCOMYCIN HYDROCHLORIDE 250 MILLIGRAM(S): KIT at 18:10

## 2024-06-21 RX ADMIN — NICOTINE POLACRILEX 1 PATCH: 2 LOZENGE ORAL at 19:47

## 2024-06-21 RX ADMIN — LIDOCAINE HCL 1 PATCH: 28 GEL TOPICAL at 15:48

## 2024-06-21 RX ADMIN — Medication 800 MILLIGRAM(S): at 05:12

## 2024-06-21 RX ADMIN — HEPARIN SODIUM 5000 UNIT(S): 50 INJECTION, SOLUTION INTRAVENOUS at 15:50

## 2024-06-21 RX ADMIN — BUPROPION HYDROCHLORIDE 300 MILLIGRAM(S): 150 TABLET, EXTENDED RELEASE ORAL at 15:47

## 2024-06-22 LAB
ALBUMIN SERPL ELPH-MCNC: 4.2 G/DL — SIGNIFICANT CHANGE UP (ref 3.3–5)
ALP SERPL-CCNC: 89 U/L — SIGNIFICANT CHANGE UP (ref 40–120)
ALT FLD-CCNC: 19 U/L — SIGNIFICANT CHANGE UP (ref 10–45)
ANION GAP SERPL CALC-SCNC: 15 MMOL/L — SIGNIFICANT CHANGE UP (ref 5–17)
AST SERPL-CCNC: 18 U/L — SIGNIFICANT CHANGE UP (ref 10–40)
BILIRUB SERPL-MCNC: 0.2 MG/DL — SIGNIFICANT CHANGE UP (ref 0.2–1.2)
BUN SERPL-MCNC: 12 MG/DL — SIGNIFICANT CHANGE UP (ref 7–23)
CALCIUM SERPL-MCNC: 9.7 MG/DL — SIGNIFICANT CHANGE UP (ref 8.4–10.5)
CHLORIDE SERPL-SCNC: 97 MMOL/L — SIGNIFICANT CHANGE UP (ref 96–108)
CO2 SERPL-SCNC: 23 MMOL/L — SIGNIFICANT CHANGE UP (ref 22–31)
CREAT SERPL-MCNC: 0.54 MG/DL — SIGNIFICANT CHANGE UP (ref 0.5–1.3)
EGFR: 128 ML/MIN/1.73M2 — SIGNIFICANT CHANGE UP
GLUCOSE SERPL-MCNC: 80 MG/DL — SIGNIFICANT CHANGE UP (ref 70–99)
HCT VFR BLD CALC: 42.7 % — SIGNIFICANT CHANGE UP (ref 39–50)
HGB BLD-MCNC: 14.5 G/DL — SIGNIFICANT CHANGE UP (ref 13–17)
MCHC RBC-ENTMCNC: 29.5 PG — SIGNIFICANT CHANGE UP (ref 27–34)
MCHC RBC-ENTMCNC: 34 GM/DL — SIGNIFICANT CHANGE UP (ref 32–36)
MCV RBC AUTO: 87 FL — SIGNIFICANT CHANGE UP (ref 80–100)
NRBC # BLD: 0 /100 WBCS — SIGNIFICANT CHANGE UP (ref 0–0)
PLATELET # BLD AUTO: 215 K/UL — SIGNIFICANT CHANGE UP (ref 150–400)
POTASSIUM SERPL-MCNC: 4 MMOL/L — SIGNIFICANT CHANGE UP (ref 3.5–5.3)
POTASSIUM SERPL-SCNC: 4 MMOL/L — SIGNIFICANT CHANGE UP (ref 3.5–5.3)
PROT SERPL-MCNC: 7.7 G/DL — SIGNIFICANT CHANGE UP (ref 6–8.3)
RBC # BLD: 4.91 M/UL — SIGNIFICANT CHANGE UP (ref 4.2–5.8)
RBC # FLD: 14.6 % — HIGH (ref 10.3–14.5)
SODIUM SERPL-SCNC: 135 MMOL/L — SIGNIFICANT CHANGE UP (ref 135–145)
WBC # BLD: 10 K/UL — SIGNIFICANT CHANGE UP (ref 3.8–10.5)
WBC # FLD AUTO: 10 K/UL — SIGNIFICANT CHANGE UP (ref 3.8–10.5)

## 2024-06-22 PROCEDURE — 99233 SBSQ HOSP IP/OBS HIGH 50: CPT

## 2024-06-22 RX ORDER — SENNOSIDES 8.6 MG
2 TABLET ORAL AT BEDTIME
Refills: 0 | Status: DISCONTINUED | OUTPATIENT
Start: 2024-06-22 | End: 2024-06-24

## 2024-06-22 RX ORDER — POLYETHYLENE GLYCOL 3350 1 G/G
17 POWDER ORAL EVERY 12 HOURS
Refills: 0 | Status: DISCONTINUED | OUTPATIENT
Start: 2024-06-22 | End: 2024-06-24

## 2024-06-22 RX ADMIN — LIDOCAINE HCL 1 PATCH: 28 GEL TOPICAL at 19:21

## 2024-06-22 RX ADMIN — Medication 800 MILLIGRAM(S): at 13:10

## 2024-06-22 RX ADMIN — NICOTINE POLACRILEX 1 PATCH: 2 LOZENGE ORAL at 07:49

## 2024-06-22 RX ADMIN — POLYETHYLENE GLYCOL 3350 17 GRAM(S): 1 POWDER ORAL at 13:11

## 2024-06-22 RX ADMIN — NICOTINE POLACRILEX 1 PATCH: 2 LOZENGE ORAL at 11:21

## 2024-06-22 RX ADMIN — Medication 5 MILLIGRAM(S): at 18:22

## 2024-06-22 RX ADMIN — Medication 800 MILLIGRAM(S): at 21:53

## 2024-06-22 RX ADMIN — LIDOCAINE HCL 1 PATCH: 28 GEL TOPICAL at 03:48

## 2024-06-22 RX ADMIN — Medication 190 MILLIGRAM(S): at 11:21

## 2024-06-22 RX ADMIN — VANCOMYCIN HYDROCHLORIDE 300 MILLIGRAM(S): KIT at 05:02

## 2024-06-22 RX ADMIN — LIDOCAINE HCL 1 PATCH: 28 GEL TOPICAL at 11:21

## 2024-06-22 RX ADMIN — BUPROPION HYDROCHLORIDE 300 MILLIGRAM(S): 150 TABLET, EXTENDED RELEASE ORAL at 11:21

## 2024-06-22 RX ADMIN — LIDOCAINE HCL 1 PATCH: 28 GEL TOPICAL at 23:21

## 2024-06-22 RX ADMIN — NICOTINE POLACRILEX 1 PATCH: 2 LOZENGE ORAL at 19:21

## 2024-06-22 RX ADMIN — Medication 800 MILLIGRAM(S): at 05:01

## 2024-06-22 RX ADMIN — Medication 100 MILLIGRAM(S): at 00:04

## 2024-06-22 RX ADMIN — Medication 5 MILLIGRAM(S): at 11:21

## 2024-06-22 RX ADMIN — Medication 5 MILLIGRAM(S): at 05:00

## 2024-06-22 RX ADMIN — Medication 2 TABLET(S): at 21:53

## 2024-06-22 RX ADMIN — NICOTINE POLACRILEX 1 PATCH: 2 LOZENGE ORAL at 13:05

## 2024-06-23 PROCEDURE — 99232 SBSQ HOSP IP/OBS MODERATE 35: CPT

## 2024-06-23 RX ORDER — BUPROPION HYDROCHLORIDE 150 MG/1
1 TABLET, EXTENDED RELEASE ORAL
Qty: 7 | Refills: 0
Start: 2024-06-23 | End: 2024-06-29

## 2024-06-23 RX ORDER — NICOTINE POLACRILEX 2 MG/1
1 LOZENGE ORAL
Qty: 30 | Refills: 0
Start: 2024-06-23 | End: 2024-07-22

## 2024-06-23 RX ORDER — MUPIROCIN 20 MG/G
1 OINTMENT TOPICAL
Refills: 0 | DISCHARGE

## 2024-06-23 RX ORDER — SENNOSIDES 8.6 MG
2 TABLET ORAL
Qty: 60 | Refills: 0
Start: 2024-06-23 | End: 2024-07-22

## 2024-06-23 RX ORDER — GABAPENTIN
1 POWDER (GRAM) MISCELLANEOUS
Refills: 0 | DISCHARGE

## 2024-06-23 RX ADMIN — Medication 800 MILLIGRAM(S): at 05:55

## 2024-06-23 RX ADMIN — Medication 800 MILLIGRAM(S): at 13:13

## 2024-06-23 RX ADMIN — BUPROPION HYDROCHLORIDE 300 MILLIGRAM(S): 150 TABLET, EXTENDED RELEASE ORAL at 11:21

## 2024-06-23 RX ADMIN — POLYETHYLENE GLYCOL 3350 17 GRAM(S): 1 POWDER ORAL at 17:19

## 2024-06-23 RX ADMIN — Medication 5 MILLIGRAM(S): at 13:15

## 2024-06-23 RX ADMIN — LIDOCAINE HCL 1 PATCH: 28 GEL TOPICAL at 19:18

## 2024-06-23 RX ADMIN — Medication 5 MILLIGRAM(S): at 21:28

## 2024-06-23 RX ADMIN — Medication 2 TABLET(S): at 21:27

## 2024-06-23 RX ADMIN — Medication 800 MILLIGRAM(S): at 21:27

## 2024-06-23 RX ADMIN — LIDOCAINE HCL 1 PATCH: 28 GEL TOPICAL at 11:21

## 2024-06-23 RX ADMIN — Medication 5 MILLIGRAM(S): at 01:11

## 2024-06-23 RX ADMIN — NICOTINE POLACRILEX 1 PATCH: 2 LOZENGE ORAL at 11:21

## 2024-06-23 RX ADMIN — POLYETHYLENE GLYCOL 3350 17 GRAM(S): 1 POWDER ORAL at 05:54

## 2024-06-23 RX ADMIN — Medication 190 MILLIGRAM(S): at 11:20

## 2024-06-24 ENCOUNTER — TRANSCRIPTION ENCOUNTER (OUTPATIENT)
Age: 44
End: 2024-06-24

## 2024-06-24 VITALS
RESPIRATION RATE: 18 BRPM | SYSTOLIC BLOOD PRESSURE: 101 MMHG | OXYGEN SATURATION: 99 % | DIASTOLIC BLOOD PRESSURE: 58 MMHG | TEMPERATURE: 98 F | HEART RATE: 64 BPM

## 2024-06-24 PROCEDURE — 96375 TX/PRO/DX INJ NEW DRUG ADDON: CPT

## 2024-06-24 PROCEDURE — C9399: CPT

## 2024-06-24 PROCEDURE — 96374 THER/PROPH/DIAG INJ IV PUSH: CPT

## 2024-06-24 PROCEDURE — 99239 HOSP IP/OBS DSCHRG MGMT >30: CPT

## 2024-06-24 PROCEDURE — 85025 COMPLETE CBC W/AUTO DIFF WBC: CPT

## 2024-06-24 PROCEDURE — 72156 MRI NECK SPINE W/O & W/DYE: CPT | Mod: MC

## 2024-06-24 PROCEDURE — 82962 GLUCOSE BLOOD TEST: CPT

## 2024-06-24 PROCEDURE — 36415 COLL VENOUS BLD VENIPUNCTURE: CPT

## 2024-06-24 PROCEDURE — 72158 MRI LUMBAR SPINE W/O & W/DYE: CPT | Mod: MC

## 2024-06-24 PROCEDURE — 80074 ACUTE HEPATITIS PANEL: CPT

## 2024-06-24 PROCEDURE — 85027 COMPLETE CBC AUTOMATED: CPT

## 2024-06-24 PROCEDURE — 83036 HEMOGLOBIN GLYCOSYLATED A1C: CPT

## 2024-06-24 PROCEDURE — 71045 X-RAY EXAM CHEST 1 VIEW: CPT

## 2024-06-24 PROCEDURE — A9585: CPT

## 2024-06-24 PROCEDURE — 80307 DRUG TEST PRSMV CHEM ANLYZR: CPT

## 2024-06-24 PROCEDURE — 70450 CT HEAD/BRAIN W/O DYE: CPT | Mod: MC

## 2024-06-24 PROCEDURE — 72157 MRI CHEST SPINE W/O & W/DYE: CPT | Mod: MC

## 2024-06-24 PROCEDURE — 80053 COMPREHEN METABOLIC PANEL: CPT

## 2024-06-24 PROCEDURE — 99285 EMERGENCY DEPT VISIT HI MDM: CPT

## 2024-06-24 PROCEDURE — 80202 ASSAY OF VANCOMYCIN: CPT

## 2024-06-24 RX ORDER — GABAPENTIN
1 POWDER (GRAM) MISCELLANEOUS
Qty: 0 | Refills: 0 | DISCHARGE
Start: 2024-06-24 | End: 2024-07-01

## 2024-06-24 RX ORDER — GABAPENTIN
1 POWDER (GRAM) MISCELLANEOUS
Qty: 90 | Refills: 0
Start: 2024-06-24 | End: 2024-07-23

## 2024-06-24 RX ADMIN — Medication 190 MILLIGRAM(S): at 08:48

## 2024-06-24 RX ADMIN — Medication 800 MILLIGRAM(S): at 05:15

## 2024-06-24 RX ADMIN — LIDOCAINE HCL 1 PATCH: 28 GEL TOPICAL at 05:17

## 2024-06-24 RX ADMIN — BUPROPION HYDROCHLORIDE 300 MILLIGRAM(S): 150 TABLET, EXTENDED RELEASE ORAL at 08:49

## 2024-06-24 RX ADMIN — NICOTINE POLACRILEX 1 PATCH: 2 LOZENGE ORAL at 05:20

## 2024-06-24 NOTE — CHART NOTE - NSCHARTNOTEFT_GEN_A_CORE
Called by RN, pt requesting to speak to doctor immediately about his medications. Pt seen and examined at bedside. Pt requesting his reported usual dose of Xanax 2mg BID and Oxycodone 30mg QID. ISTOP in chart reviewed, noted last prescription of xanax 2mg x2 tabs given in August 2021 and last prescription of oxy 30mg QID given in June 2021 (by PCP Dr. Erickson Orr). Pt states he has been on this oxy dose since his spine surgery in 2015. However, per pt, he has not been able to refill his meds due to not being able to get an appointment until October and so he had to call EMS. He states he had to "turn to the streets" to get his medications.     Medication reviewed showed Xanax 1mg PO x1 dose and Percocet 5mg q6h PRN for severe pain already ordered. Pt refused the 1mg Xanax because it is not the right dose and refused percocet because 5mg won't touch his pain and he does not want any tylenol. Discussed with pt, will only give Xanax 2mg PO x1 time dose and Oxycodone ER 10mg x1 time dose for tonight. Pt agreed there will be no more opioids given      Anthony Christianson, PGY-3  MAR [id number]    INCOMPLETE Called by RN, pt requesting to speak to doctor immediately about his medications. Pt seen and examined at bedside. Pt requesting his reported usual dose of Xanax 2mg BID and Oxycodone 30mg QID. ISTOP in chart reviewed, noted last prescription of xanax 2mg x2 tabs given in August 2021 and last prescription of oxy 30mg QID given in June 2021 (by PCP Dr. Erickson Orr). Pt states he has been on this oxy dose since his spine surgery in 2015. However, per pt, he has not been able to refill his meds due to not being able to get an appointment until October and so he had to call EMS. He states he had to "turn to the streets" to get his medications.    Medication reviewed showed Xanax 1mg PO x1 dose and Percocet 5mg q6h PRN for severe pain already ordered. Pt refused the 1mg Xanax because it is not the right dose and refused percocet because 5mg won't touch his pain and he does not want any Tylenol. Discussed with pt, will only give Xanax 2mg PO x1 time dose and Oxycodone ER 10mg x1 time dose for tonight. Pt agreed there will be no more opioids given tonight. Primary team to establish pain med regimen and confirm methadone dose in AM.      Anthony Christianson, PGY-3  MAR [id number] Detail Level: Zone Patient Specific Otc Recommendations (Will Not Stick From Patient To Patient): Discussed with patient to add in Allegra, or Zyrtec

## 2024-06-25 LAB — DRUG SCREEN, SERUM: ABNORMAL

## 2024-07-08 NOTE — ED PROVIDER NOTE - CROS ED RESP ALL NEG
07/08/2024  Angelique Calvert MISHEL NOLAN           Confirmed By:Dante Lam DO         Anesthesia Plan  Type of Anesthesia, risks & benefits discussed:    Anesthesia Type: Gen Supraglottic Airway  Intra-op Monitoring Plan: Standard ASA Monitors  Post Op Pain Control Plan: multimodal analgesia  Induction:  IV  Airway Plan: Direct, Post-Induction  Informed Consent: Informed consent signed with the Patient and all parties understand the risks and agree with anesthesia plan.  All questions answered.   ASA Score: 2  Day of Surgery Review of History & Physical: H&P Update referred to the surgeon/provider.I have interviewed and examined the patient. I have reviewed the patient's H&P dated: There are no significant changes.     Ready For Surgery From Anesthesia Perspective.     .       negative...

## 2024-07-15 NOTE — ED PROVIDER NOTE - RESPIRATORY, MLM
Patient requested appointment with provider for 07/18 for coughing and runny nose. I let patient know that before coming into office she must have negative COVID test within 3 days of visit. Patient understood and is going to take COVID test today 07/15. Patient will call back with results and understands that if COVID test is positive her appointment will have to be rescheduled.   Breath sounds clear and equal bilaterally.

## 2024-08-12 NOTE — ED ADULT TRIAGE NOTE - MEANS OF ARRIVAL
[facility]IENT [facility]  GI PROGRESS NOTE      ADMISSION DATE:  8/9/2024  CONSULTING PHYSICIAN:  Marina Walton PA-C  ATTENDING PHYSICIAN:  Bess Hanks*   CODE STATUS:  Full Resuscitation      8/12/2024    CHIEF COMPLAINT:  weakness and N/V x2d    SUBJECTIVE:    I was asked to see Nick Stewart at the request of  to transfer care to Gastroenterology .  Nick Stewart is a 65 year old male patient admitted with Ascending cholangitis (CMD) [K83.09]  Sepsis with acute liver failure without hepatic coma, due to unspecified organism, unspecified whether septic shock present  (CMD) [A41.9, R65.20, K72.00]. Transfer of care to Gastroenterology was requested for c/f cholangitis.    Interval Hx:     Diet advanced without complication. Improvement in abdominal pain, nausea, and vomiting. LFT improvement, continued mild alk phos elevation, however improved from pre-ERCP. Today patient reports feeling significantly improved without GI concerns.     Blood cultures with Escherichia coli Panic, Klebsiella oxytoca, Acinetobacter baumannii complex, and Enterobacter cloacae complex Infectious disease following.     Improved LFT's     HPI: Mr. 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     Pt with hx of biliary stricture and choledocholithiasis with multiple bouts of cholangitis. Last hospitalized for cholangitis 01/25/2024. At that time treated with cefepime, flagyl, and micafungin. Blood cultures positive for klebsiella oxytoca, klebsiella pneumoniae, and pseudomonas aeruginosa. Underwent ERCP 01/29/2024 where pt was found to have choledocholithiasis and acute cholangitis. Stone, pus, and sludge removed w/ balloon sphincteroplasty, balloon sweeps, and 2 plastic biliary stents  deployed. Had planned for repeat ERCP with spyglass and possible lithotripsy 01/29/2024. ERCP 01/29/2024 revealed previously placed CBD stent was seen emerging from the ampulla. The other stent had actually migrated proximally. Repeat balloon sweeps were performed and stents extracted. Multiple stones recovered  and two biliary plastic stents were deployed. Following this pt had short lived rise in fevers, however improved over time with d/c 02/05/2024. Patient then underwent recall ERCP 05/13/2024 for exchange of plastic stent to metal placed.    Tachycardic, intermittently hypotensive and borderline febrile in ED. Labs with T. Bili 6.6, AST 83, , Alk phos 217, procal 74.31, LA 4.8-->7.1, WBC 18.7.  INR 1.5 and plt 89. CT abd/pelvis with increased intra and extrahepatic biliary ductal dilation and focal hyperdensity as cephalad aspect c/f stone vs sludge, colonic diverticulosis without diverticulitis, stable spleen, a few prominent loops of small bowel with air fluid levels, possible ileus. Patient started on cefepime, metronidazole, and vancomycin. Also given 2L IVLR     On presentation patient reports malaise starting two days ago that progressed yesterday to light-headedness, right sided abdominal pain, and nausea with emesis of all PO intake. States he discussed with his sister this AM who urged him to have ED evaluation. Patient endorses that at baseline he has some pain in stent location with strenuous lifting of grocery bags but otherwise has no problems. Baseline regular BM's of 1-3/day that are soft and semiformed. Currently patient reports no abdominal pain since receiving medication in ED. Nausea also improved with anti-emetics. Patient does have difficulty staying focused and frequently divulges into other discussions. Unclear if this is mild encephalopathy vs pain medication side effect vs baseline. Patient oriented and alert x 4 with appropriate understanding of medical care including risks and  benefits of ERCP.     Patient denies hematochezia, melena, abdominal distention, abdominal bloating, acid reflux, diarrhea.     HISTORIES:    I have reviewed the past medical history, family history, social history, medications and allergies listed in the medical record as obtained by my nursing staff and support staff and agree with their documentation.  Allergies, Medications, Medical history, Surgical history, Social history and Family history were reviewed and updated.    REVIEW OF SYSTEMS:    Review of systems performed and negative aside from that mentioned in HPI.       OBJECTIVE:    PROBLEM LIST:    Patient Active Problem List   Diagnosis    Monoclonal gammopathy    Seizure disorder  (CMD)    Liver replaced by transplant, 2007    Unspecified hypothyroidism    Hepatitis C    Osteoporosis    Alcohol abuse, in remission since 2003    Anxiety    Gastritis and duodenitis    Sigmoid diverticulosis    Hemorrhoids    Chronic left shoulder pain    Other insomnia    Hypercalcemia    Social isolation    Primary skin sebaceous gland carcinoma    Genetic testing    Stage 2 chronic kidney disease    Abnormal LFTs    Transaminitis    S/P parathyroidectomy    Shock  (CMD)    Septic shock  (CMD)    Ascending cholangitis (CMD)       MEDICATIONS:    Medications were reviewed.     VITAL SIGNS:    Vital Last Value 24 Hour Range   Temperature 97.7 °F (36.5 °C) (08/12/24 0519) Temp  Min: 97.7 °F (36.5 °C)  Max: 99.1 °F (37.3 °C)   Pulse 64 (08/12/24 0519) Pulse  Min: 64  Max: 74   Respiratory 18 (08/12/24 0519) Resp  Min: 16  Max: 18   Non-Invasive  Blood Pressure 138/77 (08/12/24 0519) BP  Min: 134/74  Max: 154/79   Pulse Oximetry 98 % (08/12/24 0519) SpO2  Min: 98 %  Max: 99 %     Vital Today Admitted   Weight 59 kg (130 lb) (08/12/24 0519) Weight: 57 kg (125 lb 10.6 oz) (08/09/24 0720)   Height N/A Height: 5' 11\" (180.3 cm) (08/09/24 0720)   BMI N/A BMI (Calculated): 17.53 (08/09/24 0720)     INTAKE/OUTPUT:      Intake/Output  Summary (Last 24 hours) at 2024 1017  Last data filed at 2024 0608  Gross per 24 hour   Intake 760 ml   Output 1675 ml   Net -915 ml        PHYSICAL EXAM:    General: The patient is well developed, well nourished, in no acute distress, appears stated age. RN present.   Neurologic: Alert. Normal mood and affect, normal speech, no gross tremor.  Skin: Warm and dry, normal turgor.  Respiratory: Respiratory effort normal.   Cardiovascular: Regular rate and rhythm.  Extremities: No swelling or tenderness.  Gastrointestinal: Soft and nontender. Normoactive bowel sounds.      LABORATORY DATA:    Lab Results   Component Value Date    WBC 5.6 2024    HCT 34.7 (L) 2024    HGB 11.7 (L) 2024    PLT 63 (L) 2024     Lab Results   Component Value Date    GLUCOSE 111 (H) 2024    SODIUM 140 2024    POTASSIUM 3.4 2024    CHLORIDE 110 2024    BUN 15 2024    CREATININE 0.95 2024    CALCIUM 9.0 2024    ALBUMIN 2.3 (L) 2024    TP 8.1 2010    AST 23 2024    ALKPT 143 (H) 2024    GPT 53 2024    ANIONGAP 8 2024    BCRAT 20 2020    GLOB 3.4 2024    AGR 0.7 (L) 2024     INR (no units)   Date Value   2024 1.5     MELD 3.0: 13 at 2024  4:17 AM  MELD-Na: 12 at 2024  4:17 AM  Calculated from:  Serum Creatinine: 1.14 mg/dL at 2024  4:17 AM  Serum Sodium: 142 mmol/L (Using max of 137 mmol/L) at 2024  4:17 AM  Total Bilirubin: 0.8 mg/dL (Using min of 1 mg/dL) at 2024  4:17 AM  Serum Albumin: 2.2 g/dL at 2024  4:17 AM  INR(ratio): 1.5 at 2024  7:07 AM  Age at listin years  Sex: Male at 2024  4:17 AM     Maddrey Score: 19.8 at 2024 10:18 AM      IMAGING STUDIES:    Imaging studies reviewed.  The pertinent positives are the increased intra and extrahepatic biliary ductal dilation, focal hyperdensity at cephalad aspect concerning for stone versus sludge, stent obstruction  should be considered, colonic diverticulosis without diverticulitis, normal-sized spleen, mildly prominent loops of bowel with air-fluid filled    ENDOSCOPY:  ERCP 08/09/2024  Completely occluded common bile duct stent with debris stone and sludge like material.  The previously placed biliary metal stent was successfully removed  Significantly improved biliary stricture.  No further biliary stenting was performed    ERCP 05/13/2024:   Recurrent/refractory common bile duct stricture.  The overall appearance was significantly better than before.  The previously placed biliary plastic stents were removed and a fully covered biliary metal stent was deployed    ERCP 01/29/2024  The previously placed biliary stents were removed  Extensive amount of residual stone, debris and sludge was extracted with multiple balloon sweeps  Two biliary plastic stents were deployed    ERCP 01/25/2024:  Choledocholithiasis and acute cholangitis with extraction of significant amount of stone pus sludge and debris with a combination of balloon sphincteroplasty and balloon sweeps.  Two biliary plastic stents were deployed    ERCP 01/25/2024:   The previously placed biliary stents were removed  Extensive amount of residual stone, debris and sludge was extracted with multiple balloon sweeps  Two biliary plastic stents were deployed    ERCP 2/2023:  Impression:   The previously placed CBD stents were successfully removed  Mild anastomotic biliary stricturing (improved than before) with intrahepatic biliary dilation.  No biliary stenting was performed  Patent biliary sphincterotomy     ERCP 11/2022:  Impression:   Impacted common bile duct stents were removed  Patent biliary sphincterotomy  Extensive choledocholithiasis with extraction of multiple large stones along with significant amount of debris with balloon sweeps  Two common bile duct stents were deployed     ERCP/EUS 5/2022:  Impression:   The previously placed common bile duct stent was  removed  Smooth stricturing of the intrapancreatic portion of the common bile duct with upstream biliary dilation  Dense choledocholithiasis with large amount of stones and debris adherent to the entire intrapancreatic portion of the common bile duct.  Cholangioscopy did not reveal any associated mass or ulcerated lesions to the entire length of the common bile duct.  There were diffuse reactive like changes.  Biopsies were obtained  Wire guided balloon dilation of the biliary stricture was performed  To biliary plastic stents were deployed across the biliary stricture     ERCP 2/2022:  Impression:   Tight stricture at the possible site of biliary anastomosis noted at the level of the distal common bile duct/proximal common bile duct.  The distal common bile duct was normal.  The biliary stricture was sampled and a temporary common bile duct stent was deployed     EGD 4/2017:  IMPRESSION:  1. Erythema of the gastric antrum and duodenal bulb suggestive of gastritis and duodenitis respectively. Biopsies obtained for further evaluation.    Colonoscopy 04/03/2024:   Moderate internal hemorrhoids  Mild left-sided diverticulosis  Otherwise normal colonoscopy up to the terminal ileum  Random colon biopsies obtained       Colonoscopy 4/2017:  Impression:  1. Screening colonoscopy without polyps. Exam notable for a small area of colitis immediately upon entering the rectum area cold biopsies were obtained.  2. Diverticulosis of the sigmoid colon  3. Hemorrhoids    ASSESSMENT:       Mr. 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     Septic shock 2/2 cholangitis  Hx biliary stricture w/ recurrent cholangitis   Patient presented to ED with nausea, vomiting, abdominal pain, and fever progressing over 2d.   History:    -- Hx Biliary stricture requiring stenting in the past, Hx of cholangitis   -- LFT elevation following OLT first noted on 02/02/2022 routine labs done by transplant hepatology   -- 02/15/2022 ERCP first noted tight stricture at the site of biliary anastomosis at level of distal CBD/common proximal common bile duct, at this time stricture sampled and temporary CBD stent placed   -- 05/23/2022 and 11/18/2022 recall ERCPs with removal of prior stents and placement of new plastic stents  - 02/7/2023 CBD stents removed and mild anastomotic stricturing with intrahepatic biliary dilation noted, no stenting performed  -Patient then relatively stable from GI perspective from 02/7/2023 until 01/25/2024 when he represented with nausea and vomiting found to be 2/2 acute cholangitis  --01/25/2024 patient presented to ED with sepsis.  Cultures with Klebsiella oxytoca, pneumoniae and pseudomonas  -- 01/25/2024 ERCP revealed significant debris and stone in ducts and 2 plastic stents were deployed  -- 01/29/2024 repeat ERCP with one stent emerging from ampulla and one proximally migrated, both stents removed and two new plastic stents placed stacked across distal common bile duct and ampulla    -- 5/13/2024 ERCP with overall improved appearance, plastic stents removed and metal stents placed  Current Presentation  Arrival via EMS 08/09/2024 w/ N/V and abdominal pain   -- Tachycardic to 109, temperature 100.9, other VSS   --  T. Bili 6.6, AST 83, , Alk phos 217, procal 74.31, WBC 18.7   -- LA 4.8-->7.1  -- CT abd/pelvis with increased intra and extrahepatic biliary ductal dilation and focal hyperdensity as cephalad aspect c/f stone vs sludge, colonic diverticulosis without diverticulitis, stable spleen, a few prominent loops of small bowel with air fluid levels, possible ileus  -- S/p volume resuscitation w/ 2L IVLR  -- On cefepime, metronidazole, and vancomycin initiaily   - Transitioned to zosyn upon admission to ICU   --  Emergent ERCP 08/09/2024: Completely occluded common bile duct stent with debris stone and sludge like material.  The previously placed biliary metal stent was successfully removed, significantly improved biliary stricture.  No further biliary stenting was performed  -- Hemodynamically stable following ERCP   -- Post op WBC and liver tests much improved, significant symptomatic improvement, and tolerating PO intake    EtOH/HCV Cirrhosis s/p OLT 07/16/2007   Patient follows with hepatology, was last seen 03/01/2024; good allograft function per transplant note  -- HCV trx prior to OLT w/ SVR achieved   -- HCV RNA 2017 not detected   -- EToH abstinence for >20yr  -- On PTA ursodiol, prograf monotherapy, remeron, and zofran     JENY - improving   -- Likely prerenal in setting of n/v with poor PO intake  -- Cr 1.9-->1.49  -- Bsl ~1  -- S/p 2L IV LR     Hx diverticulitis   -- CT A/P w/o contrast on prior admission 1/25/2024 noting suspected ascending colonic diverticulitis and/or colitis with mild pericolonic edema and fat stranding    -- Treated with abx per ID guidance with resolution in diarrhea  -- Colonoscopy 04/03/2024 with Moderate internal hemorrhoids, mild left-sided diverticulosis, and otherwise normal colonoscopy  -- CT A/P 08/09/2024 without signs of diverticulitis      ANTIPLATELET / ANTICOAGULATION:  MEDICATION:  None    PLAN:    Antibiotics per primary   CTM CMP for continued improvement   CTM I/O's   Will facilitate outpt GI f/u     GI will sign out. Thank you for your consult. Please reach out with further questions.      [facility] is available for secure chat until 5:00 pm, after that time please contact physician on call, thank you.     Assessment and plan discussed with Dr. Acosta who agrees with plan.    Marina Walton PA-C   [facility]  [phone]         ambulatory

## 2024-08-19 ENCOUNTER — INPATIENT (INPATIENT)
Facility: HOSPITAL | Age: 44
LOS: 4 days | Discharge: HOME | DRG: 552 | End: 2024-08-24
Attending: STUDENT IN AN ORGANIZED HEALTH CARE EDUCATION/TRAINING PROGRAM | Admitting: STUDENT IN AN ORGANIZED HEALTH CARE EDUCATION/TRAINING PROGRAM
Payer: MEDICAID

## 2024-08-19 VITALS
HEART RATE: 62 BPM | SYSTOLIC BLOOD PRESSURE: 106 MMHG | HEIGHT: 68 IN | RESPIRATION RATE: 18 BRPM | OXYGEN SATURATION: 96 % | WEIGHT: 229.94 LBS | DIASTOLIC BLOOD PRESSURE: 69 MMHG | TEMPERATURE: 98 F

## 2024-08-19 DIAGNOSIS — Z98.890 OTHER SPECIFIED POSTPROCEDURAL STATES: Chronic | ICD-10-CM

## 2024-08-19 PROCEDURE — 99285 EMERGENCY DEPT VISIT HI MDM: CPT

## 2024-08-20 DIAGNOSIS — F11.20 OPIOID DEPENDENCE, UNCOMPLICATED: ICD-10-CM

## 2024-08-20 DIAGNOSIS — M54.9 DORSALGIA, UNSPECIFIED: ICD-10-CM

## 2024-08-20 DIAGNOSIS — F19.10 OTHER PSYCHOACTIVE SUBSTANCE ABUSE, UNCOMPLICATED: ICD-10-CM

## 2024-08-20 LAB
ALBUMIN SERPL ELPH-MCNC: 4.2 G/DL — SIGNIFICANT CHANGE UP (ref 3.3–5)
ALP SERPL-CCNC: 91 U/L — SIGNIFICANT CHANGE UP (ref 40–120)
ALT FLD-CCNC: 23 U/L — SIGNIFICANT CHANGE UP (ref 10–45)
AMPHET UR-MCNC: NEGATIVE — SIGNIFICANT CHANGE UP
ANION GAP SERPL CALC-SCNC: 16 MMOL/L — SIGNIFICANT CHANGE UP (ref 5–17)
APPEARANCE UR: CLEAR — SIGNIFICANT CHANGE UP
AST SERPL-CCNC: 41 U/L — HIGH (ref 10–40)
BACTERIA # UR AUTO: NEGATIVE /HPF — SIGNIFICANT CHANGE UP
BARBITURATES UR SCN-MCNC: NEGATIVE — SIGNIFICANT CHANGE UP
BASOPHILS # BLD AUTO: 0.04 K/UL — SIGNIFICANT CHANGE UP (ref 0–0.2)
BASOPHILS NFR BLD AUTO: 0.6 % — SIGNIFICANT CHANGE UP (ref 0–2)
BENZODIAZ UR-MCNC: POSITIVE
BILIRUB SERPL-MCNC: 0.7 MG/DL — SIGNIFICANT CHANGE UP (ref 0.2–1.2)
BILIRUB UR-MCNC: ABNORMAL
BUN SERPL-MCNC: 17 MG/DL — SIGNIFICANT CHANGE UP (ref 7–23)
CALCIUM SERPL-MCNC: 9.1 MG/DL — SIGNIFICANT CHANGE UP (ref 8.4–10.5)
CAST: 1 /LPF — SIGNIFICANT CHANGE UP (ref 0–4)
CHLORIDE SERPL-SCNC: 99 MMOL/L — SIGNIFICANT CHANGE UP (ref 96–108)
CO2 SERPL-SCNC: 20 MMOL/L — LOW (ref 22–31)
COCAINE METAB.OTHER UR-MCNC: POSITIVE
COLOR SPEC: SIGNIFICANT CHANGE UP
CREAT SERPL-MCNC: 0.66 MG/DL — SIGNIFICANT CHANGE UP (ref 0.5–1.3)
CRP SERPL-MCNC: 5 MG/L — HIGH (ref 0–4)
DIFF PNL FLD: NEGATIVE — SIGNIFICANT CHANGE UP
EGFR: 120 ML/MIN/1.73M2 — SIGNIFICANT CHANGE UP
EOSINOPHIL # BLD AUTO: 0.21 K/UL — SIGNIFICANT CHANGE UP (ref 0–0.5)
EOSINOPHIL NFR BLD AUTO: 3.3 % — SIGNIFICANT CHANGE UP (ref 0–6)
ERYTHROCYTE [SEDIMENTATION RATE] IN BLOOD: <2 MM/HR — LOW (ref 0–15)
GLUCOSE SERPL-MCNC: 71 MG/DL — SIGNIFICANT CHANGE UP (ref 70–99)
GLUCOSE UR QL: NEGATIVE MG/DL — SIGNIFICANT CHANGE UP
HCT VFR BLD CALC: 45 % — SIGNIFICANT CHANGE UP (ref 39–50)
HGB BLD-MCNC: 15.1 G/DL — SIGNIFICANT CHANGE UP (ref 13–17)
IMM GRANULOCYTES NFR BLD AUTO: 0.3 % — SIGNIFICANT CHANGE UP (ref 0–0.9)
KETONES UR-MCNC: NEGATIVE MG/DL — SIGNIFICANT CHANGE UP
LEUKOCYTE ESTERASE UR-ACNC: NEGATIVE — SIGNIFICANT CHANGE UP
LYMPHOCYTES # BLD AUTO: 1.71 K/UL — SIGNIFICANT CHANGE UP (ref 1–3.3)
LYMPHOCYTES # BLD AUTO: 27.2 % — SIGNIFICANT CHANGE UP (ref 13–44)
MCHC RBC-ENTMCNC: 31.7 PG — SIGNIFICANT CHANGE UP (ref 27–34)
MCHC RBC-ENTMCNC: 33.6 GM/DL — SIGNIFICANT CHANGE UP (ref 32–36)
MCV RBC AUTO: 94.3 FL — SIGNIFICANT CHANGE UP (ref 80–100)
METHADONE UR-MCNC: POSITIVE
MONOCYTES # BLD AUTO: 0.62 K/UL — SIGNIFICANT CHANGE UP (ref 0–0.9)
MONOCYTES NFR BLD AUTO: 9.9 % — SIGNIFICANT CHANGE UP (ref 2–14)
NEUTROPHILS # BLD AUTO: 3.68 K/UL — SIGNIFICANT CHANGE UP (ref 1.8–7.4)
NEUTROPHILS NFR BLD AUTO: 58.7 % — SIGNIFICANT CHANGE UP (ref 43–77)
NITRITE UR-MCNC: NEGATIVE — SIGNIFICANT CHANGE UP
NRBC # BLD: 0 /100 WBCS — SIGNIFICANT CHANGE UP (ref 0–0)
OPIATES UR-MCNC: POSITIVE
OXYCODONE UR-MCNC: NEGATIVE — SIGNIFICANT CHANGE UP
PCP SPEC-MCNC: SIGNIFICANT CHANGE UP
PCP UR-MCNC: NEGATIVE — SIGNIFICANT CHANGE UP
PH UR: 6 — SIGNIFICANT CHANGE UP (ref 5–8)
PLATELET # BLD AUTO: 186 K/UL — SIGNIFICANT CHANGE UP (ref 150–400)
POTASSIUM SERPL-MCNC: 4.2 MMOL/L — SIGNIFICANT CHANGE UP (ref 3.5–5.3)
POTASSIUM SERPL-SCNC: 4.2 MMOL/L — SIGNIFICANT CHANGE UP (ref 3.5–5.3)
PROT SERPL-MCNC: 7.6 G/DL — SIGNIFICANT CHANGE UP (ref 6–8.3)
PROT UR-MCNC: NEGATIVE MG/DL — SIGNIFICANT CHANGE UP
RBC # BLD: 4.77 M/UL — SIGNIFICANT CHANGE UP (ref 4.2–5.8)
RBC # FLD: 13.5 % — SIGNIFICANT CHANGE UP (ref 10.3–14.5)
RBC CASTS # UR COMP ASSIST: 1 /HPF — SIGNIFICANT CHANGE UP (ref 0–4)
SODIUM SERPL-SCNC: 135 MMOL/L — SIGNIFICANT CHANGE UP (ref 135–145)
SP GR SPEC: 1.03 — SIGNIFICANT CHANGE UP (ref 1–1.03)
SQUAMOUS # UR AUTO: 2 /HPF — SIGNIFICANT CHANGE UP (ref 0–5)
THC UR QL: POSITIVE
UROBILINOGEN FLD QL: 1 MG/DL — SIGNIFICANT CHANGE UP (ref 0.2–1)
WBC # BLD: 6.28 K/UL — SIGNIFICANT CHANGE UP (ref 3.8–10.5)
WBC # FLD AUTO: 6.28 K/UL — SIGNIFICANT CHANGE UP (ref 3.8–10.5)
WBC UR QL: 2 /HPF — SIGNIFICANT CHANGE UP (ref 0–5)

## 2024-08-20 PROCEDURE — 99223 1ST HOSP IP/OBS HIGH 75: CPT

## 2024-08-20 PROCEDURE — 71045 X-RAY EXAM CHEST 1 VIEW: CPT | Mod: 26

## 2024-08-20 PROCEDURE — 72082 X-RAY EXAM ENTIRE SPI 2/3 VW: CPT | Mod: 26

## 2024-08-20 RX ORDER — IBUPROFEN 600 MG
400 TABLET ORAL ONCE
Refills: 0 | Status: COMPLETED | OUTPATIENT
Start: 2024-08-20 | End: 2024-08-20

## 2024-08-20 RX ORDER — PANTOPRAZOLE SODIUM 40 MG
40 TABLET, DELAYED RELEASE (ENTERIC COATED) ORAL
Refills: 0 | Status: DISCONTINUED | OUTPATIENT
Start: 2024-08-20 | End: 2024-08-24

## 2024-08-20 RX ORDER — METHADONE HYDROCHLORIDE 1 G/G
190 POWDER ORAL DAILY
Refills: 0 | Status: DISCONTINUED | OUTPATIENT
Start: 2024-08-20 | End: 2024-08-24

## 2024-08-20 RX ORDER — ONDANSETRON 2 MG/ML
4 INJECTION, SOLUTION INTRAMUSCULAR; INTRAVENOUS EVERY 8 HOURS
Refills: 0 | Status: DISCONTINUED | OUTPATIENT
Start: 2024-08-20 | End: 2024-08-24

## 2024-08-20 RX ORDER — ACETAMINOPHEN 325 MG/1
975 TABLET ORAL ONCE
Refills: 0 | Status: COMPLETED | OUTPATIENT
Start: 2024-08-20 | End: 2024-08-20

## 2024-08-20 RX ORDER — GABAPENTIN 100 MG
800 CAPSULE ORAL THREE TIMES A DAY
Refills: 0 | Status: DISCONTINUED | OUTPATIENT
Start: 2024-08-20 | End: 2024-08-24

## 2024-08-20 RX ORDER — SODIUM CHLORIDE 9 MG/ML
500 INJECTION INTRAMUSCULAR; INTRAVENOUS; SUBCUTANEOUS ONCE
Refills: 0 | Status: COMPLETED | OUTPATIENT
Start: 2024-08-20 | End: 2024-08-20

## 2024-08-20 RX ORDER — CYCLOBENZAPRINE HCL 10 MG
5 TABLET ORAL EVERY 8 HOURS
Refills: 0 | Status: DISCONTINUED | OUTPATIENT
Start: 2024-08-20 | End: 2024-08-21

## 2024-08-20 RX ORDER — ACETAMINOPHEN 325 MG/1
1000 TABLET ORAL EVERY 8 HOURS
Refills: 0 | Status: DISCONTINUED | OUTPATIENT
Start: 2024-08-20 | End: 2024-08-24

## 2024-08-20 RX ORDER — SODIUM CHLORIDE 9 MG/ML
1000 INJECTION INTRAMUSCULAR; INTRAVENOUS; SUBCUTANEOUS ONCE
Refills: 0 | Status: COMPLETED | OUTPATIENT
Start: 2024-08-20 | End: 2024-08-20

## 2024-08-20 RX ORDER — LIDOCAINE/BENZALKONIUM/ALCOHOL
1 SOLUTION, NON-ORAL TOPICAL DAILY
Refills: 0 | Status: DISCONTINUED | OUTPATIENT
Start: 2024-08-20 | End: 2024-08-24

## 2024-08-20 RX ORDER — METHADONE HYDROCHLORIDE 1 G/G
190 POWDER ORAL DAILY
Refills: 0 | Status: DISCONTINUED | OUTPATIENT
Start: 2024-08-20 | End: 2024-08-20

## 2024-08-20 RX ORDER — LIDOCAINE/BENZALKONIUM/ALCOHOL
1 SOLUTION, NON-ORAL TOPICAL ONCE
Refills: 0 | Status: COMPLETED | OUTPATIENT
Start: 2024-08-20 | End: 2024-08-20

## 2024-08-20 RX ORDER — BUPROPION HYDROCHLORIDE 150 MG/1
300 TABLET ORAL DAILY
Refills: 0 | Status: DISCONTINUED | OUTPATIENT
Start: 2024-08-20 | End: 2024-08-21

## 2024-08-20 RX ADMIN — Medication 400 MILLIGRAM(S): at 03:03

## 2024-08-20 RX ADMIN — ACETAMINOPHEN 1000 MILLIGRAM(S): 325 TABLET ORAL at 15:46

## 2024-08-20 RX ADMIN — Medication 1 PATCH: at 20:33

## 2024-08-20 RX ADMIN — Medication 800 MILLIGRAM(S): at 22:18

## 2024-08-20 RX ADMIN — ACETAMINOPHEN 1000 MILLIGRAM(S): 325 TABLET ORAL at 16:16

## 2024-08-20 RX ADMIN — SODIUM CHLORIDE 1000 MILLILITER(S): 9 INJECTION INTRAMUSCULAR; INTRAVENOUS; SUBCUTANEOUS at 04:48

## 2024-08-20 RX ADMIN — ACETAMINOPHEN 975 MILLIGRAM(S): 325 TABLET ORAL at 02:33

## 2024-08-20 RX ADMIN — Medication 5 MILLIGRAM(S): at 15:46

## 2024-08-20 RX ADMIN — ACETAMINOPHEN 1000 MILLIGRAM(S): 325 TABLET ORAL at 22:18

## 2024-08-20 RX ADMIN — ACETAMINOPHEN 975 MILLIGRAM(S): 325 TABLET ORAL at 03:03

## 2024-08-20 RX ADMIN — METHADONE HYDROCHLORIDE 190 MILLIGRAM(S): 1 POWDER ORAL at 20:34

## 2024-08-20 RX ADMIN — SODIUM CHLORIDE 1000 MILLILITER(S): 9 INJECTION INTRAMUSCULAR; INTRAVENOUS; SUBCUTANEOUS at 08:14

## 2024-08-20 RX ADMIN — Medication 400 MILLIGRAM(S): at 02:33

## 2024-08-20 RX ADMIN — ACETAMINOPHEN 1000 MILLIGRAM(S): 325 TABLET ORAL at 22:48

## 2024-08-20 RX ADMIN — SODIUM CHLORIDE 1000 MILLILITER(S): 9 INJECTION INTRAMUSCULAR; INTRAVENOUS; SUBCUTANEOUS at 20:44

## 2024-08-20 NOTE — ED PROVIDER NOTE - OBJECTIVE STATEMENT
Patient is a 45-year-old male past medical history chronic thoracic osteomyelitis, IV drug use presenting for back pain.  Patient states he has had back pain to both his upper and lower back for the past 2 weeks, without injury.  Says that is predominantly located on the left side of his back, not worse with movement, nonradiating, without trauma at onset.  Not associated with any fevers, chills, nausea, vomiting, cough, sore throat, rashes.  No changes to urine or stool, no lower extremity numbness or weakness.  No saddle anesthesia.  Endorses IV drug use but says last usage was approximately 2 months ago.

## 2024-08-20 NOTE — ED ADULT NURSE NOTE - ISOLATION TYPE:
Prescription refill request on:     Disp Refills Start End    phentermine 37.5 MG capsule 30 capsule 3 5/6/2021     Sig - Route: Take 37.5 mg by mouth every morning. Indications: Finished taking on Monday, June 7th.  - Oral    Class: Historical Med        LOV: 5-6-2021  NOV: 8-    Refill request denied.     None

## 2024-08-20 NOTE — ED ADULT NURSE NOTE - OBJECTIVE STATEMENT
42YM A&OX4 Ambulatory PMHX of osteomyelitis (gabapentin), depression (Wellbutrin) presents to the ED c/o worsening back pain for two weeks. pt denies recent falls, traumas, f/c/n/v/d, CP, SOB, urinary symptoms, numbness or tingling 44YM A&OX4 Ambulatory PMHX of IV drug abuse (methadone), osteomyelitis of lumbar spine (gabapentin), depression (Wellbutrin), T2DM (Ozempic), HTN presents to the ED c/o worsening back pain for two weeks. pt denies recent falls, traumas, f/c/n/v/d, CP, SOB, urinary symptoms, numbness or tingling 44YM A&OX4 Ambulatory PMHX of IV drug abuse (methadone, last IV drug use was 2 months ago), osteomyelitis (gabapentin), depression (Wellbutrin), T2DM (Ozempic), HTN presents to the ED c/o worsening back pain for two weeks. pt denies recent falls, traumas, f/c/n/v/d, CP, SOB, urinary symptoms, numbness or tingling 44YM A&OX4 Ambulatory PMHX of IV drug abuse (methadone, last IV drug use was 2 months ago), osteomyelitis of lumbar region (gabapentin), thoracic spinal fusion, depression (Wellbutrin), T2DM (Ozempic), HTN presents to the ED c/o worsening back pain for two weeks. pt denies recent falls, traumas, f/c/n/v/d, CP, SOB, urinary symptoms, numbness or tingling

## 2024-08-20 NOTE — H&P ADULT - PROBLEM SELECTOR PLAN 1
- history of thoracic osteomyelitis  - Obtain MRI cervical, thoracic, and lumbar spine to r/o recurrent osteomyelitis  - As patient is hemodynamically stable will hold antibiotic therapy at this time as he may require bone biopsy if MRI positive  - Multimodal pain regimen, pain management consulted  - PT virgenal

## 2024-08-20 NOTE — H&P ADULT - PROBLEM SELECTOR PLAN 2
- Discussed with outpatient [facility]. Confirmed patient last visit on 8/19 and receive 190 mg.   - Resume outpatient methadone dose 190 mg qd

## 2024-08-20 NOTE — ED ADULT NURSE REASSESSMENT NOTE - NS ED NURSE REASSESS COMMENT FT1
ACP MARCIO Truong came down to see patient regarding Methadone request hypotension and bradycardia. Holding off on Methadone and pain medications at this time, pending improvement of VS.

## 2024-08-20 NOTE — H&P ADULT - HISTORY OF PRESENT ILLNESS
Blas Sheets is a 46 y/o M with PMH significant for chronic thoracic osteomyelitis, IVDA on methadone, and polysubstance abuse presenting with back pain. Patient stated pain has been ongoing x 2 months and has not improved since last hospitalization despite taking gabapentin and flexeril and applying lidocaine patch. Pain is located in the mid thoracic and b/l lateral lumbar spine. He denies fevers but admits to chills and night sweats. Denies any new FND or loss of BB function. Admits to associated nausea. Spoke with [facility] and confirmed he is an active patient in the methadone clinic, last dose 190 mg on 8/19. Patient admits to occasional Xanax use to help wit back pain but denies any additional drug or alcohol use.

## 2024-08-20 NOTE — ED PROVIDER NOTE - CLINICAL SUMMARY MEDICAL DECISION MAKING FREE TEXT BOX
Patient is a 45-year-old male past medical history chronic thoracic osteomyelitis, IV drug use presenting for back pain. With initial vital signs borderline bradycardic otherwise within normal and afebrile.  Presenting with back pain with tenderness to left paraspinal thoracic and lumbar spine without midline spinal tenderness and without infectious changes to the skin overlying the spine.  No fevers.  Differential includes but not limited to muscular pain, lower likelihood of acute osteomyelitis, possibly with chronic pain from chronic osteomyelitis.  No findings suggestive of spinal cord compression.  To evaluate labs, x-ray, give meds and reassess.

## 2024-08-20 NOTE — PATIENT PROFILE ADULT - FALL HARM RISK - HARM RISK INTERVENTIONS

## 2024-08-20 NOTE — H&P ADULT - NSHPREVIEWOFSYSTEMS_GEN_ALL_CORE
Review of Systems:   CONSTITUTIONAL: No fever, weight loss, + chills  EYES: No eye pain, visual disturbances, or discharge  ENMT:  No difficulty hearing, tinnitus, vertigo; No sinus or throat pain  RESPIRATORY: No SOB. No cough, wheezing, chills or hemoptysis  CARDIOVASCULAR: No chest pain, palpitations, dizziness, or leg swelling  GASTROINTESTINAL: No abdominal or epigastric pain. + nausea, no vomiting, or hematemesis; No diarrhea or constipation. No melena or hematochezia.  GENITOURINARY: No dysuria, frequency, hematuria, or incontinence  NEUROLOGICAL: No headaches, memory loss, loss of strength, numbness, or tremors  SKIN: No itching, burning, rashes, or lesions   LYMPH NODES: No enlarged glands  ENDOCRINE: No heat or cold intolerance; No hair loss  MUSCULOSKELETAL: No joint pain or swelling; No muscle, back pain  PSYCHIATRIC: No depression, anxiety, mood swings, or difficulty sleeping  HEME/LYMPH: No easy bruising, or bleeding gums

## 2024-08-20 NOTE — ED ADULT NURSE REASSESSMENT NOTE - NS ED NURSE REASSESS COMMENT FT1
Received at 0710 sleeping, easily arousable. Breathing easy and non labored. Denies back pain at this time. BP 98/62, HR 53. Denies dizziness, headache, chest pain or sob. Will closely monitor. Received at 0710 sleeping, easily arousable. Breathing easy and non labored. Denies back pain at this time. BP 98/62, HR 53. Denies dizziness, headache, chest pain or sob.  MAUREEN Truong made aware. NS 1liter bolus given as ordered. Will closely monitor. Received at 0710 sleeping, easily arousable. Breathing easy and non labored. Denies back pain at this time. BP 98/62, HR 53. Denies dizziness, headache, chest pain or sob.  MAUREEN Truong made aware.  ml bolus given as ordered. Will closely monitor.

## 2024-08-20 NOTE — ED PROVIDER NOTE - PROGRESS NOTE DETAILS
Bear Bush MD PGY3: tried to call patients outpatient clinic that provides methadone, [facility] at [phone]. no  or answering service available to  at this time. Bear Bush MD PGY3: labs without elev inflam markers or other findings suggestive of infectious etiology. xr nonactionable and stable from prior. dw pt. continued pain, requesting methadone, will reach out to methadone clinic at 530 as pt says it opens then. also reviewed chart, will call cain [phone] which is who prev attg on last admission reached for confirmation. Bear Bush MD PGY3: Reached out to patient's methadone clinic and there, again no answer on either hand, no  available on either end.  Discussed with patient discharge for follow-up immediately in methadone clinic for scheduled methadone versus need for admission for pain control with likely waiting for medication verification and methadone dosing.  Patient prefers admission for pain control given current pain and difficulty with ambulation secondary to his pain.

## 2024-08-20 NOTE — ED PROVIDER NOTE - ATTENDING CONTRIBUTION TO CARE
44-year-old male with a history of polysubstance misuse in the past complicated by osteomyelitis of the spine, prior spinal hardware secondary to spinal fusions here for "knuckle like pain" in the lower back ongoing for 2 weeks waxing and waning but now a little bit more persistent.  Denies trauma, black/spine manipulation, spinal injections.  States he has been feeling a little bit warm and sweaty however no documented fevers.  States he is not incontinent of urine or stool but is having a little bit more difficulty starting his urinary stream.  Denies pain with urination.  Still able to ambulate without assistive device.    Hemodynamically stable no acute distress.  No evidence of jaundice.  No evidence of clinical anemia.  No increased work of breathing.  Clear lungs.  Regular rate and rhythm.  Positive mild pan paraspinal mild tenderness palpation without any midline spine tenderness palpation or step-offs.  No CVA tenderness palpation.  Benign abdomen no peritoneal signs.  Steady unassisted gait.  Neurovascular intact with 2+ distal pulses.  No clonus.    Eval for back hardware malpositioning, see if there is secondary evidence of osteomyelitis or discitis.  Do not suspect acute fracture spine.  Do not suspect acute cord pathology at this time.  Plan for x-rays, blood work inflammatory markers analgesia reassess.

## 2024-08-20 NOTE — H&P ADULT - NSHPPHYSICALEXAM_GEN_ALL_CORE
Vital Signs Last 24 Hrs  T(C): 36.3 (20 Aug 2024 07:43), Max: 36.8 (19 Aug 2024 21:00)  T(F): 97.4 (20 Aug 2024 07:43), Max: 98.2 (19 Aug 2024 21:00)  HR: 49 (20 Aug 2024 13:00) (44 - 62)  BP: 87/46 (20 Aug 2024 13:00) (87/46 - 106/69)  BP(mean): 59 (20 Aug 2024 13:00) (59 - 87)  RR: 12 (20 Aug 2024 13:00) (12 - 18)  SpO2: 100% (20 Aug 2024 13:00) (96% - 100%)    Parameters below as of 20 Aug 2024 13:00  Patient On (Oxygen Delivery Method): room air        PHYSICAL EXAM:  GENERAL:  Well appearing, in NAD  HEAD:  NCAT, poor dentition  EYES: conjunctiva clear  NECK: Supple, No JVD  CHEST/LUNG: CTA B/L. No w/r/r.  HEART: Reg rate. Normal S1, S2. No m/r/g.   ABDOMEN: SNTND  EXTREMITIES:  2+ Peripheral Pulses, No clubbing, cyanosis, edema.  PSYCH: AAOx3, appropriate affect  NEUROLOGY: grossly non-focal  MSK: TTP over R thoracic paraspinal musculature and spinous processes TTP over b/l lumbar paraspinal musculature.   SKIN: No rashes or lesions

## 2024-08-20 NOTE — ED PROVIDER NOTE - PHYSICAL EXAMINATION
CONSTITUTIONAL: awake; in no acute distress.   SKIN: healed incision to midline spine without erythema, edema, dehiscence, drainage, or warmth  HEAD: Normocephalic; atraumatic.  EYES: no conjunctival injection. no scleral icterus  ENT: No nasal discharge; airway clear.  CARD: S1, S2 normal; no murmurs, gallops, or rubs. Regular rate and rhythm.   RESP: No wheezes, rales or rhonchi. Good air movement bilaterally.   ABD: soft ntnd, no guarding, no distention, no rigidity.   EXT: No cyanosis or edema. good strength and sensation throughout ble. midline spine without tenderness. L parathoracic and lumbar spine with mild tenderness.  NEURO: Alert, oriented, grossly unremarkable  PSYCH: Cooperative, appropriate.

## 2024-08-20 NOTE — H&P ADULT - NSHPLABSRESULTS_GEN_ALL_CORE
LABS:                         15.1   6.28  )-----------( 186      ( 20 Aug 2024 02:58 )             45.0     08-20    135  |  99  |  17  ----------------------------<  71  4.2   |  20<L>  |  0.66    Ca    9.1      20 Aug 2024 02:58    TPro  7.6  /  Alb  4.2  /  TBili  0.7  /  DBili  x   /  AST  41<H>  /  ALT  23  /  AlkPhos  91  08-20      Urinalysis Basic - ( 20 Aug 2024 06:42 )    Color: Dark Yellow / Appearance: Clear / S.028 / pH: x  Gluc: x / Ketone: Negative mg/dL  / Bili: Small / Urobili: 1.0 mg/dL   Blood: x / Protein: Negative mg/dL / Nitrite: Negative   Leuk Esterase: Negative / RBC: 1 /HPF / WBC 2 /HPF   Sq Epi: x / Non Sq Epi: 2 /HPF / Bacteria: Negative /HPF              Records reviewed from prior hospitalization.  Labs reviewed remarkable for   EKG personally reviewed   CXR personally reviewed

## 2024-08-20 NOTE — ED ADULT NURSE REASSESSMENT NOTE - NS ED NURSE REASSESS COMMENT FT1
Received report from MONIK Bernal. Patient a/ox4, breathing spontaneously. Patient denies nausea, vomiting, chest pain and dyspnea. Safety and comfort provided.

## 2024-08-20 NOTE — H&P ADULT - ASSESSMENT
Blas Fraserga is a 44 y/o M with PMH significant for chronic thoracic osteomyelitis, IVDA on methadone, and polysubstance abuse presenting with back pain x 2 months. Given symptoms and examination will obtain MRI pan-spine to r/o osteomyelitis. Pain management consulted.

## 2024-08-20 NOTE — H&P ADULT - PROBLEM SELECTOR PLAN 3
- UDS positive for benzodiazepine, THC, cocaine, opiates, and methadone. Patient admits only to Xanax use.   -  on cessation    - Reviewing, and interpreting labs and testing.  - Independently obtaining a review of systems and performing a physical exam  - Counselling and educating patient/family regarding interpretation of aforementioned items    Time Spent= 78 minutes

## 2024-08-21 LAB
ANION GAP SERPL CALC-SCNC: 13 MMOL/L — SIGNIFICANT CHANGE UP (ref 5–17)
BUN SERPL-MCNC: 11 MG/DL — SIGNIFICANT CHANGE UP (ref 7–23)
CALCIUM SERPL-MCNC: 9.1 MG/DL — SIGNIFICANT CHANGE UP (ref 8.4–10.5)
CHLORIDE SERPL-SCNC: 103 MMOL/L — SIGNIFICANT CHANGE UP (ref 96–108)
CO2 SERPL-SCNC: 24 MMOL/L — SIGNIFICANT CHANGE UP (ref 22–31)
CREAT SERPL-MCNC: 0.64 MG/DL — SIGNIFICANT CHANGE UP (ref 0.5–1.3)
CULTURE RESULTS: SIGNIFICANT CHANGE UP
EGFR: 121 ML/MIN/1.73M2 — SIGNIFICANT CHANGE UP
GLUCOSE SERPL-MCNC: 74 MG/DL — SIGNIFICANT CHANGE UP (ref 70–99)
HCT VFR BLD CALC: 46.6 % — SIGNIFICANT CHANGE UP (ref 39–50)
HGB BLD-MCNC: 15 G/DL — SIGNIFICANT CHANGE UP (ref 13–17)
MCHC RBC-ENTMCNC: 30.1 PG — SIGNIFICANT CHANGE UP (ref 27–34)
MCHC RBC-ENTMCNC: 32.2 GM/DL — SIGNIFICANT CHANGE UP (ref 32–36)
MCV RBC AUTO: 93.6 FL — SIGNIFICANT CHANGE UP (ref 80–100)
NRBC # BLD: 0 /100 WBCS — SIGNIFICANT CHANGE UP (ref 0–0)
PLATELET # BLD AUTO: 201 K/UL — SIGNIFICANT CHANGE UP (ref 150–400)
POTASSIUM SERPL-MCNC: 4 MMOL/L — SIGNIFICANT CHANGE UP (ref 3.5–5.3)
POTASSIUM SERPL-SCNC: 4 MMOL/L — SIGNIFICANT CHANGE UP (ref 3.5–5.3)
RBC # BLD: 4.98 M/UL — SIGNIFICANT CHANGE UP (ref 4.2–5.8)
RBC # FLD: 13.6 % — SIGNIFICANT CHANGE UP (ref 10.3–14.5)
SODIUM SERPL-SCNC: 140 MMOL/L — SIGNIFICANT CHANGE UP (ref 135–145)
SPECIMEN SOURCE: SIGNIFICANT CHANGE UP
WBC # BLD: 6.31 K/UL — SIGNIFICANT CHANGE UP (ref 3.8–10.5)
WBC # FLD AUTO: 6.31 K/UL — SIGNIFICANT CHANGE UP (ref 3.8–10.5)

## 2024-08-21 PROCEDURE — 99222 1ST HOSP IP/OBS MODERATE 55: CPT

## 2024-08-21 PROCEDURE — 99232 SBSQ HOSP IP/OBS MODERATE 35: CPT | Mod: GC

## 2024-08-21 RX ORDER — BUPROPION HYDROCHLORIDE 150 MG/1
300 TABLET ORAL DAILY
Refills: 0 | Status: DISCONTINUED | OUTPATIENT
Start: 2024-08-21 | End: 2024-08-24

## 2024-08-21 RX ORDER — HYDROMORPHONE HYDROCHLORIDE 2 MG/1
0.5 TABLET ORAL ONCE
Refills: 0 | Status: DISCONTINUED | OUTPATIENT
Start: 2024-08-21 | End: 2024-08-21

## 2024-08-21 RX ORDER — POLYETHYLENE GLYCOL 3350 17 G/17G
17 POWDER, FOR SOLUTION ORAL DAILY
Refills: 0 | Status: DISCONTINUED | OUTPATIENT
Start: 2024-08-21 | End: 2024-08-24

## 2024-08-21 RX ORDER — SENNA 187 MG
2 TABLET ORAL AT BEDTIME
Refills: 0 | Status: DISCONTINUED | OUTPATIENT
Start: 2024-08-21 | End: 2024-08-24

## 2024-08-21 RX ORDER — OXYCODONE HYDROCHLORIDE 5 MG/1
10 TABLET ORAL EVERY 6 HOURS
Refills: 0 | Status: DISCONTINUED | OUTPATIENT
Start: 2024-08-21 | End: 2024-08-23

## 2024-08-21 RX ORDER — METHOCARBAMOL 750 MG/1
1000 TABLET, FILM COATED ORAL EVERY 6 HOURS
Refills: 0 | Status: DISCONTINUED | OUTPATIENT
Start: 2024-08-21 | End: 2024-08-24

## 2024-08-21 RX ADMIN — ACETAMINOPHEN 1000 MILLIGRAM(S): 325 TABLET ORAL at 05:40

## 2024-08-21 RX ADMIN — OXYCODONE HYDROCHLORIDE 10 MILLIGRAM(S): 5 TABLET ORAL at 20:04

## 2024-08-21 RX ADMIN — HYDROMORPHONE HYDROCHLORIDE 0.5 MILLIGRAM(S): 2 TABLET ORAL at 13:20

## 2024-08-21 RX ADMIN — Medication 3 MILLIGRAM(S): at 23:38

## 2024-08-21 RX ADMIN — BUPROPION HYDROCHLORIDE 300 MILLIGRAM(S): 150 TABLET ORAL at 13:02

## 2024-08-21 RX ADMIN — POLYETHYLENE GLYCOL 3350 17 GRAM(S): 17 POWDER, FOR SOLUTION ORAL at 13:22

## 2024-08-21 RX ADMIN — ACETAMINOPHEN 1000 MILLIGRAM(S): 325 TABLET ORAL at 05:10

## 2024-08-21 RX ADMIN — ACETAMINOPHEN 1000 MILLIGRAM(S): 325 TABLET ORAL at 22:29

## 2024-08-21 RX ADMIN — ONDANSETRON 4 MILLIGRAM(S): 2 INJECTION, SOLUTION INTRAMUSCULAR; INTRAVENOUS at 06:59

## 2024-08-21 RX ADMIN — METHOCARBAMOL 1000 MILLIGRAM(S): 750 TABLET, FILM COATED ORAL at 18:39

## 2024-08-21 RX ADMIN — Medication 2 TABLET(S): at 22:30

## 2024-08-21 RX ADMIN — Medication 800 MILLIGRAM(S): at 22:30

## 2024-08-21 RX ADMIN — ACETAMINOPHEN 1000 MILLIGRAM(S): 325 TABLET ORAL at 23:30

## 2024-08-21 RX ADMIN — METHADONE HYDROCHLORIDE 190 MILLIGRAM(S): 1 POWDER ORAL at 12:07

## 2024-08-21 RX ADMIN — Medication 5 MILLIGRAM(S): at 06:24

## 2024-08-21 RX ADMIN — BUPROPION HYDROCHLORIDE 300 MILLIGRAM(S): 150 TABLET ORAL at 00:15

## 2024-08-21 RX ADMIN — Medication 800 MILLIGRAM(S): at 05:10

## 2024-08-21 RX ADMIN — OXYCODONE HYDROCHLORIDE 10 MILLIGRAM(S): 5 TABLET ORAL at 21:04

## 2024-08-21 RX ADMIN — Medication 800 MILLIGRAM(S): at 13:21

## 2024-08-21 RX ADMIN — HYDROMORPHONE HYDROCHLORIDE 0.5 MILLIGRAM(S): 2 TABLET ORAL at 12:18

## 2024-08-21 RX ADMIN — Medication 40 MILLIGRAM(S): at 05:09

## 2024-08-21 NOTE — PHYSICAL THERAPY INITIAL EVALUATION ADULT - ADDITIONAL COMMENTS
Pt lives with wife and 2 teenage boys in 2nd floor apt. Pt was independent prior to admission without PT services.

## 2024-08-21 NOTE — PROGRESS NOTE ADULT - PROBLEM SELECTOR PLAN 1
Currently on IV Rocephin 2 gm Q24H and IV Vancomycin, pt currently refusing unless osteo seen on imaging  - pending MRI C/T/L spine with sedation resulted with degenerative disc disease, no abnormal enhancement suggestive of osteo  - ID was discussed with by prior attending on 6/22, pending follow up   - Pain control with neurontin, lidocaine patch, flexeril; one time dulauded given today 0.5IV due to acute pain  - He would follow up at the clinic on Monday to  his methadone pill Currently on IV Rocephin 2 gm Q24H and IV Vancomycin, pt currently refusing unless osteo seen on imaging  - pending MRI C/T/L spine with sedation resulted with degenerative disc disease, no abnormal enhancement suggestive of osteo  - ID was discussed with by prior attending on 6/22, pending follow up   - Pain control with neurontin, lidocaine patch, flexeril; one time dulauded given today 0.5IV due to acute pain  - He would follow up at the clinic on Monday to  his methadone pill  - f/u chronic pain c/s Currently on IV Rocephin 2 gm Q24H and IV Vancomycin, pt currently refusing unless osteo seen on imaging  - pending MRI C/T/L spine with sedation resulted with degenerative disc disease, no abnormal enhancement suggestive of osteo  - ID was discussed with by prior attending on 6/22, pending follow up   - Pain control with neurontin, lidocaine patch, flexeril; one time dilaudid given today 0.5IV due to acute pain  - He would follow up at the clinic on Monday to  his methadone pill  - f/u chronic pain c/s

## 2024-08-21 NOTE — PHYSICAL THERAPY INITIAL EVALUATION ADULT - ADL SKILLS, REHAB EVAL
Recommendation Preamble: The following recommendations were made during the visit:
Recommendations (Free Text): microneedling consultation with [facility]
Render Risk Assessment In Note?: no
independent
Detail Level: Zone

## 2024-08-21 NOTE — CONSULT NOTE ADULT - ASSESSMENT
45M PMHx significant for chronic thoracic osteomyelitis, IVDA on methadone, and polysubstance abuse, hospital admission in June for right knee cellulitis     Presenting with chronic back pain, worsening, mid thoracic and b/l lateral lumbar spine.  Primary team confirmed w/ [facility], he is an active patient in the methadone clinic, last dose 190 mg on 8/19.    Current out- patient pain regimen: Gabapentin 800 mg TID (and illicit Xanax)  Out Patient Pain Management provider: None    Opioid Risk Tool (ORT-OUD) Score: High - Pt w/ Hx IVDA (heroin) and opioid abuse, is in Methadone Maintenance program. Denies using anything recently except Xanax that he buys on the street. UTox (+) for Benzo (xanax), opiate, methadone, cocaine (pt denies use). Specimen sent for metabolite confirmation.  NOTE: EMR reviewed - ED provider note on this admission states pt reports last IVDA 2 months ago. Last admission, in June, patient's wife reported that pt was still using Cocaine and Heroin.    Pain Score: 8/10 to 0/10    Opioid tolerant pt w/ c/o chronic thoracic back pain and low back pain that radiates down RLE causing leg to "give out". Pt states he was told he has OM Lumbar spine as well, awaiting MRI w/ anesthesia tomorrow. Posterior thoracic spine pain is described as "tightness". States Xanax calms everything and he "forgets about the pain".   Pt does not see a PCP or pain specialist. Went to a PCP who gave Rx for Gabapentin 800 mg TID and Wellbutrin, since then has been obtaining Rxs from Urgent Cares, EDs, Hospital admissions. Lengthy discussion regarding need for consistent management. Pt received IV Dilaudid 0.5 mg earlier, requesting higher dose (2 mg IV) Or Oxy IR 30 mg (states he was on these on prior hospitalizations - explained to pt there will be no IV opioids and limited PO - 10 mg Oxy Q6h until results of MRI)     Plan discussed with primary team:  Avoid IV opioids, not indicated for pt tolerating PO diet/meds  Discontinue Flexeril  Start Robaxin 1,000 mg Q6h  Start Oxycodone 10 mg Q6h PRN severe pain until MRI results - pt is aware that if MRI shows no new findings as etiology for pain this will be discontinued.  Continue Gabapentin 800 mg TID (CrCl = 220.7)  Methadone 190 mg QD for Medication Assisted Treatment for Substance Use Disorder - any dose changes should be addressed w/ Behavioral Health, monitor QTc.    Continue Lidoderm  Warm/cool packs for comfort  Continue Bowel Regimen  Incentive Spirometer  PT per primary team  Monitor for sedation, respiratory depression  Out-patient pain practice list provided for pain management after discharge  Watch for signs and symptoms of withdrawal from possible unknown illicit substances  Narcan Rescue Kit on discharge (Naloxone 4 mg/0.1 ml nasal spray - 1 spray q 2-3 minutes alternating between nostrils)    Time spent on encounter:  55      Minutes    Chronic Pain Service  [phone] 45M PMHx significant for chronic thoracic osteomyelitis, IVDA on methadone, and polysubstance abuse, hospital admission in June for right knee cellulitis     Presenting with chronic back pain, worsening, mid thoracic and b/l lateral lumbar spine.  Primary team confirmed w/ [facility], he is an active patient in the methadone clinic, last dose 190 mg on 8/19.    Current out- patient pain regimen: Gabapentin 800 mg TID (and illicit Xanax)  Out Patient Pain Management provider: None    Opioid Risk Tool (ORT-OUD) Score: High - Pt w/ Hx IVDA (heroin) and opioid abuse, is in Methadone Maintenance program. Denies using anything recently except Xanax that he buys on the street. UTox (+) for Benzo (xanax), opiate, methadone, cocaine (pt denies use). Specimen sent for metabolite confirmation.  NOTE: EMR reviewed - ED provider note on this admission states pt reports last IVDA 2 months ago. Last admission, in June, patient's wife reported that pt was still using Cocaine and Heroin.    Pain Score: 8/10 to 0/10    Opioid tolerant pt w/ c/o chronic thoracic back pain and low back pain that radiates down RLE causing leg to "give out". Pt states he was told he has OM Lumbar spine as well, awaiting MRI w/ anesthesia tomorrow. Posterior thoracic spine pain is described as "tightness". States Xanax calms everything and he "forgets about the pain".   Pt does not see a PCP or pain specialist. Went to a PCP who gave Rx for Gabapentin 800 mg TID and Wellbutrin, since then has been obtaining Rxs from Urgent Cares, EDs, Hospital admissions. Lengthy discussion regarding need for consistent management. Pt received IV Dilaudid 0.5 mg earlier, requesting higher dose (2 mg IV) Or Oxy IR 30 mg (states he was on these on prior hospitalizations - explained to pt there will be no IV opioids and limited PO - 10 mg Oxy Q6h until results of MRI)     Plan discussed with primary team:  Avoid IV opioids, not indicated for pt tolerating PO diet/meds  Discontinue Flexeril  Start Robaxin 1,000 mg Q6h  Start Oxycodone 10 mg Q6h PRN severe pain until MRI results - pt is aware that if MRI shows no new findings as etiology for pain this will be discontinued.  Continue Gabapentin 800 mg TID (CrCl = 220.7)  Consider Tylenol and NSAIDs around the clock  Methadone 190 mg QD for Medication Assisted Treatment for Substance Use Disorder - any dose changes should be addressed w/ Behavioral Health, monitor QTc.    Continue Lidoderm  Warm/cool packs for comfort  Continue Bowel Regimen  Incentive Spirometer  PT per primary team  Monitor for sedation, respiratory depression  Out-patient pain practice list provided for pain management after discharge  Watch for signs and symptoms of withdrawal from possible unknown illicit substances  Narcan Rescue Kit on discharge (Naloxone 4 mg/0.1 ml nasal spray - 1 spray q 2-3 minutes alternating between nostrils)    Time spent on encounter:  55      Minutes    Chronic Pain Service  [phone]

## 2024-08-21 NOTE — CONSULT NOTE ADULT - SUBJECTIVE AND OBJECTIVE BOX
Chief Complaint:  Patient is a 44y old  Male who presents with a chief complaint of Back pain (21 Aug 2024 13:47)    HPI:  Blas Sheets is a 44 y/o M with PMH significant for chronic thoracic osteomyelitis, IVDA on methadone, and polysubstance abuse presenting with back pain. Patient stated pain has been ongoing x 2 months and has not improved since last hospitalization despite taking gabapentin and flexeril and applying lidocaine patch. Pain is located in the mid thoracic and b/l lateral lumbar spine. He denies fevers but admits to chills and night sweats. Denies any new FND or loss of BB function. Admits to associated nausea. Spoke with [facility] and confirmed he is an active patient in the methadone clinic, last dose 190 mg on 8/19. Patient admits to occasional Xanax use to help wit back pain but denies any additional drug or alcohol use.  (20 Aug 2024 15:13)    Current out- patient pain regimen: Gabapentin 800 mg TID    Out Patient Pain Management provider: None    [facility] Prescription Monitoring Program: Reference #[id number]    Opioid Risk Tool (ORT-OUD) Score: High - Pt w/ Hx IVDA (heroin) and opioid abuse, is in Methadone Maintenance program. Denies using anything recently except Xanax that he buys on the street. UTox (+) for Benzo (xanax), opiate, methadone, cocaine (pt denies use). Specimen sent for metabolite confirmation.    Pain Score: 8/10 to 0/10    Pt seen sitting at edge of bed, appears mostly comfortable. Pt w/ c/o chronic thoracic back pain and low back pain that radiates down RLE causing leg to "give out". Pt states he was told he has OM Lumbar spine as well, awaiting MRI w/ anesthesia tomorrow. Posterior thoracic spine pain is described as "tightness". States Xanax calms everything and he "forgets about the pain". Pt received IV Dilaudid earlier today, requesting at higher dose. Pt educated re: benefits of PO over IV opioids; need to find an effective oral regimen in preparation for discharge; goal of pain management to find effective oral regimen so that pain is tolerable and patient can function. Pt expressed understanding. Pt does not see a PCP or pain specialist. Went to a PCP who gave Rx for Gabapentin 800 mg TID and Wellbutrin, since then has been obtaining Rxs from Urgent Cares, EDs, Hospital admissions. Lengthy discussion regarding need for consistent management.     REVIEW OF SYSTEMS:  CONSTITUTIONAL: No fever, weight loss, fatigue, falls  NEURO: No headaches, memory loss, loss of strength, tremors, dizziness or blurred vision  RESP: No shortness of breath, cough  CV: No chest pain, palpitations  GI: No abdominal pain, nausea, vomiting, diarrhea, constipation, incontinence, (+)flatus  : No urinary incontinence/retention but endorses hesitancy  MSK: No joint pain or swelling; chronic back pain  SKIN: No itching, burning, rashes, or lesions   PSYCHIATRIC: Endorses feelings of depression 2/2 chronic pain, is on Wellbutrin      PHYSICAL EXAM  GENERAL: Seen at bedside, NAD, well-groomed, well-developed, appears stated age, no signs of toxicity  NEURO: Alert & Oriented X3, Good concentration; Follows commands   HEENT: Head atraumatic, normocephalic; speech clear and fluent  GI: Appetite good, last BM 2 days ago  : Voiding but endorses hesitancy  EXTREMITIES:  moving all extremities, ambulates independently   SKIN: No rashes or lesions noted, multiple tattoos noted on extremities  PSYCH: affect normal; good eye contact; no signs of depression or anxiety    PAST MEDICAL & SURGICAL HISTORY:  IV drug abuse      Osteomyelitis of lumbar spine      Seizure      Methadone maintenance therapy patient      HTN (hypertension)      H/O Spinal surgery          FAMILY HISTORY:  FH: dementia    FH: diabetes mellitus (Mother)        Allergies    Zosyn (Hives)        MEDICATIONS  (STANDING):  acetaminophen     Tablet .. 1000 milliGRAM(s) Oral every 8 hours  buPROPion XL (24-Hour) . 300 milliGRAM(s) Oral daily  gabapentin 800 milliGRAM(s) Oral three times a day  lidocaine   4% Patch 1 Patch Transdermal daily  methadone    Tablet 190 milliGRAM(s) Oral daily  methocarbamol 1000 milliGRAM(s) Oral every 6 hours  pantoprazole    Tablet 40 milliGRAM(s) Oral before breakfast  polyethylene glycol 3350 17 Gram(s) Oral daily  senna 2 Tablet(s) Oral at bedtime    MEDICATIONS  (PRN):  melatonin 3 milliGRAM(s) Oral at bedtime PRN Insomnia  ondansetron Injectable 4 milliGRAM(s) IV Push every 8 hours PRN Nausea and/or Vomiting      Vital Signs:  T(C): 37.1 (08-21-24 @ 12:39)  HR: 64 (08-21-24 @ 13:39)  BP: 124/87 (08-21-24 @ 13:39)  RR: 16 (08-21-24 @ 12:39)  SpO2: 98% (08-21-24 @ 13:39)    Pertinent labs/radiology:  Reviewed                          15.0   6.31  )-----------( 201      ( 21 Aug 2024 07:02 )             46.6       08-21    140  |  103  |  11  ----------------------------<  74  4.0   |  24  |  0.64    Ca    9.1      21 Aug 2024 07:03    TPro  7.6  /  Alb  4.2  /  TBili  0.7  /  DBili  x   /  AST  41<H>  /  ALT  23  /  AlkPhos  91  08-20

## 2024-08-21 NOTE — PROGRESS NOTE ADULT - SUBJECTIVE AND OBJECTIVE BOX
Kimmy Collier PGY3  pager [phone] or check resident tab for coverage    Patient is a 44y old  Male who presents with a chief complaint of Back pain (20 Aug 2024 15:13)      SUBJECTIVE / OVERNIGHT EVENTS: NAONE. Patient feels well. Endorses back pain. Able to walk and move without any significant limitations but has pain. Denies fevers but endorses chills. denies cp/sob/n/v/d.    MEDICATIONS  (STANDING):  acetaminophen     Tablet .. 1000 milliGRAM(s) Oral every 8 hours  buPROPion XL (24-Hour) . 300 milliGRAM(s) Oral daily  gabapentin 800 milliGRAM(s) Oral three times a day  lidocaine   4% Patch 1 Patch Transdermal daily  methadone    Tablet 190 milliGRAM(s) Oral daily  pantoprazole    Tablet 40 milliGRAM(s) Oral before breakfast  polyethylene glycol 3350 17 Gram(s) Oral daily  senna 2 Tablet(s) Oral at bedtime    MEDICATIONS  (PRN):  cyclobenzaprine 5 milliGRAM(s) Oral every 8 hours PRN Spasm  melatonin 3 milliGRAM(s) Oral at bedtime PRN Insomnia  ondansetron Injectable 4 milliGRAM(s) IV Push every 8 hours PRN Nausea and/or Vomiting      CAPILLARY BLOOD GLUCOSE        I&O's Summary    21 Aug 2024 07:01  -  21 Aug 2024 13:47  --------------------------------------------------------  IN: 360 mL / OUT: 0 mL / NET: 360 mL        Vital Signs Last 24 Hrs  T(C): 37.1 (21 Aug 2024 12:39), Max: 37.1 (21 Aug 2024 12:39)  T(F): 98.8 (21 Aug 2024 12:39), Max: 98.8 (21 Aug 2024 12:39)  HR: 64 (21 Aug 2024 13:39) (47 - 64)  BP: 124/87 (21 Aug 2024 13:39) (85/52 - 124/87)  BP(mean): 62 (20 Aug 2024 18:00) (52 - 62)  RR: 16 (21 Aug 2024 12:39) (14 - 18)  SpO2: 98% (21 Aug 2024 13:39) (96% - 100%)    Parameters below as of 21 Aug 2024 13:39  Patient On (Oxygen Delivery Method): room air        PHYSICAL EXAM:  GENERAL: no distress  PSYCH: A&O x3  HEAD: Atraumatic, Normocephalic  NECK: Supple, No JVD  CHEST/LUNG: clear to auscultation bilaterally  HEART: regular rate and rhythm, no murmurs  ABDOMEN: nontender to palpation, no rebound tenderness/guarding  EXTREMITIES: no edema on bilateral LE  NEUROLOGY: no focal neurologic deficit  SKIN: No rashes or lesions    LABS:                        15.0   6.31  )-----------( 201      ( 21 Aug 2024 07:02 )             46.6      08-21    140  |  103  |  11  ----------------------------<  74  4.0   |  24  |  0.64    Ca    9.1      21 Aug 2024 07:03    TPro  7.6  /  Alb  4.2  /  TBili  0.7  /  DBili  x   /  AST  41<H>  /  ALT  23  /  AlkPhos  91  08-20          Urinalysis Basic - ( 21 Aug 2024 07:03 )    Color: x / Appearance: x / SG: x / pH: x  Gluc: 74 mg/dL / Ketone: x  / Bili: x / Urobili: x   Blood: x / Protein: x / Nitrite: x   Leuk Esterase: x / RBC: x / WBC x   Sq Epi: x / Non Sq Epi: x / Bacteria: x        RADIOLOGY & ADDITIONAL TESTS:    Imaging Personally Reviewed:    Consultant(s) Notes Reviewed:      Care Discussed with Consultants/Other Providers:

## 2024-08-22 LAB
ALBUMIN SERPL ELPH-MCNC: 4.1 G/DL — SIGNIFICANT CHANGE UP (ref 3.3–5)
ALP SERPL-CCNC: 85 U/L — SIGNIFICANT CHANGE UP (ref 40–120)
ALT FLD-CCNC: 21 U/L — SIGNIFICANT CHANGE UP (ref 10–45)
ANION GAP SERPL CALC-SCNC: 13 MMOL/L — SIGNIFICANT CHANGE UP (ref 5–17)
AST SERPL-CCNC: 29 U/L — SIGNIFICANT CHANGE UP (ref 10–40)
BILIRUB SERPL-MCNC: 0.3 MG/DL — SIGNIFICANT CHANGE UP (ref 0.2–1.2)
BUN SERPL-MCNC: 13 MG/DL — SIGNIFICANT CHANGE UP (ref 7–23)
CALCIUM SERPL-MCNC: 9.1 MG/DL — SIGNIFICANT CHANGE UP (ref 8.4–10.5)
CHLORIDE SERPL-SCNC: 101 MMOL/L — SIGNIFICANT CHANGE UP (ref 96–108)
CO2 SERPL-SCNC: 24 MMOL/L — SIGNIFICANT CHANGE UP (ref 22–31)
CREAT SERPL-MCNC: 0.66 MG/DL — SIGNIFICANT CHANGE UP (ref 0.5–1.3)
EGFR: 120 ML/MIN/1.73M2 — SIGNIFICANT CHANGE UP
GLUCOSE SERPL-MCNC: 106 MG/DL — HIGH (ref 70–99)
HCT VFR BLD CALC: 45.5 % — SIGNIFICANT CHANGE UP (ref 39–50)
HGB BLD-MCNC: 15 G/DL — SIGNIFICANT CHANGE UP (ref 13–17)
MAGNESIUM SERPL-MCNC: 2.1 MG/DL — SIGNIFICANT CHANGE UP (ref 1.6–2.6)
MCHC RBC-ENTMCNC: 30.8 PG — SIGNIFICANT CHANGE UP (ref 27–34)
MCHC RBC-ENTMCNC: 33 GM/DL — SIGNIFICANT CHANGE UP (ref 32–36)
MCV RBC AUTO: 93.4 FL — SIGNIFICANT CHANGE UP (ref 80–100)
NRBC # BLD: 0 /100 WBCS — SIGNIFICANT CHANGE UP (ref 0–0)
PHOSPHATE SERPL-MCNC: 3.3 MG/DL — SIGNIFICANT CHANGE UP (ref 2.5–4.5)
PLATELET # BLD AUTO: 189 K/UL — SIGNIFICANT CHANGE UP (ref 150–400)
POTASSIUM SERPL-MCNC: 3.6 MMOL/L — SIGNIFICANT CHANGE UP (ref 3.5–5.3)
POTASSIUM SERPL-SCNC: 3.6 MMOL/L — SIGNIFICANT CHANGE UP (ref 3.5–5.3)
PROT SERPL-MCNC: 7.6 G/DL — SIGNIFICANT CHANGE UP (ref 6–8.3)
RBC # BLD: 4.87 M/UL — SIGNIFICANT CHANGE UP (ref 4.2–5.8)
RBC # FLD: 13.2 % — SIGNIFICANT CHANGE UP (ref 10.3–14.5)
SODIUM SERPL-SCNC: 138 MMOL/L — SIGNIFICANT CHANGE UP (ref 135–145)
WBC # BLD: 6.7 K/UL — SIGNIFICANT CHANGE UP (ref 3.8–10.5)
WBC # FLD AUTO: 6.7 K/UL — SIGNIFICANT CHANGE UP (ref 3.8–10.5)

## 2024-08-22 PROCEDURE — 99232 SBSQ HOSP IP/OBS MODERATE 35: CPT | Mod: GC

## 2024-08-22 RX ORDER — LACTULOSE 10 G
10 PACKET (EA) ORAL ONCE
Refills: 0 | Status: COMPLETED | OUTPATIENT
Start: 2024-08-22 | End: 2024-08-22

## 2024-08-22 RX ADMIN — ACETAMINOPHEN 1000 MILLIGRAM(S): 325 TABLET ORAL at 21:52

## 2024-08-22 RX ADMIN — Medication 800 MILLIGRAM(S): at 13:00

## 2024-08-22 RX ADMIN — Medication 2 TABLET(S): at 21:52

## 2024-08-22 RX ADMIN — METHADONE HYDROCHLORIDE 190 MILLIGRAM(S): 1 POWDER ORAL at 11:12

## 2024-08-22 RX ADMIN — POLYETHYLENE GLYCOL 3350 17 GRAM(S): 17 POWDER, FOR SOLUTION ORAL at 11:48

## 2024-08-22 RX ADMIN — Medication 1 PATCH: at 19:35

## 2024-08-22 RX ADMIN — METHOCARBAMOL 1000 MILLIGRAM(S): 750 TABLET, FILM COATED ORAL at 11:48

## 2024-08-22 RX ADMIN — ACETAMINOPHEN 1000 MILLIGRAM(S): 325 TABLET ORAL at 22:50

## 2024-08-22 RX ADMIN — OXYCODONE HYDROCHLORIDE 10 MILLIGRAM(S): 5 TABLET ORAL at 13:35

## 2024-08-22 RX ADMIN — Medication 1 PATCH: at 11:48

## 2024-08-22 RX ADMIN — METHOCARBAMOL 1000 MILLIGRAM(S): 750 TABLET, FILM COATED ORAL at 05:08

## 2024-08-22 RX ADMIN — OXYCODONE HYDROCHLORIDE 10 MILLIGRAM(S): 5 TABLET ORAL at 13:03

## 2024-08-22 RX ADMIN — Medication 40 MILLIGRAM(S): at 05:07

## 2024-08-22 RX ADMIN — Medication 800 MILLIGRAM(S): at 21:52

## 2024-08-22 RX ADMIN — OXYCODONE HYDROCHLORIDE 10 MILLIGRAM(S): 5 TABLET ORAL at 06:18

## 2024-08-22 RX ADMIN — METHOCARBAMOL 1000 MILLIGRAM(S): 750 TABLET, FILM COATED ORAL at 00:30

## 2024-08-22 RX ADMIN — BUPROPION HYDROCHLORIDE 300 MILLIGRAM(S): 150 TABLET ORAL at 11:48

## 2024-08-22 RX ADMIN — METHOCARBAMOL 1000 MILLIGRAM(S): 750 TABLET, FILM COATED ORAL at 17:51

## 2024-08-22 RX ADMIN — ONDANSETRON 4 MILLIGRAM(S): 2 INJECTION, SOLUTION INTRAMUSCULAR; INTRAVENOUS at 12:17

## 2024-08-22 RX ADMIN — Medication 800 MILLIGRAM(S): at 05:08

## 2024-08-22 RX ADMIN — OXYCODONE HYDROCHLORIDE 10 MILLIGRAM(S): 5 TABLET ORAL at 19:50

## 2024-08-22 RX ADMIN — Medication 10 GRAM(S): at 11:54

## 2024-08-22 RX ADMIN — OXYCODONE HYDROCHLORIDE 10 MILLIGRAM(S): 5 TABLET ORAL at 06:48

## 2024-08-22 RX ADMIN — OXYCODONE HYDROCHLORIDE 10 MILLIGRAM(S): 5 TABLET ORAL at 18:53

## 2024-08-22 NOTE — SBIRT NOTE ADULT - NSSBIRTDRGPOSREINDET_GEN_A_CORE
Patient receives services at a methadone clinic located on [facility] in [place] 6 days/week Mon-Sat

## 2024-08-22 NOTE — PROGRESS NOTE ADULT - PROBLEM SELECTOR PLAN 1
Currently on IV Rocephin 2 gm Q24H and IV Vancomycin, pt currently refusing unless osteo seen on imaging  - pending repeat MRI C/T/L. Past MR spine in june with sedation resulted with degenerative disc disease, no abnormal enhancement suggestive of osteo  - patient wants MR with anesthesia however no clear indications for anesthesia in this patient with combined substance use hx concerns.  - ID was discussed with by prior attending on 6/22, pending follow up   - Pain control with neurontin, lidocaine patch, flexeril; one time dilaudid given today 0.5IV due to acute pain  - He would follow up at the clinic on Monday to  his methadone pill  - f/u chronic pain c/s recs appreciated Currently on IV Rocephin 2 gm Q24H and IV Vancomycin, pt currently refusing unless osteo seen on imaging  - pending repeat MRI C/T/L. Past MR spine in june with sedation resulted with degenerative disc disease, no abnormal enhancement suggestive of osteo  - patient wants MR with anesthesia however no clear indications for anesthesia in this patient with combined substance use hx concerns.  - ID was discussed with by prior attending on 6/22, pending follow up   - Pain control with neurontin, lidocaine patch, methocarbamol; oxy for breakthru pain till MRI done  - He would follow up at the clinic on Monday to  his methadone pill  - f/u chronic pain c/s recs appreciated - monitoring off abx   - pending repeat MRI C/T/L. Past MR spine in june with sedation resulted with degenerative disc disease, no abnormal enhancement suggestive of osteo  - patient wants MR with anesthesia, plan to be done with anesthesia tomorrow 8/23; NPO after MN; however no clear indications for anesthesia in this patient with combined substance use hx concerns.  - ID was discussed with by prior attending on 6/22, pending follow up   - Pain control with neurontin, lidocaine patch, methocarbamol; oxy for breakthru pain till MRI done  - He would follow up at the clinic on Monday to  his methadone pill  - chronic pain c/s recs appreciated

## 2024-08-22 NOTE — PROGRESS NOTE ADULT - SUBJECTIVE AND OBJECTIVE BOX
Kimmy Collier PGY3  pager [phone] or check resident tab for coverage    Patient is a 44y old  Male who presents with a chief complaint of Back pain (21 Aug 2024 17:13)      SUBJECTIVE / OVERNIGHT EVENTS: Patient feels well, not able to go to the MRI yesterday evening and  was wanting it under anesthesia prior to going. overall feels well; denies cp/n/v/d/c. Endorses pain in lower back. Endorses chills.    MEDICATIONS  (STANDING):  acetaminophen     Tablet .. 1000 milliGRAM(s) Oral every 8 hours  buPROPion XL (24-Hour) . 300 milliGRAM(s) Oral daily  gabapentin 800 milliGRAM(s) Oral three times a day  lidocaine   4% Patch 1 Patch Transdermal daily  methadone    Tablet 190 milliGRAM(s) Oral daily  methocarbamol 1000 milliGRAM(s) Oral every 6 hours  pantoprazole    Tablet 40 milliGRAM(s) Oral before breakfast  polyethylene glycol 3350 17 Gram(s) Oral daily  senna 2 Tablet(s) Oral at bedtime    MEDICATIONS  (PRN):  melatonin 3 milliGRAM(s) Oral at bedtime PRN Insomnia  ondansetron Injectable 4 milliGRAM(s) IV Push every 8 hours PRN Nausea and/or Vomiting  oxyCODONE    Solution 10 milliGRAM(s) Oral every 6 hours PRN Severe Pain (7 - 10)      CAPILLARY BLOOD GLUCOSE        I&O's Summary    21 Aug 2024 07:01  -  22 Aug 2024 07:00  --------------------------------------------------------  IN: 720 mL / OUT: 0 mL / NET: 720 mL        Vital Signs Last 24 Hrs  T(C): 37.1 (22 Aug 2024 04:20), Max: 37.2 (21 Aug 2024 21:35)  T(F): 98.8 (22 Aug 2024 04:20), Max: 99 (21 Aug 2024 21:35)  HR: 62 (22 Aug 2024 06:15) (52 - 64)  BP: 108/67 (22 Aug 2024 06:15) (108/67 - 124/87)  BP(mean): --  RR: 18 (22 Aug 2024 04:20) (16 - 18)  SpO2: 95% (22 Aug 2024 04:20) (95% - 98%)    Parameters below as of 22 Aug 2024 04:20  Patient On (Oxygen Delivery Method): room air        PHYSICAL EXAM:  GENERAL: no distress  PSYCH: A&O x3  HEAD: Atraumatic, Normocephalic  NECK: Supple, No JVD  CHEST/LUNG: clear to auscultation bilaterally  HEART: regular rate and rhythm, no murmurs  ABDOMEN: nontender to palpation, no rebound tenderness/guarding  EXTREMITIES: no edema on bilateral LE  NEUROLOGY: no focal neurologic deficit  SKIN: No rashes or lesions    LABS:                        15.0   6.70  )-----------( 189      ( 22 Aug 2024 06:51 )             45.5      08-22    138  |  101  |  13  ----------------------------<  106<H>  3.6   |  24  |  0.66    Ca    9.1      22 Aug 2024 06:51  Phos  3.3     08-22  Mg     2.1     08-22    TPro  7.6  /  Alb  4.1  /  TBili  0.3  /  DBili  x   /  AST  29  /  ALT  21  /  AlkPhos  85  08-22          Urinalysis Basic - ( 22 Aug 2024 06:51 )    Color: x / Appearance: x / SG: x / pH: x  Gluc: 106 mg/dL / Ketone: x  / Bili: x / Urobili: x   Blood: x / Protein: x / Nitrite: x   Leuk Esterase: x / RBC: x / WBC x   Sq Epi: x / Non Sq Epi: x / Bacteria: x        RADIOLOGY & ADDITIONAL TESTS:    Imaging Personally Reviewed:    Consultant(s) Notes Reviewed:      Care Discussed with Consultants/Other Providers:   Kimmy Collier PGY3  pager [phone] or check resident tab for coverage    Patient is a 44y old  Male who presents with a chief complaint of Back pain (21 Aug 2024 17:13)      SUBJECTIVE / OVERNIGHT EVENTS: Patient feels well, not able to go to the MRI yesterday evening and  was wanting it under anesthesia prior to going. overall feels well; denies cp/n/v/d/c. Endorses pain in lower back that is improved. Chills improved. c/o night sweats    MEDICATIONS  (STANDING):  acetaminophen     Tablet .. 1000 milliGRAM(s) Oral every 8 hours  buPROPion XL (24-Hour) . 300 milliGRAM(s) Oral daily  gabapentin 800 milliGRAM(s) Oral three times a day  lidocaine   4% Patch 1 Patch Transdermal daily  methadone    Tablet 190 milliGRAM(s) Oral daily  methocarbamol 1000 milliGRAM(s) Oral every 6 hours  pantoprazole    Tablet 40 milliGRAM(s) Oral before breakfast  polyethylene glycol 3350 17 Gram(s) Oral daily  senna 2 Tablet(s) Oral at bedtime    MEDICATIONS  (PRN):  melatonin 3 milliGRAM(s) Oral at bedtime PRN Insomnia  ondansetron Injectable 4 milliGRAM(s) IV Push every 8 hours PRN Nausea and/or Vomiting  oxyCODONE    Solution 10 milliGRAM(s) Oral every 6 hours PRN Severe Pain (7 - 10)      CAPILLARY BLOOD GLUCOSE        I&O's Summary    21 Aug 2024 07:01  -  22 Aug 2024 07:00  --------------------------------------------------------  IN: 720 mL / OUT: 0 mL / NET: 720 mL        Vital Signs Last 24 Hrs  T(C): 37.1 (22 Aug 2024 04:20), Max: 37.2 (21 Aug 2024 21:35)  T(F): 98.8 (22 Aug 2024 04:20), Max: 99 (21 Aug 2024 21:35)  HR: 62 (22 Aug 2024 06:15) (52 - 64)  BP: 108/67 (22 Aug 2024 06:15) (108/67 - 124/87)  BP(mean): --  RR: 18 (22 Aug 2024 04:20) (16 - 18)  SpO2: 95% (22 Aug 2024 04:20) (95% - 98%)    Parameters below as of 22 Aug 2024 04:20  Patient On (Oxygen Delivery Method): room air        PHYSICAL EXAM:  GENERAL: no distress  PSYCH: A&O x3  HEAD: Atraumatic, Normocephalic  NECK: Supple, No JVD  CHEST/LUNG: clear to auscultation bilaterally  HEART: regular rate and rhythm, no murmurs  ABDOMEN: nontender to palpation, no rebound tenderness/guarding  EXTREMITIES: no edema on bilateral LE  NEUROLOGY: no focal neurologic deficit  SKIN: No rashes or lesions    LABS:                        15.0   6.70  )-----------( 189      ( 22 Aug 2024 06:51 )             45.5      08-22    138  |  101  |  13  ----------------------------<  106<H>  3.6   |  24  |  0.66    Ca    9.1      22 Aug 2024 06:51  Phos  3.3     08-22  Mg     2.1     08-22    TPro  7.6  /  Alb  4.1  /  TBili  0.3  /  DBili  x   /  AST  29  /  ALT  21  /  AlkPhos  85  08-22          Urinalysis Basic - ( 22 Aug 2024 06:51 )    Color: x / Appearance: x / SG: x / pH: x  Gluc: 106 mg/dL / Ketone: x  / Bili: x / Urobili: x   Blood: x / Protein: x / Nitrite: x   Leuk Esterase: x / RBC: x / WBC x   Sq Epi: x / Non Sq Epi: x / Bacteria: x        RADIOLOGY & ADDITIONAL TESTS:    Imaging Personally Reviewed:    Consultant(s) Notes Reviewed:      Care Discussed with Consultants/Other Providers:

## 2024-08-23 ENCOUNTER — TRANSCRIPTION ENCOUNTER (OUTPATIENT)
Age: 44
End: 2024-08-23

## 2024-08-23 PROCEDURE — 72158 MRI LUMBAR SPINE W/O & W/DYE: CPT | Mod: 26

## 2024-08-23 PROCEDURE — 72157 MRI CHEST SPINE W/O & W/DYE: CPT | Mod: 26

## 2024-08-23 PROCEDURE — 93010 ELECTROCARDIOGRAM REPORT: CPT

## 2024-08-23 PROCEDURE — 72156 MRI NECK SPINE W/O & W/DYE: CPT | Mod: 26

## 2024-08-23 PROCEDURE — 99232 SBSQ HOSP IP/OBS MODERATE 35: CPT | Mod: GC

## 2024-08-23 RX ORDER — METHOCARBAMOL 750 MG/1
2 TABLET, FILM COATED ORAL
Qty: 30 | Refills: 3
Start: 2024-08-23

## 2024-08-23 RX ORDER — POLYETHYLENE GLYCOL 3350 17 G/17G
17 POWDER, FOR SOLUTION ORAL
Qty: 0 | Refills: 0 | DISCHARGE
Start: 2024-08-23

## 2024-08-23 RX ORDER — PANTOPRAZOLE SODIUM 40 MG
1 TABLET, DELAYED RELEASE (ENTERIC COATED) ORAL
Qty: 30 | Refills: 3
Start: 2024-08-23

## 2024-08-23 RX ORDER — ACETAMINOPHEN 325 MG/1
2 TABLET ORAL
Qty: 0 | Refills: 0 | DISCHARGE
Start: 2024-08-23

## 2024-08-23 RX ORDER — SENNA 187 MG
2 TABLET ORAL
Qty: 0 | Refills: 0 | DISCHARGE
Start: 2024-08-23

## 2024-08-23 RX ADMIN — METHOCARBAMOL 1000 MILLIGRAM(S): 750 TABLET, FILM COATED ORAL at 14:50

## 2024-08-23 RX ADMIN — METHOCARBAMOL 1000 MILLIGRAM(S): 750 TABLET, FILM COATED ORAL at 00:28

## 2024-08-23 RX ADMIN — OXYCODONE HYDROCHLORIDE 10 MILLIGRAM(S): 5 TABLET ORAL at 15:21

## 2024-08-23 RX ADMIN — Medication 1 PATCH: at 19:35

## 2024-08-23 RX ADMIN — Medication 800 MILLIGRAM(S): at 14:50

## 2024-08-23 RX ADMIN — METHOCARBAMOL 1000 MILLIGRAM(S): 750 TABLET, FILM COATED ORAL at 17:37

## 2024-08-23 RX ADMIN — OXYCODONE HYDROCHLORIDE 10 MILLIGRAM(S): 5 TABLET ORAL at 14:51

## 2024-08-23 RX ADMIN — OXYCODONE HYDROCHLORIDE 10 MILLIGRAM(S): 5 TABLET ORAL at 01:03

## 2024-08-23 RX ADMIN — Medication 1 PATCH: at 15:52

## 2024-08-23 RX ADMIN — ACETAMINOPHEN 1000 MILLIGRAM(S): 325 TABLET ORAL at 21:29

## 2024-08-23 RX ADMIN — POLYETHYLENE GLYCOL 3350 17 GRAM(S): 17 POWDER, FOR SOLUTION ORAL at 15:52

## 2024-08-23 RX ADMIN — OXYCODONE HYDROCHLORIDE 10 MILLIGRAM(S): 5 TABLET ORAL at 02:00

## 2024-08-23 RX ADMIN — Medication 1 PATCH: at 01:35

## 2024-08-23 RX ADMIN — BUPROPION HYDROCHLORIDE 300 MILLIGRAM(S): 150 TABLET ORAL at 15:52

## 2024-08-23 RX ADMIN — Medication 10 MILLIGRAM(S): at 17:37

## 2024-08-23 RX ADMIN — ACETAMINOPHEN 1000 MILLIGRAM(S): 325 TABLET ORAL at 22:00

## 2024-08-23 RX ADMIN — Medication 3 MILLIGRAM(S): at 01:03

## 2024-08-23 RX ADMIN — METHADONE HYDROCHLORIDE 190 MILLIGRAM(S): 1 POWDER ORAL at 10:33

## 2024-08-23 RX ADMIN — Medication 2 TABLET(S): at 21:30

## 2024-08-23 RX ADMIN — Medication 800 MILLIGRAM(S): at 21:29

## 2024-08-23 RX ADMIN — ACETAMINOPHEN 1000 MILLIGRAM(S): 325 TABLET ORAL at 15:21

## 2024-08-23 RX ADMIN — ACETAMINOPHEN 1000 MILLIGRAM(S): 325 TABLET ORAL at 14:51

## 2024-08-23 NOTE — DISCHARGE NOTE PROVIDER - NSDCMRMEDTOKEN_GEN_ALL_CORE_FT
acetaminophen 500 mg oral tablet: 2 tab(s) orally every 8 hours  buPROPion 300 mg/24 hours (XL) oral tablet, extended release: 1 tab(s) orally once a day  cyclobenzaprine 5 mg oral tablet: 1 tab(s) orally 3 times a day as needed for Muscle Spasm  gabapentin 800 mg oral tablet: 1 tab(s) orally 3 times a day  methadone: 190 mg once a day. Med verified with [place] [phone].  pantoprazole 40 mg oral delayed release tablet: 1 tab(s) orally once a day (before a meal)  polyethylene glycol 3350 oral powder for reconstitution: 17 gram(s) orally once a day  senna leaf extract oral tablet: 2 tab(s) orally once a day (at bedtime)   acetaminophen 500 mg oral tablet: 2 tab(s) orally every 8 hours  buPROPion 300 mg/24 hours (XL) oral tablet, extended release: 1 tab(s) orally once a day  cyclobenzaprine 5 mg oral tablet: 1 tab(s) orally 3 times a day as needed for Muscle Spasm  gabapentin 800 mg oral tablet: 1 tab(s) orally 3 times a day  methadone: 190 milligram(s) orally once a day Med verified with [place] [phone].  pantoprazole 40 mg oral delayed release tablet: 1 tab(s) orally once a day (before a meal)  polyethylene glycol 3350 oral powder for reconstitution: 17 gram(s) orally once a day  senna leaf extract oral tablet: 2 tab(s) orally once a day (at bedtime)   acetaminophen 500 mg oral tablet: 2 tab(s) orally every 8 hours  buPROPion 300 mg/24 hours (XL) oral tablet, extended release: 1 tab(s) orally once a day  cyclobenzaprine 5 mg oral tablet: 1 tab(s) orally 3 times a day as needed for Muscle Spasm  gabapentin 800 mg oral tablet: 1 tab(s) orally 3 times a day  methadone: 190 milligram(s) orally once a day Med verified with [place] [phone].  methocarbamol 500 mg oral tablet: 2 tab(s) orally every 6 hours as needed for  muscle spasm  pantoprazole 40 mg oral delayed release tablet: 1 tab(s) orally once a day (before a meal)  polyethylene glycol 3350 oral powder for reconstitution: 17 gram(s) orally once a day  senna leaf extract oral tablet: 2 tab(s) orally once a day (at bedtime)

## 2024-08-23 NOTE — DISCHARGE NOTE PROVIDER - NSFOLLOWUPCLINICS_GEN_ALL_ED_FT
[facility] of Substance Abuse  Psychiatry Substance Abuse  [address] [place] [address]  Phone: [phone]  Fax:

## 2024-08-23 NOTE — PROGRESS NOTE ADULT - SUBJECTIVE AND OBJECTIVE BOX
Kimmy Collier PGY1  pager [phone] or check resident tab for coverage    Patient is a 44y old  Male who presents with a chief complaint of Back pain (22 Aug 2024 07:53)      SUBJECTIVE / OVERNIGHT EVENTS:    MEDICATIONS  (STANDING):  acetaminophen     Tablet .. 1000 milliGRAM(s) Oral every 8 hours  buPROPion XL (24-Hour) . 300 milliGRAM(s) Oral daily  gabapentin 800 milliGRAM(s) Oral three times a day  lidocaine   4% Patch 1 Patch Transdermal daily  methadone    Tablet 190 milliGRAM(s) Oral daily  methocarbamol 1000 milliGRAM(s) Oral every 6 hours  pantoprazole    Tablet 40 milliGRAM(s) Oral before breakfast  polyethylene glycol 3350 17 Gram(s) Oral daily  senna 2 Tablet(s) Oral at bedtime    MEDICATIONS  (PRN):  melatonin 3 milliGRAM(s) Oral at bedtime PRN Insomnia  ondansetron Injectable 4 milliGRAM(s) IV Push every 8 hours PRN Nausea and/or Vomiting  oxyCODONE    Solution 10 milliGRAM(s) Oral every 6 hours PRN Severe Pain (7 - 10)      CAPILLARY BLOOD GLUCOSE        I&O's Summary    22 Aug 2024 07:01  -  23 Aug 2024 07:00  --------------------------------------------------------  IN: 240 mL / OUT: 3 mL / NET: 237 mL        Vital Signs Last 24 Hrs  T(C): 36.8 (23 Aug 2024 05:29), Max: 37.1 (22 Aug 2024 21:37)  T(F): 98.2 (23 Aug 2024 05:29), Max: 98.7 (22 Aug 2024 21:37)  HR: 56 (23 Aug 2024 05:29) (56 - 63)  BP: 107/70 (23 Aug 2024 05:29) (107/70 - 130/76)  BP(mean): --  RR: 18 (23 Aug 2024 05:29) (18 - 18)  SpO2: 92% (23 Aug 2024 05:29) (92% - 96%)    Parameters below as of 23 Aug 2024 05:29  Patient On (Oxygen Delivery Method): room air        PHYSICAL EXAM:  GENERAL: no distress  PSYCH: A&O x3  HEAD: Atraumatic, Normocephalic  NECK: Supple, No JVD  CHEST/LUNG: clear to auscultation bilaterally  HEART: regular rate and rhythm, no murmurs  ABDOMEN: nontender to palpation, no rebound tenderness/guarding  EXTREMITIES: no edema on bilateral LE  NEUROLOGY: no focal neurologic deficit  SKIN: No rashes or lesions    LABS:                        15.0   6.70  )-----------( 189      ( 22 Aug 2024 06:51 )             45.5      08-22    138  |  101  |  13  ----------------------------<  106<H>  3.6   |  24  |  0.66    Ca    9.1      22 Aug 2024 06:51  Phos  3.3     08-22  Mg     2.1     08-22    TPro  7.6  /  Alb  4.1  /  TBili  0.3  /  DBili  x   /  AST  29  /  ALT  21  /  AlkPhos  85  08-22          Urinalysis Basic - ( 22 Aug 2024 06:51 )    Color: x / Appearance: x / SG: x / pH: x  Gluc: 106 mg/dL / Ketone: x  / Bili: x / Urobili: x   Blood: x / Protein: x / Nitrite: x   Leuk Esterase: x / RBC: x / WBC x   Sq Epi: x / Non Sq Epi: x / Bacteria: x        RADIOLOGY & ADDITIONAL TESTS:    Imaging Personally Reviewed:    Consultant(s) Notes Reviewed:      Care Discussed with Consultants/Other Providers:   Kimmy Collier PGY3  pager [phone] or check resident tab for coverage    Patient is a 44y old  Male who presents with a chief complaint of Back pain (22 Aug 2024 07:53)      SUBJECTIVE / OVERNIGHT EVENTS: NAONE. Patient feels well. Denies f/c/n/v/d. NPO after MN for MRI w/ anesthesia. Denies cp/sob. Back pain is better.    MEDICATIONS  (STANDING):  acetaminophen     Tablet .. 1000 milliGRAM(s) Oral every 8 hours  buPROPion XL (24-Hour) . 300 milliGRAM(s) Oral daily  gabapentin 800 milliGRAM(s) Oral three times a day  lidocaine   4% Patch 1 Patch Transdermal daily  methadone    Tablet 190 milliGRAM(s) Oral daily  methocarbamol 1000 milliGRAM(s) Oral every 6 hours  pantoprazole    Tablet 40 milliGRAM(s) Oral before breakfast  polyethylene glycol 3350 17 Gram(s) Oral daily  senna 2 Tablet(s) Oral at bedtime    MEDICATIONS  (PRN):  melatonin 3 milliGRAM(s) Oral at bedtime PRN Insomnia  ondansetron Injectable 4 milliGRAM(s) IV Push every 8 hours PRN Nausea and/or Vomiting  oxyCODONE    Solution 10 milliGRAM(s) Oral every 6 hours PRN Severe Pain (7 - 10)      CAPILLARY BLOOD GLUCOSE        I&O's Summary    22 Aug 2024 07:01  -  23 Aug 2024 07:00  --------------------------------------------------------  IN: 240 mL / OUT: 3 mL / NET: 237 mL        Vital Signs Last 24 Hrs  T(C): 36.8 (23 Aug 2024 05:29), Max: 37.1 (22 Aug 2024 21:37)  T(F): 98.2 (23 Aug 2024 05:29), Max: 98.7 (22 Aug 2024 21:37)  HR: 56 (23 Aug 2024 05:29) (56 - 63)  BP: 107/70 (23 Aug 2024 05:29) (107/70 - 130/76)  BP(mean): --  RR: 18 (23 Aug 2024 05:29) (18 - 18)  SpO2: 92% (23 Aug 2024 05:29) (92% - 96%)    Parameters below as of 23 Aug 2024 05:29  Patient On (Oxygen Delivery Method): room air        PHYSICAL EXAM:  GENERAL: no distress  PSYCH: A&O x3  HEAD: Atraumatic, Normocephalic  NECK: Supple, No JVD  CHEST/LUNG: clear to auscultation bilaterally  HEART: regular rate and rhythm, no murmurs  ABDOMEN: nontender to palpation, no rebound tenderness/guarding  EXTREMITIES: no edema on bilateral LE  NEUROLOGY: no focal neurologic deficit  SKIN: No rashes or lesions    LABS:                        15.0   6.70  )-----------( 189      ( 22 Aug 2024 06:51 )             45.5      08-22    138  |  101  |  13  ----------------------------<  106<H>  3.6   |  24  |  0.66    Ca    9.1      22 Aug 2024 06:51  Phos  3.3     08-22  Mg     2.1     08-22    TPro  7.6  /  Alb  4.1  /  TBili  0.3  /  DBili  x   /  AST  29  /  ALT  21  /  AlkPhos  85  08-22          Urinalysis Basic - ( 22 Aug 2024 06:51 )    Color: x / Appearance: x / SG: x / pH: x  Gluc: 106 mg/dL / Ketone: x  / Bili: x / Urobili: x   Blood: x / Protein: x / Nitrite: x   Leuk Esterase: x / RBC: x / WBC x   Sq Epi: x / Non Sq Epi: x / Bacteria: x        RADIOLOGY & ADDITIONAL TESTS:    Imaging Personally Reviewed:    Consultant(s) Notes Reviewed:      Care Discussed with Consultants/Other Providers:

## 2024-08-23 NOTE — DISCHARGE NOTE PROVIDER - NSDCFUADDAPPT_GEN_ALL_CORE_FT
APPTS ARE READY TO BE MADE: [X ] YES    Best Family or Patient Contact (if needed):    Additional Information about above appointments (if needed):    1:   2:   3:     Other comments or requests:    APPTS ARE READY TO BE MADE: [X ] YES    Best Family or Patient Contact (if needed):    Additional Information about above appointments (if needed):    1: PCP  2: Substance Abuse  3: Nephrology    Other comments or requests:

## 2024-08-23 NOTE — PROGRESS NOTE ADULT - PROBLEM SELECTOR PLAN 1
- monitoring off abx   - pending repeat MRI C/T/L. Past MR spine in june with sedation resulted with degenerative disc disease, no abnormal enhancement suggestive of osteo  - patient wants MR with anesthesia, plan to be done with anesthesia today  8/23; NPO after MN; however no clear indications for anesthesia in this patient with combined substance use hx concerns.  - ID was discussed with by prior attending on 6/22, pending follow up   - Pain control with neurontin, lidocaine patch, methocarbamol; oxy for breakthru pain till MRI done  - He would follow up at the clinic on Monday to  his methadone pill  - chronic pain c/s recs appreciated - monitoring off abx   - pending repeat MRI C/T/L. Past MR spine in june with sedation resulted with degenerative disc disease, no abnormal enhancement suggestive of osteo  - patient wants MR with anesthesia, plan to be done with anesthesia today  8/23; NPO after MN;  patient with combined substance use hx concerns.  - ID was discussed with by prior attending on 6/22, pending follow up   - Pain control with neurontin, lidocaine patch, methocarbamol; oxy for breakthru pain till MRI done  - He would follow up at the clinic on Monday to  his methadone pill  - chronic pain c/s recs appreciated

## 2024-08-23 NOTE — DISCHARGE NOTE PROVIDER - HOSPITAL COURSE
Blas Sheets is a 46 y/o M with PMH significant for chronic thoracic osteomyelitis, IVDA on methadone, and polysubstance abuse presenting with back pain. Patient stated pain has been ongoing x 2 months and has not improved since last hospitalization despite taking gabapentin and flexeril and applying lidocaine patch. Pain is located in the mid thoracic and b/l lateral lumbar spine. He denies fevers but admits to chills and night sweats. Denies any new FND or loss of BB function. Admits to associated nausea. Spoke with [facility] and confirmed he is an active patient in the methadone clinic, last dose 190 mg on 8/19. Patient admits to occasional Xanax use to help wit back pain but denies any additional drug or alcohol     Hospital Course:   Blas Sheets is a 46 y/o M with PMH significant for chronic thoracic osteomyelitis, IVDA on methadone, and polysubstance abuse presenting with back pain. Patient stated pain has been ongoing x 2 months and has not improved since last hospitalization despite taking gabapentin and flexeril and applying lidocaine patch. Pain is located in the mid thoracic and b/l lateral lumbar spine. He denies fevers but admits to chills and night sweats. Denies any new FND or loss of BB function. Admits to associated nausea. Spoke with [facility] and confirmed he is an active patient in the methadone clinic, last dose 190 mg on 8/19. Patient admits to occasional Xanax use to help wit back pain but denies any additional drug or alcohol     Hospital Course:  44-year-old male w/ hx of chronic thoracic osteomyelitis and IV drug use presenting for worsening back pain. Patient was admitted for back pain . Thoracolumbar xray and MRI Spine negative for osteomyelitis. Chronic pain consulted for assistance, dc'ed oxy and flexeril, continued robaxin, gabapentin, tylenol prn, and methadone for h/o polysubstance abuse. PT no skilled needs. Patient is medically stable to be discharged per attending Dr. Meyer.

## 2024-08-23 NOTE — DISCHARGE NOTE PROVIDER - CARE PROVIDER_API CALL
Erickson Orr  Nephrology  [address], [address]  [place] [address]  Phone: [phone]  Fax: [phone]  Established Patient  Follow Up Time: 1 week

## 2024-08-23 NOTE — DISCHARGE NOTE PROVIDER - ATTENDING DISCHARGE PHYSICAL EXAMINATION:
CONSTITUTIONAL: NAD, well-developed   EYES: PERRLA; conjunctiva and sclera clear  ENMT: Moist oral mucosa, no pharyngeal injection or exudates   NECK: Supple   RESPIRATORY: Normal respiratory effort; lungs are clear to auscultation bilaterally  CARDIOVASCULAR: Regular rate and rhythm, normal S1 and S2  ABDOMEN: Nontender to palpation, normoactive bowel sounds  MUSCULOSKELETAL: no clubbing or cyanosis of digits; no joint swelling or tenderness to palpation  PSYCH: A+O to person, place, and time; affect appropriate  NEUROLOGY: no gross sensory deficits   SKIN: No rashes

## 2024-08-23 NOTE — DISCHARGE NOTE PROVIDER - NSDCCPTREATMENT_GEN_ALL_CORE_FT
PRINCIPAL PROCEDURE  Procedure: MRI C spine  Findings and Treatment: IMPRESSION:  1. CERVICAL SPINE:   Mild-to-moderate cervical degenerative disc disease.    Cervical cord intact.   No abnormal enhancement.  2. THORACIC SPINE:   Posterior stabilization T1-T8 secures T4 and T5   acquired ankylosis. Stable postoperative appearance. Lower thoracic mild   degenerative disc disease. Thoracic cord intact.   No abnormal   enhancement.  3. LUMBAR SPINE:   Grade 1 degenerative anterior listhesis L4 on L5.  No   significant lumbar degenerative disc disease. Conus and cauda equina   intact.   No abnormal enhancement.  4. No substantial interval change is recognized 6/21/2024

## 2024-08-23 NOTE — DISCHARGE NOTE PROVIDER - NSDCCPCAREPLAN_GEN_ALL_CORE_FT
PRINCIPAL DISCHARGE DIAGNOSIS  Diagnosis: Back pain  Assessment and Plan of Treatment: Now improved. MRI spine was negative for osteomyelitis. Follow up with your PCP in 1-2 weeks.      SECONDARY DISCHARGE DIAGNOSES  Diagnosis: Polysubstance abuse  Assessment and Plan of Treatment: Continue your methadone.     PRINCIPAL DISCHARGE DIAGNOSIS  Diagnosis: Back pain  Assessment and Plan of Treatment: You were admitted with back pain. Initially there was some concern for an infection since you also had chills. You did not show any  other signs of active infection however. You received an MRI of your whole spine. MRI spine was negative for osteomyelitis which is an infection of your bone. Follow up with your PCP in 1-2 weeks. If you start experiencing fevers, lightheadedness, dizziness, shortness of breath, nausea/vomiting/chest pain, excessive night sweats, please come to the ED right away.      SECONDARY DISCHARGE DIAGNOSES  Diagnosis: Polysubstance abuse  Assessment and Plan of Treatment: Your urine contained cocaine, opioids, benzodiazepines, THC. This did not align with your history. We talked to  pain management and started you on a new medication which helped your back pain. We stopped oxycodone after your MRI was negative for infection. It is very important to seek therapy for your polysubstance abuse history. This along with continued medical management outpatient is important for your well being. Please take your meds as prescribed. Continue your methadone. If you start experiencing withdrawal/psychiatric symptoms such as night sweats, chills, seizures, hallucinations, delusions/paranoia, thoughts of suicide or harming others, please come to the ED right away.,

## 2024-08-24 ENCOUNTER — TRANSCRIPTION ENCOUNTER (OUTPATIENT)
Age: 44
End: 2024-08-24

## 2024-08-24 VITALS
DIASTOLIC BLOOD PRESSURE: 74 MMHG | HEART RATE: 67 BPM | OXYGEN SATURATION: 95 % | TEMPERATURE: 98 F | SYSTOLIC BLOOD PRESSURE: 117 MMHG | RESPIRATION RATE: 18 BRPM

## 2024-08-24 PROCEDURE — 72156 MRI NECK SPINE W/O & W/DYE: CPT | Mod: MC

## 2024-08-24 PROCEDURE — 99239 HOSP IP/OBS DSCHRG MGMT >30: CPT | Mod: GC

## 2024-08-24 PROCEDURE — G0480: CPT

## 2024-08-24 PROCEDURE — 93005 ELECTROCARDIOGRAM TRACING: CPT

## 2024-08-24 PROCEDURE — 85025 COMPLETE CBC W/AUTO DIFF WBC: CPT

## 2024-08-24 PROCEDURE — A9585: CPT

## 2024-08-24 PROCEDURE — 99285 EMERGENCY DEPT VISIT HI MDM: CPT

## 2024-08-24 PROCEDURE — 86140 C-REACTIVE PROTEIN: CPT

## 2024-08-24 PROCEDURE — 85027 COMPLETE CBC AUTOMATED: CPT

## 2024-08-24 PROCEDURE — 72158 MRI LUMBAR SPINE W/O & W/DYE: CPT | Mod: MC

## 2024-08-24 PROCEDURE — 72157 MRI CHEST SPINE W/O & W/DYE: CPT | Mod: MC

## 2024-08-24 PROCEDURE — 80048 BASIC METABOLIC PNL TOTAL CA: CPT

## 2024-08-24 PROCEDURE — 71045 X-RAY EXAM CHEST 1 VIEW: CPT

## 2024-08-24 PROCEDURE — 81001 URINALYSIS AUTO W/SCOPE: CPT

## 2024-08-24 PROCEDURE — 87086 URINE CULTURE/COLONY COUNT: CPT

## 2024-08-24 PROCEDURE — 83735 ASSAY OF MAGNESIUM: CPT

## 2024-08-24 PROCEDURE — 85652 RBC SED RATE AUTOMATED: CPT

## 2024-08-24 PROCEDURE — 80307 DRUG TEST PRSMV CHEM ANLYZR: CPT

## 2024-08-24 PROCEDURE — 84100 ASSAY OF PHOSPHORUS: CPT

## 2024-08-24 PROCEDURE — 84145 PROCALCITONIN (PCT): CPT

## 2024-08-24 PROCEDURE — 97161 PT EVAL LOW COMPLEX 20 MIN: CPT

## 2024-08-24 PROCEDURE — 72082 X-RAY EXAM ENTIRE SPI 2/3 VW: CPT

## 2024-08-24 PROCEDURE — 80053 COMPREHEN METABOLIC PANEL: CPT

## 2024-08-24 RX ORDER — CYCLOBENZAPRINE HCL 10 MG
1 TABLET ORAL
Qty: 20 | Refills: 0
Start: 2024-08-24 | End: 2024-08-26

## 2024-08-24 RX ORDER — GABAPENTIN 100 MG
1 CAPSULE ORAL
Qty: 90 | Refills: 1
Start: 2024-08-24 | End: 2024-10-22

## 2024-08-24 RX ADMIN — METHOCARBAMOL 1000 MILLIGRAM(S): 750 TABLET, FILM COATED ORAL at 00:25

## 2024-08-24 RX ADMIN — METHOCARBAMOL 1000 MILLIGRAM(S): 750 TABLET, FILM COATED ORAL at 05:58

## 2024-08-24 RX ADMIN — Medication 3 MILLIGRAM(S): at 00:25

## 2024-08-24 RX ADMIN — Medication 800 MILLIGRAM(S): at 06:00

## 2024-08-24 RX ADMIN — Medication 40 MILLIGRAM(S): at 08:57

## 2024-08-24 RX ADMIN — ACETAMINOPHEN 1000 MILLIGRAM(S): 325 TABLET ORAL at 07:00

## 2024-08-24 RX ADMIN — ACETAMINOPHEN 1000 MILLIGRAM(S): 325 TABLET ORAL at 05:58

## 2024-08-24 NOTE — PROGRESS NOTE ADULT - PROBLEM SELECTOR PLAN 4
Heparin sq
Heparin sq
IMPROVE score low; will hold off for now
IMPROVE score low; will hold off for now

## 2024-08-24 NOTE — PROGRESS NOTE ADULT - ATTENDING COMMENTS
Patient is a 44 year old male, with PMH of chronic thoracic osteomyelitis and IV drug use presenting for worsening back pain.     - MRI C,T and L spine done with anesthesia negative for OM    - pain mgmt recs appreciated; continue pain control with PRN tylenol, gabapentin, robaxin, lidocaine patch; will discontinue oxycodone solution since MRI neg  - bowel regimen   - continue methadone   - PT eval: NPT   - SW consult     DC planning today. SW set up transport for patient to go to methadone clinic  35 mins spent on DC planning     Rest as above. Discussed with HS.
Patient is a 44 year old male, with PMH of chronic thoracic osteomyelitis and IV drug use presenting for worsening back pain.     - MRI C,T and L spine pending; low concern for OM at this time (afebrile, normal WBC count); will monitor off abx at this time   - continue pain control with ATC tylenol, gabapentin, PRN flexeril, lidocaine patch  - pain mgmt consult pending   - continue methadone   - PT eval: NPT   - SW consult     Rest as above. Discussed with HS.
Patient is a 44 year old male, with PMH of chronic thoracic osteomyelitis and IV drug use presenting for worsening back pain.     - MRI C,T and L spine done with anesthesia; pending read; low concern for OM at this time (afebrile, normal WBC count); will monitor off abx at this time   - pain mgmt recs appreciated; continue pain control with ATC tylenol, gabapentin, robaxin, lidocaine patch, oxycodone solution PRN; likely DC oxy if no acute findings noted on MRI scans per pain mgmt recs   - bowel regimen; give suppository when return to floor from PACU   - continue methadone   - PT eval: NPT   - SW consult   - possible DC home tomorrow if MRI results negative; patient states will need transport to go from here to his methadone clinic and get methadone from there without getting dose here; states his clinic is closed on Sunday so will likely need to be discharged early Saturday if cleared to ensure methadone given to patient     Rest as above. Discussed with HS.
Patient is a 44 year old male, with PMH of chronic thoracic osteomyelitis and IV drug use presenting for worsening back pain.     - MRI C,T and L spine pending; patient states needing to be sedated for MRI; will plan to obtain MRI with anesthesia tomorrow; NPO after MN; low concern for OM at this time (afebrile, normal WBC count); will monitor off abx at this time   - pain mgmt recs appreciated; continue pain control with ATC tylenol, gabapentin, robaxin, lidocaine patch, oxycodone solution PRN; likely DC oxy if no acute findings noted on MRI scans per pain mgmt recs   - bowel regimen; give dose of lactulose today   - continue methadone   - PT eval: NPT   - SW consult     Rest as above. Discussed with HS.

## 2024-08-24 NOTE — PROGRESS NOTE ADULT - PROBLEM SELECTOR PLAN 3
Prior attending called methadone clinic [facility] [phone] confirmed dose   C/w methadone 190mg
Prior attending called methadone [facility] [phone] confirmed dose   C/w methadone 190mg
Prior attending called methadone [facility] [phone] confirmed dose   C/w methadone 190mg
Prior attending called methadone clinic [facility] [phone] confirmed dose   C/w methadone 190mg

## 2024-08-24 NOTE — DISCHARGE NOTE NURSING/CASE MANAGEMENT/SOCIAL WORK - NSDCFUADDAPPT_GEN_ALL_CORE_FT
APPTS ARE READY TO BE MADE: [X ] YES    Best Family or Patient Contact (if needed):    Additional Information about above appointments (if needed):    1: PCP  2: Substance Abuse  3: Nephrology    Other comments or requests:

## 2024-08-24 NOTE — PROGRESS NOTE ADULT - PROBLEM SELECTOR PLAN 1
- monitoring off abx   - MR with anesthesia done with no concern for OM.  - ID was discussed with by prior attending on 6/22, pending follow up   - Pain control with neurontin, lidocaine patch, methocarbamol; oxy for breakthru pain  - He would follow up at the clinic on Monday to  his methadone pill  - chronic pain c/s recs appreciated - monitoring off abx   - MR with anesthesia done with no concern for OM.  - Pain control with neurontin, lidocaine patch, methocarbamol; oxy for breakthru pain  - chronic pain c/s recs appreciated

## 2024-08-24 NOTE — PROGRESS NOTE ADULT - SUBJECTIVE AND OBJECTIVE BOX
Kimmy Collier PGY3  pager [phone] or check resident tab for coverage    Patient is a 44y old  Male who presents with a chief complaint of Back pain (23 Aug 2024 15:42)      SUBJECTIVE / OVERNIGHT EVENTS: Patient feels well; ready to go home. Patient will go to methadone clinic first to pick it up. Denies cp/sob/f/c/n/v/d.     MEDICATIONS  (STANDING):  acetaminophen     Tablet .. 1000 milliGRAM(s) Oral every 8 hours  buPROPion XL (24-Hour) . 300 milliGRAM(s) Oral daily  gabapentin 800 milliGRAM(s) Oral three times a day  lidocaine   4% Patch 1 Patch Transdermal daily  methadone    Tablet 190 milliGRAM(s) Oral daily  methocarbamol 1000 milliGRAM(s) Oral every 6 hours  pantoprazole    Tablet 40 milliGRAM(s) Oral before breakfast  polyethylene glycol 3350 17 Gram(s) Oral daily  senna 2 Tablet(s) Oral at bedtime    MEDICATIONS  (PRN):  melatonin 3 milliGRAM(s) Oral at bedtime PRN Insomnia  ondansetron Injectable 4 milliGRAM(s) IV Push every 8 hours PRN Nausea and/or Vomiting      CAPILLARY BLOOD GLUCOSE        I&O's Summary    23 Aug 2024 07:01  -  24 Aug 2024 07:00  --------------------------------------------------------  IN: 920 mL / OUT: 0 mL / NET: 920 mL        Vital Signs Last 24 Hrs  T(C): 36.8 (24 Aug 2024 04:35), Max: 37.1 (23 Aug 2024 20:30)  T(F): 98.3 (24 Aug 2024 04:35), Max: 98.7 (23 Aug 2024 20:30)  HR: 67 (24 Aug 2024 04:35) (56 - 74)  BP: 117/74 (24 Aug 2024 04:35) (105/56 - 140/89)  BP(mean): 76 (23 Aug 2024 15:00) (62 - 111)  RR: 18 (24 Aug 2024 04:35) (14 - 18)  SpO2: 95% (24 Aug 2024 04:35) (92% - 96%)    Parameters below as of 24 Aug 2024 04:35  Patient On (Oxygen Delivery Method): room air        PHYSICAL EXAM:  GENERAL: no distress  PSYCH: A&O x3  HEAD: Atraumatic, Normocephalic  NECK: Supple, No JVD  CHEST/LUNG: clear to auscultation bilaterally  HEART: regular rate and rhythm, no murmurs  ABDOMEN: nontender to palpation, no rebound tenderness/guarding  EXTREMITIES: no edema on bilateral LE  NEUROLOGY: no focal neurologic deficit  SKIN: No rashes or lesions    LABS:                     RADIOLOGY & ADDITIONAL TESTS:    Imaging Personally Reviewed:    Consultant(s) Notes Reviewed:      Care Discussed with Consultants/Other Providers:

## 2024-08-24 NOTE — DISCHARGE NOTE NURSING/CASE MANAGEMENT/SOCIAL WORK - PATIENT PORTAL LINK FT
You can access the FollowMyHealth Patient Portal offered by [facility] by registering at the following website: [email or web address]. By joining [employer]’s FollowMyHealth portal, you will also be able to view your health information using other applications (apps) compatible with our system.

## 2024-08-24 NOTE — PROGRESS NOTE ADULT - PROBLEM SELECTOR PLAN 2
+polysubstance abuse   [facility] I Stop # [phone] in the chart.  On methadone   SW follow up
+polysubstance abuse   [facility] I Stop # [phone] in the chart.  On methadone   SW follow up appreciated however patient declined resources?  patient with cocaine, thc, opioids, benzos in urine
+polysubstance abuse   [facility] I Stop # [phone] in the chart.  On methadone   SW follow up appreciated however patient declined resources?  patient with cocaine, thc, opioids, benzos in urine
+polysubstance abuse   [facility] I Stop # [phone] in the chart.  On methadone   SW follow up  patient with cocaine, thc, opioids, benzos in urine

## 2024-08-24 NOTE — PROGRESS NOTE ADULT - ASSESSMENT
44-year-old male w/ hx of chronic thoracic osteomyelitis and IV drug use presenting for worsening back pain. Patient initially presented on 6/18 but left AMA as he did not want to miss his scheduled methadone dose outpatient. Patient returned on 6/19 now with increased somnolence. Of note patient was recently admitted to [facility] for MSSA cellulitis of the right knee and was discharged on 5/29 on PO Keflex. In the ED, patient has remained afebrile and hemodynamically stable. Labs significant for elevated ESR/CRP. Blood cultures from 6/18 so far unremarkable. CT spine showing stable thoracic surgical changes s/p prior laminectomies to T3-T7, MRI spine negative for osteo. ID consulted for management for possible vertebral osteomyelitis.    
44-year-old male w/ hx of chronic thoracic osteomyelitis and IV drug use presenting for worsening back pain. Patient initially presented on 6/18 but left AMA as he did not want to miss his scheduled methadone dose outpatient. Patient returned on 6/19 now with increased somnolence. Of note patient was recently admitted to [facility] for MSSA cellulitis of the right knee and was discharged on 5/29 on PO Keflex. In the ED, patient has remained afebrile and hemodynamically stable. Labs significant for elevated ESR/CRP. Blood cultures from 6/18 so far unremarkable. CT spine showing stable thoracic surgical changes s/p prior laminectomies to T3-T7, MRI spine negative for osteo. ID consulted for management for possible vertebral osteomyelitis.    
44-year-old male w/ hx of chronic thoracic osteomyelitis and IV drug use presenting for worsening back pain. Patient initially presented on 6/18 but left AMA as he did not want to miss his scheduled methadone dose outpatient. Patient returned on 6/19 now with increased somnolence. Of note patient was recently admitted to [facility] for MSSA cellulitis of the right knee and was discharged on 5/29 on PO Keflex. In the ED, patient has remained afebrile and hemodynamically stable. Labs significant for elevated ESR/CRP. Blood cultures from 6/18 so far unremarkable. CT spine showing stable thoracic surgical changes s/p prior laminectomies to T3-T7, MRI spine negative for osteo. ID consulted for management for possible vertebral osteomyelitis.    
44-year-old male w/ hx of chronic thoracic osteomyelitis and IV drug use presenting for worsening back pain. Patient initially presented on 6/18 but left AMA as he did not want to miss his scheduled methadone dose outpatient. Patient returned on 6/19 now with increased somnolence. Of note patient was recently admitted to [facility] for MSSA cellulitis of the right knee and was discharged on 5/29 on PO Keflex. In the ED, patient has remained afebrile and hemodynamically stable. Labs significant for elevated ESR/CRP. Blood cultures from 6/18 so far unremarkable. CT spine showing stable thoracic surgical changes s/p prior laminectomies to T3-T7, MRI spine negative for osteo. ID consulted for management for possible vertebral osteomyelitis.

## 2024-08-30 LAB — COMPREHENSIVE TOXICOLOGY SCREEN, URINE: SIGNIFICANT CHANGE UP

## 2024-09-03 NOTE — CHART NOTE - NSCHARTNOTEFT_GEN_A_CORE
Family member answered on behalf of the patient and advised they will pass forward our details for the patient to return our call.   [phone]  [phone] number  [phone] with cousin (relative)
45M PMHx significant for chronic thoracic osteomyelitis, IVDA on methadone, and polysubstance abuse, hospital admission in June for right knee cellulitis   Presenting with chronic back pain, worsening, mid thoracic and b/l lateral lumbar spine.  Primary team confirmed w/ [facility], he is an active patient in the methadone clinic, last dose 190 mg on 8/19.    Current out- patient pain regimen: Gabapentin 800 mg TID (and illicit Xanax)  Out Patient Pain Management provider: None    Opioid Risk Tool (ORT-OUD) Score: High - Pt w/ Hx IVDA (heroin) and opioid abuse, is in Methadone Maintenance program.   Denies using anything recently except Xanax that he buys on the street. UTox (+) for Benzo (xanax), opiate, methadone, cocaine (pt denies use). Specimen sent for metabolite confirmation.  NOTE: EMR reviewed - ED provider note on this admission states pt reports last IVDA 2 months ago. Last admission, in June, patient's wife reported that pt was still using Cocaine and Heroin.    Pain Score: 8/10 to 0/10    Opioid tolerant pt w/ c/o chronic thoracic back pain and low back pain that radiates down RLE causing leg to "give out". Pt states he was told he has OM Lumbar spine.      < from: MR Lumbar Spine w/wo IV Cont (08.23.24 @ 14:24) >  IMPRESSION:  1. CERVICAL SPINE:   Mild-to-moderate cervical degenerative disc disease.    Cervical cord intact.   No abnormal enhancement.  2. THORACIC SPINE:   Posterior stabilization T1-T8 secures T4 and T5   acquired ankylosis. Stable postoperative appearance. Lower thoracic mild   degenerative disc disease. Thoracic cord intact.   No abnormal   enhancement.  3. LUMBAR SPINE:   Grade 1 degenerative anterior listhesis L4 on L5.  No   significant lumbar degenerative disc disease. Conus and cauda equina   intact.   No abnormal enhancement.  4. No substantial interval change is recognized 6/21/2024    Avoid IV opioids, not indicated for pt tolerating PO diet/meds  Discontinue Flexeril    Discontinue Oxycodone.  Change Robaxin 1,000 mg to Q6h PRN.  Continue Gabapentin 800 mg TID (CrCl = 214).  Change Tylenol and NSAIDs to PRN.  Continue Methadone 190 mg QD for Medication Assisted Treatment for Substance Use Disorder - any dose changes should be addressed w/ Behavioral Health, monitor QTc.  Monitor for sedation, respiratory depression.  Continue Bowel Regimen PRN.  OOB per primary team.    Out-patient pain practice list provided for pain management after discharge.  Narcan Rescue Kit on discharge (Naloxone 4 mg/0.1 ml nasal spray - 1 spray q 2-3 minutes alternating between nostrils).    Signing off.      [facility]  [phone]
Confidential Drug Utilization Report  Search Terms: john sheets, 1980Search Date: 08/20/2024 05:08:38 AM  The Drug Utilization Report below displays all of the controlled substance prescriptions, if any, that your patient has filled in the last twelve months. The information displayed on this report is compiled from pharmacy submissions to the Department, and accurately reflects the information as submitted by the pharmacies.    This report was requested by: Tr Serna | Reference #: [id number]    Practitioner Count: 1  Pharmacy Count: 1  Current Opioid Prescriptions: 0  Current Benzodiazepine Prescriptions: 0  Current Stimulant Prescriptions: 0      Patient Demographic Information (PDI)       PDI	First Name	Last Name	Birth Date	Gender	Street Address	[facility]	Zip Code  A	John Sheets	1980	Male	[address]	[id number]    Prescription Information      PDI Filter:    PDI	Current Rx	Drug Type	Rx Written	Rx Dispensed	Drug	Quantity	Days Supply	Prescriber Name	Prescriber GÓMEZ #	Payment Method	Dispenser  A	Y		08/14/2024	08/17/2024	xyosted 75 mg/0.5 ml auto-inj	2ml	28	Bradley [id number]	Medicaid	[id number]  A	Y		07/16/2024	07/23/2024	xyosted 75 mg/0.5 ml auto-inj	2ml	28	Bradley Caballero	[id number]	Medicaid	[facility][id number]  A	N		06/12/2024	06/15/2024	xyosted 75 mg/0.5 ml auto-inj	2ml	28	Bradley Caballero	[id number]	Medicaid	[id number]  A	N		04/30/2024	05/03/2024	xyosted 75 mg/0.5 ml auto-inj	2ml	28	Bradley Caballero	[id number]	Medicaid	[id number]  A	N		12/13/2023	01/03/2024	xyosted 75 mg/0.5 ml auto-inj	6ml	84	Bradley [id number]	Medicaid	[id number]  A	N		09/25/2023	09/28/2023	xyosted 75 mg/0.5 ml auto-inj	6ml	84	Bradley Caballero	[id number]	Medicaid	[id number]  A	N		08/22/2023	08/30/2023	xyosted 75 mg/0.5 ml auto-inj	2ml	28	Bradley Caballero	[id number]	Medicaid	[facility] #[id number]    * - Details of Drug Type : O = Opioid, B = Benzodiazepine, S = Stimulant    * - Drugs marked with an asterisk are compound drugs. If the compound drug is made up of more than one controlled substance, then each controlled substance will be a separate row in the table.
Family member answered on behalf of the patient and advised they will pass forward our details for the patient to return our call.   [phone]  [phone]-ilcpq number  [phone] with cousin (relative)
Family member answered on behalf of the patient and advised they will pass forward our details for the patient to return our call.   [phone]  [phone] number  [phone] with cousin (relative)

## 2024-09-22 ENCOUNTER — HOSPITAL ENCOUNTER (INPATIENT)
Dept: HOSPITAL 74 - YASAS | Age: 44
LOS: 5 days | Discharge: OTHER INSTITUTION | DRG: 773 | End: 2024-09-27
Attending: SURGERY | Admitting: ALLERGY & [facility]
Payer: COMMERCIAL

## 2024-09-22 VITALS — BODY MASS INDEX: 34.9 KG/M2

## 2024-09-22 DIAGNOSIS — F11.23: Primary | ICD-10-CM

## 2024-09-22 DIAGNOSIS — F10.20: ICD-10-CM

## 2024-09-22 DIAGNOSIS — G40.909: ICD-10-CM

## 2024-09-22 DIAGNOSIS — F17.210: ICD-10-CM

## 2024-09-22 DIAGNOSIS — F13.20: ICD-10-CM

## 2024-09-23 PROCEDURE — HZ2ZZZZ DETOXIFICATION SERVICES FOR SUBSTANCE ABUSE TREATMENT: ICD-10-PCS | Performed by: SURGERY

## 2024-09-23 RX ADMIN — METHOCARBAMOL PRN MG: 500 TABLET ORAL at 03:23

## 2024-09-23 RX ADMIN — Medication SCH MG: at 22:27

## 2024-09-23 RX ADMIN — TUBERCULIN PURIFIED PROTEIN DERIVATIVE ONE ML: 5 INJECTION, SOLUTION INTRADERMAL at 09:57

## 2024-09-23 RX ADMIN — DIVALPROEX SODIUM SCH MG: 500 TABLET, DELAYED RELEASE ORAL at 13:14

## 2024-09-23 RX ADMIN — Medication SCH TAB: at 10:00

## 2024-09-23 RX ADMIN — TRAZODONE HYDROCHLORIDE SCH MG: 50 TABLET ORAL at 22:27

## 2024-09-23 RX ADMIN — ACETAMINOPHEN PRN MG: 325 TABLET ORAL at 03:24

## 2024-09-23 RX ADMIN — DIVALPROEX SODIUM SCH MG: 500 TABLET, DELAYED RELEASE ORAL at 09:56

## 2024-09-23 RX ADMIN — GABAPENTIN SCH MG: 400 CAPSULE ORAL at 13:17

## 2024-09-23 RX ADMIN — Medication SCH MG: at 22:29

## 2024-09-23 RX ADMIN — NICOTINE SCH MG: 21 PATCH TRANSDERMAL at 09:57

## 2024-09-24 LAB
ALBUMIN SERPL-MCNC: 3.5 G/DL (ref 3.4–5)
ALP SERPL-CCNC: 92 U/L (ref 45–117)
ALT SERPL-CCNC: 46 U/L (ref 13–61)
ANION GAP SERPL CALC-SCNC: 4 MMOL/L (ref 4–13)
AST SERPL-CCNC: 47 U/L (ref 15–37)
BILIRUB SERPL-MCNC: 0.4 MG/DL (ref 0.2–1)
BUN SERPL-MCNC: 8.3 MG/DL (ref 7–18)
CALCIUM SERPL-MCNC: 9 MG/DL (ref 8.5–10.1)
CHLORIDE SERPL-SCNC: 102 MMOL/L (ref 98–107)
CO2 SERPL-SCNC: 31 MMOL/L (ref 21–32)
CREAT SERPL-MCNC: 0.6 MG/DL (ref 0.55–1.3)
DEPRECATED RDW RBC AUTO: 14.3 % (ref 11.9–15.9)
GLUCOSE SERPL-MCNC: 77 MG/DL (ref 74–106)
HCT VFR BLD CALC: 42.3 % (ref 35.4–49)
HGB BLD-MCNC: 14.5 GM/DL (ref 11.7–16.9)
MCH RBC QN AUTO: 30.7 PG (ref 25.7–33.7)
MCHC RBC AUTO-ENTMCNC: 34.2 G/DL (ref 32–35.9)
MCV RBC: 89.8 FL (ref 80–96)
PLATELET # BLD AUTO: 201 10^3/UL (ref 134–434)
PMV BLD: 10.2 FL (ref 7.5–11.1)
POTASSIUM SERPLBLD-SCNC: 4.2 MMOL/L (ref 3.5–5.1)
PROT SERPL-MCNC: 7.2 G/DL (ref 6.4–8.2)
RBC # BLD AUTO: 4.7 M/MM3 (ref 4–5.6)
SODIUM SERPL-SCNC: 137 MMOL/L (ref 136–145)
WBC # BLD AUTO: 8 K/MM3 (ref 4–10)

## 2024-09-24 RX ADMIN — GABAPENTIN SCH MG: 300 CAPSULE ORAL at 17:24

## 2024-09-24 RX ADMIN — DIVALPROEX SODIUM SCH MG: 250 TABLET, DELAYED RELEASE ORAL at 09:25

## 2024-09-24 RX ADMIN — ONDANSETRON PRN MG: 4 TABLET, ORALLY DISINTEGRATING ORAL at 05:22

## 2024-09-24 RX ADMIN — HYDROXYZINE PAMOATE PRN MG: 25 CAPSULE ORAL at 14:09

## 2024-09-25 RX ADMIN — MAGNESIUM HYDROXIDE PRN ML: 400 SUSPENSION ORAL at 15:36

## 2024-09-26 RX ADMIN — POLYETHYLENE GLYCOL 3350 PRN GM: 17 POWDER, FOR SOLUTION ORAL at 13:27

## 2024-09-27 VITALS
DIASTOLIC BLOOD PRESSURE: 60 MMHG | HEART RATE: 75 BPM | SYSTOLIC BLOOD PRESSURE: 107 MMHG | RESPIRATION RATE: 20 BRPM | TEMPERATURE: 97.7 F

## 2024-11-22 ENCOUNTER — EMERGENCY (EMERGENCY)
Facility: HOSPITAL | Age: 44
LOS: 0 days | Discharge: HOME | End: 2024-11-22
Attending: STUDENT IN AN ORGANIZED HEALTH CARE EDUCATION/TRAINING PROGRAM
Payer: MEDICAID

## 2024-11-22 VITALS
HEART RATE: 89 BPM | RESPIRATION RATE: 16 BRPM | DIASTOLIC BLOOD PRESSURE: 69 MMHG | WEIGHT: 229.06 LBS | HEIGHT: 68 IN | SYSTOLIC BLOOD PRESSURE: 115 MMHG | TEMPERATURE: 98 F | OXYGEN SATURATION: 97 %

## 2024-11-22 VITALS
HEART RATE: 78 BPM | RESPIRATION RATE: 17 BRPM | DIASTOLIC BLOOD PRESSURE: 78 MMHG | SYSTOLIC BLOOD PRESSURE: 112 MMHG | OXYGEN SATURATION: 98 %

## 2024-11-22 DIAGNOSIS — Z98.890 OTHER SPECIFIED POSTPROCEDURAL STATES: Chronic | ICD-10-CM

## 2024-11-22 PROCEDURE — 99284 EMERGENCY DEPT VISIT MOD MDM: CPT

## 2024-11-22 PROCEDURE — 73030 X-RAY EXAM OF SHOULDER: CPT | Mod: 26,RT

## 2024-11-22 RX ORDER — LIDOCAINE HYDROCHLORIDE 40 MG/ML
1 SOLUTION TOPICAL ONCE
Refills: 0 | Status: COMPLETED | OUTPATIENT
Start: 2024-11-22 | End: 2024-11-22

## 2024-11-22 RX ADMIN — LIDOCAINE HYDROCHLORIDE 1 PATCH: 40 SOLUTION TOPICAL at 16:49

## 2024-11-22 NOTE — ED PROVIDER NOTE - ATTENDING CONTRIBUTION TO CARE
The patient was seen and evaluated by me as well as the attached resident.     Nursing notes and vital signs were reviewed.    MDM: X-rays were ordered for this patient's right arm.  Will also order lidocaine patch.    On reevaluation, the patient's x-rays have not been completed at this time.  The lidocaine patch was applied.  The patient is able to move his right shoulder with no difficulty or pain.  Is able to carry heavy object including his bag that he brought with him.    At this time, the patient's x-rays have not been completed.  This patient was signed out to the oncoming physician.  Please see the oncoming physician's addendum's, notes, and progress notes for any change in this patient's management or care.  [facility] resident will continue to follow.

## 2024-11-22 NOTE — ED PROVIDER NOTE - OBJECTIVE STATEMENT
This patient is a 44-year-old male presenting to the emergency room today with complaints of right shoulder pain.  States that 4 months ago he was doing push-ups and felt some pain in the right shoulder.  He states the pain is continued to today.  He states that he has no injuries to his head.  No other complaints at this time.  Does have a history of IV drug abuse methadone use.  Has a history of seizures.  States that he has not had any seizures recently.  No other complaints at this time.

## 2024-11-22 NOTE — ED PROVIDER NOTE - CLINICAL SUMMARY MEDICAL DECISION MAKING FREE TEXT BOX
NADYA [facility] [address] Darcy, PGY3 - This is a 44-year-old male with past medical history of IV drug use and seizure presenting today for complaint of right shoulder pain for 4 months.  Continue today.  No injuries or trauma acutely exam shows good range of motion of all extremities is noted.  Neurovascularly intact.  Plan to x-ray at the shoulder of the right side.  And lidocaine patch in the region.  Most likely DC. On reassessment, patient feeling better. X ray reviewed. No acute fractures. Will dc.

## 2024-11-22 NOTE — ED ADULT TRIAGE NOTE - ESI TRIAGE ACUITY LEVEL, MLM
Mother told COVID NEGATIVE  SHE JUST   Has a dry cough  SHE HAS NO SCHOOL TOMORROW  Needs note to return Monday  TOLD TO GIVE FLUIDS AND HONEY FOR COUGH  Note entered for mom to get off [address]  4

## 2024-11-22 NOTE — ED PROVIDER NOTE - NS ED ATTENDING STATEMENT MOD
Statement Selected I have seen and examined this patient and fully participated in the care of this patient as the teaching attending.  The service was shared with the [facility].  I reviewed and verified the documentation.

## 2024-11-22 NOTE — ED PROVIDER NOTE - PHYSICAL EXAMINATION
GENERAL: The patient appears well and is in no apparent distress.    EYES: Pupils equal and reactive.  Extraocular eye movements are intact.    ENT: Head is atraumatic.  Posterior oropharynx is unremarkable.      SKIN: Skin is intact without evidence of significant lacerations or sores.    MUSCULOSKELETAL: Patient has good range of motion of all extremities.  The patient has good distal cap refill.  The patient has palpable distal pulses.  No obvious edema is noted.    NEUROLOGIC: Cranial nerves II through XII are grossly within normal limits.  Sensory and motor examination is unremarkable.    PSYCHIATRIC: Patient is awake, alert, and oriented ×4.

## 2024-11-22 NOTE — ED ADULT NURSE NOTE - NSFALLUNIVINTERV_ED_ALL_ED
Bed/Stretcher in lowest position, wheels locked, appropriate side rails in place/Call bell, personal items and telephone in reach/Instruct patient to call for assistance before getting out of bed/chair/stretcher/Non-slip footwear applied when patient is off stretcher/[place] to call system/Physically safe environment - no spills, clutter or unnecessary equipment/Purposeful proactive rounding/Room/bathroom lighting operational, light cord in reach

## 2024-11-22 NOTE — ED ADULT TRIAGE NOTE - CHIEF COMPLAINT QUOTE
pt presents to the ED c/o right arm shoulder pain started 4 months ago. pt states "might have been because of push ups" denies trauma

## 2024-11-22 NOTE — ED PROVIDER NOTE - NS ED ROS FT
CONSTITUTIONAL: No weight loss, fever, chills, weakness or fatigue.    HEENT:    Eyes: No visual loss, blurred vision, double vision or yellow sclerae.  Ears, Nose, Throat: No hearing loss, sneezing, congestion, runny nose or sore throat.    SKIN: No rash or itching.    NEUROLOGICAL: No headache, dizziness, syncope, paralysis, ataxia, numbness or tingling in the extremities. No change in bowel or bladder control.    MUSCULOSKELETAL: Right shoulder pain

## 2024-11-22 NOTE — ED PROVIDER NOTE - NSFOLLOWUPINSTRUCTIONS_ED_ALL_ED_FT
You were seen for right shoulder pain.  You can take ibuprofen 400mg every 6-8 hous and/or Tylenol 1000mg every 6-8 hours as needed for pain.  Follow-up with your primary care doctor.  Return to emergency room for any weakness, numbness tingling or new/concerning symptoms

## 2024-11-22 NOTE — ED PROVIDER NOTE - PATIENT PORTAL LINK FT
You can access the FollowMyHealth Patient Portal offered by [facility] by registering at the following website: [email or web address]. By joining [employer]’s FollowMyHealth portal, you will also be able to view your health information using other applications (apps) compatible with our system.

## 2024-11-23 ENCOUNTER — EMERGENCY (EMERGENCY)
Facility: HOSPITAL | Age: 44
LOS: 1 days | Discharge: HOME | End: 2024-11-23
Attending: STUDENT IN AN ORGANIZED HEALTH CARE EDUCATION/TRAINING PROGRAM
Payer: MEDICAID

## 2024-11-23 VITALS
SYSTOLIC BLOOD PRESSURE: 116 MMHG | DIASTOLIC BLOOD PRESSURE: 73 MMHG | WEIGHT: 229.06 LBS | RESPIRATION RATE: 18 BRPM | HEIGHT: 68 IN | OXYGEN SATURATION: 95 % | TEMPERATURE: 98 F | HEART RATE: 84 BPM

## 2024-11-23 DIAGNOSIS — Z98.890 OTHER SPECIFIED POSTPROCEDURAL STATES: Chronic | ICD-10-CM

## 2024-11-23 PROCEDURE — 99285 EMERGENCY DEPT VISIT HI MDM: CPT

## 2024-11-23 RX ORDER — IBUPROFEN 200 MG
400 TABLET ORAL ONCE
Refills: 0 | Status: COMPLETED | OUTPATIENT
Start: 2024-11-23 | End: 2024-11-23

## 2024-11-23 RX ADMIN — Medication 400 MILLIGRAM(S): at 22:33

## 2024-11-23 NOTE — ED PROVIDER NOTE - ATTENDING CONTRIBUTION TO CARE
43 yo M with PMHx of thoracic osteomyelitis and IV drug use presents with back pain and arm pain. He reports he was seen here yesterday and discharged. He reports that he has been taking robaxin and flexeril for his pain with no alleviation in his symptoms. He denies additional symptoms, has been ambulatory. Patient reports he currently is undomiciled.     PE: well appearing, nontoxic, no respiratory distress.  Neuro nonfocal.  Skin intact. Psych normal mood.    MDM: Differential diagnosis includes but is not limited to MSK strain, acute on chronic pain, upper ext dvt  will assess with duplex and provide pain medication

## 2024-11-23 NOTE — ED ADULT NURSE NOTE - NSFALLUNIVINTERV_ED_ALL_ED
Bed/Stretcher in lowest position, wheels locked, appropriate side rails in place/Call bell, personal items and telephone in reach/Instruct patient to call for assistance before getting out of bed/chair/stretcher/Non-slip footwear applied when patient is off stretcher/[place] to call system/Physically safe environment - no spills, clutter or unnecessary equipment/Purposeful proactive rounding/Room/bathroom lighting operational, light cord in reach

## 2024-11-23 NOTE — ED PROVIDER NOTE - PATIENT PORTAL LINK FT
You can access the FollowMyHealth Patient Portal offered by [facility] by registering at the following website: [email or web address]. By joining [employer]’s FollowMyHealth portal, you will also be able to view your health information using other applications (apps) compatible with our system. You can access the FollowMyHealth Patient Portal offered by [facility] by registering at the following website: [email or web address]. By joining [employer]’s FollowMyHealth portal, you will also be able to view your health information using other applications (apps) compatible with our system.

## 2024-11-23 NOTE — ED PROVIDER NOTE - PHYSICAL EXAMINATION
General: Alert and Orientated x 3. No apparent distress.  Eyes: No scleral icterus.   ENT: Mucous membranes moist  Cardiac: No murmurs appreciated.   Pulmonary: CTA bilaterally. No increased WOB.   Abdominal: Soft, Non-distended, non-tender  Back: No midline tendernes  Neurologic: No focal sensory or motor deficits.  Extremities: No lower extremity edema, bruising, soreness   Skin: Color appropriate for race. Intact, warm, and well-perfused.  Psychiatric: Appropriate mood and affect. No apparent risk to self or others.

## 2024-11-23 NOTE — ED PROVIDER NOTE - NSFOLLOWUPINSTRUCTIONS_ED_ALL_ED_FT
You were seen for lower back pain. This pain is likely due to a muscle strain or disc herniation. For symptoms, please take motrin OR ibuprofen OR advil (600 mg by mouth up to every 6 hours with food and water) AND tylenol (650 mg up to every 6 hours)    You may also use a heating pad for additional comfort.     Please return to the ER immediately if you develop leg weakness, loss of bowel or bladder control (meaning that you are urinating or defecating on yourself when you did not mean to or are unable to urinate or defecate when you feel you must.  Also return to the ER if you develop numbness of your groin or genitals.  Even if the pain is well controlled or you are able to tolerate the pain, development of any of these symptoms may represent a medical emergency requiring immediate intervention.  Do not delay presentation should these occur.    Please follow-up with a spinal specialist as indicated in your paperwork for ongoing care.

## 2024-11-23 NOTE — ED PROVIDER NOTE - PROGRESS NOTE DETAILS
no dvt, pain controlled, patient resting comfortably. Will be seen by SW in AM. Dr. Julissa Danielle, ATTENDING: Patient seen and evaluated by social work.  Patient with no acute complaints.  Right has been arranged.  Strict return precaution discussed and all questions answered. JOSE LUIS Germain MD PGY-2: Patient requesting to go to detox center.  Insistent that he would like to "come clean"   For his 4-year-old daughter "  Last used cocaine 2 days ago, has history of heroin, benzo, cocaine use. Social work aware, will obtain requested labs for clearance. JOSE LUIS Germain MD PGY-2:   per social work, patient unable to go to inpatient treatment center due to insurance restrictions.  Will provide transportation to shelter.

## 2024-11-23 NOTE — ED ADULT NURSE NOTE - OBJECTIVE STATEMENT
43 y/o male, A&O x4, PMH chronic thoracic osteomyelitis and IV drug use on methadone presents to the ED c/o back pain and RUE pain x3 weeks. Pt endorses chronic bilateral lower back that has progressively worsened associated w/ RUE pain. Pt states he is currently undomiciled, has shelter access but does not like to go, denies SI/HI. Pt affect is calm and appropriate, spontaneous unlabored breathing, strong peripheral pulses, abdomen soft nondistended nontender, PERRLA, equal strength sensation ROM bilaterally, ambulation w/o difficulty. Safety and comfort measures maintained.

## 2024-11-23 NOTE — ED PROVIDER NOTE - CLINICAL SUMMARY MEDICAL DECISION MAKING FREE TEXT BOX
43 y/o male hx chronic thoracic osteomyelitis and IV drug use presents with back pain, right upper arm pain. He is hemodynamically stable, afebrile, on exam he appears mildly uncomfortable, no midline back pain, no focal neurologic deficits, no significant right arm swelling or tenderness. No red flags concerning for spinal cord involvement. Patient has taken methadone today as well as flexeril, robaxin, and gabapentin. Will get US to r/o right arm dvt given hx IV drug use, motrin for pain, d/c.

## 2024-11-23 NOTE — ED PROVIDER NOTE - OBJECTIVE STATEMENT
43 y/o male hx chronic thoracic osteomyelitis and IV drug use presents with back pain, right upper arm pain. The pain is felt bilateral lower back, is same as chronic pain but worse. Patient is currently undomiciled, has shelter access but does not like to go. He is compliant with methadone. No fever/chills, chest pain, SOB, abdominal pain, dysuria/hematuria/incontinence, n/v/d, weakness. Was seen in ED yesterday for right arm pain, x-rays negative.

## 2024-11-24 VITALS
SYSTOLIC BLOOD PRESSURE: 108 MMHG | TEMPERATURE: 98 F | DIASTOLIC BLOOD PRESSURE: 67 MMHG | HEART RATE: 66 BPM | RESPIRATION RATE: 16 BRPM | OXYGEN SATURATION: 97 %

## 2024-11-24 LAB
ALBUMIN SERPL ELPH-MCNC: 4.1 G/DL — SIGNIFICANT CHANGE UP (ref 3.3–5)
ALP SERPL-CCNC: 93 U/L — SIGNIFICANT CHANGE UP (ref 40–120)
ALT FLD-CCNC: 26 U/L — SIGNIFICANT CHANGE UP (ref 10–45)
AMPHET UR-MCNC: NEGATIVE — SIGNIFICANT CHANGE UP
ANION GAP SERPL CALC-SCNC: 11 MMOL/L — SIGNIFICANT CHANGE UP (ref 5–17)
APPEARANCE UR: CLEAR — SIGNIFICANT CHANGE UP
AST SERPL-CCNC: 38 U/L — SIGNIFICANT CHANGE UP (ref 10–40)
BACTERIA # UR AUTO: NEGATIVE /HPF — SIGNIFICANT CHANGE UP
BARBITURATES UR SCN-MCNC: NEGATIVE — SIGNIFICANT CHANGE UP
BASOPHILS # BLD AUTO: 0.02 K/UL — SIGNIFICANT CHANGE UP (ref 0–0.2)
BASOPHILS NFR BLD AUTO: 0.4 % — SIGNIFICANT CHANGE UP (ref 0–2)
BENZODIAZ UR-MCNC: POSITIVE
BILIRUB SERPL-MCNC: 0.6 MG/DL — SIGNIFICANT CHANGE UP (ref 0.2–1.2)
BILIRUB UR-MCNC: NEGATIVE — SIGNIFICANT CHANGE UP
BUN SERPL-MCNC: 18 MG/DL — SIGNIFICANT CHANGE UP (ref 7–23)
CALCIUM SERPL-MCNC: 9.4 MG/DL — SIGNIFICANT CHANGE UP (ref 8.4–10.5)
CAST: 0 /LPF — SIGNIFICANT CHANGE UP (ref 0–4)
CHLORIDE SERPL-SCNC: 102 MMOL/L — SIGNIFICANT CHANGE UP (ref 96–108)
CO2 SERPL-SCNC: 25 MMOL/L — SIGNIFICANT CHANGE UP (ref 22–31)
COCAINE METAB.OTHER UR-MCNC: NEGATIVE — SIGNIFICANT CHANGE UP
COLOR SPEC: SIGNIFICANT CHANGE UP
CREAT SERPL-MCNC: 0.64 MG/DL — SIGNIFICANT CHANGE UP (ref 0.5–1.3)
DIFF PNL FLD: NEGATIVE — SIGNIFICANT CHANGE UP
EGFR: 121 ML/MIN/1.73M2 — SIGNIFICANT CHANGE UP
EOSINOPHIL # BLD AUTO: 0.22 K/UL — SIGNIFICANT CHANGE UP (ref 0–0.5)
EOSINOPHIL NFR BLD AUTO: 4.3 % — SIGNIFICANT CHANGE UP (ref 0–6)
FLUAV AG NPH QL: SIGNIFICANT CHANGE UP
FLUBV AG NPH QL: SIGNIFICANT CHANGE UP
GLUCOSE SERPL-MCNC: 111 MG/DL — HIGH (ref 70–99)
GLUCOSE UR QL: NEGATIVE MG/DL — SIGNIFICANT CHANGE UP
HCT VFR BLD CALC: 45 % — SIGNIFICANT CHANGE UP (ref 39–50)
HGB BLD-MCNC: 14.5 G/DL — SIGNIFICANT CHANGE UP (ref 13–17)
IMM GRANULOCYTES NFR BLD AUTO: 0.4 % — SIGNIFICANT CHANGE UP (ref 0–0.9)
KETONES UR-MCNC: NEGATIVE MG/DL — SIGNIFICANT CHANGE UP
LEUKOCYTE ESTERASE UR-ACNC: NEGATIVE — SIGNIFICANT CHANGE UP
LYMPHOCYTES # BLD AUTO: 1.06 K/UL — SIGNIFICANT CHANGE UP (ref 1–3.3)
LYMPHOCYTES # BLD AUTO: 20.5 % — SIGNIFICANT CHANGE UP (ref 13–44)
MCHC RBC-ENTMCNC: 29.5 PG — SIGNIFICANT CHANGE UP (ref 27–34)
MCHC RBC-ENTMCNC: 32.2 G/DL — SIGNIFICANT CHANGE UP (ref 32–36)
MCV RBC AUTO: 91.5 FL — SIGNIFICANT CHANGE UP (ref 80–100)
METHADONE UR-MCNC: POSITIVE
MONOCYTES # BLD AUTO: 0.35 K/UL — SIGNIFICANT CHANGE UP (ref 0–0.9)
MONOCYTES NFR BLD AUTO: 6.8 % — SIGNIFICANT CHANGE UP (ref 2–14)
NEUTROPHILS # BLD AUTO: 3.5 K/UL — SIGNIFICANT CHANGE UP (ref 1.8–7.4)
NEUTROPHILS NFR BLD AUTO: 67.6 % — SIGNIFICANT CHANGE UP (ref 43–77)
NITRITE UR-MCNC: NEGATIVE — SIGNIFICANT CHANGE UP
NRBC # BLD: 0 /100 WBCS — SIGNIFICANT CHANGE UP (ref 0–0)
OPIATES UR-MCNC: POSITIVE
OXYCODONE UR-MCNC: NEGATIVE — SIGNIFICANT CHANGE UP
PCP SPEC-MCNC: SIGNIFICANT CHANGE UP
PCP UR-MCNC: NEGATIVE — SIGNIFICANT CHANGE UP
PH UR: 7 — SIGNIFICANT CHANGE UP (ref 5–8)
PLATELET # BLD AUTO: 212 K/UL — SIGNIFICANT CHANGE UP (ref 150–400)
POTASSIUM SERPL-MCNC: 3.8 MMOL/L — SIGNIFICANT CHANGE UP (ref 3.5–5.3)
POTASSIUM SERPL-SCNC: 3.8 MMOL/L — SIGNIFICANT CHANGE UP (ref 3.5–5.3)
PROT SERPL-MCNC: 7.9 G/DL — SIGNIFICANT CHANGE UP (ref 6–8.3)
PROT UR-MCNC: NEGATIVE MG/DL — SIGNIFICANT CHANGE UP
RBC # BLD: 4.92 M/UL — SIGNIFICANT CHANGE UP (ref 4.2–5.8)
RBC # FLD: 14 % — SIGNIFICANT CHANGE UP (ref 10.3–14.5)
RBC CASTS # UR COMP ASSIST: 4 /HPF — SIGNIFICANT CHANGE UP (ref 0–4)
RSV RNA NPH QL NAA+NON-PROBE: SIGNIFICANT CHANGE UP
SARS-COV-2 RNA SPEC QL NAA+PROBE: SIGNIFICANT CHANGE UP
SODIUM SERPL-SCNC: 138 MMOL/L — SIGNIFICANT CHANGE UP (ref 135–145)
SP GR SPEC: 1.03 — SIGNIFICANT CHANGE UP (ref 1–1.03)
SQUAMOUS # UR AUTO: 9 /HPF — HIGH (ref 0–5)
THC UR QL: POSITIVE
UROBILINOGEN FLD QL: 1 MG/DL — SIGNIFICANT CHANGE UP (ref 0.2–1)
WBC # BLD: 5.17 K/UL — SIGNIFICANT CHANGE UP (ref 3.8–10.5)
WBC # FLD AUTO: 5.17 K/UL — SIGNIFICANT CHANGE UP (ref 3.8–10.5)
WBC UR QL: 1 /HPF — SIGNIFICANT CHANGE UP (ref 0–5)

## 2024-11-24 PROCEDURE — 87086 URINE CULTURE/COLONY COUNT: CPT

## 2024-11-24 PROCEDURE — 80307 DRUG TEST PRSMV CHEM ANLYZR: CPT

## 2024-11-24 PROCEDURE — 93971 EXTREMITY STUDY: CPT

## 2024-11-24 PROCEDURE — 99284 EMERGENCY DEPT VISIT MOD MDM: CPT | Mod: 25

## 2024-11-24 PROCEDURE — 93971 EXTREMITY STUDY: CPT | Mod: 26,RT

## 2024-11-24 PROCEDURE — 85025 COMPLETE CBC W/AUTO DIFF WBC: CPT

## 2024-11-24 PROCEDURE — 81001 URINALYSIS AUTO W/SCOPE: CPT

## 2024-11-24 PROCEDURE — 80053 COMPREHEN METABOLIC PANEL: CPT

## 2024-11-24 PROCEDURE — 87637 SARSCOV2&INF A&B&RSV AMP PRB: CPT

## 2024-11-24 NOTE — ED ADULT NURSE REASSESSMENT NOTE - NS ED NURSE REASSESS COMMENT FT1
Report received from MONIK Loja. Patient is A+OX3, sitting up in stretcher. Patient has crackers and water at bedside, tolerating PO intake well. Patient waiting to speak with social work, updated on plan of care. Call bell within reach.

## 2024-11-24 NOTE — CHART NOTE - NSCHARTNOTEFT_GEN_A_CORE
SW notified by medical team that patient was medically cleared and awaiting shelter referral and resources.  SW reviewed patient's chart.  Patient is a 44 year old, , English speaking male.  SW met with patient, introduced self, explained role.  Patient reports previously in a [facility].  SW placed call to [facility] and was provided with cares ID [id number] and assigned to the [facility] shelter located at [address] [place], [place] [address].  SW placed call to [facility] [phone] Ext [phone] who reported patient can return to shelter.  Upon informing patient that he needed to return to his assigned shelter he than reported there were people their wanting to fight him.  SW called shelter back and was informed  that he must return to his assigned shelter and that they can re-assign him.  Patient continuous to decline returning to prior shelter and stated he would go to the Men’s drop in on [address]  Patient than stated he did not want to go to the drop in shelter and wanted detox as he used cocaine 2 days ago.  Patient not eligible for detox as not in withdrawal. WES notified M.D.  who spoke with patient ordered a urine screen.  SW placed call to [facility], and they reported that they would need urine screen and they verified that patient’s insurance is restricted to [place].  They further explained that usually occurred with patient’s who frequently inappropriately utilized ER and shop around for such.  WES explained to M.D that worker would not be able to lift the restriction and that patient could be d/c to shelter whereby there are care managers to assist with treatment.  WES will remain available to work in collaboration with the IDT.

## 2024-11-24 NOTE — CHART NOTE - NSCHARTNOTEFT_GEN_A_CORE
Patient medically cleared for d/c and as per patient's request [phone] [facility] 3:00 P.M Auth # [id number].

## 2024-11-24 NOTE — ED ADULT NURSE REASSESSMENT NOTE - NS ED NURSE REASSESS COMMENT FT1
Per MD, patient states he would like to go to a detox center and last reported cocaine use 2 days ago.  updated on plan of care. Labs sent, patient aware that urine sample is needed.

## 2024-11-25 ENCOUNTER — EMERGENCY (EMERGENCY)
Facility: HOSPITAL | Age: 44
LOS: 0 days | Discharge: HOME | End: 2024-11-26
Attending: EMERGENCY MEDICINE
Payer: MEDICAID

## 2024-11-25 VITALS
HEIGHT: 68 IN | SYSTOLIC BLOOD PRESSURE: 111 MMHG | TEMPERATURE: 98 F | OXYGEN SATURATION: 97 % | WEIGHT: 229.94 LBS | HEART RATE: 67 BPM | RESPIRATION RATE: 15 BRPM | DIASTOLIC BLOOD PRESSURE: 72 MMHG

## 2024-11-25 DIAGNOSIS — F19.10 OTHER PSYCHOACTIVE SUBSTANCE ABUSE, UNCOMPLICATED: ICD-10-CM

## 2024-11-25 DIAGNOSIS — R11.0 NAUSEA: ICD-10-CM

## 2024-11-25 DIAGNOSIS — Z98.890 OTHER SPECIFIED POSTPROCEDURAL STATES: Chronic | ICD-10-CM

## 2024-11-25 DIAGNOSIS — Z88.0 ALLERGY STATUS TO PENICILLIN: ICD-10-CM

## 2024-11-25 DIAGNOSIS — R10.84 GENERALIZED ABDOMINAL PAIN: ICD-10-CM

## 2024-11-25 LAB
CULTURE RESULTS: SIGNIFICANT CHANGE UP
SPECIMEN SOURCE: SIGNIFICANT CHANGE UP

## 2024-11-25 PROCEDURE — 99284 EMERGENCY DEPT VISIT MOD MDM: CPT

## 2024-11-25 RX ORDER — METOCLOPRAMIDE HCL 10 MG
10 TABLET ORAL ONCE
Refills: 0 | Status: COMPLETED | OUTPATIENT
Start: 2024-11-25 | End: 2024-11-25

## 2024-11-25 RX ORDER — FAMOTIDINE 10 MG/ML
20 INJECTION INTRAVENOUS ONCE
Refills: 0 | Status: COMPLETED | OUTPATIENT
Start: 2024-11-25 | End: 2024-11-25

## 2024-11-25 RX ORDER — SODIUM CHLORIDE 9 MG/ML
1000 INJECTION, SOLUTION INTRAMUSCULAR; INTRAVENOUS; SUBCUTANEOUS ONCE
Refills: 0 | Status: COMPLETED | OUTPATIENT
Start: 2024-11-25 | End: 2024-11-25

## 2024-11-25 NOTE — ED ADULT NURSE NOTE - OBJECTIVE STATEMENT
pt a&ox4, ambulatory with a steady gait. pt requesting rehab for ETOH, heroine, and benzodiazapine usage. Last used heroin and ETOH on saturday, Xanax taken yesterday. Pt reports to currently be on methadone 190mg daily for the last 2 years. Pt reports nausea, abd pain, and chronic back pain.  Denies PMH. Denies SI/HI. Pt reports to be on gabapentin due to T1-T8 osteomyelitis in 2014.

## 2024-11-25 NOTE — ED PROVIDER NOTE - PROGRESS NOTE DETAILS
Results reported to patient--grossly benign, labs are consistent with +tox on uDS, etoh negative, no other acute findings   Pt. reports feeling better after ER stay, Pt. is clinically sober, ambulating with steady gait, demonstrates decision-making capacity.  Pt. has no complaints at this time.  Pt. is stable for D/C home.   pt. agrees to f/u with primary care outpt., detox referral provided with discharge papers for pt. to follow, no signs of acute drug withdrawal present at this time   pt. understands to return to ED if symptoms worsen; will d/c

## 2024-11-25 NOTE — ED PROVIDER NOTE - OBJECTIVE STATEMENT
43 yo M presents to ER requesting detox from benzos and heroin.  Last dose of benzos and methadone before coming to ER.  Pt. complains of nausea and generalized abd cramping.    ROS: negative for fever, cough, headache, chest pain, shortness of breath, abd pain, nausea, vomiting, diarrhea, rash, paresthesia, and weakness--all other systems reviewed are negative.   PMH: ETOH, benzodiazepines, last benzodiazepine, heroine  use 11/24 currently take methadone 190mg daily; Meds: Denies; SH: benzos, heroin, methadone

## 2024-11-25 NOTE — ED ADULT NURSE NOTE - NSFALLUNIVINTERV_ED_ALL_ED
Bed/Stretcher in lowest position, wheels locked, appropriate side rails in place/Call bell, personal items and telephone in reach/Instruct patient to call for assistance before getting out of bed/chair/stretcher/Non-slip footwear applied when patient is off stretcher/[place] to call system/Physically safe environment - no spills, clutter or unnecessary equipment/Purposeful proactive rounding/Room/bathroom lighting operational, light cord in reach

## 2024-11-25 NOTE — ED ADULT TRIAGE NOTE - CHIEF COMPLAINT QUOTE
requesting rehab for ETOH, benzodiazepines, last benzodiazepine, heroine  use 11/24 currently take methadone 190mg daily. c/o abd pain. Patient has book bag and sprite with him, bag abd bottle searched. Gabapentin bottle found in patient's bag and removed. Denies SI/HI in triage. States that he has been to [facility] in [place] and [facility] in [place] for rehab in the past

## 2024-11-25 NOTE — ED PROVIDER NOTE - CLINICAL SUMMARY MEDICAL DECISION MAKING FREE TEXT BOX
43 yo M with poly substance abuse, not in a cute withdrawal, complains of nausea, generalized abd cramping but benign exam and not actively vomiting in ER.  Pt. requests detox referral to a detox center for his drug abuse, doubt pancreatitis, no urinary complaints  -cbc, cmp, drug screen, lactate, lipase, etoh level, ekg, iv, hydration, pepcid/reglan for nausea  -f/u results, reeval

## 2024-11-25 NOTE — ED PROVIDER NOTE - CARE PROVIDER_API CALL
Foster Harris  Internal Medicine  [address] [place] [address]  Phone: [phone]  Fax: [phone]  Follow Up Time: Urgent

## 2024-11-25 NOTE — ED PROVIDER NOTE - PATIENT PORTAL LINK FT
You can access the FollowMyHealth Patient Portal offered by [facility] by registering at the following website: [email or web address]. By joining [facility]’s FollowMyHealth portal, you will also be able to view your health information using other applications (apps) compatible with our system.

## 2024-11-25 NOTE — ED ADULT NURSE NOTE - COVID-19  TEST TYPE
Helical Rim Advancement Flap Text: The defect edges were debeveled with a #15 blade scalpel.  Given the location of the defect and the proximity to free margins (helical rim) a double helical rim advancement flap was deemed most appropriate.  Using a sterile surgical marker, the appropriate advancement flaps were drawn incorporating the defect and placing the expected incisions between the helical rim and antihelix where possible.  The area thus outlined was incised through and through with a #15 scalpel blade.  With a skin hook and iris scissors, the flaps were gently and sharply undermined and freed up. MOLECULAR PCR

## 2024-11-25 NOTE — ED ADULT NURSE NOTE - CHIEF COMPLAINT QUOTE
requesting rehab for ETOH, benzodiazepines, last benzodiazepine, heroine  use 11/24 currently take methadone 190mg daily. c/o abd pain. Patient has book bag and sprite with him, bag abd bottle searched. Gabapentin bottle found in patient's bag and removed. Denies SI/HI in triage. States that he has been to [facility] in [place] and [facility] in [place] for rehab in the past

## 2024-11-26 VITALS
HEART RATE: 58 BPM | DIASTOLIC BLOOD PRESSURE: 80 MMHG | SYSTOLIC BLOOD PRESSURE: 137 MMHG | TEMPERATURE: 98 F | RESPIRATION RATE: 18 BRPM | OXYGEN SATURATION: 100 %

## 2024-11-26 LAB
ALBUMIN SERPL ELPH-MCNC: 3 G/DL — LOW (ref 3.3–5)
ALP SERPL-CCNC: 82 U/L — SIGNIFICANT CHANGE UP (ref 40–120)
ALT FLD-CCNC: 30 U/L — SIGNIFICANT CHANGE UP (ref 12–78)
AMPHET UR-MCNC: NEGATIVE — SIGNIFICANT CHANGE UP
ANION GAP SERPL CALC-SCNC: 3 MMOL/L — LOW (ref 5–17)
AST SERPL-CCNC: 44 U/L — HIGH (ref 15–37)
BARBITURATES UR SCN-MCNC: NEGATIVE — SIGNIFICANT CHANGE UP
BASOPHILS # BLD AUTO: 0.01 K/UL — SIGNIFICANT CHANGE UP (ref 0–0.2)
BASOPHILS NFR BLD AUTO: 0.2 % — SIGNIFICANT CHANGE UP (ref 0–2)
BENZODIAZ UR-MCNC: POSITIVE — SIGNIFICANT CHANGE UP
BILIRUB SERPL-MCNC: 0.5 MG/DL — SIGNIFICANT CHANGE UP (ref 0.2–1.2)
BUN SERPL-MCNC: 12 MG/DL — SIGNIFICANT CHANGE UP (ref 7–23)
CALCIUM SERPL-MCNC: 8.6 MG/DL — SIGNIFICANT CHANGE UP (ref 8.5–10.1)
CHLORIDE SERPL-SCNC: 104 MMOL/L — SIGNIFICANT CHANGE UP (ref 96–108)
CO2 SERPL-SCNC: 28 MMOL/L — SIGNIFICANT CHANGE UP (ref 22–31)
COCAINE METAB.OTHER UR-MCNC: NEGATIVE — SIGNIFICANT CHANGE UP
CREAT SERPL-MCNC: 0.6 MG/DL — SIGNIFICANT CHANGE UP (ref 0.5–1.3)
EGFR: 124 ML/MIN/1.73M2 — SIGNIFICANT CHANGE UP
EOSINOPHIL # BLD AUTO: 0.29 K/UL — SIGNIFICANT CHANGE UP (ref 0–0.5)
EOSINOPHIL NFR BLD AUTO: 4.4 % — SIGNIFICANT CHANGE UP (ref 0–6)
ETHANOL SERPL-MCNC: <10 MG/DL — SIGNIFICANT CHANGE UP (ref 0–10)
GLUCOSE SERPL-MCNC: 93 MG/DL — SIGNIFICANT CHANGE UP (ref 70–99)
HCT VFR BLD CALC: 42.2 % — SIGNIFICANT CHANGE UP (ref 39–50)
HGB BLD-MCNC: 13.9 G/DL — SIGNIFICANT CHANGE UP (ref 13–17)
IMM GRANULOCYTES NFR BLD AUTO: 0.2 % — SIGNIFICANT CHANGE UP (ref 0–0.9)
LACTATE SERPL-SCNC: 0.8 MMOL/L — SIGNIFICANT CHANGE UP (ref 0.7–2)
LYMPHOCYTES # BLD AUTO: 1.68 K/UL — SIGNIFICANT CHANGE UP (ref 1–3.3)
LYMPHOCYTES # BLD AUTO: 25.4 % — SIGNIFICANT CHANGE UP (ref 13–44)
MCHC RBC-ENTMCNC: 29.2 PG — SIGNIFICANT CHANGE UP (ref 27–34)
MCHC RBC-ENTMCNC: 32.9 G/DL — SIGNIFICANT CHANGE UP (ref 32–36)
MCV RBC AUTO: 88.7 FL — SIGNIFICANT CHANGE UP (ref 80–100)
METHADONE UR-MCNC: POSITIVE — SIGNIFICANT CHANGE UP
MONOCYTES # BLD AUTO: 0.44 K/UL — SIGNIFICANT CHANGE UP (ref 0–0.9)
MONOCYTES NFR BLD AUTO: 6.6 % — SIGNIFICANT CHANGE UP (ref 2–14)
NEUTROPHILS # BLD AUTO: 4.19 K/UL — SIGNIFICANT CHANGE UP (ref 1.8–7.4)
NEUTROPHILS NFR BLD AUTO: 63.2 % — SIGNIFICANT CHANGE UP (ref 43–77)
NRBC # BLD: 0 /100 WBCS — SIGNIFICANT CHANGE UP (ref 0–0)
OPIATES UR-MCNC: POSITIVE — SIGNIFICANT CHANGE UP
PCP SPEC-MCNC: SIGNIFICANT CHANGE UP
PCP UR-MCNC: NEGATIVE — SIGNIFICANT CHANGE UP
PLATELET # BLD AUTO: 207 K/UL — SIGNIFICANT CHANGE UP (ref 150–400)
POTASSIUM SERPL-MCNC: 4 MMOL/L — SIGNIFICANT CHANGE UP (ref 3.5–5.3)
POTASSIUM SERPL-SCNC: 4 MMOL/L — SIGNIFICANT CHANGE UP (ref 3.5–5.3)
PROT SERPL-MCNC: 7.3 GM/DL — SIGNIFICANT CHANGE UP (ref 6–8.3)
RBC # BLD: 4.76 M/UL — SIGNIFICANT CHANGE UP (ref 4.2–5.8)
RBC # FLD: 13.8 % — SIGNIFICANT CHANGE UP (ref 10.3–14.5)
SODIUM SERPL-SCNC: 135 MMOL/L — SIGNIFICANT CHANGE UP (ref 135–145)
THC UR QL: POSITIVE — SIGNIFICANT CHANGE UP
WBC # BLD: 6.62 K/UL — SIGNIFICANT CHANGE UP (ref 3.8–10.5)
WBC # FLD AUTO: 6.62 K/UL — SIGNIFICANT CHANGE UP (ref 3.8–10.5)

## 2024-11-26 PROCEDURE — 93010 ELECTROCARDIOGRAM REPORT: CPT

## 2024-11-26 RX ADMIN — SODIUM CHLORIDE 1000 MILLILITER(S): 9 INJECTION, SOLUTION INTRAMUSCULAR; INTRAVENOUS; SUBCUTANEOUS at 00:30

## 2024-11-26 RX ADMIN — FAMOTIDINE 20 MILLIGRAM(S): 10 INJECTION INTRAVENOUS at 00:30

## 2024-11-26 RX ADMIN — Medication 10 MILLIGRAM(S): at 00:29

## 2024-11-26 NOTE — ED ADULT NURSE REASSESSMENT NOTE - NS ED NURSE REASSESS COMMENT FT1
Pt A&Ox4, ambulates unassisted, vitals WNL. Pt awaiting medicaid cab for transport to methadone clinic. Pt safety maintained.

## 2024-12-12 ENCOUNTER — INPATIENT (INPATIENT)
Facility: HOSPITAL | Age: 44
LOS: 2 days | Discharge: HOME | End: 2024-12-15
Attending: STUDENT IN AN ORGANIZED HEALTH CARE EDUCATION/TRAINING PROGRAM | Admitting: STUDENT IN AN ORGANIZED HEALTH CARE EDUCATION/TRAINING PROGRAM
Payer: MEDICAID

## 2024-12-12 VITALS
SYSTOLIC BLOOD PRESSURE: 156 MMHG | WEIGHT: 235.01 LBS | HEART RATE: 100 BPM | DIASTOLIC BLOOD PRESSURE: 90 MMHG | RESPIRATION RATE: 16 BRPM | TEMPERATURE: 99 F | OXYGEN SATURATION: 96 % | HEIGHT: 68 IN

## 2024-12-12 DIAGNOSIS — Z98.890 OTHER SPECIFIED POSTPROCEDURAL STATES: Chronic | ICD-10-CM

## 2024-12-12 PROCEDURE — 71045 X-RAY EXAM CHEST 1 VIEW: CPT | Mod: 26

## 2024-12-12 PROCEDURE — 99285 EMERGENCY DEPT VISIT HI MDM: CPT

## 2024-12-12 PROCEDURE — 93010 ELECTROCARDIOGRAM REPORT: CPT

## 2024-12-12 PROCEDURE — 70450 CT HEAD/BRAIN W/O DYE: CPT | Mod: 26,MC

## 2024-12-12 RX ORDER — SODIUM CHLORIDE 9 MG/ML
1000 INJECTION, SOLUTION INTRAMUSCULAR; INTRAVENOUS; SUBCUTANEOUS ONCE
Refills: 0 | Status: COMPLETED | OUTPATIENT
Start: 2024-12-12 | End: 2024-12-12

## 2024-12-12 NOTE — ED PROVIDER NOTE - OBJECTIVE STATEMENT
44 M pmh polysubstance abuse, seizure disorder presenting to the ED for seizure activity. Pt states that he was in his daughters room but does not remember what happened afterwards. Pt states that he has not taken his prescribed depakote in over a week. His last seizure was a year ago. pt states that he has been taking his methadone 190mg daily along with 300mg wellbutrin

## 2024-12-12 NOTE — ED ADULT NURSE NOTE - OBJECTIVE STATEMENT
Pt is a 44y M AOx4 with a pmh of substance abuse on methadone and seizures on depakote. Pt hasn't taken his depakote for a week. Pt BIBA  after have witnessed seizure at home. Pt's wife heard the pt fall and have seizure like activity. Pt states that his last seizure was years ago. Pt also reports lightheadedness and headache.

## 2024-12-12 NOTE — ED ADULT NURSE NOTE - CHIEF COMPLAINT QUOTE
BIBA,  seizure, lasting approx 1-2 minutes.   wife heard him fall and found him on floor shaking.   pinpoint pupils.    pt states he has not taken Depakote in 2 weeks  (PMH-susbstance abuse, seizures)  pt aaox4 in triage.

## 2024-12-12 NOTE — ED PROVIDER NOTE - NS ED ROS FT
General: Denies fever, chills  HEENT: Denies sensory changes, sore throat  Neck: Denies neck pain, neck stiffness  Resp: Denies coughing, SOB  Cardiovascular: Denies CP, palpitations, LE edema  GI: Denies nausea, vomiting, abdominal pain, diarrhea, constipation, blood in stool  : Denies dysuria, hematuria, frequency, incontinence  MSK: Denies back pain  Neuro: + seizure  Skin: Denies rashes.

## 2024-12-12 NOTE — ED PROVIDER NOTE - CLINICAL SUMMARY MEDICAL DECISION MAKING FREE TEXT BOX
44 M pmh polysubstance abuse, seizures presenting to the ED for seizure activity    breakthrough seizure. no seizure or post-ictal behavior while in ED  concern for infectious etiology, substance ingestion, potential drug toxicity (pt on methadone (190mg qd) & bupropion (300mg qd))  labs  CT head  tele monitoring  tele admit 44 M pmh polysubstance abuse, seizures presenting to the ED for seizure activity    breakthrough seizure. no seizure or post-ictal behavior while in ED  seizure likely in setting of med non-compliance  concern for infectious etiology, substance ingestion, potential drug toxicity (pt on methadone (190mg qd) & bupropion (300mg qd))  labs  CT head  tele monitoring  tele admit

## 2024-12-12 NOTE — ED PROVIDER NOTE - CONSIDERATION OF ADMISSION OBSERVATION
considering admission give hx of polysubstance use and concern for potential bupropion overdose. pt to need continued monitoring for repeat seizure activity or worsening cardiac arrythmia Consideration of Admission/Observation

## 2024-12-12 NOTE — ED ADULT TRIAGE NOTE - CHIEF COMPLAINT QUOTE
BIBA,  seizure, wife heard him fall and found him on floor shaking.   pinpoint pupils.    (PMH-susbstance abuse, seizures) BIBA,  seizure, wife heard him fall and found him on floor shaking.   pinpoint pupils.    pt states he has not taken Depakote in 2 weeks  (PMH-susbstance abuse, seizures)  pt aaox4 in triage. BIBA,  seizure, lasting approx 1-2 minutes.   wife heard him fall and found him on floor shaking.   pinpoint pupils.    pt states he has not taken Depakote in 2 weeks  (PMH-susbstance abuse, seizures)  pt aaox4 in triage.

## 2024-12-12 NOTE — ED PROVIDER NOTE - PHYSICAL EXAMINATION
General: Well appearing male in no acute distress  HEENT: Normocephalic, atraumatic. Moist mucous membranes. Oropharynx clear. No lymphadenopathy.  Eyes: No scleral icterus. EOMI. GIGI.  Neck:. Soft and supple. Full ROM without pain. No midline tenderness  Cardiac: Regular rate and regular rhythm. No murmurs, rubs, gallops. Peripheral pulses 2+ and symmetric. No LE edema.  Resp: Lungs CTAB. Speaking in full sentences. No wheezes, rales or rhonchi.  Abd: Soft, non-tender, non-distended. No guarding or rebound. No scars, masses, or lesions.  Back: Spine midline and non-tender. No CVA tenderness.    Skin: No rashes, abrasions, or lacerations.  Neuro: AO x 3. Moves all extremities symmetrically. Motor strength and sensation grossly intact. ambulatory

## 2024-12-12 NOTE — ED ADULT NURSE NOTE - BIRTH SEX
Renzo Whelan)  Internal Medicine  [address] [place] [address]  Phone: [phone]  Fax: [phone]  Follow Up Time:   
Male

## 2024-12-13 DIAGNOSIS — G40.919 EPILEPSY, UNSPECIFIED, INTRACTABLE, WITHOUT STATUS EPILEPTICUS: ICD-10-CM

## 2024-12-13 DIAGNOSIS — E87.20 ACIDOSIS, UNSPECIFIED: ICD-10-CM

## 2024-12-13 DIAGNOSIS — F32.9 MAJOR DEPRESSIVE DISORDER, SINGLE EPISODE, UNSPECIFIED: ICD-10-CM

## 2024-12-13 DIAGNOSIS — Z87.898 PERSONAL HISTORY OF OTHER SPECIFIED CONDITIONS: ICD-10-CM

## 2024-12-13 DIAGNOSIS — F17.200 NICOTINE DEPENDENCE, UNSPECIFIED, UNCOMPLICATED: ICD-10-CM

## 2024-12-13 LAB
ALBUMIN SERPL ELPH-MCNC: 3.7 G/DL — SIGNIFICANT CHANGE UP (ref 3.3–5)
ALP SERPL-CCNC: 91 U/L — SIGNIFICANT CHANGE UP (ref 40–120)
ALT FLD-CCNC: 30 U/L — SIGNIFICANT CHANGE UP (ref 12–78)
AMPHET UR-MCNC: NEGATIVE — SIGNIFICANT CHANGE UP
ANION GAP SERPL CALC-SCNC: 7 MMOL/L — SIGNIFICANT CHANGE UP (ref 5–17)
APAP SERPL-MCNC: < 2 UG/ML (ref 10–30)
AST SERPL-CCNC: 25 U/L — SIGNIFICANT CHANGE UP (ref 15–37)
BARBITURATES UR SCN-MCNC: NEGATIVE — SIGNIFICANT CHANGE UP
BASOPHILS # BLD AUTO: 0.05 K/UL — SIGNIFICANT CHANGE UP (ref 0–0.2)
BASOPHILS NFR BLD AUTO: 0.5 % — SIGNIFICANT CHANGE UP (ref 0–2)
BENZODIAZ UR-MCNC: NEGATIVE — SIGNIFICANT CHANGE UP
BILIRUB SERPL-MCNC: 0.3 MG/DL — SIGNIFICANT CHANGE UP (ref 0.2–1.2)
BUN SERPL-MCNC: 19 MG/DL — SIGNIFICANT CHANGE UP (ref 7–23)
CALCIUM SERPL-MCNC: 9.2 MG/DL — SIGNIFICANT CHANGE UP (ref 8.5–10.1)
CHLORIDE SERPL-SCNC: 105 MMOL/L — SIGNIFICANT CHANGE UP (ref 96–108)
CO2 SERPL-SCNC: 28 MMOL/L — SIGNIFICANT CHANGE UP (ref 22–31)
COCAINE METAB.OTHER UR-MCNC: NEGATIVE — SIGNIFICANT CHANGE UP
CREAT SERPL-MCNC: 0.83 MG/DL — SIGNIFICANT CHANGE UP (ref 0.5–1.3)
EGFR: 112 ML/MIN/1.73M2 — SIGNIFICANT CHANGE UP
EOSINOPHIL # BLD AUTO: 0.16 K/UL — SIGNIFICANT CHANGE UP (ref 0–0.5)
EOSINOPHIL NFR BLD AUTO: 1.7 % — SIGNIFICANT CHANGE UP (ref 0–6)
ETHANOL SERPL-MCNC: <10 MG/DL — SIGNIFICANT CHANGE UP (ref 0–10)
GLUCOSE SERPL-MCNC: 89 MG/DL — SIGNIFICANT CHANGE UP (ref 70–99)
HCT VFR BLD CALC: 46.6 % — SIGNIFICANT CHANGE UP (ref 39–50)
HGB BLD-MCNC: 16 G/DL — SIGNIFICANT CHANGE UP (ref 13–17)
IMM GRANULOCYTES NFR BLD AUTO: 0.4 % — SIGNIFICANT CHANGE UP (ref 0–0.9)
LACTATE SERPL-SCNC: 0.7 MMOL/L — SIGNIFICANT CHANGE UP (ref 0.7–2)
LACTATE SERPL-SCNC: 2.5 MMOL/L — HIGH (ref 0.7–2)
LYMPHOCYTES # BLD AUTO: 1.84 K/UL — SIGNIFICANT CHANGE UP (ref 1–3.3)
LYMPHOCYTES # BLD AUTO: 19.3 % — SIGNIFICANT CHANGE UP (ref 13–44)
MCHC RBC-ENTMCNC: 29.7 PG — SIGNIFICANT CHANGE UP (ref 27–34)
MCHC RBC-ENTMCNC: 34.3 G/DL — SIGNIFICANT CHANGE UP (ref 32–36)
MCV RBC AUTO: 86.6 FL — SIGNIFICANT CHANGE UP (ref 80–100)
METHADONE UR-MCNC: POSITIVE — SIGNIFICANT CHANGE UP
MONOCYTES # BLD AUTO: 0.64 K/UL — SIGNIFICANT CHANGE UP (ref 0–0.9)
MONOCYTES NFR BLD AUTO: 6.7 % — SIGNIFICANT CHANGE UP (ref 2–14)
NEUTROPHILS # BLD AUTO: 6.78 K/UL — SIGNIFICANT CHANGE UP (ref 1.8–7.4)
NEUTROPHILS NFR BLD AUTO: 71.4 % — SIGNIFICANT CHANGE UP (ref 43–77)
NRBC # BLD: 0 /100 WBCS — SIGNIFICANT CHANGE UP (ref 0–0)
OPIATES UR-MCNC: NEGATIVE — SIGNIFICANT CHANGE UP
PCP SPEC-MCNC: SIGNIFICANT CHANGE UP
PCP UR-MCNC: NEGATIVE — SIGNIFICANT CHANGE UP
PLATELET # BLD AUTO: 271 K/UL — SIGNIFICANT CHANGE UP (ref 150–400)
POTASSIUM SERPL-MCNC: 4.2 MMOL/L — SIGNIFICANT CHANGE UP (ref 3.5–5.3)
POTASSIUM SERPL-SCNC: 4.2 MMOL/L — SIGNIFICANT CHANGE UP (ref 3.5–5.3)
PROLACTIN SERPL-MCNC: 17.5 NG/ML — SIGNIFICANT CHANGE UP (ref 4.1–18.4)
PROT SERPL-MCNC: 8.2 GM/DL — SIGNIFICANT CHANGE UP (ref 6–8.3)
RBC # BLD: 5.38 M/UL — SIGNIFICANT CHANGE UP (ref 4.2–5.8)
RBC # FLD: 13.8 % — SIGNIFICANT CHANGE UP (ref 10.3–14.5)
SALICYLATES SERPL-MCNC: <1.7 MG/DL — LOW (ref 2.8–20)
SODIUM SERPL-SCNC: 140 MMOL/L — SIGNIFICANT CHANGE UP (ref 135–145)
THC UR QL: POSITIVE — SIGNIFICANT CHANGE UP
WBC # BLD: 9.51 K/UL — SIGNIFICANT CHANGE UP (ref 3.8–10.5)
WBC # FLD AUTO: 9.51 K/UL — SIGNIFICANT CHANGE UP (ref 3.8–10.5)

## 2024-12-13 PROCEDURE — 95816 EEG AWAKE AND DROWSY: CPT | Mod: 26

## 2024-12-13 PROCEDURE — 99222 1ST HOSP IP/OBS MODERATE 55: CPT

## 2024-12-13 RX ORDER — MAGNESIUM, ALUMINUM HYDROXIDE 200-225/5
30 SUSPENSION, ORAL (FINAL DOSE FORM) ORAL EVERY 4 HOURS
Refills: 0 | Status: DISCONTINUED | OUTPATIENT
Start: 2024-12-13 | End: 2024-12-15

## 2024-12-13 RX ORDER — METHOCARBAMOL 500 MG/1
1000 TABLET, FILM COATED ORAL EVERY 6 HOURS
Refills: 0 | Status: DISCONTINUED | OUTPATIENT
Start: 2024-12-13 | End: 2024-12-15

## 2024-12-13 RX ORDER — SENNOSIDES 8.6 MG
2 TABLET ORAL AT BEDTIME
Refills: 0 | Status: DISCONTINUED | OUTPATIENT
Start: 2024-12-13 | End: 2024-12-15

## 2024-12-13 RX ORDER — METHADONE HYDROCHLORIDE 5 MG/1
190 TABLET ORAL DAILY
Refills: 0 | Status: DISCONTINUED | OUTPATIENT
Start: 2024-12-13 | End: 2024-12-15

## 2024-12-13 RX ORDER — ONDANSETRON HYDROCHLORIDE 4 MG/1
4 TABLET, FILM COATED ORAL EVERY 8 HOURS
Refills: 0 | Status: DISCONTINUED | OUTPATIENT
Start: 2024-12-13 | End: 2024-12-15

## 2024-12-13 RX ORDER — VALPROIC ACID 250 MG/5ML
250 SOLUTION ORAL
Refills: 0 | Status: DISCONTINUED | OUTPATIENT
Start: 2024-12-13 | End: 2024-12-14

## 2024-12-13 RX ORDER — PANTOPRAZOLE SODIUM 40 MG/1
40 TABLET, DELAYED RELEASE ORAL
Refills: 0 | Status: DISCONTINUED | OUTPATIENT
Start: 2024-12-13 | End: 2024-12-15

## 2024-12-13 RX ORDER — BUPROPION HCL 100 MG
300 TABLET ORAL DAILY
Refills: 0 | Status: DISCONTINUED | OUTPATIENT
Start: 2024-12-13 | End: 2024-12-15

## 2024-12-13 RX ORDER — ACETAMINOPHEN 500MG 500 MG/1
650 TABLET, COATED ORAL EVERY 6 HOURS
Refills: 0 | Status: DISCONTINUED | OUTPATIENT
Start: 2024-12-13 | End: 2024-12-15

## 2024-12-13 RX ORDER — HYDROMORPHONE HYDROCHLORIDE 2 MG/1
4 TABLET ORAL EVERY 6 HOURS
Refills: 0 | Status: DISCONTINUED | OUTPATIENT
Start: 2024-12-13 | End: 2024-12-15

## 2024-12-13 RX ORDER — ENOXAPARIN SODIUM 30 MG/.3ML
40 INJECTION SUBCUTANEOUS EVERY 12 HOURS
Refills: 0 | Status: DISCONTINUED | OUTPATIENT
Start: 2024-12-13 | End: 2024-12-15

## 2024-12-13 RX ORDER — GABAPENTIN 300 MG/1
800 CAPSULE ORAL THREE TIMES A DAY
Refills: 0 | Status: DISCONTINUED | OUTPATIENT
Start: 2024-12-13 | End: 2024-12-15

## 2024-12-13 RX ORDER — ACETAMINOPHEN, DIPHENHYDRAMINE HCL, PHENYLEPHRINE HCL 325; 25; 5 MG/1; MG/1; MG/1
3 TABLET ORAL AT BEDTIME
Refills: 0 | Status: DISCONTINUED | OUTPATIENT
Start: 2024-12-13 | End: 2024-12-15

## 2024-12-13 RX ORDER — VALPROIC ACID 250 MG/5ML
250 SOLUTION ORAL ONCE
Refills: 0 | Status: COMPLETED | OUTPATIENT
Start: 2024-12-13 | End: 2024-12-13

## 2024-12-13 RX ORDER — LORAZEPAM 2 MG/1
2 TABLET ORAL ONCE
Refills: 0 | Status: DISCONTINUED | OUTPATIENT
Start: 2024-12-13 | End: 2024-12-14

## 2024-12-13 RX ORDER — VALPROIC ACID 250 MG/5ML
250 SOLUTION ORAL
Refills: 0 | Status: DISCONTINUED | OUTPATIENT
Start: 2024-12-13 | End: 2024-12-13

## 2024-12-13 RX ORDER — CYCLOBENZAPRINE HCL 10 MG
5 TABLET ORAL THREE TIMES A DAY
Refills: 0 | Status: DISCONTINUED | OUTPATIENT
Start: 2024-12-13 | End: 2024-12-15

## 2024-12-13 RX ORDER — POLYETHYLENE GLYCOL 3350 17 G/17G
17 POWDER, FOR SOLUTION ORAL DAILY
Refills: 0 | Status: DISCONTINUED | OUTPATIENT
Start: 2024-12-13 | End: 2024-12-15

## 2024-12-13 RX ORDER — METHADONE HYDROCHLORIDE 5 MG/1
190 TABLET ORAL
Refills: 0 | DISCHARGE

## 2024-12-13 RX ORDER — HYDROMORPHONE HYDROCHLORIDE 2 MG/1
2 TABLET ORAL EVERY 4 HOURS
Refills: 0 | Status: DISCONTINUED | OUTPATIENT
Start: 2024-12-13 | End: 2024-12-15

## 2024-12-13 RX ADMIN — VALPROIC ACID 250 MILLIGRAM(S): 250 SOLUTION ORAL at 01:47

## 2024-12-13 RX ADMIN — HYDROMORPHONE HYDROCHLORIDE 4 MILLIGRAM(S): 2 TABLET ORAL at 16:44

## 2024-12-13 RX ADMIN — GABAPENTIN 800 MILLIGRAM(S): 300 CAPSULE ORAL at 05:12

## 2024-12-13 RX ADMIN — HYDROMORPHONE HYDROCHLORIDE 4 MILLIGRAM(S): 2 TABLET ORAL at 15:51

## 2024-12-13 RX ADMIN — GABAPENTIN 800 MILLIGRAM(S): 300 CAPSULE ORAL at 14:20

## 2024-12-13 RX ADMIN — SODIUM CHLORIDE 2000 MILLILITER(S): 9 INJECTION, SOLUTION INTRAMUSCULAR; INTRAVENOUS; SUBCUTANEOUS at 00:09

## 2024-12-13 RX ADMIN — ENOXAPARIN SODIUM 40 MILLIGRAM(S): 30 INJECTION SUBCUTANEOUS at 05:12

## 2024-12-13 RX ADMIN — VALPROIC ACID 250 MILLIGRAM(S): 250 SOLUTION ORAL at 13:06

## 2024-12-13 RX ADMIN — Medication 300 MILLIGRAM(S): at 14:20

## 2024-12-13 RX ADMIN — HYDROMORPHONE HYDROCHLORIDE 4 MILLIGRAM(S): 2 TABLET ORAL at 23:21

## 2024-12-13 RX ADMIN — HYDROMORPHONE HYDROCHLORIDE 2 MILLIGRAM(S): 2 TABLET ORAL at 20:36

## 2024-12-13 RX ADMIN — GABAPENTIN 800 MILLIGRAM(S): 300 CAPSULE ORAL at 22:04

## 2024-12-13 RX ADMIN — METHADONE HYDROCHLORIDE 190 MILLIGRAM(S): 5 TABLET ORAL at 11:13

## 2024-12-13 RX ADMIN — HYDROMORPHONE HYDROCHLORIDE 2 MILLIGRAM(S): 2 TABLET ORAL at 21:36

## 2024-12-13 RX ADMIN — VALPROIC ACID 250 MILLIGRAM(S): 250 SOLUTION ORAL at 23:21

## 2024-12-13 RX ADMIN — PANTOPRAZOLE SODIUM 40 MILLIGRAM(S): 40 TABLET, DELAYED RELEASE ORAL at 05:12

## 2024-12-13 NOTE — H&P ADULT - NSICDXPASTMEDICALHX_GEN_ALL_CORE_FT
PAST MEDICAL HISTORY:  History of intravenous drug use in remission     Methadone maintenance therapy patient     Osteomyelitis of lumbar spine     Seizure disorder     Tobacco dependence with current use

## 2024-12-13 NOTE — H&P ADULT - NSHPPHYSICALEXAM_GEN_ALL_CORE
T(C): 37.3 (12-12-24 @ 23:14), Max: 37.3 (12-12-24 @ 23:14)  HR: 100 (12-12-24 @ 23:14) (100 - 100)  BP: 156/90 (12-12-24 @ 23:14) (156/90 - 156/90)  RR: 16 (12-12-24 @ 23:14) (16 - 16)  SpO2: 96% (12-12-24 @ 23:14) (96% - 96%)    CONSTITUTIONAL: no apparent distress  EYES: PERRLA and symmetric, EOMI, mildly erythematous conjunctivae b/l  ENMT: Oral mucosa with moist membranes, poor dentition  RESP: No respiratory distress, no use of accessory muscles; CTA b/l  CV: RRR  GI: Soft, NT, ND  NEURO: AAO x 4

## 2024-12-13 NOTE — PATIENT PROFILE ADULT - FALL HARM RISK - HARM RISK INTERVENTIONS

## 2024-12-13 NOTE — H&P ADULT - HISTORY OF PRESENT ILLNESS
Blas Sheets is a 44 year old male with PMHx of chronic thoracic osteomyelitis, hx of IVDA (on methadone), seizure disorder, tobacco use, and depression who presented to the ED on 12/12/24 for complaints of seizure like activity.    Patient reports his wife witnessed him having a seizure like activity today. Described as shaking of all extremities and foaming at the mouth. Fell down and hit the dresser. No associated tongue biting or urinary or bowel incontinence. States he is supposed to take valproic acid but does not take it if he does not have seizures. Has only been intermittently taking it for the past 2-3 years. Of note, patient reports he has been taking bupropion for years. Initially started on it in an attempt to quit smoking but remained on it for depression. Lives at home with wife, daughter, and two stepsons.    In the ED, VSS except Tmax 99.2 and HR as elevated as 100. Labs grossly unremarkable except lactic acid 2.5. Acetaminophen, salicylate, blood alcohol level negative. Valproic acid level < 3. Utox +THC and methadone. CT head without acute intracranial findings. Received 1L NS bolus and valproic acid 250 mg.

## 2024-12-13 NOTE — H&P ADULT - NSHPLABSRESULTS_GEN_ALL_CORE
16.0   9.51  )-----------( 271      ( 12 Dec 2024 23:50 )             46.6     140  |  105  |  19  ----------------------------<  89     12-12  4.2   |  28  |  0.83    Ca    9.2      12 Dec 2024 23:50    TPro  8.2  /  Alb  3.7  /  TBili  0.3  /  DBili  x   /  AST  25  /  ALT  30  /  AlkPhos  91  12-12    CT head without contrast 12/12/24  IMPRESSION:  No evidence of acute intracranial hemorrhage, midline shift or CT evidence of acute territorial infarct.    If the patient's symptoms persist, consider short interval follow-up head CT or brain MRI if there are no MRI contraindications

## 2024-12-13 NOTE — H&P ADULT - ASSESSMENT
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    Breakthrough seizures secondary to medication noncompliance  Reports witnessed seizure like activity by wife today, described as shaking of all extremities and foaming at mouth, fell down  No associated tongue biting or urinary or bowel incontinence  States he is supposed to take valproic acid but does not take it if he does not have seizures and therefore, has only been intermittently taking it for the past 2-3 years  Valproic acid level < 3 on admission, CT head without acute intracranial findings  S/p valproic acid 250 mg PO in the ED  Neuro checks q4, aspiration and seizure precautions  PTA valproic acid 250 mg BID resumed for now  Will hold off on PRN benzos due to likelihood of skewing EEG results  F/u prolactin, EEG, MRI brain  Consult neurology for optimization of AED regimen given noncompliance - please call in AM    Lactic acidosis secondary to above  Lactic acid 2.5 on admission  S/p 1L NS bolus in the ED  F/u serial lactates      Chronic medical conditions:  Chronic thoracic osteomyelitis: PTA methocarbamol 1000 mg q6 PRN, cyclobenzaprine 5 mg TID PRN for muscle spasms  Hx of IVDA: PTA methadone 190 mg held for now, please call [facility] Methadone clinic [phone] to confirm dosing  Depression: PTA bupropion  mg held due to risks of lowering seizure threshold, advise outpatient f/u with PCP to discuss risks vs. benefits of staying on this medication  Tobacco use: smokes 1/2 ppd x 26 years, not interested in quitting at this time, declined nicotine patch and nicorette gum    Medication reconciliation completed using discharge med list from [facility] from 8/24/24. Patient reports his medications have not changed since then. Blas Sehets is a 44 year old male with PMHx of chronic thoracic osteomyelitis, hx of IVDA (on methadone), seizure disorder, tobacco use, and depression who presented to the ED on 12/12/24 for complaints of seizure like activity and admitted for breakthrough seizures secondary to medication noncompliance.    Breakthrough seizures secondary to medication noncompliance  Reports witnessed seizure like activity by wife today, described as shaking of all extremities and foaming at mouth, fell down  No associated tongue biting or urinary or bowel incontinence  States he is supposed to take valproic acid but does not take it if he does not have seizures and therefore, has only been intermittently taking it for the past 2-3 years  Valproic acid level < 3 on admission, CT head without acute intracranial findings  S/p valproic acid 250 mg PO in the ED  Neuro checks q4, aspiration and seizure precautions  PTA valproic acid 250 mg BID resumed for now  Will hold off on PRN benzos due to likelihood of skewing EEG results  F/u prolactin, EEG, MRI brain  Telemetry  Consult neurology for optimization of AED regimen given noncompliance - please call in AM    Lactic acidosis secondary to above  Lactic acid 2.5 on admission  S/p 1L NS bolus in the ED  F/u serial lactates      Chronic medical conditions:  Chronic thoracic osteomyelitis: PTA methocarbamol 1000 mg q6 PRN, cyclobenzaprine 5 mg TID PRN for muscle spasms  Hx of IVDA: PTA methadone 190 mg held for now, please call [facility] Methadone clinic [phone] to confirm dosing  Depression: PTA bupropion  mg held due to risks of lowering seizure threshold, advise outpatient f/u with PCP to discuss risks vs. benefits of staying on this medication  Tobacco use: smokes 1/2 ppd x 26 years, not interested in quitting at this time, declined nicotine patch and nicorette gum    Medication reconciliation completed using discharge med list from [facility] from 8/24/24. Patient reports his medications have not changed since then.

## 2024-12-13 NOTE — PATIENT PROFILE ADULT - FUNCTIONAL ASSESSMENT - BASIC MOBILITY 6.
4-calculated by average/Not able to assess (calculate score using [facility] averaging method)

## 2024-12-13 NOTE — CHART NOTE - NSCHARTNOTEFT_GEN_A_CORE
Hx of IVDA:  methadone 190 mg daily     Spoke with Dominique Lambert RN     Patient is on direct observation therapy with 190 mg methadone daily. Last dose on 12/12 at 0800hrs.     Dose confirmed with [facility] [phone]     ABDIRIZAK Prater [facility] [id number]

## 2024-12-13 NOTE — PROGRESS NOTE ADULT - ASSESSMENT
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    Breakthrough seizures secondary to medication noncompliance  Reports witnessed seizure like activity by wife today, described as shaking of all extremities and foaming at mouth, fell down  No associated tongue biting or urinary or bowel incontinence  States he is supposed to take valproic acid but does not take it if he does not have seizures and therefore, has only been intermittently taking it for the past 2-3 years  Valproic acid level < 3 on admission, CT head without acute intracranial findings  S/p valproic acid 250 mg PO in the ED  Neuro checks q4, aspiration and seizure precautions  EEG no seizure actuivity   Telemetry  Consult neurology for optimization of AED regimen given noncompliance - please call in AM    Lactic acidosis secondary to above  follow lactate acid   F/u serial lactates      Chronic medical conditions:  Chronic thoracic osteomyelitis: PTA methocarbamol 1000 mg q6 PRN, cyclobenzaprine 5 mg TID PRN for muscle spasms  Hx of IVDA: PTA methadone 190  [facility] [phone] to confirmed    Depression:  bupropion  mg restarted may lower session threshold   Tobacco use: smokes 1/2 ppd x 26 years, not interested in quitting at this time, declined nicotine patch and nicorette gum    Medication reconciliation completed using discharge med list from [facility] from 8/24/24. Patient reports his medications have not changed since then.

## 2024-12-14 LAB
ALBUMIN SERPL ELPH-MCNC: 3.3 G/DL — SIGNIFICANT CHANGE UP (ref 3.3–5)
ALP SERPL-CCNC: 82 U/L — SIGNIFICANT CHANGE UP (ref 40–120)
ALT FLD-CCNC: 24 U/L — SIGNIFICANT CHANGE UP (ref 12–78)
ANION GAP SERPL CALC-SCNC: 4 MMOL/L — LOW (ref 5–17)
AST SERPL-CCNC: 18 U/L — SIGNIFICANT CHANGE UP (ref 15–37)
BILIRUB SERPL-MCNC: 0.5 MG/DL — SIGNIFICANT CHANGE UP (ref 0.2–1.2)
BUN SERPL-MCNC: 16 MG/DL — SIGNIFICANT CHANGE UP (ref 7–23)
CALCIUM SERPL-MCNC: 8.6 MG/DL — SIGNIFICANT CHANGE UP (ref 8.5–10.1)
CHLORIDE SERPL-SCNC: 104 MMOL/L — SIGNIFICANT CHANGE UP (ref 96–108)
CO2 SERPL-SCNC: 28 MMOL/L — SIGNIFICANT CHANGE UP (ref 22–31)
CREAT SERPL-MCNC: 0.66 MG/DL — SIGNIFICANT CHANGE UP (ref 0.5–1.3)
EGFR: 120 ML/MIN/1.73M2 — SIGNIFICANT CHANGE UP
GLUCOSE SERPL-MCNC: 74 MG/DL — SIGNIFICANT CHANGE UP (ref 70–99)
HCT VFR BLD CALC: 43.2 % — SIGNIFICANT CHANGE UP (ref 39–50)
HGB BLD-MCNC: 14.5 G/DL — SIGNIFICANT CHANGE UP (ref 13–17)
MAGNESIUM SERPL-MCNC: 2.2 MG/DL — SIGNIFICANT CHANGE UP (ref 1.6–2.6)
MCHC RBC-ENTMCNC: 30 PG — SIGNIFICANT CHANGE UP (ref 27–34)
MCHC RBC-ENTMCNC: 33.6 G/DL — SIGNIFICANT CHANGE UP (ref 32–36)
MCV RBC AUTO: 89.4 FL — SIGNIFICANT CHANGE UP (ref 80–100)
NRBC # BLD: 0 /100 WBCS — SIGNIFICANT CHANGE UP (ref 0–0)
PHOSPHATE SERPL-MCNC: 3.2 MG/DL — SIGNIFICANT CHANGE UP (ref 2.5–4.5)
PLATELET # BLD AUTO: 194 K/UL — SIGNIFICANT CHANGE UP (ref 150–400)
POTASSIUM SERPL-MCNC: 3.9 MMOL/L — SIGNIFICANT CHANGE UP (ref 3.5–5.3)
POTASSIUM SERPL-SCNC: 3.9 MMOL/L — SIGNIFICANT CHANGE UP (ref 3.5–5.3)
PROT SERPL-MCNC: 7.2 GM/DL — SIGNIFICANT CHANGE UP (ref 6–8.3)
RBC # BLD: 4.83 M/UL — SIGNIFICANT CHANGE UP (ref 4.2–5.8)
RBC # FLD: 14 % — SIGNIFICANT CHANGE UP (ref 10.3–14.5)
SODIUM SERPL-SCNC: 136 MMOL/L — SIGNIFICANT CHANGE UP (ref 135–145)
WBC # BLD: 6.77 K/UL — SIGNIFICANT CHANGE UP (ref 3.8–10.5)
WBC # FLD AUTO: 6.77 K/UL — SIGNIFICANT CHANGE UP (ref 3.8–10.5)

## 2024-12-14 PROCEDURE — 70551 MRI BRAIN STEM W/O DYE: CPT | Mod: 26

## 2024-12-14 PROCEDURE — 99232 SBSQ HOSP IP/OBS MODERATE 35: CPT

## 2024-12-14 PROCEDURE — 73030 X-RAY EXAM OF SHOULDER: CPT | Mod: 26,RT

## 2024-12-14 PROCEDURE — 76377 3D RENDER W/INTRP POSTPROCES: CPT | Mod: 26

## 2024-12-14 RX ORDER — LEVETIRACETAM 1000 MG/1
1000 TABLET ORAL ONCE
Refills: 0 | Status: COMPLETED | OUTPATIENT
Start: 2024-12-14 | End: 2024-12-14

## 2024-12-14 RX ORDER — LEVETIRACETAM 1000 MG/1
500 TABLET ORAL
Refills: 0 | Status: DISCONTINUED | OUTPATIENT
Start: 2024-12-15 | End: 2024-12-15

## 2024-12-14 RX ADMIN — HYDROMORPHONE HYDROCHLORIDE 4 MILLIGRAM(S): 2 TABLET ORAL at 21:32

## 2024-12-14 RX ADMIN — GABAPENTIN 800 MILLIGRAM(S): 300 CAPSULE ORAL at 14:23

## 2024-12-14 RX ADMIN — LORAZEPAM 2 MILLIGRAM(S): 2 TABLET ORAL at 11:44

## 2024-12-14 RX ADMIN — HYDROMORPHONE HYDROCHLORIDE 4 MILLIGRAM(S): 2 TABLET ORAL at 16:48

## 2024-12-14 RX ADMIN — HYDROMORPHONE HYDROCHLORIDE 4 MILLIGRAM(S): 2 TABLET ORAL at 20:32

## 2024-12-14 RX ADMIN — HYDROMORPHONE HYDROCHLORIDE 2 MILLIGRAM(S): 2 TABLET ORAL at 18:09

## 2024-12-14 RX ADMIN — Medication 2 TABLET(S): at 21:25

## 2024-12-14 RX ADMIN — HYDROMORPHONE HYDROCHLORIDE 4 MILLIGRAM(S): 2 TABLET ORAL at 00:22

## 2024-12-14 RX ADMIN — PANTOPRAZOLE SODIUM 40 MILLIGRAM(S): 40 TABLET, DELAYED RELEASE ORAL at 08:42

## 2024-12-14 RX ADMIN — GABAPENTIN 800 MILLIGRAM(S): 300 CAPSULE ORAL at 05:28

## 2024-12-14 RX ADMIN — LEVETIRACETAM 400 MILLIGRAM(S): 1000 TABLET ORAL at 18:04

## 2024-12-14 RX ADMIN — HYDROMORPHONE HYDROCHLORIDE 2 MILLIGRAM(S): 2 TABLET ORAL at 18:40

## 2024-12-14 RX ADMIN — VALPROIC ACID 250 MILLIGRAM(S): 250 SOLUTION ORAL at 14:22

## 2024-12-14 RX ADMIN — ENOXAPARIN SODIUM 40 MILLIGRAM(S): 30 INJECTION SUBCUTANEOUS at 05:28

## 2024-12-14 RX ADMIN — HYDROMORPHONE HYDROCHLORIDE 4 MILLIGRAM(S): 2 TABLET ORAL at 14:23

## 2024-12-14 RX ADMIN — METHADONE HYDROCHLORIDE 190 MILLIGRAM(S): 5 TABLET ORAL at 11:23

## 2024-12-14 RX ADMIN — HYDROMORPHONE HYDROCHLORIDE 4 MILLIGRAM(S): 2 TABLET ORAL at 06:44

## 2024-12-14 RX ADMIN — ACETAMINOPHEN, DIPHENHYDRAMINE HCL, PHENYLEPHRINE HCL 3 MILLIGRAM(S): 325; 25; 5 TABLET ORAL at 21:26

## 2024-12-14 RX ADMIN — Medication 300 MILLIGRAM(S): at 14:22

## 2024-12-14 RX ADMIN — POLYETHYLENE GLYCOL 3350 17 GRAM(S): 17 POWDER, FOR SOLUTION ORAL at 11:23

## 2024-12-14 RX ADMIN — HYDROMORPHONE HYDROCHLORIDE 2 MILLIGRAM(S): 2 TABLET ORAL at 03:57

## 2024-12-14 RX ADMIN — HYDROMORPHONE HYDROCHLORIDE 2 MILLIGRAM(S): 2 TABLET ORAL at 09:10

## 2024-12-14 RX ADMIN — HYDROMORPHONE HYDROCHLORIDE 2 MILLIGRAM(S): 2 TABLET ORAL at 08:41

## 2024-12-14 RX ADMIN — GABAPENTIN 800 MILLIGRAM(S): 300 CAPSULE ORAL at 21:25

## 2024-12-14 NOTE — CONSULT NOTE ADULT - ASSESSMENT
Epilepsy  Hx of substance abuse s/p methadone  Anxiety/Depression  Chronic thoracic osteomyelitis    - recommend Keppra 500mg BID. Load with 1000mg IV x1 now.  - discontinue Depakote as this will likely interact with his psychiatric meds  - follow up with his neurologist at [place], [place] once discharged    Thank you for the consult.

## 2024-12-14 NOTE — CONSULT NOTE ADULT - SUBJECTIVE AND OBJECTIVE BOX
HPI: 44 year old man with hx of epilepsy, substance abuse on mehtadone, depression, chronic thoracic osteomyelitis (2014) presenting with seizure. Patient felt a seizure coming on and went to his daughter's bedroom and then collapsed. He passed out and was noted to be foaming at the mouth. It lasted minutes. No Tongue bite or incontinence. His last seizure was ~2022. Patient says he stopped taking Depakote because he did not have a seizure. In the past his seizures were due to benzo withdrawal. His MRI brain shows no acute process and EEG showed generalized spike/wave.     PMHx: Epilepsy, Hx of substance abuse, Chronic thoracic osteomyelitis, Depression, anxiety  PSHx: thoracic spine surgery  PFHx: Dementia  Social Hx: hx of heroine use  Allergies: Zosyn  ROS: seizure  Medications: see EMR    Vitals: Temp 98.8F      RR  18      HR  123     BP  123/75  General: NAD  CV: regular rate and rhythm  Resp: no respiratory distress  Neck: supple  Neuro Exam: AOx3. Follows commands. No dysarthria. No aphasia. EOM intact. No facial droop. PERRL. VFF. Tongue is midline. Palate elevate symmetrically. Shoulder shrug is intact. Finger to nose and heel to shin intact. Moving all four extremities. No focal weakness. Reflexes symmetric and toes down. Gait exam is normal. Sensory intact to touch. Appears restless    MRI head, EEG, and labs reviewed

## 2024-12-14 NOTE — CONSULT NOTE ADULT - PROVIDER SPECIALTY LIST ADULT
Patient is a 74y old  Male who presents with a chief complaint of not feeling well (09 Oct 2018 15:36)      INTERVAL /OVERNIGHT EVENTS: now sedated    MEDICATIONS  (STANDING):  ALBUTerol    0.083% 2.5 milliGRAM(s) Nebulizer every 6 hours  buDESOnide   0.5 milliGRAM(s) Respule 0.5 milliGRAM(s) Inhalation every 12 hours  cholestyramine Powder (Sugar-Free) 4 Gram(s) Oral daily  cyanocobalamin Injectable 1000 MICROGram(s) IntraMuscular daily  famotidine    Tablet 20 milliGRAM(s) Oral two times a day  folic acid 1 milliGRAM(s) Oral daily  lactobacillus acidophilus 1 Tablet(s) Oral three times a day with meals  multivitamin 1 Tablet(s) Oral daily  nystatin Powder 1 Application(s) Topical two times a day  QUEtiapine 100 milliGRAM(s) Oral daily  QUEtiapine 100 milliGRAM(s) Oral at bedtime  sodium bicarbonate 650 milliGRAM(s) Oral three times a day  tiotropium 18 MICROgram(s) Capsule 1 Capsule(s) Inhalation daily  venlafaxine XR. 150 milliGRAM(s) Oral daily    MEDICATIONS  (PRN):  morphine  - Injectable 2 milliGRAM(s) IV Push every 6 hours PRN pain/ distress  OLANZapine Injectable 10 milliGRAM(s) IntraMuscular every 6 hours PRN Acute agitation  ondansetron Injectable 4 milliGRAM(s) IV Push every 6 hours PRN Nausea  promethazine Suppository 12.5 milliGRAM(s) Rectal four times a day PRN breakthrough nausea / vomiting  sodium chloride 0.65% Nasal 1 Spray(s) Both Nostrils four times a day PRN Nasal Congestion      Allergies    penicillin (Unknown)  strawberry (Unknown)    Intolerances        REVIEW OF SYSTEMS: unable to obtain    Vital Signs Last 24 Hrs  T(C): 37.1 (09 Oct 2018 19:54), Max: 37.1 (09 Oct 2018 19:54)  T(F): 98.7 (09 Oct 2018 19:54), Max: 98.7 (09 Oct 2018 19:54)  HR: 85 (09 Oct 2018 19:54) (58 - 95)  BP: 115/73 (09 Oct 2018 19:54) (100/61 - 115/73)  BP(mean): --  RR: 21 (09 Oct 2018 19:54) (18 - 22)  SpO2: 99% (09 Oct 2018 19:54) (89% - 99%)    PHYSICAL EXAM:  GENERAL: NAD, well-groomed, well-developed  HEAD:  Atraumatic, Normocephalic  EYES: EOMI, PERRLA, conjunctiva and sclera clear  ENMT: No tonsillar erythema, exudates, or enlargement; Moist mucous membranes, Good dentition, No lesions  NECK: Supple, No JVD, Normal thyroid  NERVOUS SYSTEm Motor Strength 5/5 B/L upper and lower extremities; DTRs 2+ intact and symmetric  CHEST/LUNG: Clear to auscultation bilaterally; No rales, rhonchi, wheezing, or rubs  HEART: Regular rate and rhythm; No murmurs, rubs, or gallops  ABDOMEN: Soft, Nontender, Nondistended; Bowel sounds present  EXTREMITIES:  2+ Peripheral Pulses, No clubbing, cyanosis, or edema  LYMPH: No lymphadenopathy noted  SKIN: No rashes or lesions    LABS:                        7.1    x     )-----------( x        ( 09 Oct 2018 17:05 )             22.6       Ca    7.7        08 Oct 2018 07:09        Urinalysis Basic - ( 09 Oct 2018 08:24 )    Color: Yellow / Appearance: Slightly Turbid / S.010 / pH: x  Gluc: x / Ketone: Trace  / Bili: Small / Urobili: Negative   Blood: x / Protein: 75 mg/dL / Nitrite: Negative   Leuk Esterase: Small / RBC: 25-50 /HPF / WBC 3-5   Sq Epi: x / Non Sq Epi: Occasional / Bacteria: Few      CAPILLARY BLOOD GLUCOSE          RADIOLOGY & ADDITIONAL TESTS:    Notes Reviewed:  [x ] YES  [ ] NO    Care Discussed with Consultants/Other Providers [x ] YES  [ ] NO Neurology

## 2024-12-14 NOTE — PROGRESS NOTE ADULT - ASSESSMENT
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    Breakthrough seizures secondary to medication noncompliance  Reports witnessed seizure like activity by wife today, described as shaking of all extremities and foaming at mouth, fell down  No associated tongue biting or urinary or bowel incontinence  States he is supposed to take valproic acid but does not take it if he does not have seizures and therefore, has only been intermittently taking it for the past 2-3 years  Valproic acid level < 3 on admission, CT head without acute intracranial findings  S/p valproic acid 250 mg PO in the ED  Neuro checks q4, aspiration and seizure precautions  EEG no seizure activity   MRI brain pending   Neurology consulted     Lactic acidosis secondary to above  follow lactate acid   resolved    Chronic medical conditions:  Chronic thoracic osteomyelitis: PTA methocarbamol 1000 mg q6 PRN, cyclobenzaprine 5 mg TID PRN for muscle spasms  Hx of IVDA: PTA methadone 190  [facility]adone clinic confirmed    Depression:  bupropion  mg restarted may lower session threshold   Tobacco use: smokes 1/2 ppd x 26 years, not interested in quitting at this time, declined nicotine patch and nicorette gum    Medication reconciliation completed using discharge med list from [facility] from 8/24/24. Patient reports his medications have not changed since then.

## 2024-12-14 NOTE — PROGRESS NOTE ADULT - REASON FOR ADMISSION
Breakthrough seizures secondary to medication noncompliance
Breakthrough seizures secondary to medication noncompliance

## 2024-12-14 NOTE — PROGRESS NOTE ADULT - SUBJECTIVE AND OBJECTIVE BOX
Patient is a 44y old  Male who presents with a chief complaint of Breakthrough seizures secondary to medication noncompliance (13 Dec 2024 14:30)      INTERVAL HPI/OVERNIGHT EVENTS: No events. Anxious about MRI. No seizures overnight.     MEDICATIONS  (STANDING):  buPROPion XL (24-Hour) . 300 milliGRAM(s) Oral daily  enoxaparin Injectable 40 milliGRAM(s) SubCutaneous every 12 hours  gabapentin 800 milliGRAM(s) Oral three times a day  methadone    Tablet 190 milliGRAM(s) Oral daily  pantoprazole    Tablet 40 milliGRAM(s) Oral before breakfast  polyethylene glycol 3350 17 Gram(s) Oral daily  senna 2 Tablet(s) Oral at bedtime  valproic acid 250 milliGRAM(s) Oral two times a day    MEDICATIONS  (PRN):  acetaminophen     Tablet .. 650 milliGRAM(s) Oral every 6 hours PRN Temp greater or equal to 38C (100.4F), Mild Pain (1 - 3)  aluminum hydroxide/magnesium hydroxide/simethicone Suspension 30 milliLiter(s) Oral every 4 hours PRN Dyspepsia  cyclobenzaprine 5 milliGRAM(s) Oral three times a day PRN Muscle Spasm  HYDROmorphone   Tablet 2 milliGRAM(s) Oral every 4 hours PRN Moderate Pain (4 - 6)  HYDROmorphone   Tablet 4 milliGRAM(s) Oral every 6 hours PRN Severe Pain (7 - 10)  melatonin 3 milliGRAM(s) Oral at bedtime PRN Insomnia  methocarbamol 1000 milliGRAM(s) Oral every 6 hours PRN for muscle spasm  ondansetron Injectable 4 milliGRAM(s) IV Push every 8 hours PRN Nausea and/or Vomiting      Allergies    Zosyn (Hives)    Intolerances        REVIEW OF SYSTEMS:  ROS negative unless stated otherwise.    Vital Signs Last 24 Hrs  T(C): 37 (14 Dec 2024 11:28), Max: 37.2 (13 Dec 2024 23:55)  T(F): 98.6 (14 Dec 2024 11:28), Max: 98.9 (13 Dec 2024 23:55)  HR: 73 (14 Dec 2024 11:28) (65 - 79)  BP: 122/82 (14 Dec 2024 11:28) (112/69 - 134/81)  BP(mean): --  RR: 18 (14 Dec 2024 11:28) (18 - 19)  SpO2: 97% (14 Dec 2024 11:28) (94% - 100%)    Parameters below as of 14 Dec 2024 05:26  Patient On (Oxygen Delivery Method): room air        PHYSICAL EXAM:  GENERAL: NAD  HEAD:  Atraumatic   EYES: EOMI   ENMT: Moist mucous membranes  NECK: Supple   NERVOUS SYSTEM:  Awake  CHEST/LUNG: CTAB   HEART: RRR   ABDOMEN: Soft, Nontender, Nondistended  EXTREMITIES:   No clubbing, cyanosis, or edema  PSYCH: Mood appropriate    LABS:                        14.5   6.77  )-----------( 194      ( 14 Dec 2024 05:20 )             43.2     12-14    136  |  104  |  16  ----------------------------<  74  3.9   |  28  |  0.66    Ca    8.6      14 Dec 2024 05:20  Phos  3.2     12-14  Mg     2.2     12-14    TPro  7.2  /  Alb  3.3  /  TBili  0.5  /  DBili  x   /  AST  18  /  ALT  24  /  AlkPhos  82  12-14      Urinalysis Basic - ( 14 Dec 2024 05:20 )    Color: x / Appearance: x / SG: x / pH: x  Gluc: 74 mg/dL / Ketone: x  / Bili: x / Urobili: x   Blood: x / Protein: x / Nitrite: x   Leuk Esterase: x / RBC: x / WBC x   Sq Epi: x / Non Sq Epi: x / Bacteria: x      CAPILLARY BLOOD GLUCOSE          RADIOLOGY & ADDITIONAL TESTS:    Imaging Personally Reviewed:  [ X] YES  [ ] NO    Consultant(s) Notes Reviewed:  [ X] YES  [ ] NO    Care Discussed with Consultants/Other Providers [X ] YES  [ ] NO
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    Patient reports his wife witnessed him having a seizure like activity today. Described as shaking of all extremities and foaming at the mouth. Fell down and hit the dresser. No associated tongue biting or urinary or bowel incontinence. States he is supposed to take valproic acid but does not take it if he does not have seizures. Has only been intermittently taking it for the past 2-3 years. Of note, patient reports he has been taking bupropion for years. Initially started on it in an attempt to quit smoking but remained on it for depression. Lives at home with wife, daughter, and two stepsons.    In the ED, VSS except Tmax 99.2 and HR as elevated as 100. Labs grossly unremarkable except lactic acid 2.5. Acetaminophen, salicylate, blood alcohol level negative. Valproic acid level < 3. Utox +THC and methadone. CT head without acute intracranial findings. Received 1L NS bolus and valproic acid 250 mg. (13 Dec 2024 02:03)  Patient is a 44y old  Male who presents with a chief complaint of Breakthrough seizures secondary to medication noncompliance (13 Dec 2024 02:03)      INTERVAL HPI/OVERNIGHT EVENTS:    MEDICATIONS  (STANDING):  buPROPion XL (24-Hour) . 300 milliGRAM(s) Oral daily  enoxaparin Injectable 40 milliGRAM(s) SubCutaneous every 12 hours  gabapentin 800 milliGRAM(s) Oral three times a day  methadone    Tablet 190 milliGRAM(s) Oral daily  pantoprazole    Tablet 40 milliGRAM(s) Oral before breakfast  polyethylene glycol 3350 17 Gram(s) Oral daily  senna 2 Tablet(s) Oral at bedtime  valproic acid 250 milliGRAM(s) Oral two times a day    MEDICATIONS  (PRN):  acetaminophen     Tablet .. 650 milliGRAM(s) Oral every 6 hours PRN Temp greater or equal to 38C (100.4F), Mild Pain (1 - 3)  aluminum hydroxide/magnesium hydroxide/simethicone Suspension 30 milliLiter(s) Oral every 4 hours PRN Dyspepsia  cyclobenzaprine 5 milliGRAM(s) Oral three times a day PRN Muscle Spasm  HYDROmorphone   Tablet 2 milliGRAM(s) Oral every 4 hours PRN Moderate Pain (4 - 6)  HYDROmorphone   Tablet 4 milliGRAM(s) Oral every 6 hours PRN Severe Pain (7 - 10)  LORazepam   Injectable 2 milliGRAM(s) IV Push once PRN Agitation  melatonin 3 milliGRAM(s) Oral at bedtime PRN Insomnia  methocarbamol 1000 milliGRAM(s) Oral every 6 hours PRN for muscle spasm  ondansetron Injectable 4 milliGRAM(s) IV Push every 8 hours PRN Nausea and/or Vomiting      Allergies    Zosyn (Hives)    Intolerances        REVIEW OF SYSTEMS:  CONSTITUTIONAL: No fever, weight loss, or fatigue  EYES: No eye pain, visual disturbances, or discharge  ENMT:  No difficulty hearing, tinnitus, vertigo; No sinus or throat pain  NECK: No pain or stiffness  BREASTS: No pain, masses, or nipple discharge  RESPIRATORY: No cough, wheezing, chills or hemoptysis; No shortness of breath  CARDIOVASCULAR: No chest pain, palpitations, dizziness, or leg swelling  GASTROINTESTINAL: No abdominal or epigastric pain. No nausea, vomiting, or hematemesis; No diarrhea or constipation. No melena or hematochezia.  GENITOURINARY: No dysuria, frequency, hematuria, or incontinence  NEUROLOGICAL: No headaches, memory loss, loss of strength, numbness, or tremors  SKIN: No itching, burning, rashes, or lesions   LYMPH NODES: No enlarged glands  ENDOCRINE: No heat or cold intolerance; No hair loss  MUSCULOSKELETAL: No joint pain or swelling; No muscle, back, or extremity pain  PSYCHIATRIC: No depression, anxiety, mood swings, or difficulty sleeping  HEME/LYMPH: No easy bruising, or bleeding gums  ALLERGY AND IMMUNOLOGIC: No hives or eczema    Vital Signs Last 24 Hrs  T(C): 36.9 (13 Dec 2024 11:13), Max: 37.3 (12 Dec 2024 23:14)  T(F): 98.4 (13 Dec 2024 11:13), Max: 99.2 (12 Dec 2024 23:14)  HR: 82 (13 Dec 2024 11:13) (73 - 100)  BP: 132/70 (13 Dec 2024 11:13) (104/79 - 156/90)  BP(mean): --  RR: 18 (13 Dec 2024 11:13) (16 - 20)  SpO2: 96% (13 Dec 2024 11:13) (92% - 96%)    Parameters below as of 13 Dec 2024 04:26  Patient On (Oxygen Delivery Method): room air        PHYSICAL EXAM:  GENERAL: NAD, well-groomed, well-developed  HEAD:  Atraumatic, Normocephalic  EYES: EOMI, PERRLA, conjunctiva and sclera clear  ENMT: No tonsillar erythema, exudates, or enlargement; Moist mucous membranes, Good dentition, No lesions  NECK: Supple, No JVD, Normal thyroid  NERVOUS SYSTEM:  Alert & Oriented X3, Good concentration; Motor Strength 5/5 B/L upper and lower extremities; DTRs 2+ intact and symmetric  CHEST/LUNG: Clear to ascultation  bilaterally; No rales, rhonchi, wheezing, or rubs  HEART: Regular rate and rhythm; No murmurs, rubs, or gallops  ABDOMEN: Soft, Nontender, Nondistended; Bowel sounds present  EXTREMITIES:  2+ Peripheral Pulses, No clubbing, cyanosis, or edema  LYMPH: No lymphadenopathy noted  SKIN: No rashes or lesions    LABS:                        16.0   9.51  )-----------( 271      ( 12 Dec 2024 23:50 )             46.6     12-12    140  |  105  |  19  ----------------------------<  89  4.2   |  28  |  0.83    Ca    9.2      12 Dec 2024 23:50    TPro  8.2  /  Alb  3.7  /  TBili  0.3  /  DBili  x   /  AST  25  /  ALT  30  /  AlkPhos  91  12-12      Urinalysis Basic - ( 12 Dec 2024 23:50 )    Color: x / Appearance: x / SG: x / pH: x  Gluc: 89 mg/dL / Ketone: x  / Bili: x / Urobili: x   Blood: x / Protein: x / Nitrite: x   Leuk Esterase: x / RBC: x / WBC x   Sq Epi: x / Non Sq Epi: x / Bacteria: x      CAPILLARY BLOOD GLUCOSE          RADIOLOGY & ADDITIONAL TESTS:    Imaging Personally Reviewed:  [ ] YES  [ ] NO    Consultant(s) Notes Reviewed:  [ ] YES  [ ] NO    Care Discussed with Consultants/Other Providers [ ] YES  [ ] NO

## 2024-12-15 VITALS
HEART RATE: 80 BPM | TEMPERATURE: 98 F | DIASTOLIC BLOOD PRESSURE: 80 MMHG | RESPIRATION RATE: 16 BRPM | SYSTOLIC BLOOD PRESSURE: 120 MMHG | OXYGEN SATURATION: 98 %

## 2024-12-15 PROCEDURE — 99239 HOSP IP/OBS DSCHRG MGMT >30: CPT

## 2024-12-15 RX ORDER — OXYCODONE HYDROCHLORIDE 30 MG/1
1 TABLET ORAL
Qty: 3 | Refills: 0
Start: 2024-12-15 | End: 2024-12-15

## 2024-12-15 RX ORDER — LEVETIRACETAM 1000 MG/1
1 TABLET ORAL
Qty: 60 | Refills: 0
Start: 2024-12-15 | End: 2025-01-13

## 2024-12-15 RX ORDER — VALPROIC ACID 250 MG/5ML
1 SOLUTION ORAL
Refills: 0 | DISCHARGE

## 2024-12-15 RX ORDER — OXYCODONE HYDROCHLORIDE 30 MG/1
1 TABLET ORAL
Qty: 6 | Refills: 0
Start: 2024-12-15 | End: 2024-12-16

## 2024-12-15 RX ADMIN — HYDROMORPHONE HYDROCHLORIDE 4 MILLIGRAM(S): 2 TABLET ORAL at 05:08

## 2024-12-15 RX ADMIN — GABAPENTIN 800 MILLIGRAM(S): 300 CAPSULE ORAL at 05:07

## 2024-12-15 RX ADMIN — HYDROMORPHONE HYDROCHLORIDE 4 MILLIGRAM(S): 2 TABLET ORAL at 11:31

## 2024-12-15 RX ADMIN — HYDROMORPHONE HYDROCHLORIDE 2 MILLIGRAM(S): 2 TABLET ORAL at 08:30

## 2024-12-15 RX ADMIN — ENOXAPARIN SODIUM 40 MILLIGRAM(S): 30 INJECTION SUBCUTANEOUS at 05:07

## 2024-12-15 RX ADMIN — HYDROMORPHONE HYDROCHLORIDE 2 MILLIGRAM(S): 2 TABLET ORAL at 07:49

## 2024-12-15 RX ADMIN — POLYETHYLENE GLYCOL 3350 17 GRAM(S): 17 POWDER, FOR SOLUTION ORAL at 11:32

## 2024-12-15 RX ADMIN — Medication 300 MILLIGRAM(S): at 11:32

## 2024-12-15 RX ADMIN — METHADONE HYDROCHLORIDE 190 MILLIGRAM(S): 5 TABLET ORAL at 11:32

## 2024-12-15 RX ADMIN — LEVETIRACETAM 500 MILLIGRAM(S): 1000 TABLET ORAL at 05:13

## 2024-12-15 RX ADMIN — HYDROMORPHONE HYDROCHLORIDE 4 MILLIGRAM(S): 2 TABLET ORAL at 12:15

## 2024-12-15 RX ADMIN — HYDROMORPHONE HYDROCHLORIDE 4 MILLIGRAM(S): 2 TABLET ORAL at 06:08

## 2024-12-15 RX ADMIN — GABAPENTIN 800 MILLIGRAM(S): 300 CAPSULE ORAL at 13:28

## 2024-12-15 RX ADMIN — PANTOPRAZOLE SODIUM 40 MILLIGRAM(S): 40 TABLET, DELAYED RELEASE ORAL at 07:48

## 2024-12-15 NOTE — DISCHARGE NOTE PROVIDER - NSDCMRMEDTOKEN_GEN_ALL_CORE_FT
buPROPion 300 mg/24 hours (XL) oral tablet, extended release: 1 tab(s) orally once a day  cyclobenzaprine 5 mg oral tablet: 1 tab(s) orally 3 times a day as needed for Muscle Spasm  gabapentin 800 mg oral tablet: 1 tab(s) orally 3 times a day  levETIRAcetam 500 mg oral tablet: 1 tab(s) orally 2 times a day  methadone: 190 milligram(s) orally once a day  methocarbamol 500 mg oral tablet: 2 tab(s) orally every 6 hours as needed for  muscle spasm  pantoprazole 40 mg oral delayed release tablet: 1 tab(s) orally once a day (before a meal)  polyethylene glycol 3350 oral powder for reconstitution: 17 gram(s) orally once a day  senna leaf extract oral tablet: 2 tab(s) orally once a day (at bedtime)   buPROPion 300 mg/24 hours (XL) oral tablet, extended release: 1 tab(s) orally once a day  cyclobenzaprine 5 mg oral tablet: 1 tab(s) orally 3 times a day as needed for Muscle Spasm  gabapentin 800 mg oral tablet: 1 tab(s) orally 3 times a day  levETIRAcetam 500 mg oral tablet: 1 tab(s) orally 2 times a day  methadone: 190 milligram(s) orally once a day  methocarbamol 500 mg oral tablet: 2 tab(s) orally every 6 hours as needed for  muscle spasm  oxyCODONE 5 mg oral tablet: 1 tab(s) orally every 8 hours MDD: 3 tabs  pantoprazole 40 mg oral delayed release tablet: 1 tab(s) orally once a day (before a meal)  polyethylene glycol 3350 oral powder for reconstitution: 17 gram(s) orally once a day  senna leaf extract oral tablet: 2 tab(s) orally once a day (at bedtime)

## 2024-12-15 NOTE — DISCHARGE NOTE NURSING/CASE MANAGEMENT/SOCIAL WORK - NSDCFUADDAPPT_GEN_ALL_CORE_FT
APPTS ARE READY TO BE MADE: [X] YES    Best Family or Patient Contact (if needed):    Additional Information about above appointments (if needed):    1: neuro  2: pcp  3:     Other comments or requests:

## 2024-12-15 NOTE — DISCHARGE NOTE PROVIDER - CARE PROVIDER_API CALL
Mónica Cherry  Neurology  [address] [place] [address]  Phone: [phone]  Fax: [phone]  Follow Up Time: 1 week

## 2024-12-15 NOTE — DISCHARGE NOTE PROVIDER - NSDCCPCAREPLAN_GEN_ALL_CORE_FT
PRINCIPAL DISCHARGE DIAGNOSIS  Diagnosis: Seizure  Assessment and Plan of Treatment: Evaluated by neurologist  Please tkae keppra 500mg twice daily for seizures, stop taking valproic acid.   Follow up with neurology.

## 2024-12-15 NOTE — DISCHARGE NOTE NURSING/CASE MANAGEMENT/SOCIAL WORK - NSDCPEFALRISK_GEN_ALL_CORE
For information on Fall & Injury Prevention, visit: [email or web address] OR  [email or web address] OR  https://www.cdc.gov/steadi/patient.html

## 2024-12-15 NOTE — DISCHARGE NOTE PROVIDER - NSDCFUADDAPPT_GEN_ALL_CORE_FT
APPTS ARE READY TO BE MADE: [X] YES    Best Family or Patient Contact (if needed):    Additional Information about above appointments (if needed):    1: neuro  2: pcp  3:     Other comments or requests:    APPTS ARE READY TO BE MADE: [X] YES    Best Family or Patient Contact (if needed):    Additional Information about above appointments (if needed):    1: neuro  2: pcp  3:     Other comments or requests:   Patient was outreached but did not answer. A voicemail was left for the patient to return our call.

## 2024-12-15 NOTE — DISCHARGE NOTE PROVIDER - HOSPITAL COURSE
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    Breakthrough seizures secondary to medication noncompliance  Reports witnessed seizure like activity by wife today, described as shaking of all extremities and foaming at mouth, fell down  No associated tongue biting or urinary or bowel incontinence  States he is supposed to take valproic acid but does not take it if he does not have seizures and therefore, has only been intermittently taking it for the past 2-3 years  Valproic acid level < 3 on admission, CT head without acute intracranial findings  S/p valproic acid 250 mg PO in the ED  Neuro checks q4, aspiration and seizure precautions  EEG no seizure activity   MRI brain completed, no infarct, limited my motion artifact. Spoke with neurology, no need to repeat   Neurology consulted -- meds changed to keppra 500mg BID , loaded yesterday     Lactic acidosis secondary to above  follow lactate acid   resolved    Chronic medical conditions:  Chronic thoracic osteomyelitis: PTA methocarbamol 1000 mg q6 PRN, cyclobenzaprine 5 mg TID PRN for muscle spasms  Hx of IVDA: PTA methadone 190  [facility] Methadone clinic confirmed    Depression:  bupropion  mg restarted may lower session threshold   Tobacco use: smokes 1/2 ppd x 26 years, not interested in quitting at this time, declined nicotine patch and nicorette gum    Medication reconciliation completed using discharge med list from [facility] from 8/24/24. Patient reports his medications have not changed since then.      Patient seen and evaluated. DC home.     DC time 37 mins

## 2024-12-15 NOTE — DISCHARGE NOTE NURSING/CASE MANAGEMENT/SOCIAL WORK - PATIENT PORTAL LINK FT
You can access the FollowMyHealth Patient Portal offered by [facility] by registering at the following website: [email or web address]. By joining [employer]’s FollowMyHealth portal, you will also be able to view your health information using other applications (apps) compatible with our system.

## 2024-12-15 NOTE — DISCHARGE NOTE NURSING/CASE MANAGEMENT/SOCIAL WORK - FINANCIAL ASSISTANCE
[facility] provides services at a reduced cost to those who are determined to be eligible through [facility]’s financial assistance program. Information regarding [facility]’s financial assistance program can be found by going to [email or web address] or by calling [phone].

## 2024-12-15 NOTE — DISCHARGE NOTE NURSING/CASE MANAGEMENT/SOCIAL WORK - NSDCPNINST_GEN_ALL_CORE
Pt verbalized understanding of discharge instructions, medications and scheduling follow up appointments with MD.

## 2024-12-16 LAB — VALPROATE FREE SERPL-MCNC: SIGNIFICANT CHANGE UP UG/ML (ref 6–22)

## 2024-12-16 NOTE — PATIENT PROFILE ADULT - NSPROEXTENSIONSOFSELF_GEN_A_NUR
Well Child Check - 12-15 Year Old Visit    Name: Mike Pérez   Age:  14 year old  Date: 12/17/2024   Historian(s): Mother and The patient    Parent/Guardian would like communication of their results via:      Cell Phone:   Telephone Information:   Mobile [phone]     Okay to leave a message containing results? Yes     Health Maintenance       Meningococcal Vaccine (1 - 2-dose series)  Never done    HPV Vaccine (1 - Male 2-dose series)  Never done    Influenza Vaccine (1)  Never done    COVID-19 Vaccine (1 - 2024-25 season)  Never done           Following review of the above:  Pended orders    Note: Refer to final orders and clinician documentation.         SUBJECTIVE    Concerns:  Knee pain      Patient's history reviewed/updated as appropriate in medical record.     Past Medical History:   Diagnosis Date    Nondisplaced fracture (avulsion) of lateral epicondyle of left humerus, subsequent encounter for fracture with routine healing 08/10/2021     No past surgical history on file.   No family history on file.  Current Outpatient Medications   Medication Sig Dispense Refill    adapalene (DIFFERIN) 0.3 % gel Apply topically nightly. 45 g 3    Loratadine (CLARITIN PO)       Multiple Vitamins-Minerals (MULTI-VITAMIN GUMMIES) CHEW Chew  by mouth.       No current facility-administered medications for this visit.      ALLERGIES:  No Known Allergies     Nutrition/Elimination:  Good variety of foods:   Yes   Iron-rich food intake:  Yes  Milk intake:  Type of milk: 2%  Juice/sweetened drinks:  Often  Water intake: Ounces per day; not enough  Vitamins/supplements:  yes  Constipation/diarrhea: none      Sleep:   Hours per night:  8    Development:   Keeping up with school work:  Yes   Has trusted friends:   Yes   Exercise intolerance:   No    Social/Behavioral:  Lives with: Parents, 3 brothers, sister  School: Name: [employer]thrthathdtheth:th th6th Parental controls on phone:  Yes  Behavior/discipline concerns:  No    Activities:  [employer] [place] racing, football     Athletics clearance:  Any history of syncope or pre-syncopal events?   No    Any history of difficulty breathing while exercising?   No    Any personal history of murmur or high blood pressure?  No    Any family history of early sudden cardiac death?   No    Any family history of congenital heart disease?   No    Have you ever had a seizure?    No    Any chronic musculoskeletal concerns?    Yes, knee  Any history of concussion?     No      Screening:   Seat belt: Yes    Sees a dentist?   Yes   Smoke exposure:  No   Guns at home/locked: Yes       Review Flowsheet  More data may exist         12/17/2024   PHQ 2/9 Score   Peds PHQ 2 Score 0   Peds PHQ 2 Interpretation No further screening needed   Feeling down, depressed, irritable or hopeless? Not at all   Little interest or pleasure in activity? Not at all   Peds PHQ 9 Score 5   Peds PHQ 9 Interpretation Mild Depression   Trouble falling or staying asleep or sleeping too much? Several days   Poor appetite, weight loss, or overeating? More than half the days   Feeling tired or having little energy? Not at all   Feeling bad about yourself or that you are a failure or have let yourself or family down? Not at all   Trouble concentrating on things such as school work, reading, or watching TV? More than half the days   Moving or speaking slowly that other people have noticed or the opposite - being so fidgety or restless that you have been moving around a lot more than usual? Not at all   Thoughts that you would be better off dead or of hurting yourself in some way? Not at all      Details                   DEPRESSION ASSESSMENT/PLAN:  Mild symptoms, will monitor and reassess at next visit. Patient instructed to contact our office if symptoms worsen.     Cardiac screening:  No chest pain, dizziness, or syncope during exercise.   No FH cardiomyopathy, early MI, or sudden unexplained death.   FH hyperlipidemia None        OBJECTIVE  Visit Vitals  /69   Pulse 73   Ht 5' 3.5\" (1.613 m)   Wt 51.9 kg (114 lb 6.4 oz)   BMI 19.95 kg/m²       Exam:   General: Well appearing adolescent, no acute distress. Alert and interactive  Skin: No bruises; comedonal and inflammatory acne lesions on forehead  Head: Normocephalic, atraumatic  Eyes: PERRL, EOMI, positive red reflex bilaterally   Ears: TMs are transparent with normal landmarks   Throat: Oropharynx with moist mucous membranes, no lesions  Neck: Supple, no lymphadenopathy or masses  Heart: Regular rate and rhythm, normal S1 and S2, no murmurs  Lungs: Clear to auscultation bilaterally, no wheezes, normal work of breathing  Abdomen: Soft, nontender, no masses  Male : circumcised male. Testes descended bilaterally, no hydrocele.  Back: Spine appears straight  Extremities: Normal, full range of motion; swelling and tenderness to the tibial tuberosity  Neurologic: Normal strength, tone, and reflexes       Assessment/Plan:     Mike Pérez is a pleasant 14 year old male presenting for his annual check up. Overall he is doing very well, and family has no major concerns. He is having knee pain, most consistent with Osgood-Schlatter, and there is acne on forehead. Growth and development are appropriate. Exam is normal today except for aforementioned acne and knee findings. I do have some concern about grades - only getting Cs in some classes when he has much more potential, I think possibly due to lack of motivation. Routine guidance.       Well 14 year old adolescent  - Growth:  Appropriate    - Development:  Appropriate  - Immunizations:  None today   - Screening: Lipid panel ordered to be done when fasting   Vision: Vision Screening    Right eye Left eye Both eyes   Without correction 20/25 20/25    With correction          - Guidance: Routine anticipatory guidance discussed  - Participation:  No restrictions to activities     Osgood-Schlatter (tibial tuberosity avulsion)  -  Knee pain at the point of insertion of the patellar tendon that often occurs in  children between ages 9-14 who have undergone a rapid growth spurt  - Imaging not necessary at this time as patient fits clinical criteria   - Conservative treatment: tylenol/ibuprofen to control pain, ice after heavy activity  - Can consider physical therapy in future   - If worsening, would refer to Sports medicine for consideration of other diagnoses    Acne vulgaris   - Benzoyl peroxide wash (Panoxyl Wash): start nightly for 3-4 days and if tolerated, move to morning and night; can cause redness/dryness of skin and can stain fabric  - Adapalene nightly, prescription sent to pharmacy  - Make sure to wash face gently as rough scrubbing can make acne worse  - Can consider oral antibiotic if no improvement with the above measures in 4-6 weeks   - If concern for scarring, worsening lesions, would refer to dermatology upon family request       Follow-up:  In 1 year for next Well Child Check, sooner prn concerns    Srinivasan Lyman MD   [facility]      none

## 2024-12-20 DIAGNOSIS — F32.A DEPRESSION, UNSPECIFIED: ICD-10-CM

## 2024-12-20 DIAGNOSIS — M86.68 OTHER CHRONIC OSTEOMYELITIS, OTHER SITE: ICD-10-CM

## 2024-12-20 DIAGNOSIS — F17.210 NICOTINE DEPENDENCE, CIGARETTES, UNCOMPLICATED: ICD-10-CM

## 2024-12-20 DIAGNOSIS — B96.89 OTHER SPECIFIED BACTERIAL AGENTS AS THE CAUSE OF DISEASES CLASSIFIED ELSEWHERE: ICD-10-CM

## 2024-12-20 DIAGNOSIS — Z91.128 PATIENT'S INTENTIONAL UNDERDOSING OF MEDICATION REGIMEN FOR OTHER REASON: ICD-10-CM

## 2024-12-20 DIAGNOSIS — Z88.0 ALLERGY STATUS TO PENICILLIN: ICD-10-CM

## 2024-12-20 DIAGNOSIS — T42.76XA UNDERDOSING OF UNSPECIFIED ANTIEPILEPTIC AND SEDATIVE-HYPNOTIC DRUGS, INITIAL ENCOUNTER: ICD-10-CM

## 2024-12-20 DIAGNOSIS — E87.20 ACIDOSIS, UNSPECIFIED: ICD-10-CM

## 2024-12-20 DIAGNOSIS — F11.20 OPIOID DEPENDENCE, UNCOMPLICATED: ICD-10-CM

## 2024-12-20 DIAGNOSIS — G40.419 OTHER GENERALIZED EPILEPSY AND EPILEPTIC SYNDROMES, INTRACTABLE, WITHOUT STATUS EPILEPTICUS: ICD-10-CM

## 2024-12-20 DIAGNOSIS — Z79.891 LONG TERM (CURRENT) USE OF OPIATE ANALGESIC: ICD-10-CM

## 2025-01-30 NOTE — ED ADULT NURSE NOTE - NSFALLRSKASSESSTYPE_ED_ALL_ED
Upon chart review, US has been rescheduled to tomorrow. Closing encounter.     Jany Alarcon, ATC    Initial (On Arrival)

## 2025-03-02 ENCOUNTER — EMERGENCY (EMERGENCY)
Facility: HOSPITAL | Age: 45
LOS: 0 days | Discharge: HOME | End: 2025-03-02
Attending: EMERGENCY MEDICINE
Payer: MEDICAID

## 2025-03-02 VITALS
RESPIRATION RATE: 16 BRPM | SYSTOLIC BLOOD PRESSURE: 117 MMHG | HEART RATE: 99 BPM | OXYGEN SATURATION: 96 % | DIASTOLIC BLOOD PRESSURE: 87 MMHG | TEMPERATURE: 98 F

## 2025-03-02 VITALS — HEIGHT: 68 IN | WEIGHT: 229.94 LBS

## 2025-03-02 DIAGNOSIS — R41.3 OTHER AMNESIA: ICD-10-CM

## 2025-03-02 DIAGNOSIS — G40.909 EPILEPSY, UNSPECIFIED, NOT INTRACTABLE, WITHOUT STATUS EPILEPTICUS: ICD-10-CM

## 2025-03-02 DIAGNOSIS — T42.6X6A UNDERDOSING OF OTHER ANTIEPILEPTIC AND SEDATIVE-HYPNOTIC DRUGS, INITIAL ENCOUNTER: ICD-10-CM

## 2025-03-02 DIAGNOSIS — F11.20 OPIOID DEPENDENCE, UNCOMPLICATED: ICD-10-CM

## 2025-03-02 DIAGNOSIS — Z98.890 OTHER SPECIFIED POSTPROCEDURAL STATES: Chronic | ICD-10-CM

## 2025-03-02 DIAGNOSIS — M25.511 PAIN IN RIGHT SHOULDER: ICD-10-CM

## 2025-03-02 DIAGNOSIS — Z91.148 PATIENT'S OTHER NONCOMPLIANCE WITH MEDICATION REGIMEN FOR OTHER REASON: ICD-10-CM

## 2025-03-02 DIAGNOSIS — Z88.0 ALLERGY STATUS TO PENICILLIN: ICD-10-CM

## 2025-03-02 PROBLEM — Z87.898 PERSONAL HISTORY OF OTHER SPECIFIED CONDITIONS: Chronic | Status: ACTIVE | Noted: 2024-12-13

## 2025-03-02 PROBLEM — F17.200 NICOTINE DEPENDENCE, UNSPECIFIED, UNCOMPLICATED: Chronic | Status: ACTIVE | Noted: 2024-12-13

## 2025-03-02 PROCEDURE — 99284 EMERGENCY DEPT VISIT MOD MDM: CPT

## 2025-03-02 PROCEDURE — 73030 X-RAY EXAM OF SHOULDER: CPT | Mod: 26,RT

## 2025-03-02 RX ORDER — ACETAMINOPHEN 500 MG/5ML
975 LIQUID (ML) ORAL ONCE
Refills: 0 | Status: COMPLETED | OUTPATIENT
Start: 2025-03-02 | End: 2025-03-02

## 2025-03-02 RX ORDER — IBUPROFEN 200 MG
600 TABLET ORAL ONCE
Refills: 0 | Status: COMPLETED | OUTPATIENT
Start: 2025-03-02 | End: 2025-03-02

## 2025-03-02 RX ADMIN — Medication 600 MILLIGRAM(S): at 01:35

## 2025-03-02 RX ADMIN — Medication 975 MILLIGRAM(S): at 01:35

## 2025-03-20 ENCOUNTER — EMERGENCY (EMERGENCY)
Facility: HOSPITAL | Age: 45
LOS: 0 days | Discharge: HOME | End: 2025-03-21
Attending: EMERGENCY MEDICINE
Payer: MEDICAID

## 2025-03-20 VITALS
SYSTOLIC BLOOD PRESSURE: 122 MMHG | HEIGHT: 68 IN | TEMPERATURE: 98 F | RESPIRATION RATE: 18 BRPM | WEIGHT: 240.08 LBS | OXYGEN SATURATION: 98 % | DIASTOLIC BLOOD PRESSURE: 83 MMHG | HEART RATE: 94 BPM

## 2025-03-20 DIAGNOSIS — Z98.890 OTHER SPECIFIED POSTPROCEDURAL STATES: Chronic | ICD-10-CM

## 2025-03-20 DIAGNOSIS — T42.6X6A UNDERDOSING OF OTHER ANTIEPILEPTIC AND SEDATIVE-HYPNOTIC DRUGS, INITIAL ENCOUNTER: ICD-10-CM

## 2025-03-20 DIAGNOSIS — Z91.138 PATIENT'S UNINTENTIONAL UNDERDOSING OF MEDICATION REGIMEN FOR OTHER REASON: ICD-10-CM

## 2025-03-20 DIAGNOSIS — F19.129 OTHER PSYCHOACTIVE SUBSTANCE ABUSE WITH INTOXICATION, UNSPECIFIED: ICD-10-CM

## 2025-03-20 DIAGNOSIS — Z88.0 ALLERGY STATUS TO PENICILLIN: ICD-10-CM

## 2025-03-20 LAB
ETHANOL SERPL-MCNC: <10 MG/DL — SIGNIFICANT CHANGE UP (ref 0–10)
PCP SPEC-MCNC: SIGNIFICANT CHANGE UP

## 2025-03-20 PROCEDURE — 93010 ELECTROCARDIOGRAM REPORT: CPT

## 2025-03-20 PROCEDURE — 99284 EMERGENCY DEPT VISIT MOD MDM: CPT

## 2025-03-20 RX ORDER — LEVETIRACETAM 10 MG/ML
1000 INJECTION, SOLUTION INTRAVENOUS ONCE
Refills: 0 | Status: COMPLETED | OUTPATIENT
Start: 2025-03-20 | End: 2025-03-20

## 2025-03-20 RX ADMIN — Medication 1000 MILLILITER(S): at 23:56

## 2025-03-20 RX ADMIN — LEVETIRACETAM 400 MILLIGRAM(S): 10 INJECTION, SOLUTION INTRAVENOUS at 23:56

## 2025-03-20 NOTE — ED PROVIDER NOTE - CARE PROVIDER_API CALL
Foster [facility]  Internal Medicine  [address] [place] [address]  Phone: [phone]  Fax: [phone]  Follow Up Time: Urgent

## 2025-03-20 NOTE — ED PROVIDER NOTE - OBJECTIVE STATEMENT
46 yo M with alcohol intox.  pt. was found on the ground by EMS.  Pt. denies trauma, admits to drinking, says he has seizures and needs his seizure med, as he hadn't taken it.   ROS: negative for fever, cough, headache, chest pain, shortness of breath, abd pain, nausea, vomiting, diarrhea, rash, paresthesia, and weakness--all other systems reviewed are negative.   PMH: Delisa; Meds: Keppra; SH: +PSA

## 2025-03-20 NOTE — ED PROVIDER NOTE - PATIENT PORTAL LINK FT
You can access the FollowMyHealth Patient Portal offered by [facility] by registering at the following website: [email or web address]. By joining [employer]’s FollowMyHealth portal, you will also be able to view your health information using other applications (apps) compatible with our system.

## 2025-03-20 NOTE — ED PROVIDER NOTE - CLINICAL SUMMARY MEDICAL DECISION MAKING FREE TEXT BOX
46 yo M with hx of poly substance abuse, admittedly intoxicated tonight, non-compliant with meds--keppra.  -cbc, cmp, etoh, drug screen, finger stick, eikg, iv hydration bolus, keppra bolus for sz prophylaxis, monitor   -sober reeval

## 2025-03-20 NOTE — ED PROVIDER NOTE - PHYSICAL EXAMINATION
Gen: NAD, laying comfortably in stretcher  Head: ncat, perrla, eomi b/l  Neck: supple, no lymphadenopathy, no midline deviation  Heart: rrr, no m/r/g  Lungs: CTA b/l, no rales/ronchi/wheezes  Abd: soft, nontender, non-distended, no rebound or guarding  Ext: no clubbing/cyanosis/edema  Neuro: sensation and muscle strength intact b/l

## 2025-03-20 NOTE — ED PROVIDER NOTE - PROGRESS NOTE DETAILS
Results reported to patient--+DS for polysubstance  Pt. reports feeling better after sleep in ER, now Pt. is clinically sober, ambulating with steady gait, demonstrates decision-making capacity.  Pt. has no complaints at this time.  Pt. is stable for D/C home.   pt. agrees to f/u with primary care outpt. urgently, educated pt. on drug and alcohol abuse  pt. understands to return to ED if symptoms worsen; will d/c with gabapentin per patient request, he understands not to use other drugs with gabapentin unless directed by doctor

## 2025-03-21 VITALS
SYSTOLIC BLOOD PRESSURE: 113 MMHG | HEART RATE: 85 BPM | RESPIRATION RATE: 17 BRPM | OXYGEN SATURATION: 96 % | DIASTOLIC BLOOD PRESSURE: 79 MMHG | TEMPERATURE: 98 F

## 2025-03-21 LAB
AMPHET UR-MCNC: POSITIVE — SIGNIFICANT CHANGE UP
BARBITURATES UR SCN-MCNC: NEGATIVE — SIGNIFICANT CHANGE UP
BASOPHILS # BLD AUTO: 0.04 K/UL — SIGNIFICANT CHANGE UP (ref 0–0.2)
BASOPHILS NFR BLD AUTO: 0.4 % — SIGNIFICANT CHANGE UP (ref 0–2)
BENZODIAZ UR-MCNC: POSITIVE — SIGNIFICANT CHANGE UP
COCAINE METAB.OTHER UR-MCNC: POSITIVE — SIGNIFICANT CHANGE UP
EOSINOPHIL # BLD AUTO: 0.12 K/UL — SIGNIFICANT CHANGE UP (ref 0–0.5)
EOSINOPHIL NFR BLD AUTO: 1.1 % — SIGNIFICANT CHANGE UP (ref 0–6)
HCT VFR BLD CALC: 44.5 % — SIGNIFICANT CHANGE UP (ref 39–50)
HGB BLD-MCNC: 15.4 G/DL — SIGNIFICANT CHANGE UP (ref 13–17)
IMM GRANULOCYTES NFR BLD AUTO: 0.5 % — SIGNIFICANT CHANGE UP (ref 0–0.9)
LYMPHOCYTES # BLD AUTO: 19 % — SIGNIFICANT CHANGE UP (ref 13–44)
LYMPHOCYTES # BLD AUTO: 2.05 K/UL — SIGNIFICANT CHANGE UP (ref 1–3.3)
MCHC RBC-ENTMCNC: 30.5 PG — SIGNIFICANT CHANGE UP (ref 27–34)
MCHC RBC-ENTMCNC: 34.6 G/DL — SIGNIFICANT CHANGE UP (ref 32–36)
MCV RBC AUTO: 88.1 FL — SIGNIFICANT CHANGE UP (ref 80–100)
METHADONE UR-MCNC: POSITIVE — SIGNIFICANT CHANGE UP
MONOCYTES # BLD AUTO: 0.67 K/UL — SIGNIFICANT CHANGE UP (ref 0–0.9)
MONOCYTES NFR BLD AUTO: 6.2 % — SIGNIFICANT CHANGE UP (ref 2–14)
NEUTROPHILS # BLD AUTO: 7.85 K/UL — HIGH (ref 1.8–7.4)
NEUTROPHILS NFR BLD AUTO: 72.8 % — SIGNIFICANT CHANGE UP (ref 43–77)
NRBC BLD AUTO-RTO: 0 /100 WBCS — SIGNIFICANT CHANGE UP (ref 0–0)
OPIATES UR-MCNC: POSITIVE — SIGNIFICANT CHANGE UP
PCP UR-MCNC: NEGATIVE — SIGNIFICANT CHANGE UP
PLATELET # BLD AUTO: 255 K/UL — SIGNIFICANT CHANGE UP (ref 150–400)
RBC # BLD: 5.05 M/UL — SIGNIFICANT CHANGE UP (ref 4.2–5.8)
RBC # FLD: 13.2 % — SIGNIFICANT CHANGE UP (ref 10.3–14.5)
THC UR QL: POSITIVE — SIGNIFICANT CHANGE UP
WBC # BLD: 10.78 K/UL — HIGH (ref 3.8–10.5)
WBC # FLD AUTO: 10.78 K/UL — HIGH (ref 3.8–10.5)

## 2025-03-21 RX ORDER — GABAPENTIN 400 MG/1
1 CAPSULE ORAL
Qty: 21 | Refills: 0
Start: 2025-03-21 | End: 2025-03-27

## 2025-03-21 NOTE — ED ADULT NURSE NOTE - NSFALLRISKINTERV_ED_ALL_ED
Assistance OOB with selected safe patient handling equipment if applicable/Assistance with ambulation/Communicate fall risk and risk factors to all staff, patient, and family/Monitor gait and stability/Monitor for mental status changes and reorient to person, place, and time, as needed/Provide visual cue: yellow wristband, yellow gown, etc/Reinforce activity limits and safety measures with patient and family/Toileting schedule using arm’s reach rule for commode and bathroom/Use of alarms - bed, stretcher, chair and/or video monitoring/Call bell, personal items and telephone in reach/Instruct patient to call for assistance before getting out of bed/chair/stretcher/Non-slip footwear applied when patient is off stretcher/[place] to call system/Physically safe environment - no spills, clutter or unnecessary equipment/Purposeful Proactive Rounding/Room/bathroom lighting operational, light cord in reach

## 2025-03-21 NOTE — ED ADULT NURSE NOTE - OBJECTIVE STATEMENT
bibems for intox.  found laying on floor.  pt endorses drinking half a beer and taking 1mg xanax.  Pt arousable, awake and alert.  denies any pain.  FS 86 in triage.   hx of seizures, non compliant.  nkda.

## 2025-03-24 ENCOUNTER — EMERGENCY (EMERGENCY)
Facility: HOSPITAL | Age: 45
LOS: 0 days | Discharge: HOME | End: 2025-03-25
Attending: STUDENT IN AN ORGANIZED HEALTH CARE EDUCATION/TRAINING PROGRAM
Payer: MEDICAID

## 2025-03-24 VITALS
OXYGEN SATURATION: 99 % | WEIGHT: 238.1 LBS | RESPIRATION RATE: 18 BRPM | HEART RATE: 80 BPM | TEMPERATURE: 99 F | HEIGHT: 68 IN

## 2025-03-24 DIAGNOSIS — Z88.0 ALLERGY STATUS TO PENICILLIN: ICD-10-CM

## 2025-03-24 DIAGNOSIS — M79.10 MYALGIA, UNSPECIFIED SITE: ICD-10-CM

## 2025-03-24 DIAGNOSIS — R79.89 OTHER SPECIFIED ABNORMAL FINDINGS OF BLOOD CHEMISTRY: ICD-10-CM

## 2025-03-24 DIAGNOSIS — G40.909 EPILEPSY, UNSPECIFIED, NOT INTRACTABLE, WITHOUT STATUS EPILEPTICUS: ICD-10-CM

## 2025-03-24 DIAGNOSIS — Z98.890 OTHER SPECIFIED POSTPROCEDURAL STATES: Chronic | ICD-10-CM

## 2025-03-24 LAB
ALBUMIN SERPL ELPH-MCNC: 3.9 G/DL — SIGNIFICANT CHANGE UP (ref 3.3–5)
ALP SERPL-CCNC: 96 U/L — SIGNIFICANT CHANGE UP (ref 40–120)
ALT FLD-CCNC: 89 U/L — HIGH (ref 12–78)
ANION GAP SERPL CALC-SCNC: 6 MMOL/L — SIGNIFICANT CHANGE UP (ref 5–17)
AST SERPL-CCNC: 167 U/L — HIGH (ref 15–37)
BASOPHILS # BLD AUTO: 0.03 K/UL — SIGNIFICANT CHANGE UP (ref 0–0.2)
BASOPHILS NFR BLD AUTO: 0.3 % — SIGNIFICANT CHANGE UP (ref 0–2)
BILIRUB SERPL-MCNC: 0.7 MG/DL — SIGNIFICANT CHANGE UP (ref 0.2–1.2)
BUN SERPL-MCNC: 15 MG/DL — SIGNIFICANT CHANGE UP (ref 7–23)
CALCIUM SERPL-MCNC: 9.3 MG/DL — SIGNIFICANT CHANGE UP (ref 8.5–10.1)
CHLORIDE SERPL-SCNC: 100 MMOL/L — SIGNIFICANT CHANGE UP (ref 96–108)
CK SERPL-CCNC: 4212 U/L — HIGH (ref 26–308)
CO2 SERPL-SCNC: 30 MMOL/L — SIGNIFICANT CHANGE UP (ref 22–31)
CREAT SERPL-MCNC: 0.78 MG/DL — SIGNIFICANT CHANGE UP (ref 0.5–1.3)
EGFR: 113 ML/MIN/1.73M2 — SIGNIFICANT CHANGE UP
EGFR: 113 ML/MIN/1.73M2 — SIGNIFICANT CHANGE UP
EOSINOPHIL # BLD AUTO: 0.13 K/UL — SIGNIFICANT CHANGE UP (ref 0–0.5)
EOSINOPHIL NFR BLD AUTO: 1.3 % — SIGNIFICANT CHANGE UP (ref 0–6)
GLUCOSE SERPL-MCNC: 71 MG/DL — SIGNIFICANT CHANGE UP (ref 70–99)
HCT VFR BLD CALC: 40.1 % — SIGNIFICANT CHANGE UP (ref 39–50)
HGB BLD-MCNC: 13.9 G/DL — SIGNIFICANT CHANGE UP (ref 13–17)
IMM GRANULOCYTES NFR BLD AUTO: 0.4 % — SIGNIFICANT CHANGE UP (ref 0–0.9)
LYMPHOCYTES # BLD AUTO: 1.85 K/UL — SIGNIFICANT CHANGE UP (ref 1–3.3)
LYMPHOCYTES # BLD AUTO: 18 % — SIGNIFICANT CHANGE UP (ref 13–44)
MCHC RBC-ENTMCNC: 30.4 PG — SIGNIFICANT CHANGE UP (ref 27–34)
MCHC RBC-ENTMCNC: 34.7 G/DL — SIGNIFICANT CHANGE UP (ref 32–36)
MCV RBC AUTO: 87.7 FL — SIGNIFICANT CHANGE UP (ref 80–100)
MONOCYTES # BLD AUTO: 0.64 K/UL — SIGNIFICANT CHANGE UP (ref 0–0.9)
MONOCYTES NFR BLD AUTO: 6.2 % — SIGNIFICANT CHANGE UP (ref 2–14)
NEUTROPHILS # BLD AUTO: 7.57 K/UL — HIGH (ref 1.8–7.4)
NEUTROPHILS NFR BLD AUTO: 73.8 % — SIGNIFICANT CHANGE UP (ref 43–77)
NRBC BLD AUTO-RTO: 0 /100 WBCS — SIGNIFICANT CHANGE UP (ref 0–0)
PLATELET # BLD AUTO: 226 K/UL — SIGNIFICANT CHANGE UP (ref 150–400)
POTASSIUM SERPL-MCNC: 4 MMOL/L — SIGNIFICANT CHANGE UP (ref 3.5–5.3)
POTASSIUM SERPL-SCNC: 4 MMOL/L — SIGNIFICANT CHANGE UP (ref 3.5–5.3)
PROT SERPL-MCNC: 8.5 GM/DL — HIGH (ref 6–8.3)
RBC # BLD: 4.57 M/UL — SIGNIFICANT CHANGE UP (ref 4.2–5.8)
RBC # FLD: 13 % — SIGNIFICANT CHANGE UP (ref 10.3–14.5)
SODIUM SERPL-SCNC: 136 MMOL/L — SIGNIFICANT CHANGE UP (ref 135–145)
WBC # BLD: 10.26 K/UL — SIGNIFICANT CHANGE UP (ref 3.8–10.5)
WBC # FLD AUTO: 10.26 K/UL — SIGNIFICANT CHANGE UP (ref 3.8–10.5)

## 2025-03-24 PROCEDURE — 99284 EMERGENCY DEPT VISIT MOD MDM: CPT

## 2025-03-24 RX ORDER — GABAPENTIN 400 MG/1
400 CAPSULE ORAL ONCE
Refills: 0 | Status: COMPLETED | OUTPATIENT
Start: 2025-03-24 | End: 2025-03-24

## 2025-03-24 RX ORDER — LORAZEPAM 4 MG/ML
0.5 VIAL (ML) INJECTION ONCE
Refills: 0 | Status: DISCONTINUED | OUTPATIENT
Start: 2025-03-24 | End: 2025-03-24

## 2025-03-24 RX ORDER — BUPROPION HYDROBROMIDE 522 MG/1
300 TABLET, EXTENDED RELEASE ORAL ONCE
Refills: 0 | Status: COMPLETED | OUTPATIENT
Start: 2025-03-24 | End: 2025-03-24

## 2025-03-24 RX ADMIN — GABAPENTIN 400 MILLIGRAM(S): 400 CAPSULE ORAL at 23:06

## 2025-03-24 RX ADMIN — Medication 500 MILLIGRAM(S): at 22:35

## 2025-03-24 RX ADMIN — BUPROPION HYDROBROMIDE 300 MILLIGRAM(S): 522 TABLET, EXTENDED RELEASE ORAL at 22:35

## 2025-03-24 RX ADMIN — GABAPENTIN 400 MILLIGRAM(S): 400 CAPSULE ORAL at 22:36

## 2025-03-24 NOTE — ED ADULT TRIAGE NOTE - CHIEF COMPLAINT QUOTE
pt c/o generalize body pain for a week. pt was seen at another hospital yesterday for rhabdomyolysis and he AMA'ed.  states he want to be admitted.  history of dm, anxiety, drug abuse use methadone.

## 2025-03-24 NOTE — ED PROVIDER NOTE - CLINICAL SUMMARY MEDICAL DECISION MAKING FREE TEXT BOX
46 y/o PMH seizure disorder, IVDA/opiate disorder, osteomyelitis, here today with body aches. Patient was recently at [facility] and left AMA for "rhabdomyolysis". He was in the ED a few days ago for chronic pain. Patient requesting to sleep in the ED and for his medications. Denies alcohol usage or drug usage today.    In the ED, vitals stable.    GENERAL: Awake, alert, NAD  HEENT: NC/AT, moist mucous membranes  LUNGS: CTAB, no wheezes or crackles   CARDIAC: RRR, no m/r/g  ABDOMEN: Soft, non tender, non distended, no rebound, no guarding  EXT: No edema, no calf tenderness, 2+ DP pulses bilaterally, no deformities.  NEURO: A&Ox3. Moving all extremities.  SKIN: Warm and dry. No rash. Blisters noted to the feet.  PSYCH: Normal affect.    Will check labs to evaluate for rhabdo.    Labs with mild elevated CPK. Patient's Hurt score is negative.  Will dose home meds.

## 2025-03-24 NOTE — ED ADULT NURSE NOTE - NSFALLHARMRISKINTERV_ED_ALL_ED
"Gynecologic Oncology Daily Progress Note  2020    Maggie Navarrete  : 1948  MRN: [id number]    HD#2 admitted for fevers, sepsis, complicated UTI v pyelonephritis   Disease: Recurrent Stage IIB leiomyosarcoma, s/p interval debulking at [place] 20. Final path: recurrent LMS. XL, BSO 2016, s/p 5C doxorubicin/dacarbazine.    24 hour events:   - Admitted from the ED  - Started on IV zosyn   - CT A/P   - UA, UCx  - CXR    - Fever to 102.1F this morning at 0726    Subjective: patient reports she is feeling \"crummy\" this morning \"but so much better than when I came in\". Was able to sleep some last night. Still getting up to use commode. Denies dizziness, chest pain, shortness of breath. She does report lingering RLQ abd pain, no change from  Yesterday \"or maybe a little better\", denies feeling feverish, chills or sweats.      Objective:  Patient Vitals for the past 24 hrs:   BP Temp Temp src Heart Rate Resp SpO2   20 0905 -- 99.6  F (37.6  C) Oral -- -- --   20 0726 124/56 102.1  F (38.9  C) Oral 94 16 97 %   20 0447 -- 98.7  F (37.1  C) Oral -- -- --   20 2212 100/48 98.9  F (37.2  C) Oral 85 16 95 %   20 2109 114/48 -- -- 95 17 94 %   20 2107 -- 99.9  F (37.7  C) Oral -- -- --   20 1945 118/45 102.9  F (39.4  C) Oral 99 16 97 %   20 1707 130/60 99.9  F (37.7  C) Oral 105 18 99 %   20 1534 114/62 98.3  F (36.8  C) Oral 92 18 99 %   20 1400 -- 98.2  F (36.8  C) Oral -- -- --   20 1140 125/64 100.4  F (38  C) Oral 106 18 98 %   20 0950 126/57 103  F (39.4  C) Oral 105 18 98 %   20 0926 126/61 100.1  F (37.8  C) Oral 103 18 98 %       GEN - NAD, sitting comfortably in bed  CV - RRR  PULM - CTAB, no wheezing  ABD - soft, non-distended, appropriately tender, more so in the RLQ   EXT - no edema, non-tender, SCDs in place      I/O last 3 completed shifts:  In: 3470 [P.O.:720; I.V.:1750; IV Piggyback:1000]  Out: 1075 [Urine:1075]    I/O " this shift:  In: -   Out: 425 [Urine:425]    Over the last two hours pt diuresed 3.43ml/kg/hr, diuresing very well     LABS:  BMP  Recent Labs   Lab 06/28/20  0614 06/27/20  0549 06/27/20  0028    136 133   POTASSIUM 4.0 3.7 3.6   CHLORIDE 112* 107 104   SUSAN 7.2* 7.8* 8.2*   CO2 17* 19* 20   BUN 17 29 32*   CR 1.47* 1.66* 1.81*   GLC 86 125* 137*     CBC  Recent Labs   Lab 06/28/20  0614 06/27/20  0549 06/27/20  0028   WBC 5.2 10.8 14.2*   RBC 3.07* 3.41* 3.51*   HGB 9.2* 10.1* 10.6*   HCT 28.4* 31.1* 32.1*   MCV 93 91 92   MCH 30.0 29.6 30.2   MCHC 32.4 32.5 33.0   RDW 15.9* 15.4* 15.4*    194 206     INRNo lab results found in last 7 days.  LFTs   Recent Labs   Lab 06/27/20  0028   AST 30   ALT 42   ALKPHOS 120   BILITOTAL 0.4       Assessment: Maggie Navarrete is a 72 year old with history of recurrent Stage IIB leiomyosarcoma , now HD#2 admitted treatment of urosepsis. Currently on zosyn, awaiting culture sensitivities, patient spiking fevers. Overall her clinical picture has improved with lactate 1.1>0.7 this morning, excellent urine output, creatinine function improving (1.81>1.66>1.47) and other vitals wnl     Plan:  Dz: Recurrent Stage IIB leiomyosarcoma, s/p interval debulking at [place] 6/16/20. Final path: recurrent LMS. XL, BSO 2016, s/p 5C doxorubicin/dacarbazine.  FEN: Reg diet, s/p 1L, LR @125m/h    Pain: tylenol, oxycodone PRN  Heme: H/o PE, on PTA Eliquis. Hgb 10.6.  CV: No issues  Pulm: PE as above.   GI: Senokot PRN, protonix. CT A/P wnl.   /ID: COVID neg. UA +nitrite, large LE. UCx (6/25) proteus (pansensitive, except macrobid), not treated. WBC 14.2>10.8. Lactate 1.4> 0.6 Blood Cx: GN Rods (proteus). BILLY 2/2 sepsis, IVF as above. Cr 1.81>1.66>1.47. Tm 103F (0950, 6/27) Tl 102.9F (1945, 6/27)  D#2 Zosyn 2.25g>3.375g.  Will call lab about sensitivities and ensure zosyn is adequate coverage at this dose.   Endocrine: Hypothyroid, home synthroid.   Psych/Neuro/MSK: No issues,  post-op.  PPX: SCDs  Dispo:  Home when afebrile on PO antibiotics    SIRI Lyons MD  Gynecology Oncology PGY-2  Pager: [phone]  06/28/2020 9:20 AM      Provider Disclosure:   I agree with above History, Review of Systems, Physical exam and Plan. I have reviewed the content of the documentation and have edited it as needed. I have seen and personally performed the services documented here and the documentation accurately represents those services and the decisions I have made.     Electronically signed by:   Leonard Ferguson MD   Gynecologic Oncology   [facility]       Communicate risk of Fall with Harm to all staff, patient, and family/Provide visual cue: red socks, yellow wristband, yellow gown, etc/Reinforce activity limits and safety measures with patient and family/Bed in lowest position, wheels locked, appropriate side rails in place/Call bell, personal items and telephone in reach/Instruct patient to call for assistance before getting out of bed/chair/stretcher/Non-slip footwear applied when patient is off stretcher/[place] to call system/Physically safe environment - no spills, clutter or unnecessary equipment/Purposeful Proactive Rounding/Room/bathroom lighting operational, light cord in reach

## 2025-03-24 NOTE — ED PROVIDER NOTE - NSFOLLOWUPINSTRUCTIONS_ED_ALL_ED_FT
You were seen in the ED for body pain.    Please follow up with your primary doctor.    Your rhabdomyolysis appears to be improving.

## 2025-03-24 NOTE — ED ADULT NURSE NOTE - NS TRANSFER PATIENT BELONGINGS
Vascular & Interventional Radiology Brief Procedure Note    Interventional Radiologist: Aline Salcedo    Pre-operative Diagnosis:  Esophageal cancer. In remission. Post-operative Diagnosis: Same as pre-op dx    Procedure(s) Performed:  Chest wall port removal.    Anesthesia:  Local and Moderate Sedation    Findings:  Successful removal of right chest wall port. Prolene sutures recovered. Complications: None    Estimated Blood Loss:  minimal    Tubes and Drains: None    Specimens: None    Condition: Good    Disposition:   To [facility]    Plan:  F/U with oncologist.      Vito Schwartz MD  [facility]  Vascular & Interventional Radiology  7/9/2018
Clothing

## 2025-03-24 NOTE — ED ADULT NURSE NOTE - OBJECTIVE STATEMENT
Pt is a 46yo Male AAOx4 zosyn allergy pmh epilepsy, spinal fusion, osteomyelitis pw r arm pain, back pain, b/l foot pain 8/10 for the past few days. Pt reports recently being here and at [facility]. Pt reports being told he has rhabdo and leaving AMA. Pt denies any cp, sob. Pt labs sent as ordered. Pt updated on plan of care.

## 2025-03-24 NOTE — ED ADULT NURSE NOTE - NS ED NURSE LEVEL OF CONSCIOUSNESS SPEECH
· PSA remains immeasurable 1/19/2023 (<0.010)  · F/u 6 months with PSA  · Patient requests referral with Dr. Bermudez               For continued care   Speaking Coherently

## 2025-03-24 NOTE — ED PROVIDER NOTE - PATIENT PORTAL LINK FT
You can access the FollowMyHealth Patient Portal offered by [facility] by registering at the following website: [email or web address]. By joining [employer]’s FollowMyHealth portal, you will also be able to view your health information using other applications (apps) compatible with our system.

## 2025-03-25 VITALS
TEMPERATURE: 98 F | HEART RATE: 73 BPM | OXYGEN SATURATION: 99 % | SYSTOLIC BLOOD PRESSURE: 117 MMHG | RESPIRATION RATE: 17 BRPM | DIASTOLIC BLOOD PRESSURE: 73 MMHG

## 2025-03-25 RX ORDER — GABAPENTIN 400 MG/1
400 CAPSULE ORAL ONCE
Refills: 0 | Status: COMPLETED | OUTPATIENT
Start: 2025-03-25 | End: 2025-03-25

## 2025-03-25 RX ADMIN — Medication 0.5 MILLIGRAM(S): at 00:10

## 2025-03-25 RX ADMIN — GABAPENTIN 400 MILLIGRAM(S): 400 CAPSULE ORAL at 05:57

## 2025-03-27 ENCOUNTER — EMERGENCY (EMERGENCY)
Facility: HOSPITAL | Age: 45
LOS: 0 days | Discharge: HOME | End: 2025-03-28
Attending: STUDENT IN AN ORGANIZED HEALTH CARE EDUCATION/TRAINING PROGRAM
Payer: MEDICAID

## 2025-03-27 VITALS
WEIGHT: 240.08 LBS | OXYGEN SATURATION: 95 % | RESPIRATION RATE: 16 BRPM | SYSTOLIC BLOOD PRESSURE: 111 MMHG | TEMPERATURE: 99 F | HEART RATE: 86 BPM | DIASTOLIC BLOOD PRESSURE: 75 MMHG | HEIGHT: 68 IN

## 2025-03-27 DIAGNOSIS — Z88.0 ALLERGY STATUS TO PENICILLIN: ICD-10-CM

## 2025-03-27 DIAGNOSIS — G40.909 EPILEPSY, UNSPECIFIED, NOT INTRACTABLE, WITHOUT STATUS EPILEPTICUS: ICD-10-CM

## 2025-03-27 DIAGNOSIS — Z98.890 OTHER SPECIFIED POSTPROCEDURAL STATES: Chronic | ICD-10-CM

## 2025-03-27 DIAGNOSIS — M62.82 RHABDOMYOLYSIS: ICD-10-CM

## 2025-03-27 DIAGNOSIS — Z87.39 PERSONAL HISTORY OF OTHER DISEASES OF THE MUSCULOSKELETAL SYSTEM AND CONNECTIVE TISSUE: ICD-10-CM

## 2025-03-27 PROCEDURE — 99284 EMERGENCY DEPT VISIT MOD MDM: CPT

## 2025-03-27 NOTE — ED ADULT TRIAGE NOTE - CHIEF COMPLAINT QUOTE
complaining of body aches, "little bit runny nose , cough" started 3 weeks ago. h/o seizure, osteomyelitis, substance abuse on methadone treatment.

## 2025-03-27 NOTE — ED ADULT TRIAGE NOTE - PAIN: PRESENCE, MLM
Department of Anesthesiology  Preprocedure Note       Name:  Ela Briggs   Age:  24 y.o.  :  2000                                          MRN:  [id number]         Date:  3/27/2025      Surgeon: Surgeon(s):  Nany Vizcaino MD    Procedure: Procedure(s):   SECTION    Medications prior to admission:   Prior to Admission medications    Medication Sig Start Date End Date Taking? Authorizing Provider   Prenatal MV-Min-Fe Fum-FA-DHA (PRENATAL/FOLIC ACID+DHA PO) Take by mouth   Yes Laya Reveles MD   levothyroxine (SYNTHROID) 25 MCG tablet Take 1 tablet by mouth daily 24 Yes Tricia Cruz APRN - CNM   famotidine (PEPCID) 20 MG tablet Take 1 tablet by mouth 2 times daily 24  Yes Nany Vizcaino MD   Psyllium (DAILY FIBER PO) Take 1 capsule by mouth daily   Yes Laya Reveles MD   ondansetron (ZOFRAN-ODT) 4 MG disintegrating tablet Let 1 tablet dissolve on your tongue every 4 hours as needed to relieve nausea/vomiting. 24  Tricia Cruz APRN - CNM   Probiotic Product (PROBIOTIC-10 PO) Take 1 capsule by mouth daily    ProviderLaya MD       Current medications:    Current Facility-Administered Medications   Medication Dose Route Frequency Provider Last Rate Last Admin    scopolamine (TRANSDERM-SCOP) transdermal patch 1 patch  1 patch TransDERmal Once Nany Vizcaino MD   1 patch at 25 0654    lactated ringers infusion   IntraVENous Continuous Nany Vizcaino MD        sodium chloride flush 0.9 % injection 10 mL  10 mL IntraVENous 2 times per day Nany Vizcaino MD        sodium chloride flush 0.9 % injection 10 mL  10 mL IntraVENous PRN Nany Vizcaino MD        0.9 % sodium chloride infusion   IntraVENous PRN Nany Vizcaino MD        oxytocin (PITOCIN) 30 units in 500 mL infusion  87.3 saima-units/min IntraVENous Continuous PRN Nany Vizcaino MD        And    oxytocin (PITOCIN) 10 unit bolus from the bag 
body/complains of pain/discomfort

## 2025-03-28 ENCOUNTER — EMERGENCY (EMERGENCY)
Facility: HOSPITAL | Age: 45
LOS: 0 days | Discharge: HOME | End: 2025-03-29
Attending: STUDENT IN AN ORGANIZED HEALTH CARE EDUCATION/TRAINING PROGRAM
Payer: MEDICAID

## 2025-03-28 VITALS
OXYGEN SATURATION: 94 % | HEART RATE: 84 BPM | HEIGHT: 68 IN | SYSTOLIC BLOOD PRESSURE: 118 MMHG | WEIGHT: 240.08 LBS | DIASTOLIC BLOOD PRESSURE: 67 MMHG | TEMPERATURE: 99 F | RESPIRATION RATE: 16 BRPM

## 2025-03-28 VITALS
HEART RATE: 49 BPM | OXYGEN SATURATION: 95 % | TEMPERATURE: 98 F | SYSTOLIC BLOOD PRESSURE: 126 MMHG | RESPIRATION RATE: 16 BRPM | DIASTOLIC BLOOD PRESSURE: 73 MMHG

## 2025-03-28 DIAGNOSIS — F19.10 OTHER PSYCHOACTIVE SUBSTANCE ABUSE, UNCOMPLICATED: ICD-10-CM

## 2025-03-28 DIAGNOSIS — Z98.890 OTHER SPECIFIED POSTPROCEDURAL STATES: Chronic | ICD-10-CM

## 2025-03-28 DIAGNOSIS — R52 PAIN, UNSPECIFIED: ICD-10-CM

## 2025-03-28 DIAGNOSIS — Z87.39 PERSONAL HISTORY OF OTHER DISEASES OF THE MUSCULOSKELETAL SYSTEM AND CONNECTIVE TISSUE: ICD-10-CM

## 2025-03-28 DIAGNOSIS — G40.909 EPILEPSY, UNSPECIFIED, NOT INTRACTABLE, WITHOUT STATUS EPILEPTICUS: ICD-10-CM

## 2025-03-28 DIAGNOSIS — Z88.1 ALLERGY STATUS TO OTHER ANTIBIOTIC AGENTS: ICD-10-CM

## 2025-03-28 DIAGNOSIS — R74.8 ABNORMAL LEVELS OF OTHER SERUM ENZYMES: ICD-10-CM

## 2025-03-28 LAB
ALBUMIN SERPL ELPH-MCNC: 3.9 G/DL — SIGNIFICANT CHANGE UP (ref 3.3–5)
ALP SERPL-CCNC: 88 U/L — SIGNIFICANT CHANGE UP (ref 40–120)
ALT FLD-CCNC: 62 U/L — SIGNIFICANT CHANGE UP (ref 12–78)
ANION GAP SERPL CALC-SCNC: 2 MMOL/L — LOW (ref 5–17)
AST SERPL-CCNC: 74 U/L — HIGH (ref 15–37)
BASOPHILS # BLD AUTO: 0.03 K/UL — SIGNIFICANT CHANGE UP (ref 0–0.2)
BASOPHILS NFR BLD AUTO: 0.4 % — SIGNIFICANT CHANGE UP (ref 0–2)
BILIRUB SERPL-MCNC: 0.4 MG/DL — SIGNIFICANT CHANGE UP (ref 0.2–1.2)
BUN SERPL-MCNC: 12 MG/DL — SIGNIFICANT CHANGE UP (ref 7–23)
CALCIUM SERPL-MCNC: 9.6 MG/DL — SIGNIFICANT CHANGE UP (ref 8.5–10.1)
CHLORIDE SERPL-SCNC: 103 MMOL/L — SIGNIFICANT CHANGE UP (ref 96–108)
CK SERPL-CCNC: 1215 U/L — HIGH (ref 26–308)
CO2 SERPL-SCNC: 30 MMOL/L — SIGNIFICANT CHANGE UP (ref 22–31)
CREAT SERPL-MCNC: 0.68 MG/DL — SIGNIFICANT CHANGE UP (ref 0.5–1.3)
EGFR: 118 ML/MIN/1.73M2 — SIGNIFICANT CHANGE UP
EGFR: 118 ML/MIN/1.73M2 — SIGNIFICANT CHANGE UP
EOSINOPHIL # BLD AUTO: 0.11 K/UL — SIGNIFICANT CHANGE UP (ref 0–0.5)
EOSINOPHIL NFR BLD AUTO: 1.3 % — SIGNIFICANT CHANGE UP (ref 0–6)
FLUAV AG NPH QL: SIGNIFICANT CHANGE UP
FLUBV AG NPH QL: SIGNIFICANT CHANGE UP
GLUCOSE SERPL-MCNC: 95 MG/DL — SIGNIFICANT CHANGE UP (ref 70–99)
HCT VFR BLD CALC: 40.2 % — SIGNIFICANT CHANGE UP (ref 39–50)
HCT VFR BLD CALC: 41 % — SIGNIFICANT CHANGE UP (ref 39–50)
HGB BLD-MCNC: 13.9 G/DL — SIGNIFICANT CHANGE UP (ref 13–17)
HGB BLD-MCNC: 14.4 G/DL — SIGNIFICANT CHANGE UP (ref 13–17)
IMM GRANULOCYTES NFR BLD AUTO: 0.2 % — SIGNIFICANT CHANGE UP (ref 0–0.9)
LYMPHOCYTES # BLD AUTO: 1.57 K/UL — SIGNIFICANT CHANGE UP (ref 1–3.3)
LYMPHOCYTES # BLD AUTO: 19 % — SIGNIFICANT CHANGE UP (ref 13–44)
MAGNESIUM SERPL-MCNC: 2.2 MG/DL — SIGNIFICANT CHANGE UP (ref 1.6–2.6)
MCHC RBC-ENTMCNC: 30.8 PG — SIGNIFICANT CHANGE UP (ref 27–34)
MCHC RBC-ENTMCNC: 31 PG — SIGNIFICANT CHANGE UP (ref 27–34)
MCHC RBC-ENTMCNC: 34.6 G/DL — SIGNIFICANT CHANGE UP (ref 32–36)
MCHC RBC-ENTMCNC: 35.1 G/DL — SIGNIFICANT CHANGE UP (ref 32–36)
MCV RBC AUTO: 88.2 FL — SIGNIFICANT CHANGE UP (ref 80–100)
MCV RBC AUTO: 88.9 FL — SIGNIFICANT CHANGE UP (ref 80–100)
MONOCYTES # BLD AUTO: 0.48 K/UL — SIGNIFICANT CHANGE UP (ref 0–0.9)
MONOCYTES NFR BLD AUTO: 5.8 % — SIGNIFICANT CHANGE UP (ref 2–14)
NEUTROPHILS # BLD AUTO: 6.04 K/UL — SIGNIFICANT CHANGE UP (ref 1.8–7.4)
NEUTROPHILS NFR BLD AUTO: 73.3 % — SIGNIFICANT CHANGE UP (ref 43–77)
NRBC BLD AUTO-RTO: 0 /100 WBCS — SIGNIFICANT CHANGE UP (ref 0–0)
NRBC BLD AUTO-RTO: 0 /100 WBCS — SIGNIFICANT CHANGE UP (ref 0–0)
PLATELET # BLD AUTO: 206 K/UL — SIGNIFICANT CHANGE UP (ref 150–400)
PLATELET # BLD AUTO: 241 K/UL — SIGNIFICANT CHANGE UP (ref 150–400)
POTASSIUM SERPL-MCNC: 3.7 MMOL/L — SIGNIFICANT CHANGE UP (ref 3.5–5.3)
POTASSIUM SERPL-SCNC: 3.7 MMOL/L — SIGNIFICANT CHANGE UP (ref 3.5–5.3)
PROT SERPL-MCNC: 8.3 GM/DL — SIGNIFICANT CHANGE UP (ref 6–8.3)
RBC # BLD: 4.52 M/UL — SIGNIFICANT CHANGE UP (ref 4.2–5.8)
RBC # BLD: 4.65 M/UL — SIGNIFICANT CHANGE UP (ref 4.2–5.8)
RBC # FLD: 13.2 % — SIGNIFICANT CHANGE UP (ref 10.3–14.5)
RBC # FLD: 13.4 % — SIGNIFICANT CHANGE UP (ref 10.3–14.5)
RSV RNA NPH QL NAA+NON-PROBE: SIGNIFICANT CHANGE UP
SARS-COV-2 RNA SPEC QL NAA+PROBE: SIGNIFICANT CHANGE UP
SODIUM SERPL-SCNC: 135 MMOL/L — SIGNIFICANT CHANGE UP (ref 135–145)
SOURCE RESPIRATORY: SIGNIFICANT CHANGE UP
WBC # BLD: 8.25 K/UL — SIGNIFICANT CHANGE UP (ref 3.8–10.5)
WBC # BLD: 8.65 K/UL — SIGNIFICANT CHANGE UP (ref 3.8–10.5)
WBC # FLD AUTO: 8.25 K/UL — SIGNIFICANT CHANGE UP (ref 3.8–10.5)
WBC # FLD AUTO: 8.65 K/UL — SIGNIFICANT CHANGE UP (ref 3.8–10.5)

## 2025-03-28 PROCEDURE — 99284 EMERGENCY DEPT VISIT MOD MDM: CPT

## 2025-03-28 RX ADMIN — Medication 1000 MILLILITER(S): at 03:14

## 2025-03-28 RX ADMIN — Medication 1000 MILLILITER(S): at 23:33

## 2025-03-28 RX ADMIN — Medication 1000 MILLILITER(S): at 04:14

## 2025-03-28 NOTE — ED ADULT NURSE NOTE - OBJECTIVE STATEMENT
Pt is c/o body aches that have started today. Left AMA from another hospital yesterday for seizures, has not been taking his Depakote. h/o seizure, osteomyelitis, substance abuse on methadone treatment. States that he thinks he has rhabdomyolysis "because I have had it before and it feels the same." denies seizures today. No fevers, chills, abdominal pain, N/V. Last time he did heroin was last week, denies IV use only snorts it. Endorsing dark urine.

## 2025-03-28 NOTE — ED PROVIDER NOTE - CLINICAL SUMMARY MEDICAL DECISION MAKING FREE TEXT BOX
45 M pmh polysubstance abuse, seizure disorder presenting to the ED for body aches.  hx of rhabdomyolysis    concern for rhabdo  labs  IV fluids  CK level 1200, downtrending from 4000 4 days prior  considered admission for rhabdomyolysis. CK level downtrending since previous ED visit 4 days ago. no kidney dysfunction. patient safe for dc home

## 2025-03-28 NOTE — ED PROVIDER NOTE - PATIENT PORTAL LINK FT
You can access the FollowMyHealth Patient Portal offered by [facility] by registering at the following website: [email or web address]. By joining [employer]’s FollowMyHealth portal, you will also be able to view your health information using other applications (apps) compatible with our system.

## 2025-03-28 NOTE — ED PROVIDER NOTE - PHYSICAL EXAMINATION
General: Well appearing female in no acute distress  HEENT: Normocephalic, atraumatic. Moist mucous membranes. Oropharynx clear. No lymphadenopathy.  Eyes: No scleral icterus. EOMI. GIGI.  Neck:. Soft and supple. Full ROM without pain. No midline tenderness  Cardiac: Regular rate and regular rhythm. No murmurs, rubs, gallops. Peripheral pulses 2+ and symmetric. No LE edema.  Resp: Lungs CTAB. Speaking in full sentences. No wheezes, rales or rhonchi.  Abd: Soft, non-tender, non-distended. No guarding or rebound. No scars, masses, or lesions.  Back: Spine midline and non-tender. No CVA tenderness.    Skin: No rashes, abrasions, or lacerations.  Neuro: AO x 3. Moves all extremities symmetrically. Motor strength and sensation grossly intact. ambulatory w/ steady gait

## 2025-03-28 NOTE — ED PROVIDER NOTE - NSFOLLOWUPINSTRUCTIONS_ED_ALL_ED_FT
Please follow up with your primary care physician within the next 2-3 days.    Please continue taking your medications as prescribed by your doctors.     Please return to the emergency department if you experience any of the following symptoms:    Fever  Chest pain  Difficulty breathing  Abdominal pain  Nausea  Vomiting

## 2025-03-28 NOTE — ED PROVIDER NOTE - PHYSICAL EXAMINATION
General: Appears well and nontoxic  Mentation: A&O x 3  psych: mood appropriate  HEENT: airway patent, conjunctivae clear bilaterally  Resp: symmetric chest rise, no resp distress, breath sounds CTA bilaterally  Cardio: RRR, no m/r/g  GI: soft/nondistended/nontender  Neuro: sensation and motor function grossly intact  Skin: no cyanosis, no jaundice  MSK: normal movement of all extremities  Lymph/Vasc: no REBEL edema

## 2025-03-28 NOTE — ED PROVIDER NOTE - PROGRESS NOTE DETAILS
Miri, DO: Patient reassessed at bedside with unchanged clinical appearance. CK level improving from this AM. Patient given IVF in ED. Patient instructed to follow-up with PCP.

## 2025-03-28 NOTE — ED PROVIDER NOTE - NSICDXPASTMEDICALHX_GEN_ALL_CORE_FT
PAST MEDICAL HISTORY:  History of intravenous drug use in remission     Methadone maintenance therapy patient     Osteomyelitis of lumbar spine     Seizure disorder     Tobacco dependence with current use      40w1d

## 2025-03-28 NOTE — ED ADULT NURSE NOTE - OBJECTIVE STATEMENT
Pt A&OX4. Pt Complaining of body aches and leg pain. Pt was seen here this AM and DC. Pt here for the same reason, states " I was here for rhabdomyolysis and I still feel the pain all over the body". Pt states he used heroin and xanax today @11am. Pt denies cp, difficulty breathing, SOB, n/v/d, fever or chills at this time. Pt denies H/I, S/I, A/H, V/H at this time

## 2025-03-28 NOTE — ED PROVIDER NOTE - NSFOLLOWUPINSTRUCTIONS_ED_ALL_ED_FT
Substance Abuse    Chemical dependency is an addiction to drugs or alcohol. It is characterized by the repeated behavior of seeking out and using drugs and alcohol despite harmful consequences to the health and safety of oneself and others. Using drugs in a manner that brought you to an Emergency Room suggests you may have an drug abuse problem. Seek help at a drug addiction center.    SEEK IMMEDIATE MEDICAL CARE IF YOU HAVE ANY OF THE FOLLOWING SYMPTOMS: chest pain, shortness of breath, change in mental status, thoughts about hurting killing yourself, thoughts about hurting or killing somebody else, hallucinations, or worsening depression.      Your CK level is improving. continue to hydrate yourself and follow-up with your primary doctor

## 2025-03-28 NOTE — ED PROVIDER NOTE - CLINICAL SUMMARY MEDICAL DECISION MAKING FREE TEXT BOX
45-year-old male with a past medical history of polysubstance abuse, seizure disorder presenting for body aches. Patient was discharged this morning after being evaluated for rhabdomyolysis. His CK level had decreased from 7136-5026 from 4 days ago. Patient presents today saying he continues to have body aches and complains that his body aches are due to rhabdomyolysis. Denies fevers, nausea, vomiting, abdominal pain, difficulty breathing, chest pain. Physical exam reveals well-appearing male, heart rate regular, clear breath sounds bilaterally, soft nontender abdomen. Will evaluate for rhabdo myelitis progression with CK, CBC, CMP.

## 2025-03-28 NOTE — ED PROVIDER NOTE - PATIENT PORTAL LINK FT
You can access the FollowMyHealth Patient Portal offered by [facility] by registering at the following website: [email or web address]. By joining [employer]’s FollowMyHealth portal, you will also be able to view your health information using other applications (apps) compatible with our system.

## 2025-03-28 NOTE — ED ADULT NURSE NOTE - TEMPLATE LIST FOR HEAD TO TOE ASSESSMENT
Take over-the-counter Benadryl at night and nondrowsy allergy medication such as Zyrtec during the day for the next 5 days.  Take all of the medication prescription.  Return to the ER for worsening shortness of breath intractable nausea or vomiting or other concerns.  
General

## 2025-03-28 NOTE — ED PROVIDER NOTE - OBJECTIVE STATEMENT
45 M pmh polysubstance abuse, seizure disorder presenting to the ED for body aches. Pt states that he feels like he has rhabdomyolysis

## 2025-03-28 NOTE — ED ADULT NURSE NOTE - EXTENSIONS OF SELF_ADULT
Patient is unable to drive at the moment and is req any refills to be sent to  [facility] [id number]. [phone]    This is updated in patients chart None

## 2025-03-28 NOTE — ED PROVIDER NOTE - CONSIDERATION OF ADMISSION OBSERVATION
Consideration of Admission/Observation considered admission for rhabdomylolysis. CK level downtrending since previous ED visit 4 days ago. no kidney dysfunction. patient safe for dc home

## 2025-03-29 VITALS
TEMPERATURE: 98 F | DIASTOLIC BLOOD PRESSURE: 70 MMHG | OXYGEN SATURATION: 97 % | SYSTOLIC BLOOD PRESSURE: 115 MMHG | HEART RATE: 79 BPM | RESPIRATION RATE: 16 BRPM

## 2025-03-29 LAB
ALBUMIN SERPL ELPH-MCNC: 3.7 G/DL — SIGNIFICANT CHANGE UP (ref 3.3–5)
ALP SERPL-CCNC: 94 U/L — SIGNIFICANT CHANGE UP (ref 40–120)
ALT FLD-CCNC: 62 U/L — SIGNIFICANT CHANGE UP (ref 12–78)
ANION GAP SERPL CALC-SCNC: 5 MMOL/L — SIGNIFICANT CHANGE UP (ref 5–17)
AST SERPL-CCNC: 71 U/L — HIGH (ref 15–37)
BILIRUB SERPL-MCNC: 0.4 MG/DL — SIGNIFICANT CHANGE UP (ref 0.2–1.2)
BUN SERPL-MCNC: 16 MG/DL — SIGNIFICANT CHANGE UP (ref 7–23)
CALCIUM SERPL-MCNC: 9.4 MG/DL — SIGNIFICANT CHANGE UP (ref 8.5–10.1)
CHLORIDE SERPL-SCNC: 104 MMOL/L — SIGNIFICANT CHANGE UP (ref 96–108)
CK SERPL-CCNC: 1174 U/L — HIGH (ref 26–308)
CO2 SERPL-SCNC: 29 MMOL/L — SIGNIFICANT CHANGE UP (ref 22–31)
CREAT SERPL-MCNC: 0.8 MG/DL — SIGNIFICANT CHANGE UP (ref 0.5–1.3)
EGFR: 113 ML/MIN/1.73M2 — SIGNIFICANT CHANGE UP
EGFR: 113 ML/MIN/1.73M2 — SIGNIFICANT CHANGE UP
GLUCOSE SERPL-MCNC: 95 MG/DL — SIGNIFICANT CHANGE UP (ref 70–99)
POTASSIUM SERPL-MCNC: 4.3 MMOL/L — SIGNIFICANT CHANGE UP (ref 3.5–5.3)
POTASSIUM SERPL-SCNC: 4.3 MMOL/L — SIGNIFICANT CHANGE UP (ref 3.5–5.3)
PROT SERPL-MCNC: 8.3 GM/DL — SIGNIFICANT CHANGE UP (ref 6–8.3)
SODIUM SERPL-SCNC: 138 MMOL/L — SIGNIFICANT CHANGE UP (ref 135–145)

## 2025-04-14 NOTE — ED ADULT NURSE REASSESSMENT NOTE - NS ED NURSE REASSESS COMMENT FT1
Come back for fasting labs    iv fluids infusing without any s/s of infiltration noted, line intact, meds well tolerated, sleeping, seizure precaution in place, no seizure activity in the ed, repeat lact pending, will continue plan of care.

## 2025-04-15 NOTE — EEG REPORT - NS EEG TEXT BOX
GONZALES MONSERRAT [id number]     Study Date: 		12-13-24  --------------------------------------------------------------------------------------------------  History:  CC/ HPI Patient is a 44y old  Male who presents with a chief complaint of Breakthrough seizures secondary to medication noncompliance (13 Dec 2024 02:03)    MEDICATIONS  (STANDING):  valproic acid 250 milliGRAM(s) Oral two times a day    --------------------------------------------------------------------------------------------------  Study Interpretation:    [[[Abbreviation Key:  PDR=alpha rhythm/posterior dominant rhythm. A-P=anterior posterior gradient.  Amplitude: ‘very low’:<20; ‘low’:20-50; ‘medium’:; ‘high’:>200uV.  Persistence for periodic/rhythmic patterns (% of epoch) ‘rare’:<1%; ‘occasional’:1-10%; ‘frequent’:10-50%; ‘abundant’:50-90%; ‘continuous’:>90%.  Persistence for sporadic discharges: ‘rare’:<1/hr; ‘occasional’:1/min-1/hr; ‘frequent’:>1/min; ‘abundant’:>1/10 sec.  GRDA=generalized rhythmic delta activity; FIRDA=frontal intermittent GRDA; LRDA=lateralized rhythmic delta activity; TIRDA=temporal intermittent rhythmic delta activity;  LPD=PLED=lateralized periodic discharges; GPD=generalized periodic discharges; BiPDs=BiPLEDs=bilateral independent periodic epileptiform discharges; SIRPID=stimulus induced rhythmic, periodic, or ictal appearing discharges; BIRDs=brief potentially ictal rhythmic discharges >4 Hz, lasting .5-10s; PFA=paroxysmal bursts of beta/gamma; LVFA=low voltage fast activity.  Modifiers: +F=with fast component; +S=with spike component; +R=with rhythmic component.  S-B=burst suppression pattern.  Max=maximal. N1-drowsy; N2-stage II sleep; N3-slow wave sleep. SSS/BETS=small sharp spikes/benign epileptiform transients of sleep. HV=hyperventilation; PS=photic stimulation]]]    Daily EEG Visual Analysis    FINDINGS:      Background:  Continuity: continuous  Symmetry: symmetric  PDR: 8.5-9 Hz activity, with amplitude to 40 uV, that attenuated to eye opening.  Low amplitude frontal beta noted in wakefulness.  Reactivity: present  Voltage: normal, mostly 20-150uV  Anterior Posterior Gradient: present  Other background findings: none  Breach: absent    Background Slowing:  Generalized slowing: none was present.  Focal slowing: none was present.    State Changes:   -Drowsiness noted with increased slowing, attenuation of fast activity, vertex transients.  -Present with N2 sleep transients with symmetric spindles and K-complexes.    Sporadic Epileptiform Discharges:    None    Rhythmic and Periodic Patterns (RPPs):  None     Electrographic and Electroclinical seizures:  None    Other Clinical Events:  None    Activation Procedures:   -Hyperventilation was not performed.    -Photic stimulation was performed and did not elicit any abnormalities.      Artifacts:  Intermittent myogenic and movement artifacts were noted.    ECG:  The heart rate on single channel ECG was predominantly between 70-90 BPM.    EEG Classification / Summary:  Normal  EEG in the awake / drowsy / asleep state(s).    Clinical Impression:  There were no epileptiform abnormalities recorded.        *PRELIM READ, ATTENDING REVIEW PENDING*     -------------------------------------------------------------------------------------------------------  [facility] EEG Reading Room Ph#: [phone]  Epilepsy Answering Service after 5PM and before 8:30AM: Ph#: [phone]    Chris Lawrence, DO   Epilepsy Fellow    GONZALES MONSERRAT [id number]     Study Date: 		12-13-24  --------------------------------------------------------------------------------------------------  History:  CC/ HPI Patient is a 44y old  Male who presents with a chief complaint of Breakthrough seizures secondary to medication noncompliance (13 Dec 2024 02:03)    MEDICATIONS  (STANDING):  valproic acid 250 milliGRAM(s) Oral two times a day    --------------------------------------------------------------------------------------------------  Study Interpretation:    [[[Abbreviation Key:  PDR=alpha rhythm/posterior dominant rhythm. A-P=anterior posterior gradient.  Amplitude: ‘very low’:<20; ‘low’:20-50; ‘medium’:; ‘high’:>200uV.  Persistence for periodic/rhythmic patterns (% of epoch) ‘rare’:<1%; ‘occasional’:1-10%; ‘frequent’:10-50%; ‘abundant’:50-90%; ‘continuous’:>90%.  Persistence for sporadic discharges: ‘rare’:<1/hr; ‘occasional’:1/min-1/hr; ‘frequent’:>1/min; ‘abundant’:>1/10 sec.  GRDA=generalized rhythmic delta activity; FIRDA=frontal intermittent GRDA; LRDA=lateralized rhythmic delta activity; TIRDA=temporal intermittent rhythmic delta activity;  LPD=PLED=lateralized periodic discharges; GPD=generalized periodic discharges; BiPDs=BiPLEDs=bilateral independent periodic epileptiform discharges; SIRPID=stimulus induced rhythmic, periodic, or ictal appearing discharges; BIRDs=brief potentially ictal rhythmic discharges >4 Hz, lasting .5-10s; PFA=paroxysmal bursts of beta/gamma; LVFA=low voltage fast activity.  Modifiers: +F=with fast component; +S=with spike component; +R=with rhythmic component.  S-B=burst suppression pattern.  Max=maximal. N1-drowsy; N2-stage II sleep; N3-slow wave sleep. SSS/BETS=small sharp spikes/benign epileptiform transients of sleep. HV=hyperventilation; PS=photic stimulation]]]    Daily EEG Visual Analysis    FINDINGS:      Background:  Continuity: continuous  Symmetry: symmetric  PDR: 8.5-9 Hz activity, with amplitude to 40 uV, that attenuated to eye opening.  Low amplitude frontal beta noted in wakefulness.  Reactivity: present  Voltage: normal, mostly 20-150uV  Anterior Posterior Gradient: present  Other background findings: none  Breach: absent    Background Slowing:  Generalized slowing: none was present.  Focal slowing: none was present.    State Changes:   -Drowsiness noted with increased slowing, attenuation of fast activity, vertex transients.  -Present with N2 sleep transients with symmetric spindles and K-complexes.    Sporadic Epileptiform Discharges:    possible fragments of generalized spike/wave    Rhythmic and Periodic Patterns (RPPs):  None     Electrographic and Electroclinical seizures:  None    Other Clinical Events:  None    Activation Procedures:   -Hyperventilation was not performed.    -Photic stimulation was performed and did not elicit any abnormalities.      Artifacts:  Intermittent myogenic and movement artifacts were noted.    ECG:  The heart rate on single channel ECG was predominantly between 70-90 BPM.    EEG Classification / Summary:  Abnormal  EEG in the awake / drowsy / asleep state(s):   1. Possible fragments of generalized spike/wave discharges    Clinical Impression:  1. Possible indication of a generalized epilepsy syndrome, however, insufficiently clear to characterize fully. If clinically indicated, prolonged EEG recording may be helpful in more fully characterizing current finding. Prior EEG report from March 2021 has noted rate presence of generalized spike wave discharges.       *PRELIM READ, ATTENDING REVIEW PENDING*     -------------------------------------------------------------------------------------------------------  [facility] EEG Reading Room Ph#: [phone]  Epilepsy Answering Service after 5PM and before 8:30AM: Ph#: [phone]    Chris Lawrence DO   Epilepsy Fellow    GONZALES MONSERRAT [id number]     Study Date: 		12-13-24  --------------------------------------------------------------------------------------------------  History:  CC/ HPI Patient is a 44y old  Male who presents with a chief complaint of Breakthrough seizures secondary to medication noncompliance (13 Dec 2024 02:03)    MEDICATIONS  (STANDING):  valproic acid 250 milliGRAM(s) Oral two times a day    --------------------------------------------------------------------------------------------------  Study Interpretation:    [[[Abbreviation Key:  PDR=alpha rhythm/posterior dominant rhythm. A-P=anterior posterior gradient.  Amplitude: ‘very low’:<20; ‘low’:20-50; ‘medium’:; ‘high’:>200uV.  Persistence for periodic/rhythmic patterns (% of epoch) ‘rare’:<1%; ‘occasional’:1-10%; ‘frequent’:10-50%; ‘abundant’:50-90%; ‘continuous’:>90%.  Persistence for sporadic discharges: ‘rare’:<1/hr; ‘occasional’:1/min-1/hr; ‘frequent’:>1/min; ‘abundant’:>1/10 sec.  GRDA=generalized rhythmic delta activity; FIRDA=frontal intermittent GRDA; LRDA=lateralized rhythmic delta activity; TIRDA=temporal intermittent rhythmic delta activity;  LPD=PLED=lateralized periodic discharges; GPD=generalized periodic discharges; BiPDs=BiPLEDs=bilateral independent periodic epileptiform discharges; SIRPID=stimulus induced rhythmic, periodic, or ictal appearing discharges; BIRDs=brief potentially ictal rhythmic discharges >4 Hz, lasting .5-10s; PFA=paroxysmal bursts of beta/gamma; LVFA=low voltage fast activity.  Modifiers: +F=with fast component; +S=with spike component; +R=with rhythmic component.  S-B=burst suppression pattern.  Max=maximal. N1-drowsy; N2-stage II sleep; N3-slow wave sleep. SSS/BETS=small sharp spikes/benign epileptiform transients of sleep. HV=hyperventilation; PS=photic stimulation]]]    Daily EEG Visual Analysis    FINDINGS:      Background:  Continuity: continuous  Symmetry: symmetric  PDR: 8.5-9 Hz activity, with amplitude to 40 uV, that attenuated to eye opening.  Low amplitude frontal beta noted in wakefulness.  Reactivity: present  Voltage: normal, mostly 20-150uV  Anterior Posterior Gradient: present  Other background findings: none  Breach: absent    Background Slowing:  Generalized slowing: none was present.  Focal slowing: none was present.    State Changes:   -Drowsiness noted with increased slowing, attenuation of fast activity, vertex transients.  -Present with N2 sleep transients with symmetric spindles and K-complexes.    Sporadic Epileptiform Discharges:    possible fragments of generalized spike/wave    Rhythmic and Periodic Patterns (RPPs):  None     Electrographic and Electroclinical seizures:  None    Other Clinical Events:  None    Activation Procedures:   -Hyperventilation was not performed.    -Photic stimulation was performed and did not elicit any abnormalities.      Artifacts:  Intermittent myogenic and movement artifacts were noted.    ECG:  The heart rate on single channel ECG was predominantly between 70-90 BPM.    EEG Classification / Summary:  Abnormal  EEG in the awake / drowsy / asleep state(s):   1. Possible fragments of generalized spike/wave discharges    Clinical Impression:  1. Possible indication of a generalized epilepsy syndrome, however, insufficiently clear to characterize fully. If clinically indicated, prolonged EEG recording may be helpful in more fully characterizing current finding. Prior EEG report from March 2021 has noted rate presence of generalized spike wave discharges.     -------------------------------------------------------------------------------------------------------  [facility] EEG Reading Room Ph#: [phone]  Epilepsy Answering Service after 5PM and before 8:30AM: Ph#: [phone]    Chris Lawrence DO   Epilepsy Fellow     Alfa Barrios MD PhD  Director, Epilepsy Division, [facility]  Complex Requirements: Extensive Undermining Performed?: No

## 2025-04-23 NOTE — ED ADULT NURSE NOTE - ISOLATION TYPE:
None
You can access the FollowMyHealth Patient Portal offered by [facility] by registering at the following website: [email or web address]. By joining [employer]’s FollowMyHealth portal, you will also be able to view your health information using other applications (apps) compatible with our system.

## 2025-07-08 NOTE — ED PROVIDER NOTE - PRO INTERPRETER NEED 2
Problem:  Digital mucous cyst of finger of right hand [M67.441]     Is Patient currently treated by: Sola    Referred by:  Malia Cash ER:  [facility] Date:  7/8/2025    Date of Injury:  Started a couple months ago Type: Blister on right 5th finger for a couple of months. Patient states it started as a hard mass, it started to get bigger and redness. Poked with a needle, some discharge came out. Pain started 2 days ago    Work Related: no    Previous X-Rays:  no    Has patient been made aware to bring films?  no           English

## 2025-07-21 NOTE — ED PROVIDER NOTE - NSICDXPASTSURGICALHX_GEN_ALL_CORE_FT
PAST SURGICAL HISTORY:  H/O Spinal surgery     
Detail Level: Zone
Initiate Treatment: triamcinolone acetonide 0.1 % topical cream Daily then 3 times a week\\nQuantity: 80.0 g  Days Supply: 30\\nSig: Apply to affected area once a day for 2 weeks then use 3 times weekly as needed
Render In Strict Bullet Format?: No